# Patient Record
Sex: FEMALE | Race: WHITE | NOT HISPANIC OR LATINO | Employment: FULL TIME | URBAN - METROPOLITAN AREA
[De-identification: names, ages, dates, MRNs, and addresses within clinical notes are randomized per-mention and may not be internally consistent; named-entity substitution may affect disease eponyms.]

---

## 2020-03-12 ENCOUNTER — CONSULT (OUTPATIENT)
Dept: ONCOLOGY | Facility: CLINIC | Age: 55
End: 2020-03-12

## 2020-03-12 VITALS
HEIGHT: 62 IN | SYSTOLIC BLOOD PRESSURE: 158 MMHG | HEART RATE: 78 BPM | DIASTOLIC BLOOD PRESSURE: 89 MMHG | OXYGEN SATURATION: 97 % | WEIGHT: 186 LBS | TEMPERATURE: 97.2 F | BODY MASS INDEX: 34.23 KG/M2 | RESPIRATION RATE: 16 BRPM

## 2020-03-12 DIAGNOSIS — C50.411 MALIGNANT NEOPLASM OF UPPER-OUTER QUADRANT OF RIGHT BREAST IN FEMALE, ESTROGEN RECEPTOR NEGATIVE (HCC): Primary | ICD-10-CM

## 2020-03-12 DIAGNOSIS — Z51.11 ENCOUNTER FOR ANTINEOPLASTIC CHEMOTHERAPY: ICD-10-CM

## 2020-03-12 DIAGNOSIS — Z17.1 MALIGNANT NEOPLASM OF UPPER-OUTER QUADRANT OF RIGHT BREAST IN FEMALE, ESTROGEN RECEPTOR NEGATIVE (HCC): Primary | ICD-10-CM

## 2020-03-12 PROBLEM — C50.419 MALIGNANT NEOPLASM OF UPPER OUTER QUADRANT OF BREAST IN FEMALE, ESTROGEN RECEPTOR NEGATIVE (HCC): Status: ACTIVE | Noted: 2020-03-12

## 2020-03-12 PROCEDURE — 99245 OFF/OP CONSLTJ NEW/EST HI 55: CPT | Performed by: INTERNAL MEDICINE

## 2020-03-12 RX ORDER — DEXAMETHASONE 4 MG/1
TABLET ORAL
Qty: 6 TABLET | Refills: 3 | Status: SHIPPED | OUTPATIENT
Start: 2020-03-12 | End: 2020-08-07

## 2020-03-12 RX ORDER — DOXORUBICIN HYDROCHLORIDE 2 MG/ML
60 INJECTION, SOLUTION INTRAVENOUS ONCE
Status: CANCELLED | OUTPATIENT
Start: 2020-03-24

## 2020-03-12 RX ORDER — ATENOLOL 50 MG/1
75 TABLET ORAL 2 TIMES DAILY
COMMUNITY
Start: 2019-12-27

## 2020-03-12 RX ORDER — MODAFINIL 200 MG/1
200 TABLET ORAL DAILY
COMMUNITY
Start: 2020-03-11

## 2020-03-12 RX ORDER — ONDANSETRON HYDROCHLORIDE 8 MG/1
8 TABLET, FILM COATED ORAL 3 TIMES DAILY PRN
Qty: 30 TABLET | Refills: 5 | Status: SHIPPED | OUTPATIENT
Start: 2020-03-12 | End: 2020-10-02 | Stop reason: SDUPTHER

## 2020-03-12 RX ORDER — SODIUM CHLORIDE 9 MG/ML
250 INJECTION, SOLUTION INTRAVENOUS ONCE
Status: CANCELLED | OUTPATIENT
Start: 2020-03-24

## 2020-03-12 RX ORDER — HYDROCHLOROTHIAZIDE 25 MG/1
25 TABLET ORAL DAILY
COMMUNITY
Start: 2019-12-23 | End: 2021-06-26 | Stop reason: HOSPADM

## 2020-03-12 RX ORDER — VENLAFAXINE HYDROCHLORIDE 75 MG/1
75 CAPSULE, EXTENDED RELEASE ORAL DAILY
COMMUNITY
Start: 2019-12-23

## 2020-03-12 RX ORDER — LIDOCAINE AND PRILOCAINE 25; 25 MG/G; MG/G
CREAM TOPICAL AS NEEDED
Qty: 30 G | Refills: 3 | Status: SHIPPED | OUTPATIENT
Start: 2020-03-12 | End: 2021-06-08

## 2020-03-12 RX ORDER — PALONOSETRON 0.05 MG/ML
0.25 INJECTION, SOLUTION INTRAVENOUS ONCE
Status: CANCELLED | OUTPATIENT
Start: 2020-03-24

## 2020-03-12 NOTE — PROGRESS NOTES
CHIEF COMPLAINT: Neoadjuvant breast cancer discussion    REASON FOR REFERRAL: Same      RECORDS OBTAINED  Records of the patients history including those obtained from Dr. Perez were reviewed and summarized in detail.    Oncology/Hematology History    1.  Stage IIB T2 (2.5cm per Dr. Perez) N0 invasive ductal triple negative breast cancer.  2/16/2020 right upper outer quadrant needle core biopsy showed invasive ductal carcinoma 4.4 mm moderately differentiated negative for estrogen, progesterone receptor both 0% and HER-2/vashti 0+.  Dr. Santo recommended lumpectomy and sentinel node biopsy.  3/12/2020 saw Dr. Gary Perez.perimenopausal breast cancer she felt a 2.5 cm right upper outer quadrant mass almost at the edge of the axilla somewhat fixed.  No skin changes.  She recommended neoadjuvant chemotherapy followed by lumpectomy versus mastectomy then radiation if patient decides on breast conservation or if nodes are found on breast MRI.  She will get formal genetic counseling to help guide the issues of possible prophylactic mastectomy.  With triple negative disease the standard recommendation would be neoadjuvant Adriamycin Cytoxan dose dense x4 followed by Taxol weekly x12 followed by surgery.  If there is residual disease then would consider Xeloda per the create X trial.  We also have  looking at observation versus Keytruda for 1 year in patients with triple negative disease who do not have a complete pathological response to neoadjuvant chemotherapy.  In the absence of symptoms, NCCN guidelines does not recommend routine metastatic surveillance imaging.  Will need ejection fraction before Adriamycin.  2.  Hypertension  3.  Hyperlipidemia  4.  Diabetes  5.  Using IUD and taking estradiol for hormone replacement therapy preoperatively  6.  History of D and C as well as bilateral tubal ligation  7.  History of cholecystectomy  8.  Vasomotor symptoms  9.  Family history of paternal aunt with breast cancer  and perhaps a sister with ovarian cancer but she is not sure.  She has nulliparous.  Genetic spit test done results pending.    10.  Anxiety and depression        Malignant neoplasm of upper outer quadrant of breast in female, estrogen receptor negative (CMS/HCC)    3/12/2020 Initial Diagnosis     Malignant neoplasm of upper outer quadrant of breast in female, estrogen receptor negative (CMS/HCC)      3/12/2020 Cancer Staged     Staging form: Breast, AJCC 8th Edition  - Clinical: Stage IIB (cT2, cN0, cM0, G2, ER-, NV-, HER2-) - Signed by Johann Juarez MD on 3/12/2020         HISTORY OF PRESENT ILLNESS:  The patient is a 54 y.o.  female, referred for neoadjuvant breast cancer discussion.  This was found on screening mammography.  See above for oncology history.  No signs or symptoms to suggest metastasis.    REVIEW OF SYSTEMS:  A 14 point review of systems was performed and is negative except as noted above.    History reviewed. No pertinent past medical history.  Past Surgical History:   Procedure Laterality Date   • BREAST BIOPSY Right 02/2020   • CHOLECYSTECTOMY     • COLONOSCOPY         Current Outpatient Medications on File Prior to Visit   Medication Sig Dispense Refill   • atenolol (TENORMIN) 50 MG tablet      • hydroCHLOROthiazide (HYDRODIURIL) 25 MG tablet      • metFORMIN (GLUCOPHAGE) 1000 MG tablet Take 1,000 mg by mouth 2 (Two) Times a Day.     • modafinil (PROVIGIL) 200 MG tablet      • venlafaxine XR (EFFEXOR-XR) 75 MG 24 hr capsule        No current facility-administered medications on file prior to visit.        No Known Allergies    Social History     Socioeconomic History   • Marital status:      Spouse name: Not on file   • Number of children: Not on file   • Years of education: Not on file   • Highest education level: Not on file   Tobacco Use   • Smoking status: Never Smoker   • Smokeless tobacco: Never Used       Family History   Problem Relation Age of Onset   • Diabetes Mother    • Lung  "cancer Father    • Breast cancer Paternal Aunt        PHYSICAL EXAM:    /89   Pulse 78   Temp 97.2 °F (36.2 °C)   Resp 16   Ht 157.5 cm (62\")   Wt 84.4 kg (186 lb)   SpO2 97%   BMI 34.02 kg/m²     ECOG: (0) Fully Active - Able to Carry On All Pre-disease Performance Without Restriction  General: well appearing female in no acute distress  HEENT: sclera anicteric, oropharynx clear  Lymphatics: no cervical, supraclavicular, inguinal, or axillary adenopathy  Cardiovascular: regular rate and rhythm, no murmurs  Neck: Supple; No thyromegaly  Lungs: clear to auscultation bilaterally. No respiratory distress.   Abdomen: soft, nontender, nondistended.  No palpable organomegaly  Extremities: no cyanosis, clubbing, edema, or cords  Skin: no rashes, lesions, bruising, or petechiae  Neuro: Alert and oriented x 4; Moving all extremities.  Psych: No anxiety or depression  Breasts: Just examined by Dr. Perez today  No results found for: HGB, HCT, MCV, PLT, WBC, NEUTROABS, LYMPHSABS, MONOSABS, EOSABS, BASOSABS  No results found for: GLUCOSE, BUN, CREATININE, NA, K, CL, CO2, CALCIUM, PROTEINTOT, ALBUMIN, BILITOT, ALKPHOS, AST, ALT        Assessment/Plan     1.  Stage IIB T2 (2.5cm per Dr. Perez) N0 invasive ductal triple negative breast cancer.  2/16/2020 right upper outer quadrant needle core biopsy showed invasive ductal carcinoma 4.4 mm moderately differentiated negative for estrogen, progesterone receptor both 0% and HER-2/avshti 0+.  Dr. Santo recommended lumpectomy and sentinel node biopsy.  3/12/2020 saw Dr. Gary Perez.perimenopausal breast cancer she felt a 2.5 cm right upper outer quadrant mass almost at the edge of the axilla somewhat fixed.  No skin changes.  She recommended neoadjuvant chemotherapy followed by lumpectomy versus mastectomy then radiation if patient decides on breast conservation or if nodes are found on breast MRI.  She will get formal genetic counseling to help guide the issues of possible " prophylactic mastectomy.  With triple negative disease the standard recommendation would be neoadjuvant Adriamycin Cytoxan dose dense x4 followed by Taxol weekly x12 followed by surgery.  If there is residual disease then would consider Xeloda per the create X trial.  We also have  looking at observation versus Keytruda for 1 year in patients with triple negative disease who do not have a complete pathological response to neoadjuvant chemotherapy.  In the absence of symptoms, NCCN guidelines does not recommend routine metastatic surveillance imaging.  Will need ejection fraction before Adriamycin.  2.  Hypertension  3.  Hyperlipidemia  4.  Diabetes  5.  Using IUD and taking estradiol for hormone replacement therapy preoperatively  6.  History of D and C as well as bilateral tubal ligation  7.  History of cholecystectomy  8.  Vasomotor symptoms  9.  Family history of paternal aunt with breast cancer and perhaps a sister with ovarian cancer but she is not sure.  She has nulliparous.  Genetic spit test done results pending.    10.  Anxiety and depression    Discussed with patient face-to-face for 90 minutes greater than 50% spent counseling regarding this plan as outlined above  Johann Juarez MD    3/12/2020

## 2020-03-13 ENCOUNTER — LAB REQUISITION (OUTPATIENT)
Dept: LAB | Facility: HOSPITAL | Age: 55
End: 2020-03-13

## 2020-03-13 ENCOUNTER — NURSE NAVIGATOR (OUTPATIENT)
Dept: ONCOLOGY | Facility: CLINIC | Age: 55
End: 2020-03-13

## 2020-03-13 DIAGNOSIS — D05.91 UNSPECIFIED TYPE OF CARCINOMA IN SITU OF RIGHT BREAST: ICD-10-CM

## 2020-03-13 NOTE — PROGRESS NOTES
I saw patient with Dr Perez and patients daughters - Dr Perez reviewed the pathology report and surgical/treatment options -stage IB, right breast triple negative breast cancer - the patient had saliva genetic test (result pending) in [place]. She will undergo neoadjuvant chemotherapy - MRI has been ordered - she will see Dr Mcmahon today - genetic referral sent  And [facility] will fax to genetics the result from the saliva test - the genetic counselors will then decide if additional testing is needed -   Educational and supportive materials were given and reviewed

## 2020-03-16 ENCOUNTER — TELEPHONE (OUTPATIENT)
Dept: ONCOLOGY | Facility: CLINIC | Age: 55
End: 2020-03-16

## 2020-03-16 DIAGNOSIS — C50.411 MALIGNANT NEOPLASM OF UPPER-OUTER QUADRANT OF RIGHT BREAST IN FEMALE, ESTROGEN RECEPTOR NEGATIVE (HCC): Primary | ICD-10-CM

## 2020-03-16 DIAGNOSIS — Z51.11 ENCOUNTER FOR ANTINEOPLASTIC CHEMOTHERAPY: ICD-10-CM

## 2020-03-16 DIAGNOSIS — Z17.1 MALIGNANT NEOPLASM OF UPPER-OUTER QUADRANT OF RIGHT BREAST IN FEMALE, ESTROGEN RECEPTOR NEGATIVE (HCC): Primary | ICD-10-CM

## 2020-03-16 LAB
CYTO UR: NORMAL
DX PRELIMINARY: NORMAL
LAB AP CASE REPORT: NORMAL
LAB AP CLINICAL INFORMATION: NORMAL
PATH REPORT.GROSS SPEC: NORMAL

## 2020-03-16 NOTE — TELEPHONE ENCOUNTER
Spoke with patient. Advised that the port placements are on hold right now and a PICC line would be placed for treatment. Placed the referral order for the PICC line. Advised the patient that the scheduling center will call to get this scheduled. Patient verbalized understanding.

## 2020-03-17 ENCOUNTER — TELEPHONE (OUTPATIENT)
Dept: ONCOLOGY | Facility: CLINIC | Age: 55
End: 2020-03-17

## 2020-03-17 ENCOUNTER — TRANSCRIBE ORDERS (OUTPATIENT)
Dept: GENERAL RADIOLOGY | Facility: HOSPITAL | Age: 55
End: 2020-03-17

## 2020-03-17 DIAGNOSIS — C50.411 MALIGNANT NEOPLASM OF UPPER-OUTER QUADRANT OF RIGHT FEMALE BREAST, UNSPECIFIED ESTROGEN RECEPTOR STATUS (HCC): Primary | ICD-10-CM

## 2020-03-18 ENCOUNTER — HOSPITAL ENCOUNTER (OUTPATIENT)
Dept: MRI IMAGING | Facility: HOSPITAL | Age: 55
Discharge: HOME | End: 2020-03-18
Admitting: SURGERY

## 2020-03-18 ENCOUNTER — HOSPITAL ENCOUNTER (OUTPATIENT)
Dept: GENERAL RADIOLOGY | Facility: HOSPITAL | Age: 55
Discharge: HOME | End: 2020-03-18

## 2020-03-18 DIAGNOSIS — C50.411 MALIGNANT NEOPLASM OF UPPER-OUTER QUADRANT OF RIGHT FEMALE BREAST (HCC): ICD-10-CM

## 2020-03-18 DIAGNOSIS — Z95.828 PORT-A-CATH IN PLACE: ICD-10-CM

## 2020-03-18 DIAGNOSIS — C50.411 MALIGNANT NEOPLASM OF UPPER-OUTER QUADRANT OF RIGHT FEMALE BREAST, UNSPECIFIED ESTROGEN RECEPTOR STATUS (HCC): ICD-10-CM

## 2020-03-18 LAB — CREAT BLDA-MCNC: 0.9 MG/DL (ref 0.6–1.3)

## 2020-03-18 PROCEDURE — 82565 ASSAY OF CREATININE: CPT

## 2020-03-18 PROCEDURE — 77049 MRI BREAST C-+ W/CAD BI: CPT

## 2020-03-18 PROCEDURE — 77049 MRI BREAST C-+ W/CAD BI: CPT | Performed by: RADIOLOGY

## 2020-03-18 PROCEDURE — A9577 INJ MULTIHANCE: HCPCS | Performed by: SURGERY

## 2020-03-18 PROCEDURE — 71045 X-RAY EXAM CHEST 1 VIEW: CPT

## 2020-03-18 PROCEDURE — 0 GADOBENATE DIMEGLUMINE 529 MG/ML SOLUTION: Performed by: SURGERY

## 2020-03-18 PROCEDURE — 77001 FLUOROGUIDE FOR VEIN DEVICE: CPT

## 2020-03-18 RX ADMIN — GADOBENATE DIMEGLUMINE 16 ML: 529 INJECTION, SOLUTION INTRAVENOUS at 15:19

## 2020-03-19 ENCOUNTER — HOSPITAL ENCOUNTER (OUTPATIENT)
Dept: CARDIOLOGY | Facility: HOSPITAL | Age: 55
Discharge: HOME | End: 2020-03-19
Admitting: INTERNAL MEDICINE

## 2020-03-19 VITALS — HEIGHT: 62 IN | WEIGHT: 186 LBS | BODY MASS INDEX: 34.23 KG/M2

## 2020-03-19 DIAGNOSIS — C50.411 MALIGNANT NEOPLASM OF UPPER-OUTER QUADRANT OF RIGHT BREAST IN FEMALE, ESTROGEN RECEPTOR NEGATIVE (HCC): ICD-10-CM

## 2020-03-19 DIAGNOSIS — Z17.1 MALIGNANT NEOPLASM OF UPPER-OUTER QUADRANT OF RIGHT BREAST IN FEMALE, ESTROGEN RECEPTOR NEGATIVE (HCC): ICD-10-CM

## 2020-03-19 DIAGNOSIS — Z51.11 ENCOUNTER FOR ANTINEOPLASTIC CHEMOTHERAPY: ICD-10-CM

## 2020-03-19 LAB
BH CV ECHO MEAS - AO MAX PG (FULL): 2.3 MMHG
BH CV ECHO MEAS - AO MAX PG: 7 MMHG
BH CV ECHO MEAS - AO ROOT AREA (BSA CORRECTED): 1.6
BH CV ECHO MEAS - AO ROOT AREA: 6.6 CM^2
BH CV ECHO MEAS - AO ROOT DIAM: 2.9 CM
BH CV ECHO MEAS - AO V2 MAX: 136 CM/SEC
BH CV ECHO MEAS - AVA(V,A): 2.3 CM^2
BH CV ECHO MEAS - AVA(V,D): 2.3 CM^2
BH CV ECHO MEAS - BSA(HAYCOCK): 2 M^2
BH CV ECHO MEAS - BSA: 1.9 M^2
BH CV ECHO MEAS - BZI_BMI: 34 KILOGRAMS/M^2
BH CV ECHO MEAS - BZI_METRIC_HEIGHT: 157.5 CM
BH CV ECHO MEAS - BZI_METRIC_WEIGHT: 84.4 KG
BH CV ECHO MEAS - EDV(CUBED): 92.3 ML
BH CV ECHO MEAS - EDV(MOD-SP2): 53 ML
BH CV ECHO MEAS - EDV(MOD-SP4): 61 ML
BH CV ECHO MEAS - EDV(TEICH): 93.4 ML
BH CV ECHO MEAS - EF(CUBED): 62.5 %
BH CV ECHO MEAS - EF(MOD-BP): 61 %
BH CV ECHO MEAS - EF(MOD-SP2): 64.2 %
BH CV ECHO MEAS - EF(MOD-SP4): 57.4 %
BH CV ECHO MEAS - EF(TEICH): 54.1 %
BH CV ECHO MEAS - ESV(CUBED): 34.6 ML
BH CV ECHO MEAS - ESV(MOD-SP2): 19 ML
BH CV ECHO MEAS - ESV(MOD-SP4): 26 ML
BH CV ECHO MEAS - ESV(TEICH): 42.8 ML
BH CV ECHO MEAS - FS: 27.9 %
BH CV ECHO MEAS - IVS/LVPW: 0.94
BH CV ECHO MEAS - IVSD: 0.86 CM
BH CV ECHO MEAS - LA DIMENSION: 3.5 CM
BH CV ECHO MEAS - LA/AO: 1.2
BH CV ECHO MEAS - LAD MAJOR: 5.3 CM
BH CV ECHO MEAS - LAT PEAK E' VEL: 11.8 CM/SEC
BH CV ECHO MEAS - LATERAL E/E' RATIO: 5.4
BH CV ECHO MEAS - LV DIASTOLIC VOL/BSA (35-75): 32.9 ML/M^2
BH CV ECHO MEAS - LV MASS(C)D: 131.8 GRAMS
BH CV ECHO MEAS - LV MASS(C)DI: 71.1 GRAMS/M^2
BH CV ECHO MEAS - LV MAX PG: 4.7 MMHG
BH CV ECHO MEAS - LV MEAN PG: 3 MMHG
BH CV ECHO MEAS - LV SYSTOLIC VOL/BSA (12-30): 14 ML/M^2
BH CV ECHO MEAS - LV V1 MAX: 108 CM/SEC
BH CV ECHO MEAS - LV V1 MEAN: 74.3 CM/SEC
BH CV ECHO MEAS - LV V1 VTI: 22.7 CM
BH CV ECHO MEAS - LVIDD: 4.5 CM
BH CV ECHO MEAS - LVIDS: 3.3 CM
BH CV ECHO MEAS - LVLD AP2: 7.2 CM
BH CV ECHO MEAS - LVLD AP4: 7.2 CM
BH CV ECHO MEAS - LVLS AP2: 5.7 CM
BH CV ECHO MEAS - LVLS AP4: 6.1 CM
BH CV ECHO MEAS - LVOT AREA (M): 2.8 CM^2
BH CV ECHO MEAS - LVOT AREA: 2.8 CM^2
BH CV ECHO MEAS - LVOT DIAM: 1.9 CM
BH CV ECHO MEAS - LVPWD: 0.92 CM
BH CV ECHO MEAS - MED PEAK E' VEL: 5.7 CM/SEC
BH CV ECHO MEAS - MEDIAL E/E' RATIO: 11.1
BH CV ECHO MEAS - MV A MAX VEL: 61.4 CM/SEC
BH CV ECHO MEAS - MV DEC TIME: 0.22 SEC
BH CV ECHO MEAS - MV E MAX VEL: 63.4 CM/SEC
BH CV ECHO MEAS - MV E/A: 1
BH CV ECHO MEAS - PA ACC SLOPE: 436.5 CM/SEC^2
BH CV ECHO MEAS - PA ACC TIME: 0.11 SEC
BH CV ECHO MEAS - PA MAX PG: 2.7 MMHG
BH CV ECHO MEAS - PA PR(ACCEL): 29.1 MMHG
BH CV ECHO MEAS - PA V2 MAX: 82.5 CM/SEC
BH CV ECHO MEAS - SI(CUBED): 31.1 ML/M^2
BH CV ECHO MEAS - SI(LVOT): 34.7 ML/M^2
BH CV ECHO MEAS - SI(MOD-SP2): 18.3 ML/M^2
BH CV ECHO MEAS - SI(MOD-SP4): 18.9 ML/M^2
BH CV ECHO MEAS - SI(TEICH): 27.3 ML/M^2
BH CV ECHO MEAS - SV(CUBED): 57.7 ML
BH CV ECHO MEAS - SV(LVOT): 64.4 ML
BH CV ECHO MEAS - SV(MOD-SP2): 34 ML
BH CV ECHO MEAS - SV(MOD-SP4): 35 ML
BH CV ECHO MEAS - SV(TEICH): 50.6 ML
BH CV ECHO MEAS - TAPSE (>1.6): 1.6 CM2
BH CV ECHO MEASUREMENTS AVERAGE E/E' RATIO: 7.25
BH CV VAS BP LEFT ARM: NORMAL MMHG
BH CV XLRA - RV BASE: 3.1 CM
BH CV XLRA - RV LENGTH: 6.9 CM
BH CV XLRA - RV MID: 2.1 CM
BH CV XLRA - TDI S': 8.9 CM/SEC
LEFT ATRIUM VOLUME INDEX: 27.5 ML/M^2
LEFT ATRIUM VOLUME: 51 ML
LV EF 2D ECHO EST: 60 %
MAXIMAL PREDICTED HEART RATE: 166 BPM
STRESS TARGET HR: 141 BPM

## 2020-03-19 PROCEDURE — 93306 TTE W/DOPPLER COMPLETE: CPT

## 2020-03-19 PROCEDURE — 93306 TTE W/DOPPLER COMPLETE: CPT | Performed by: INTERNAL MEDICINE

## 2020-03-20 ENCOUNTER — OFFICE VISIT (OUTPATIENT)
Dept: ONCOLOGY | Facility: CLINIC | Age: 55
End: 2020-03-20

## 2020-03-20 VITALS
WEIGHT: 185 LBS | BODY MASS INDEX: 34.04 KG/M2 | OXYGEN SATURATION: 98 % | TEMPERATURE: 96.6 F | SYSTOLIC BLOOD PRESSURE: 169 MMHG | HEART RATE: 77 BPM | HEIGHT: 62 IN | DIASTOLIC BLOOD PRESSURE: 93 MMHG | RESPIRATION RATE: 16 BRPM

## 2020-03-20 DIAGNOSIS — Z17.1 MALIGNANT NEOPLASM OF UPPER-OUTER QUADRANT OF RIGHT BREAST IN FEMALE, ESTROGEN RECEPTOR NEGATIVE (HCC): Primary | ICD-10-CM

## 2020-03-20 DIAGNOSIS — C50.411 MALIGNANT NEOPLASM OF UPPER-OUTER QUADRANT OF RIGHT BREAST IN FEMALE, ESTROGEN RECEPTOR NEGATIVE (HCC): Primary | ICD-10-CM

## 2020-03-20 PROCEDURE — 99215 OFFICE O/P EST HI 40 MIN: CPT | Performed by: NURSE PRACTITIONER

## 2020-03-20 RX ORDER — HYDROCODONE BITARTRATE AND ACETAMINOPHEN 5; 325 MG/1; MG/1
TABLET ORAL
COMMUNITY
Start: 2020-03-18 | End: 2020-06-14

## 2020-03-20 NOTE — PROGRESS NOTES
CHEMOTHERAPY PREPARATION    Kenya Lemon  [id number]  1965    Chief Complaint: chemo education     History of present illness:  Kenya Lemon is a 54 y.o. year old female who is here today for chemotherapy preparation and needs assessment. The patient has been diagnosed with stage IIB triple negative breast cancer and is scheduled to begin treatment with dose dense adriamycin/cytoxan followed by weekly Taxol.     Oncology History:    Oncology/Hematology History    1.  Stage IIB T2 (2.5cm per Dr. Perez) N0 invasive ductal triple negative breast cancer.  2/16/2020 right upper outer quadrant needle core biopsy showed invasive ductal carcinoma 4.4 mm moderately differentiated negative for estrogen, progesterone receptor both 0% and HER-2/vashti 0+.  Dr. Santo recommended lumpectomy and sentinel node biopsy.  3/12/2020 saw Dr. Gary Perez.perimenopausal breast cancer she felt a 2.5 cm right upper outer quadrant mass almost at the edge of the axilla somewhat fixed.  No skin changes.  She recommended neoadjuvant chemotherapy followed by lumpectomy versus mastectomy then radiation if patient decides on breast conservation or if nodes are found on breast MRI.  She will get formal genetic counseling to help guide the issues of possible prophylactic mastectomy.  With triple negative disease the standard recommendation would be neoadjuvant Adriamycin Cytoxan dose dense x4 followed by Taxol weekly x12 followed by surgery.  If there is residual disease then would consider Xeloda per the create X trial.  We also have  looking at observation versus Keytruda for 1 year in patients with triple negative disease who do not have a complete pathological response to neoadjuvant chemotherapy.  In the absence of symptoms, NCCN guidelines does not recommend routine metastatic surveillance imaging.  Will need ejection fraction before Adriamycin.  2.  Hypertension  3.  Hyperlipidemia  4.  Diabetes  5.  Using IUD and  taking estradiol for hormone replacement therapy preoperatively  6.  History of D and C as well as bilateral tubal ligation  7.  History of cholecystectomy  8.  Vasomotor symptoms  9.  Family history of paternal aunt with breast cancer and perhaps a sister with ovarian cancer but she is not sure.  She has nulliparous.  Genetic spit test done results pending.    10.  Anxiety and depression        Malignant neoplasm of upper outer quadrant of breast in female, estrogen receptor negative (CMS/HCC)    3/12/2020 Initial Diagnosis     Malignant neoplasm of upper outer quadrant of breast in female, estrogen receptor negative (CMS/HCC)      3/12/2020 Cancer Staged     Staging form: Breast, AJCC 8th Edition  - Clinical: Stage IIB (cT2, cN0, cM0, G2, ER-, IA-, HER2-) - Signed by Johann Juarez MD on 3/12/2020        Malignant neoplasm of upper-outer quadrant of right breast in female, estrogen receptor negative (CMS/HCC)    3/12/2020 Initial Diagnosis     Malignant neoplasm of upper-outer quadrant of right breast in female, estrogen receptor negative (CMS/HCC)      3/24/2020 -  Chemotherapy     OP BREAST AC DD DOXOrubicin / Cyclophosphamide      5/19/2020 -  Chemotherapy     OP BREAST PACLitaxel (weekly X 12)         No past medical history on file.    Past Surgical History:   Procedure Laterality Date   • BREAST BIOPSY Right 02/2020   • CHOLECYSTECTOMY     • COLONOSCOPY         MEDICATIONS: The current medication list was reviewed and reconciled.     Allergies:  has No Known Allergies.    Family History   Problem Relation Age of Onset   • Diabetes Mother    • Lung cancer Father    • Breast cancer Paternal Aunt          Review of Systems   Constitutional: Negative for fatigue, fever and unexpected weight change.   HENT: Negative for congestion, hearing loss, sore throat and trouble swallowing.    Eyes: Negative for visual disturbance.   Respiratory: Negative for cough, shortness of breath and wheezing.    Cardiovascular:  "Negative for chest pain and leg swelling.   Gastrointestinal: Negative for abdominal distention, abdominal pain, constipation, diarrhea, nausea and vomiting.   Endocrine: Negative.    Genitourinary: Negative.    Musculoskeletal: Negative for arthralgias, back pain and gait problem.   Skin: Negative.         tenderness at port site   Allergic/Immunologic: Negative.    Neurological: Negative for dizziness, weakness, numbness and headaches.   Hematological: Negative for adenopathy. Does not bruise/bleed easily.   Psychiatric/Behavioral: The patient is nervous/anxious.    All other systems reviewed and are negative.      Physical Exam  Vital Signs: /93   Pulse 77   Temp 96.6 °F (35.9 °C) (Temporal)   Resp 16   Ht 157.5 cm (62.01\")   Wt 83.9 kg (185 lb)   SpO2 98%   BMI 33.83 kg/m²   Vitals:    03/20/20 1357   PainSc: 0-No pain           General Appearance:  alert, cooperative, no apparent distress and appears stated age   Neurologic/Psychiatric: A&O x 3, gait steady, appropriate affect   HEENT:  Normocephalic, without obvious abnormality, mucous membranes moist   Lungs:   Clear to auscultation bilaterally; respirations regular, even, and unlabored bilaterally   Heart:  Regular rate and rhythm, no murmurs appreciated   Extremities: Normal, atraumatic; no clubbing, cyanosis, or edema    Skin: No rashes, lesions, or abnormal coloration noted     ECOG Performance Status: (0) Fully Active - Able to Carry On All Pre-disease Performance Without Restriction          NEEDS ASSESSMENTS    Genetics  The patient's new diagnosis and family history have been reviewed for genetic counseling needs. A genetic referral is recommended.     Molecular Testing  Further molecular testing on tumor is not indicated.     Psychosocial  The patient has completed a PHQ-9 Depression Screening and the Distress Thermometer (DT) today.   PHQ-9 Total Score:  . PHQ-9 results show 1-4 (Minimal Depression). The patient scored their distress " "today as 0 on a scale of 0-10 with 0 being no distress and 10 being extreme distress.   Problems marked by the patient as being an issue for them within the last week include emotional problems.   Results were reviewed along with psychosocial resources offered by our cancer center. Our oncology social worker will be flagged for a DT score of 4 or above, and a same day call will be made for a score of 9 or 10. A mental health referral is not recommended at this time. The patient is not accepting of a referral to [facility].   Copies of patient's questionnaires will be scanned into EMR for details and further reference.    Barriers to care  A barriers form was also completed by the patient today. We discussed services offered by our facility to help her have adequate access to care. The patient was given the name and card for our Oncology Social Worker, Clair John. Based upon barriers assessment today, the patient will not require a follow-up call from the  to further discuss needs.   A copy of the barriers form will also be scanned into EMR for details and further reference.     VAD Assessment  The patient and I discussed planned intervenous chemotherapy as well as other IV treatments that are often needed throughout the course of treatment. These may include, but are not limited to blood transfusions, antibiotics, and IV hydration. The vasculature does not appear to be adequate for multiple peripheral IVs throughout their treatment course. Discussed risks and benefits of VADs. The patient would like to pursue Port-A-Cath insertion prior to initiation of treatment. Pt has had port placed and has RX for EMLA cream to be used prior to access.     Advance Care Planning   ACP discussion was held with the patient during this visit. Patient does not have an advance directive, information provided.  The patient and I discussed advanced care planning, \"Conversations that Matter\".   This service was " offered, free of charge, for development of advance directives with a certified ACP facilitator.  The patient does not have an up-to-date advanced directive. This document is not on file with our office. The patient is interested in an appointment with one of our facilitators to create or update their advanced directives.         Palliative Care  The patient and I discussed palliative care services. Palliative care is not the same as Hospice care. This is specialized medical care for people living with serious illness with the goal of improving quality of life for the patient and their family. [facility] has partnered with [facility] to offer our patients outpatient palliative care early along with their treatment to assist in coordination of care, symptom management, pain management, and medical decision making.  Oncology criteria for palliative care referral is not met at this time. The patient is not interested in a palliative care consultation.     Additional Referral needs  none      CHEMOTHERAPY EDUCATION    Booklets Given: Chemotherapy and You [x]  Eating Hints [x]    Sexuality/Fertility Books []      Chemotherapy/Biotherapy Education Sheets: (list all that apply)  nausea management, acid reflux management, diarrhea management, Cancer resourse contacts information, skin and mouth care and wig information                                                                                                                                                                 Chemotherapy Regimen:   Treatment Plans     Name Type Plan dates Plan Provider         Active    OP BREAST AC DD DOXOrubicin / Cyclophosphamide ONCOLOGY TREATMENT  3/23/2020 - Present Johann Juarez MD                    TOPICS EDUCATION PROVIDED COMMENTS   ANEMIA:  role of RBC, cause, s/s, ways to manage, role of transfusion [x]    THROMBOCYTOPENIA:  role of platelet, cause, s/s, ways to prevent bleeding, things to avoid, when to seek help  [x]    NEUTROPENIA:  role of WBC, cause, infection precautions, s/s of infection, when to call MD [x]    NUTRITION & APPETITE CHANGES:  importance of maintaining healthy diet & weight, ways to manage to improve intake, dietary consult, exercise regimen [x]    DIARRHEA:  causes, s/s of dehydration, ways to manage, dietary changes, when to call MD [x]    CONSTIPATION:  causes, ways to manage, dietary changes, when to call MD [x]    NAUSEA & VOMITING:  cause, use of antiemetics, dietary changes, when to call MD [x]    MOUTH SORES:  causes, oral care, ways to manage [x]    ALOPECIA:  cause, ways to manage, resources [x]    INFERTILITY & SEXUALITY:  causes, fertility preservation options, sexuality changes, ways to manage, importance of birth control [x]    NERVOUS SYSTEM CHANGES:  causes, s/s, neuropathies, cognitive changes, ways to manage [x]    PAIN:  causes, ways to manage [x]    SKIN & NAIL CHANGES:  cause, s/s, ways to manage [x]    ORGAN TOXICITIES:  cause, s/s, need for diagnostic tests, labs, when to notify MD [x]    SURVIVORSHIP:  distress, distress assessment, secondary malignancies, early/late effects, follow-up, social issues, social support [x]    HOME CARE:  use of spill kits, storing of PO chemo, how to manage bodily fluids [x]    MISCELLANEOUS:  drug interactions, administration, vesicant, et [x]        Assessment and Plan:    Diagnoses and all orders for this visit:    Malignant neoplasm of upper-outer quadrant of right breast in female, estrogen receptor negative (CMS/HCC)  -     Ambulatory Referral to Advance Care Planning        This was a 60 minute face-to-face visit with 60 minutes spent in  counseling and coordination of care as documented above.   The patient and I have reviewed their new cancer diagnosis and scheduled treatment plan. Needs assessment was completed including genetics, psychosocial needs, barriers to care, VAD evaluation, advanced care planning, and palliative care services.  Referrals have been ordered as appropriate based upon our evaluation and patient desires.     Chemotherapy teaching was also completed today as documented above. Adequate time was given to answer all questions to her satisfaction. Patient and family are aware of their care team members and contact information if they have questions or problems throughout the treatment course. Needs assessments and education has been completed. The patient is adequately prepared to begin treatment as scheduled.     Pain assessment was performed today as a part of patient’s care. For patients with pain related to surgery, gynecologic malignancy or cancer treatment, the plan is as noted in the assessment/plan.  For patients with pain not related to these issues, they are to seek any further needed care from a more appropriate provider, such as PCP.    Pt declined pre-treatment labs today. She would like to have them done when she comes for treatment next week with port access.     Reviewed RX for home use including Zofran 8 mg taken every 8 hours as needed for nausea and vomiting, Decadron 2 tabs in the AM on days 2, 3, and 4 of treatment, and EMLA cream prior to port access.     The patient is interested in advanced care planning and health care surrogate. Referral placed for ACP.     The patient will follow up with [facility] for cycle #2 of treatment.     I advised the patient monitor her blood pressure at home. If she continues to run high (>160/90) she will contact her PCP regarding recommendations for management.     I encouraged the patient to take Claritin 10 mg daily for bone pain related to Neulasta.     [facility] Crowe

## 2020-03-23 ENCOUNTER — TELEPHONE (OUTPATIENT)
Dept: NUTRITION | Facility: HOSPITAL | Age: 55
End: 2020-03-23

## 2020-03-23 ENCOUNTER — EDUCATION (OUTPATIENT)
Dept: ONCOLOGY | Facility: HOSPITAL | Age: 55
End: 2020-03-23

## 2020-03-23 NOTE — PROGRESS NOTES
Oncology Nutrition Screening    Patient Name:  Kenya Lemon  YOB: 1965  MRN: [id number]  Date:  03/23/20  Physician:  Dr. Juarez    Type of Cancer Treatment:   Chemotherapy: Dose Dense Adriamycin / Cytoxan - every 14 days x 4 followed by Taxol    Patient Active Problem List   Diagnosis   • Malignant neoplasm of upper outer quadrant of breast in female, estrogen receptor negative (CMS/HCC)   • Encounter for antineoplastic chemotherapy   • Malignant neoplasm of upper-outer quadrant of right breast in female, estrogen receptor negative (CMS/HCC)       Current Outpatient Medications   Medication Sig Dispense Refill   • atenolol (TENORMIN) 50 MG tablet      • dexamethasone (DECADRON) 4 MG tablet Take 2 tablets in the morning daily on Days 2, 3 & 4.  Take with food. 6 tablet 3   • hydroCHLOROthiazide (HYDRODIURIL) 25 MG tablet      • HYDROcodone-acetaminophen (NORCO) 5-325 MG per tablet      • lidocaine-prilocaine (EMLA) 2.5-2.5 % cream Apply  topically to the appropriate area as directed As Needed (45-60 minutes prior to port access.  Cover with saran/plastic wrap.). 30 g 3   • metFORMIN (GLUCOPHAGE) 1000 MG tablet Take 1,000 mg by mouth 2 (Two) Times a Day.     • modafinil (PROVIGIL) 200 MG tablet      • ondansetron (ZOFRAN) 8 MG tablet Take 1 tablet by mouth 3 (Three) Times a Day As Needed for Nausea or Vomiting. 30 tablet 5   • venlafaxine XR (EFFEXOR-XR) 75 MG 24 hr capsule        No current facility-administered medications for this visit.        Glycemic Risk:   NIDDM, Steriods    Weight:   Height: 62 inches  Weight: 185 lbs.   BMI: 33.8  Obese  Weight - no recent weight changes reported    Oral Food Intake:  Regular Diet - No Restrictions    Hydration Status:   How many 8 ounce glass of water of fluid do you drink per day?  Not assessed at this time.    Enteral Feeding:   n/a    Nutrition Symptoms:   No nutrition impact symptoms reported    Activity:   Normal with no limitations     reports  "that she has never smoked. She has never used smokeless tobacco.    Evaluation of Nutritional Risk:   Patient has been identified at mild nutrition risk due to diagnosis, treatment plan, glycemic risk, and patient education.  Attempted to contact patient via phone call x 2 for nutritional assessment / education, however no answer.  Unable to leave VM message as patient's voicemail box is full.    Will provide written diet materials \"Blood Glucose Management\" and \"Nutritional Considerations in Breast Cancer\" with RD's contact information at her infusion clinic appointment on 3/24/20.    RD available to assist as needed.  Will follow up as indicated.    Electronically signed by:  Keyla Espinosa RD  14:33  "

## 2020-03-23 NOTE — PLAN OF CARE
Outpatient Infusion • [address] [facility] • [address] • [place] [id number] • [phone]      CHEMOTHERAPY EDUCATION SHEET    NAME:  Kenya Lemon      : 1965           DATE: 20    Booklets Given: Chemotherapy and You []  Eating Hints []    Sexuality/Fertility Books []     Chemotherapy/Biotherapy Education Sheets: (list all that apply)  Will provide written material during infusion appt: doxorubicin, cyclophosphamide, Neulasta OnPro                                                                                                                                                                Chemotherapy Regimen:  Dose-dense AC (doxorubicin + cyclophosphamide) + Neulasta OnPro every 14 days x 4 cycles    TOPICS EDUCATION PROVIDED EDUCATION REINFORCED COMMENTS   ANEMIA:  role of RBC, cause, s/s, ways to manage, role of transfusion [] [x] Discussed risk of anemia and encouraged to stay active.   THROMBOCYTOPENIA:  role of platelet, cause, s/s, ways to prevent bleeding, things to avoid, when to seek help [] [x] Discussed risk of bruising and bleeding.   NEUTROPENIA:  role of WBC, cause, infection precautions, s/s of infection, when to call MD [] [x] Discussed risk of infection and when to notify MD office; disucssed importance of hand washing.   NUTRITION & APPETITE CHANGES:  importance of maintaining healthy diet & weight, ways to manage to improve intake, dietary consult, exercise regimen [] [x] Discussed risk of decreased appetite.     DIARRHEA:  causes, s/s of dehydration, ways to manage, dietary changes, when to call MD [] [x] Recommended OTC Immodium if needed.   CONSTIPATION:  causes, ways to manage, dietary changes, when to call MD [] [x] Recommended stool softner and Miralax if needed   NAUSEA & VOMITING:  cause, use of antiemetics, dietary changes, when to call MD [] [x] Discussed pre-meds and at home meds-when and how to take.   MOUTH SORES:  causes, oral care, ways to manage [] [x]  Discussed preventive mouthwash with salt/baking soda/water and to notify MD office if sores develop.  Discussed using a soft brissle toothbrush and flossing.   ALOPECIA:  cause, ways to manage, resources [] [x] Discussed hair loss.   INFERTILITY & SEXUALITY:  causes, fertility preservation options, sexuality changes, ways to manage, importance of birth control [] [x] Discussed safe sex practices.   NERVOUS SYSTEM CHANGES:  causes, s/s, neuropathies, cognitive changes, ways to manage [] []    PAIN:  causes, ways to manage [] [] ????   SKIN & NAIL CHANGES:  cause, s/s, ways to manage [] [x]    ORGAN TOXICITIES:  cause, s/s, need for diagnostic tests, labs, when to notify MD [] [x] Discussed need for Echo.   SURVIVORSHIP:  distress, distress assessment, secondary malignancies, early/late effects, follow-up, social issues, social support [] []    HOME CARE:  use of spill kits, storing of PO chemo, how to manage bodily fluids [] [x] Discussed how to manage ADRs at home and when to notify MD office.   MISCELLANEOUS:  drug interactions, administration, vesicant, et [] [x] Discussed length of infusion and frequency.  Discussed indication of Neulasta. Advised patient to take claritin daily to prevent bone pain.  Discussed red color of doxorubicin.      Referrals:        Notes:   Discussed aforementioned material with patient over the phone. All questions and concerns addressed. Provided patient with my contact information and instructions to call should additional questions arise. Patient will be provided with personalized treatment calendar during infusion appt.    Education provided via telephone in an effort to practice spatial distancing and reduce transmission risk of COVID-19 for both patients and employees during the COVID-19 pandemic.

## 2020-03-24 ENCOUNTER — HOSPITAL ENCOUNTER (OUTPATIENT)
Dept: ONCOLOGY | Facility: HOSPITAL | Age: 55
Setting detail: INFUSION SERIES
Discharge: HOME | End: 2020-03-24

## 2020-03-24 ENCOUNTER — TELEPHONE (OUTPATIENT)
Dept: MRI IMAGING | Facility: HOSPITAL | Age: 55
End: 2020-03-24

## 2020-03-24 ENCOUNTER — DOCUMENTATION (OUTPATIENT)
Dept: SOCIAL WORK | Facility: HOSPITAL | Age: 55
End: 2020-03-24

## 2020-03-24 VITALS
WEIGHT: 185.7 LBS | SYSTOLIC BLOOD PRESSURE: 155 MMHG | HEART RATE: 73 BPM | BODY MASS INDEX: 34.17 KG/M2 | TEMPERATURE: 97 F | HEIGHT: 62 IN | RESPIRATION RATE: 16 BRPM | DIASTOLIC BLOOD PRESSURE: 73 MMHG

## 2020-03-24 DIAGNOSIS — C50.411 MALIGNANT NEOPLASM OF UPPER-OUTER QUADRANT OF RIGHT BREAST IN FEMALE, ESTROGEN RECEPTOR NEGATIVE (HCC): Primary | ICD-10-CM

## 2020-03-24 DIAGNOSIS — Z17.1 MALIGNANT NEOPLASM OF UPPER-OUTER QUADRANT OF RIGHT BREAST IN FEMALE, ESTROGEN RECEPTOR NEGATIVE (HCC): Primary | ICD-10-CM

## 2020-03-24 DIAGNOSIS — Z45.2 ENCOUNTER FOR ADJUSTMENT OR MANAGEMENT OF VASCULAR ACCESS DEVICE: ICD-10-CM

## 2020-03-24 LAB
ALBUMIN SERPL-MCNC: 4.5 G/DL (ref 3.5–5.2)
ALBUMIN/GLOB SERPL: 1.5 G/DL
ALP SERPL-CCNC: 57 U/L (ref 39–117)
ALT SERPL W P-5'-P-CCNC: 22 U/L (ref 1–33)
ANION GAP SERPL CALCULATED.3IONS-SCNC: 14 MMOL/L (ref 5–15)
AST SERPL-CCNC: 17 U/L (ref 1–32)
BILIRUB SERPL-MCNC: 0.2 MG/DL (ref 0.2–1.2)
BUN BLD-MCNC: 14 MG/DL (ref 6–20)
BUN/CREAT SERPL: 18.7 (ref 7–25)
CALCIUM SPEC-SCNC: 8.9 MG/DL (ref 8.6–10.5)
CHLORIDE SERPL-SCNC: 97 MMOL/L (ref 98–107)
CO2 SERPL-SCNC: 29 MMOL/L (ref 22–29)
CREAT BLD-MCNC: 0.75 MG/DL (ref 0.57–1)
ERYTHROCYTE [DISTWIDTH] IN BLOOD BY AUTOMATED COUNT: 14 % (ref 12.3–15.4)
GFR SERPL CREATININE-BSD FRML MDRD: 81 ML/MIN/1.73
GLOBULIN UR ELPH-MCNC: 3 GM/DL
GLUCOSE BLD-MCNC: 169 MG/DL (ref 65–99)
HCT VFR BLD AUTO: 36.9 % (ref 34–46.6)
HGB BLD-MCNC: 12.3 G/DL (ref 12–15.9)
LYMPHOCYTES # BLD AUTO: 3.3 10*3/MM3 (ref 0.7–3.1)
LYMPHOCYTES NFR BLD AUTO: 42.1 % (ref 19.6–45.3)
MCH RBC QN AUTO: 29.7 PG (ref 26.6–33)
MCHC RBC AUTO-ENTMCNC: 33.4 G/DL (ref 31.5–35.7)
MCV RBC AUTO: 89 FL (ref 79–97)
MONOCYTES # BLD AUTO: 0.7 10*3/MM3 (ref 0.1–0.9)
MONOCYTES NFR BLD AUTO: 9.1 % (ref 5–12)
NEUTROPHILS # BLD AUTO: 3.9 10*3/MM3 (ref 1.7–7)
NEUTROPHILS NFR BLD AUTO: 48.8 % (ref 42.7–76)
PLATELET # BLD AUTO: 320 10*3/MM3 (ref 140–450)
PMV BLD AUTO: 7.4 FL (ref 6–12)
POTASSIUM BLD-SCNC: 3.7 MMOL/L (ref 3.5–5.2)
PROT SERPL-MCNC: 7.5 G/DL (ref 6–8.5)
RBC # BLD AUTO: 4.14 10*6/MM3 (ref 3.77–5.28)
SODIUM BLD-SCNC: 140 MMOL/L (ref 136–145)
WBC NRBC COR # BLD: 7.9 10*3/MM3 (ref 3.4–10.8)

## 2020-03-24 PROCEDURE — 25010000002 PEGFILGRASTIM 6 MG/0.6ML PREFILLED SYRINGE KIT: Performed by: INTERNAL MEDICINE

## 2020-03-24 PROCEDURE — 25010000002 DEXAMETHASONE PER 1 MG: Performed by: INTERNAL MEDICINE

## 2020-03-24 PROCEDURE — 25010000002 CYCLOPHOSPHAMIDE PER 100 MG: Performed by: INTERNAL MEDICINE

## 2020-03-24 PROCEDURE — 25010000002 PALONOSETRON 0.25 MG/5ML SOLUTION PREFILLED SYRINGE: Performed by: INTERNAL MEDICINE

## 2020-03-24 PROCEDURE — 25010000002 DOXORUBICIN PER 10 MG: Performed by: INTERNAL MEDICINE

## 2020-03-24 PROCEDURE — 85025 COMPLETE CBC W/AUTO DIFF WBC: CPT | Performed by: INTERNAL MEDICINE

## 2020-03-24 PROCEDURE — 25010000003 HEPARIN LOCK FLUCH PER 10 UNITS: Performed by: INTERNAL MEDICINE

## 2020-03-24 PROCEDURE — 25010000002 FOSAPREPITANT PER 1 MG: Performed by: INTERNAL MEDICINE

## 2020-03-24 PROCEDURE — 96413 CHEMO IV INFUSION 1 HR: CPT

## 2020-03-24 PROCEDURE — 96377 APPLICATON ON-BODY INJECTOR: CPT

## 2020-03-24 PROCEDURE — 80053 COMPREHEN METABOLIC PANEL: CPT | Performed by: INTERNAL MEDICINE

## 2020-03-24 PROCEDURE — 96411 CHEMO IV PUSH ADDL DRUG: CPT

## 2020-03-24 PROCEDURE — 96367 TX/PROPH/DG ADDL SEQ IV INF: CPT

## 2020-03-24 PROCEDURE — 96375 TX/PRO/DX INJ NEW DRUG ADDON: CPT

## 2020-03-24 RX ORDER — PALONOSETRON 0.05 MG/ML
0.25 INJECTION, SOLUTION INTRAVENOUS ONCE
Status: COMPLETED | OUTPATIENT
Start: 2020-03-24 | End: 2020-03-24

## 2020-03-24 RX ORDER — SODIUM CHLORIDE 0.9 % (FLUSH) 0.9 %
10 SYRINGE (ML) INJECTION AS NEEDED
Status: DISCONTINUED | OUTPATIENT
Start: 2020-03-24 | End: 2020-03-25 | Stop reason: HOSPADM

## 2020-03-24 RX ORDER — HEPARIN SODIUM (PORCINE) LOCK FLUSH IV SOLN 100 UNIT/ML 100 UNIT/ML
500 SOLUTION INTRAVENOUS AS NEEDED
Status: DISCONTINUED | OUTPATIENT
Start: 2020-03-24 | End: 2020-03-25 | Stop reason: HOSPADM

## 2020-03-24 RX ORDER — DOXORUBICIN HYDROCHLORIDE 2 MG/ML
110 INJECTION, SOLUTION INTRAVENOUS ONCE
Status: COMPLETED | OUTPATIENT
Start: 2020-03-24 | End: 2020-03-24

## 2020-03-24 RX ORDER — HEPARIN SODIUM (PORCINE) LOCK FLUSH IV SOLN 100 UNIT/ML 100 UNIT/ML
500 SOLUTION INTRAVENOUS AS NEEDED
Status: CANCELLED | OUTPATIENT
Start: 2020-03-24

## 2020-03-24 RX ORDER — SODIUM CHLORIDE 9 MG/ML
250 INJECTION, SOLUTION INTRAVENOUS ONCE
Status: COMPLETED | OUTPATIENT
Start: 2020-03-24 | End: 2020-03-24

## 2020-03-24 RX ORDER — SODIUM CHLORIDE 0.9 % (FLUSH) 0.9 %
10 SYRINGE (ML) INJECTION AS NEEDED
Status: CANCELLED | OUTPATIENT
Start: 2020-03-24

## 2020-03-24 RX ADMIN — DOXORUBICIN HYDROCHLORIDE 110 MG: 2 INJECTION, SOLUTION INTRAVENOUS at 14:17

## 2020-03-24 RX ADMIN — CYCLOPHOSPHAMIDE 1000 MG: 1 INJECTION, POWDER, FOR SOLUTION INTRAVENOUS; ORAL at 14:29

## 2020-03-24 RX ADMIN — HEPARIN 500 UNITS: 100 SYRINGE at 15:03

## 2020-03-24 RX ADMIN — PEGFILGRASTIM 6 MG: KIT SUBCUTANEOUS at 15:03

## 2020-03-24 RX ADMIN — SODIUM CHLORIDE, PRESERVATIVE FREE 10 ML: 5 INJECTION INTRAVENOUS at 15:03

## 2020-03-24 RX ADMIN — DEXAMETHASONE SODIUM PHOSPHATE 12 MG: 4 INJECTION, SOLUTION INTRAMUSCULAR; INTRAVENOUS at 13:41

## 2020-03-24 RX ADMIN — PALONOSETRON 0.25 MG: 0.25 INJECTION, SOLUTION INTRAVENOUS at 13:40

## 2020-03-24 RX ADMIN — SODIUM CHLORIDE 150 MG: 9 INJECTION, SOLUTION INTRAVENOUS at 13:40

## 2020-03-24 RX ADMIN — SODIUM CHLORIDE 250 ML: 9 INJECTION, SOLUTION INTRAVENOUS at 13:40

## 2020-03-24 NOTE — TELEPHONE ENCOUNTER
Pt called with MRI Breast results. Recommended surgical follow up. Pt has all of her appts set up and declined to be transferred to the help line.  Pt expressed understanding and was encouraged to call with any further questions or concerns.

## 2020-03-24 NOTE — PROGRESS NOTES
SW called pt to provide support and resources.  Pt is starting her chemotherapy treatments today and she is ready to get started.  SW informed pt of the role of oncology social work and asked if she has any needs at present time. Pt asked questions related to her FMLA forms from her work.  SW directed her to the med/onc clinic staff to help complete and process her paperwork.  SW encouraged pt to call with any future needs or concerns.  SW available for ongoing support and resource needs.

## 2020-03-30 ENCOUNTER — DOCUMENTATION (OUTPATIENT)
Dept: HOSPICE | Facility: HOSPITAL | Age: 55
End: 2020-03-30

## 2020-04-01 ENCOUNTER — TELEPHONE (OUTPATIENT)
Dept: ONCOLOGY | Facility: CLINIC | Age: 55
End: 2020-04-01

## 2020-04-01 ENCOUNTER — NURSE NAVIGATOR (OUTPATIENT)
Dept: ONCOLOGY | Facility: CLINIC | Age: 55
End: 2020-04-01

## 2020-04-01 NOTE — TELEPHONE ENCOUNTER
Spoke with Keturah, patients daughter, states patient is not nauseous as much as she is having frequent loose stools. Patient is drinking Gatorade and water and is still urinating fine. No dizziness or tachycardia noted. Patient has been taking one dose of the imodium and Zofran daily. Discussed with Valerie HUNTLEY. Advised to start with increasing the imodium to the daily max if needed, up to 16mg/day. Also, advised the patient can take Zofran up to 3 times a day as well if needed. Advised if patient becomes lethargic, dizzy, tachy, stops urinating or other signs/symptoms change or worsen to let us know. Keturah verbalized understanding.

## 2020-04-01 NOTE — TELEPHONE ENCOUNTER
----- Message from Camelia Hernandez RN sent at 4/1/2020 12:32 PM EDT -----  Regarding: Juarez: N/V/D  Received phone call from daughter, Karo, stating patient is having significant N/V/D without relief from zofran or OTC anti-diarrheal. They have been encouraging hydrating fluids and BRAT diet but patient is unable to keep anything down. Please call daughter back at [phone].  Thanks,  Camelia Cisneros

## 2020-04-01 NOTE — PROGRESS NOTES
"Received phone call from daughter, Karo, stating patient is having significant nausea, vomiting and diarrhea despite zofran and OTC anti-diarrheal. She is trying to follow BRAT diet and push hydrating fluids but is not able to \"keep anything down.\" Notified Dr. Greene's nursing staff. Provided daughter's number [phone] for return call.   "

## 2020-04-02 ENCOUNTER — TELEPHONE (OUTPATIENT)
Dept: NUTRITION | Facility: HOSPITAL | Age: 55
End: 2020-04-02

## 2020-04-02 NOTE — PROGRESS NOTES
"Onc Nutrition     Patient:  Kenya Lemon  YOB: 1965    Diagnosis: stage IIB triple negative breast cancer  Treatment: dose dense adriamycin / cytoxan - every 14 days x 4 followed by taxol     Weight: 185# Usual Body Weight: ~185#  Nutrition Symptoms: diarrhea, nausea, taste changes, smell aversions      Spoke with patient via telephone for nutrition assessment / education.  Patient reports her diarrhea has improved since taking imodium as instructed yesterday.  She also reports her nausea has been manageable with Zofran.  She complains of taste changes and smell aversions as well.    Briefly discussed her history of diabetes.  She states her blood sugars were elevated while taking steroids but they have now returned to normal.  She reports she received written diet material \"Blood Glucose Management\" at her first infusion appointment.    Discussed the importance of good nutrition during her treatment course focusing on adequate calorie, protein, nutrient and fluid intake.  Advised her to be consuming smaller more frequent meals/snacks throughout the day to aid with nausea and diarrhea management.  Also advised her to be avoid highly seasoned/spicy and fatty/greasy/fried foods to aid with GI tolerance.  Recommended she choose bland, cold/room temperature foods to aid with smell aversions.  Offered several snack ideas she may find more appealing at this time.  Emphasized the importance of protein and its role in the diet; reviewed high protein foods; and recommended she have a protein source at each meal/snack.  Also emphasized the importance of hydration; reviewed good hydrating fluid options; and recommended she drink at least 64 ounces daily.  Instructed her to use the baking soda / salt mouth rinse before eating to aid with taste changes.    Answered her questions and she voiced understanding of information discussed.  Will send written diet materials, \"Tips for Managing Diarrhea\", " "\"Managing Nausea and Vomiting\", and \"Taste and Smell Changes\", via e-mail with  RD's contact information.  Encouraged her to call RD with questions.  Will monitor as needed during her treatment course.    Keyla Espinosa RD  04/02/20    "

## 2020-04-07 ENCOUNTER — DOCUMENTATION (OUTPATIENT)
Dept: ONCOLOGY | Facility: CLINIC | Age: 55
End: 2020-04-07

## 2020-04-07 ENCOUNTER — HOSPITAL ENCOUNTER (OUTPATIENT)
Dept: ONCOLOGY | Facility: HOSPITAL | Age: 55
Setting detail: INFUSION SERIES
Discharge: HOME | End: 2020-04-07

## 2020-04-07 ENCOUNTER — OFFICE VISIT (OUTPATIENT)
Dept: ONCOLOGY | Facility: CLINIC | Age: 55
End: 2020-04-07

## 2020-04-07 ENCOUNTER — TELEPHONE (OUTPATIENT)
Dept: ONCOLOGY | Facility: CLINIC | Age: 55
End: 2020-04-07

## 2020-04-07 VITALS
HEIGHT: 62 IN | OXYGEN SATURATION: 97 % | SYSTOLIC BLOOD PRESSURE: 140 MMHG | WEIGHT: 182 LBS | TEMPERATURE: 97.6 F | HEART RATE: 83 BPM | BODY MASS INDEX: 33.49 KG/M2 | RESPIRATION RATE: 16 BRPM | DIASTOLIC BLOOD PRESSURE: 74 MMHG

## 2020-04-07 DIAGNOSIS — Z17.1 MALIGNANT NEOPLASM OF UPPER-OUTER QUADRANT OF RIGHT BREAST IN FEMALE, ESTROGEN RECEPTOR NEGATIVE (HCC): Primary | ICD-10-CM

## 2020-04-07 DIAGNOSIS — C50.411 MALIGNANT NEOPLASM OF UPPER-OUTER QUADRANT OF RIGHT BREAST IN FEMALE, ESTROGEN RECEPTOR NEGATIVE (HCC): Primary | ICD-10-CM

## 2020-04-07 DIAGNOSIS — Z45.2 ENCOUNTER FOR ADJUSTMENT OR MANAGEMENT OF VASCULAR ACCESS DEVICE: ICD-10-CM

## 2020-04-07 LAB
ALBUMIN SERPL-MCNC: 4.3 G/DL (ref 3.5–5.2)
ALBUMIN/GLOB SERPL: 1.5 G/DL
ALP SERPL-CCNC: 83 U/L (ref 39–117)
ALT SERPL W P-5'-P-CCNC: 23 U/L (ref 1–33)
ANION GAP SERPL CALCULATED.3IONS-SCNC: 17 MMOL/L (ref 5–15)
AST SERPL-CCNC: 19 U/L (ref 1–32)
BILIRUB SERPL-MCNC: <0.2 MG/DL (ref 0.2–1.2)
BUN BLD-MCNC: 14 MG/DL (ref 6–20)
BUN/CREAT SERPL: 15.9 (ref 7–25)
CALCIUM SPEC-SCNC: 9 MG/DL (ref 8.6–10.5)
CHLORIDE SERPL-SCNC: 91 MMOL/L (ref 98–107)
CO2 SERPL-SCNC: 31 MMOL/L (ref 22–29)
CREAT BLD-MCNC: 0.88 MG/DL (ref 0.57–1)
ERYTHROCYTE [DISTWIDTH] IN BLOOD BY AUTOMATED COUNT: 13.4 % (ref 12.3–15.4)
GFR SERPL CREATININE-BSD FRML MDRD: 67 ML/MIN/1.73
GLOBULIN UR ELPH-MCNC: 2.9 GM/DL
GLUCOSE BLD-MCNC: 214 MG/DL (ref 65–99)
HCT VFR BLD AUTO: 32.9 % (ref 34–46.6)
HGB BLD-MCNC: 11 G/DL (ref 12–15.9)
LYMPHOCYTES # BLD AUTO: 2.9 10*3/MM3 (ref 0.7–3.1)
LYMPHOCYTES NFR BLD AUTO: 18.3 % (ref 19.6–45.3)
MCH RBC QN AUTO: 29.5 PG (ref 26.6–33)
MCHC RBC AUTO-ENTMCNC: 33.3 G/DL (ref 31.5–35.7)
MCV RBC AUTO: 88.4 FL (ref 79–97)
MONOCYTES # BLD AUTO: 1 10*3/MM3 (ref 0.1–0.9)
MONOCYTES NFR BLD AUTO: 6.4 % (ref 5–12)
NEUTROPHILS # BLD AUTO: 12 10*3/MM3 (ref 1.7–7)
NEUTROPHILS NFR BLD AUTO: 75.3 % (ref 42.7–76)
PLATELET # BLD AUTO: 177 10*3/MM3 (ref 140–450)
PMV BLD AUTO: 7 FL (ref 6–12)
POTASSIUM BLD-SCNC: 3.3 MMOL/L (ref 3.5–5.2)
PROT SERPL-MCNC: 7.2 G/DL (ref 6–8.5)
RBC # BLD AUTO: 3.72 10*6/MM3 (ref 3.77–5.28)
SODIUM BLD-SCNC: 139 MMOL/L (ref 136–145)
WBC NRBC COR # BLD: 16 10*3/MM3 (ref 3.4–10.8)

## 2020-04-07 PROCEDURE — 80053 COMPREHEN METABOLIC PANEL: CPT | Performed by: INTERNAL MEDICINE

## 2020-04-07 PROCEDURE — 96413 CHEMO IV INFUSION 1 HR: CPT

## 2020-04-07 PROCEDURE — 25010000002 CYCLOPHOSPHAMIDE PER 100 MG: Performed by: NURSE PRACTITIONER

## 2020-04-07 PROCEDURE — 96368 THER/DIAG CONCURRENT INF: CPT

## 2020-04-07 PROCEDURE — 96411 CHEMO IV PUSH ADDL DRUG: CPT

## 2020-04-07 PROCEDURE — 96409 CHEMO IV PUSH SNGL DRUG: CPT

## 2020-04-07 PROCEDURE — 25010000002 PALONOSETRON 0.25 MG/5ML SOLUTION PREFILLED SYRINGE: Performed by: NURSE PRACTITIONER

## 2020-04-07 PROCEDURE — 85025 COMPLETE CBC W/AUTO DIFF WBC: CPT | Performed by: INTERNAL MEDICINE

## 2020-04-07 PROCEDURE — 96376 TX/PRO/DX INJ SAME DRUG ADON: CPT

## 2020-04-07 PROCEDURE — 96377 APPLICATON ON-BODY INJECTOR: CPT

## 2020-04-07 PROCEDURE — 96375 TX/PRO/DX INJ NEW DRUG ADDON: CPT

## 2020-04-07 PROCEDURE — 25010000002 PEGFILGRASTIM 6 MG/0.6ML PREFILLED SYRINGE KIT: Performed by: NURSE PRACTITIONER

## 2020-04-07 PROCEDURE — 25010000003 HEPARIN LOCK FLUCH PER 10 UNITS: Performed by: INTERNAL MEDICINE

## 2020-04-07 PROCEDURE — 96417 CHEMO IV INFUS EACH ADDL SEQ: CPT

## 2020-04-07 PROCEDURE — 25010000002 DOXORUBICIN PER 10 MG: Performed by: NURSE PRACTITIONER

## 2020-04-07 PROCEDURE — 25010000002 FOSAPREPITANT PER 1 MG: Performed by: NURSE PRACTITIONER

## 2020-04-07 PROCEDURE — 96367 TX/PROPH/DG ADDL SEQ IV INF: CPT

## 2020-04-07 PROCEDURE — 25010000002 DEXAMETHASONE PER 1 MG: Performed by: NURSE PRACTITIONER

## 2020-04-07 PROCEDURE — 99213 OFFICE O/P EST LOW 20 MIN: CPT | Performed by: NURSE PRACTITIONER

## 2020-04-07 RX ORDER — PALONOSETRON 0.05 MG/ML
0.25 INJECTION, SOLUTION INTRAVENOUS ONCE
Status: CANCELLED | OUTPATIENT
Start: 2020-04-07

## 2020-04-07 RX ORDER — SODIUM CHLORIDE 0.9 % (FLUSH) 0.9 %
10 SYRINGE (ML) INJECTION AS NEEDED
Status: CANCELLED | OUTPATIENT
Start: 2020-04-07

## 2020-04-07 RX ORDER — HEPARIN SODIUM (PORCINE) LOCK FLUSH IV SOLN 100 UNIT/ML 100 UNIT/ML
500 SOLUTION INTRAVENOUS AS NEEDED
Status: DISCONTINUED | OUTPATIENT
Start: 2020-04-07 | End: 2020-04-08 | Stop reason: HOSPADM

## 2020-04-07 RX ORDER — PALONOSETRON 0.05 MG/ML
0.25 INJECTION, SOLUTION INTRAVENOUS ONCE
Status: COMPLETED | OUTPATIENT
Start: 2020-04-07 | End: 2020-04-07

## 2020-04-07 RX ORDER — DIPHENOXYLATE HYDROCHLORIDE AND ATROPINE SULFATE 2.5; .025 MG/1; MG/1
1 TABLET ORAL EVERY 6 HOURS PRN
Qty: 30 TABLET | Refills: 2 | Status: SHIPPED | OUTPATIENT
Start: 2020-04-07 | End: 2021-06-08

## 2020-04-07 RX ORDER — SODIUM CHLORIDE 9 MG/ML
250 INJECTION, SOLUTION INTRAVENOUS ONCE
Status: COMPLETED | OUTPATIENT
Start: 2020-04-07 | End: 2020-04-07

## 2020-04-07 RX ORDER — SODIUM CHLORIDE 9 MG/ML
250 INJECTION, SOLUTION INTRAVENOUS ONCE
Status: CANCELLED | OUTPATIENT
Start: 2020-04-07

## 2020-04-07 RX ORDER — DOXORUBICIN HYDROCHLORIDE 2 MG/ML
110 INJECTION, SOLUTION INTRAVENOUS ONCE
Status: COMPLETED | OUTPATIENT
Start: 2020-04-07 | End: 2020-04-07

## 2020-04-07 RX ORDER — HEPARIN SODIUM (PORCINE) LOCK FLUSH IV SOLN 100 UNIT/ML 100 UNIT/ML
500 SOLUTION INTRAVENOUS AS NEEDED
Status: CANCELLED | OUTPATIENT
Start: 2020-04-07

## 2020-04-07 RX ORDER — DOXORUBICIN HYDROCHLORIDE 2 MG/ML
60 INJECTION, SOLUTION INTRAVENOUS ONCE
Status: CANCELLED | OUTPATIENT
Start: 2020-04-07

## 2020-04-07 RX ADMIN — CYCLOPHOSPHAMIDE 1000 MG: 1 INJECTION, POWDER, FOR SOLUTION INTRAVENOUS; ORAL at 11:07

## 2020-04-07 RX ADMIN — DEXAMETHASONE SODIUM PHOSPHATE 12 MG: 4 INJECTION, SOLUTION INTRAMUSCULAR; INTRAVENOUS at 10:15

## 2020-04-07 RX ADMIN — HEPARIN 500 UNITS: 100 SYRINGE at 11:47

## 2020-04-07 RX ADMIN — SODIUM CHLORIDE 150 MG: 9 INJECTION, SOLUTION INTRAVENOUS at 10:15

## 2020-04-07 RX ADMIN — DOXORUBICIN HYDROCHLORIDE 110 MG: 2 INJECTION, SOLUTION INTRAVENOUS at 10:51

## 2020-04-07 RX ADMIN — SODIUM CHLORIDE 250 ML: 9 INJECTION, SOLUTION INTRAVENOUS at 10:11

## 2020-04-07 RX ADMIN — PEGFILGRASTIM 6 MG: KIT SUBCUTANEOUS at 11:46

## 2020-04-07 RX ADMIN — PALONOSETRON 0.25 MG: 0.25 INJECTION, SOLUTION INTRAVENOUS at 10:11

## 2020-04-07 NOTE — PROGRESS NOTES
CHIEF COMPLAINT: breast cancer management     Problem List:  Oncology/Hematology History    1.  Stage IIB T2 (2.5cm per Dr. Perez) N0 invasive ductal triple negative breast cancer.  2/16/2020 right upper outer quadrant needle core biopsy showed invasive ductal carcinoma 4.4 mm moderately differentiated negative for estrogen, progesterone receptor both 0% and HER-2/vashti 0+.  Dr. Santo recommended lumpectomy and sentinel node biopsy.  3/12/2020 saw Dr. Gary Perez.perimenopausal breast cancer she felt a 2.5 cm right upper outer quadrant mass almost at the edge of the axilla somewhat fixed.  No skin changes.  She recommended neoadjuvant chemotherapy followed by lumpectomy versus mastectomy then radiation if patient decides on breast conservation or if nodes are found on breast MRI.  She will get formal genetic counseling to help guide the issues of possible prophylactic mastectomy.  With triple negative disease the standard recommendation would be neoadjuvant Adriamycin Cytoxan dose dense x4 followed by Taxol weekly x12 followed by surgery.  If there is residual disease then would consider Xeloda per the create X trial.  We also have  looking at observation versus Keytruda for 1 year in patients with triple negative disease who do not have a complete pathological response to neoadjuvant chemotherapy.  In the absence of symptoms, NCCN guidelines does not recommend routine metastatic surveillance imaging.  Will need ejection fraction before Adriamycin.  2.  Hypertension  3.  Hyperlipidemia  4.  Diabetes  5.  Using IUD and taking estradiol for hormone replacement therapy preoperatively  6.  History of D and C as well as bilateral tubal ligation  7.  History of cholecystectomy  8.  Vasomotor symptoms  9.  Family history of paternal aunt with breast cancer and perhaps a sister with ovarian cancer but she is not sure.  She has nulliparous.  Genetic spit test done results pending.    10.  Anxiety and depression         Malignant neoplasm of upper outer quadrant of breast in female, estrogen receptor negative (CMS/HCC)    3/12/2020 Initial Diagnosis     Malignant neoplasm of upper outer quadrant of breast in female, estrogen receptor negative (CMS/HCC)      3/12/2020 Cancer Staged     Staging form: Breast, AJCC 8th Edition  - Clinical: Stage IIB (cT2, cN0, cM0, G2, ER-, OR-, HER2-) - Signed by Johann Juarez MD on 3/12/2020        Malignant neoplasm of upper-outer quadrant of right breast in female, estrogen receptor negative (CMS/HCC)    3/12/2020 Initial Diagnosis     Malignant neoplasm of upper-outer quadrant of right breast in female, estrogen receptor negative (CMS/HCC)      3/24/2020 -  Chemotherapy     OP BREAST AC DD DOXOrubicin / Cyclophosphamide      5/19/2020 -  Chemotherapy     OP BREAST PACLitaxel (weekly X 12)         HISTORY OF PRESENT ILLNESS:  The patient is a 54 y.o. female, here for follow up on management of neoadjuvant breast cancer.  She had several days of diarrhea following cycle 1 of dd AC. The diarrhea improved with 4 imodium tablets daily. She was able to drink plenty of fluids. She had mild nausea that was controlled with Zofran. She denies any vomiting. She had a small sore spot in her mouth that improved with baking soda/salt warm water rinses. She denies any fevers, chills or cough. She does not think she has had any change in the right breast nodule following 1 cycle of treatment. She has noticed some mild hair loss.         History reviewed. No pertinent past medical history.  Past Surgical History:   Procedure Laterality Date   • BREAST BIOPSY Right 02/2020   • CHOLECYSTECTOMY     • COLONOSCOPY         No Known Allergies    Family History and Social History reviewed and changed as necessary      REVIEW OF SYSTEM:   Review of Systems   Constitutional: Negative for appetite change, chills, diaphoresis, fever and unexpected weight change. + fatigue  HENT:   Negative for sore throat and trouble  "swallowing.    Eyes: Negative for icterus.   Respiratory: Negative for cough, hemoptysis and shortness of breath.    Cardiovascular: Negative for chest pain, leg swelling and palpitations.   Gastrointestinal: Negative for abdominal distention, abdominal pain, blood in stool, constipation, and vomiting. + diarrhea ,+ nausea   Endocrine: Negative for hot flashes.   Genitourinary: Negative for bladder incontinence, difficulty urinating, dysuria, frequency and hematuria.    Musculoskeletal: Negative for gait problem, neck pain and neck stiffness.   Skin: Negative for rash.   Neurological: Negative for dizziness, gait problem, headaches, light-headedness and numbness.   Hematological: Negative for adenopathy. Does not bruise/bleed easily.   Psychiatric/Behavioral: Negative for depression. The patient is not nervous/anxious.    All other systems reviewed and are negative.       PHYSICAL EXAM    Vitals:    04/07/20 0905   BP: 140/74   Pulse: 83   Resp: 16   Temp: 97.6 °F (36.4 °C)   SpO2: 97%   Weight: 82.6 kg (182 lb)   Height: 157.5 cm (62\")     Vitals:    04/07/20 0905   PainSc: 0-No pain        Constitutional: Appears well-developed and well-nourished. No distress.   ECOG: (0) Fully Active - Able to Carry On All Pre-disease Performance Without Restriction  HENT:   Head: Normocephalic.   Mouth/Throat: Oropharynx is clear and moist.   Eyes: Conjunctivae are normal. Pupils are equal, round, and reactive to light. No scleral icterus.   Neck: Neck supple. No JVD present. No thyromegaly present.   Cardiovascular: Normal rate, regular rhythm and normal heart sounds.    Pulmonary/Chest: Breath sounds normal. No respiratory distress.   Breasts: right breast nodule approximately  3.0 x 3.0 cm at the lateral posterior 9:00 position of the right breast  Abdominal: Soft. Exhibits no distension and no mass. There is no hepatosplenomegaly. There is no tenderness. There is no rebound and no guarding.   Musculoskeletal:Exhibits no " edema, tenderness or deformity.   Neurological: Alert and oriented to person, place, and time. Exhibits normal muscle tone.   Skin: No ecchymosis, no petechiae and no rash noted. Not diaphoretic. No cyanosis. Nails show no clubbing.   Psychiatric: Normal mood and affect.   Vitals reviewed.      Lab Results   Component Value Date    HGB 12.3 03/24/2020    HCT 36.9 03/24/2020    MCV 89.0 03/24/2020     03/24/2020    WBC 7.90 03/24/2020    NEUTROABS 3.90 03/24/2020    LYMPHSABS 3.30 (H) 03/24/2020    MONOSABS 0.70 03/24/2020       Lab Results   Component Value Date    GLUCOSE 169 (H) 03/24/2020    BUN 14 03/24/2020    CREATININE 0.75 03/24/2020     03/24/2020    K 3.7 03/24/2020    CL 97 (L) 03/24/2020    CO2 29.0 03/24/2020    CALCIUM 8.9 03/24/2020    PROTEINTOT 7.5 03/24/2020    ALBUMIN 4.50 03/24/2020    BILITOT 0.2 03/24/2020    ALKPHOS 57 03/24/2020    AST 17 03/24/2020    ALT 22 03/24/2020                   ASSESSMENT & PLAN:  1.  Stage IIB T2 (2.5cm per Dr. Perez) N0 invasive ductal triple negative breast cancer.  2/16/2020 right upper outer quadrant needle core biopsy showed invasive ductal carcinoma 4.4 mm moderately differentiated negative for estrogen, progesterone receptor both 0% and HER-2/vashti 0+.  Dr. Santo recommended lumpectomy and sentinel node biopsy.  3/12/2020 saw Dr. Gary Perez.perimenopausal breast cancer she felt a 2.5 cm right upper outer quadrant mass almost at the edge of the axilla somewhat fixed.  No skin changes.  She recommended neoadjuvant chemotherapy followed by lumpectomy versus mastectomy then radiation if patient decides on breast conservation or if nodes are found on breast MRI.  She will get formal genetic counseling to help guide the issues of possible prophylactic mastectomy.  With triple negative disease the standard recommendation would be neoadjuvant Adriamycin Cytoxan dose dense x4 followed by Taxol weekly x12 followed by surgery.  If there is residual  disease then would consider Xeloda per the create X trial.  We also have  looking at observation versus Keytruda for 1 year in patients with triple negative disease who do not have a complete pathological response to neoadjuvant chemotherapy.  In the absence of symptoms, NCCN guidelines does not recommend routine metastatic surveillance imaging.  Will need ejection fraction before Adriamycin.  2.  Hypertension  3.  Hyperlipidemia  4.  Diabetes  5.  Using IUD and taking estradiol for hormone replacement therapy preoperatively  6.  History of D and C as well as bilateral tubal ligation  7.  History of cholecystectomy  8.  Vasomotor symptoms  9.  Family history of paternal aunt with breast cancer and perhaps a sister with ovarian cancer but she is not sure.  She has nulliparous.  Genetic spit test done results pending.    10.  Anxiety and depression     4/7/2020 oncology follow up visit: Patient tolerated cycle 1 of dd AC relatively well. Once she started the imodium the diarrhea was better controlled. She had mild nausea with no vomiting that was controlled with Zofran. I will send a prescription for lomotil in case diarrhea this cycle is not controlled with imodium alone. We will proceed with cycle 2 of dd AC unchanged today. Patient was asking about repeat scans to evaluate response to treatment. After consultation with Dr. Juarez, response will primarily be based on exam and not scanning.   We do not normally repeat the MRI of the breast but I will ultimately defer to Dr. Perez on that.  There are no other scans planned.  I will see her back in 2 weeks for follow up evaluation with next cycle. She has been instructed to contact us in the interm if any new symptoms arise.     I spent 21 minutes with patient face to face with 15 minutes spent counseling and discussing prognosis, diagnostic testing, evaluation, current status and symptom management.       Ludivina Felder, APRN    04/07/2020

## 2020-04-10 ENCOUNTER — TELEPHONE (OUTPATIENT)
Dept: ONCOLOGY | Facility: CLINIC | Age: 55
End: 2020-04-10

## 2020-04-10 ENCOUNTER — DOCUMENTATION (OUTPATIENT)
Dept: ONCOLOGY | Facility: CLINIC | Age: 55
End: 2020-04-10

## 2020-04-10 RX ORDER — DIPHENHYDRAMINE, LIDOCAINE, NYSTATIN
KIT ORAL
Qty: 240 ML | Refills: 3 | Status: CANCELLED | OUTPATIENT
Start: 2020-04-10

## 2020-04-16 ENCOUNTER — TELEPHONE (OUTPATIENT)
Dept: NUTRITION | Facility: HOSPITAL | Age: 55
End: 2020-04-16

## 2020-04-16 NOTE — PROGRESS NOTES
Onc Nutrition    Patient: Kenya Lemon  YOB: 1965    Attempted to contact patient via phone call for nutrition follow up, however no answer.  VM message provided stating the nature of the phone call, RD's contact information and instructions to call back at her convenience.  Will follow up as indicated.  RD available to assist prn.    Keyla Espinosa, ALEXANDRA  04/16/20

## 2020-04-17 ENCOUNTER — TELEPHONE (OUTPATIENT)
Dept: ONCOLOGY | Facility: CLINIC | Age: 55
End: 2020-04-17

## 2020-04-17 RX ORDER — DIPHENHYDRAMINE, LIDOCAINE, NYSTATIN
KIT ORAL
Qty: 240 ML | Refills: 3 | Status: SHIPPED | OUTPATIENT
Start: 2020-04-17 | End: 2020-06-14

## 2020-04-17 NOTE — TELEPHONE ENCOUNTER
Patient states she has sores on her palette and her gums are sore.  MMW sent to [facility] at [facility] per patient request.  Patient notified.

## 2020-04-17 NOTE — TELEPHONE ENCOUNTER
Patient called and wanted to see if she could request rx for mouthwash. Can give her a call if needed. Pharmacy is [facility] on chart.

## 2020-04-21 ENCOUNTER — HOSPITAL ENCOUNTER (OUTPATIENT)
Dept: ONCOLOGY | Facility: HOSPITAL | Age: 55
Setting detail: INFUSION SERIES
Discharge: HOME | End: 2020-04-21

## 2020-04-21 ENCOUNTER — OFFICE VISIT (OUTPATIENT)
Dept: ONCOLOGY | Facility: CLINIC | Age: 55
End: 2020-04-21

## 2020-04-21 VITALS
WEIGHT: 178.9 LBS | DIASTOLIC BLOOD PRESSURE: 82 MMHG | RESPIRATION RATE: 16 BRPM | HEART RATE: 84 BPM | TEMPERATURE: 96.8 F | HEIGHT: 62 IN | SYSTOLIC BLOOD PRESSURE: 142 MMHG | OXYGEN SATURATION: 96 % | BODY MASS INDEX: 32.92 KG/M2

## 2020-04-21 DIAGNOSIS — Z45.2 ENCOUNTER FOR ADJUSTMENT OR MANAGEMENT OF VASCULAR ACCESS DEVICE: Primary | ICD-10-CM

## 2020-04-21 DIAGNOSIS — Z17.1 MALIGNANT NEOPLASM OF UPPER-OUTER QUADRANT OF RIGHT BREAST IN FEMALE, ESTROGEN RECEPTOR NEGATIVE (HCC): Primary | ICD-10-CM

## 2020-04-21 DIAGNOSIS — C50.411 MALIGNANT NEOPLASM OF UPPER-OUTER QUADRANT OF RIGHT BREAST IN FEMALE, ESTROGEN RECEPTOR NEGATIVE (HCC): Primary | ICD-10-CM

## 2020-04-21 DIAGNOSIS — Z17.1 MALIGNANT NEOPLASM OF UPPER-OUTER QUADRANT OF RIGHT BREAST IN FEMALE, ESTROGEN RECEPTOR NEGATIVE (HCC): ICD-10-CM

## 2020-04-21 DIAGNOSIS — C50.411 MALIGNANT NEOPLASM OF UPPER-OUTER QUADRANT OF RIGHT BREAST IN FEMALE, ESTROGEN RECEPTOR NEGATIVE (HCC): ICD-10-CM

## 2020-04-21 LAB
ALBUMIN SERPL-MCNC: 4.4 G/DL (ref 3.5–5.2)
ALBUMIN/GLOB SERPL: 1.6 G/DL
ALP SERPL-CCNC: 91 U/L (ref 39–117)
ALT SERPL W P-5'-P-CCNC: 23 U/L (ref 1–33)
ANION GAP SERPL CALCULATED.3IONS-SCNC: 16 MMOL/L (ref 5–15)
AST SERPL-CCNC: 16 U/L (ref 1–32)
BILIRUB SERPL-MCNC: 0.2 MG/DL (ref 0.2–1.2)
BUN BLD-MCNC: 13 MG/DL (ref 6–20)
BUN/CREAT SERPL: 16 (ref 7–25)
CALCIUM SPEC-SCNC: 9.2 MG/DL (ref 8.6–10.5)
CHLORIDE SERPL-SCNC: 95 MMOL/L (ref 98–107)
CO2 SERPL-SCNC: 29 MMOL/L (ref 22–29)
CREAT BLD-MCNC: 0.81 MG/DL (ref 0.57–1)
ERYTHROCYTE [DISTWIDTH] IN BLOOD BY AUTOMATED COUNT: 13.6 % (ref 12.3–15.4)
GFR SERPL CREATININE-BSD FRML MDRD: 74 ML/MIN/1.73
GLOBULIN UR ELPH-MCNC: 2.7 GM/DL
GLUCOSE BLD-MCNC: 225 MG/DL (ref 65–99)
HCT VFR BLD AUTO: 31.8 % (ref 34–46.6)
HGB BLD-MCNC: 10.4 G/DL (ref 12–15.9)
LYMPHOCYTES # BLD AUTO: 2.9 10*3/MM3 (ref 0.7–3.1)
LYMPHOCYTES NFR BLD AUTO: 14.2 % (ref 19.6–45.3)
MCH RBC QN AUTO: 29.2 PG (ref 26.6–33)
MCHC RBC AUTO-ENTMCNC: 32.6 G/DL (ref 31.5–35.7)
MCV RBC AUTO: 89.4 FL (ref 79–97)
MONOCYTES # BLD AUTO: 1.3 10*3/MM3 (ref 0.1–0.9)
MONOCYTES NFR BLD AUTO: 6.2 % (ref 5–12)
NEUTROPHILS # BLD AUTO: 16.1 10*3/MM3 (ref 1.7–7)
NEUTROPHILS NFR BLD AUTO: 79.6 % (ref 42.7–76)
PLATELET # BLD AUTO: 226 10*3/MM3 (ref 140–450)
PMV BLD AUTO: 6.2 FL (ref 6–12)
POTASSIUM BLD-SCNC: 3.4 MMOL/L (ref 3.5–5.2)
PROT SERPL-MCNC: 7.1 G/DL (ref 6–8.5)
RBC # BLD AUTO: 3.56 10*6/MM3 (ref 3.77–5.28)
SODIUM BLD-SCNC: 140 MMOL/L (ref 136–145)
WBC NRBC COR # BLD: 20.2 10*3/MM3 (ref 3.4–10.8)

## 2020-04-21 PROCEDURE — 25010000002 DOXORUBICIN PER 10 MG: Performed by: NURSE PRACTITIONER

## 2020-04-21 PROCEDURE — 25010000002 CYCLOPHOSPHAMIDE PER 100 MG: Performed by: NURSE PRACTITIONER

## 2020-04-21 PROCEDURE — 96375 TX/PRO/DX INJ NEW DRUG ADDON: CPT

## 2020-04-21 PROCEDURE — 25010000002 PALONOSETRON PER 25 MCG: Performed by: NURSE PRACTITIONER

## 2020-04-21 PROCEDURE — 25010000002 PEGFILGRASTIM 6 MG/0.6ML PREFILLED SYRINGE KIT: Performed by: NURSE PRACTITIONER

## 2020-04-21 PROCEDURE — 80053 COMPREHEN METABOLIC PANEL: CPT | Performed by: NURSE PRACTITIONER

## 2020-04-21 PROCEDURE — 96366 THER/PROPH/DIAG IV INF ADDON: CPT

## 2020-04-21 PROCEDURE — 85025 COMPLETE CBC W/AUTO DIFF WBC: CPT | Performed by: NURSE PRACTITIONER

## 2020-04-21 PROCEDURE — 25010000002 FOSAPREPITANT PER 1 MG: Performed by: NURSE PRACTITIONER

## 2020-04-21 PROCEDURE — 25010000002 DEXAMETHASONE PER 1 MG: Performed by: NURSE PRACTITIONER

## 2020-04-21 PROCEDURE — 96411 CHEMO IV PUSH ADDL DRUG: CPT

## 2020-04-21 PROCEDURE — 25010000003 HEPARIN LOCK FLUCH PER 10 UNITS: Performed by: INTERNAL MEDICINE

## 2020-04-21 PROCEDURE — 96413 CHEMO IV INFUSION 1 HR: CPT

## 2020-04-21 PROCEDURE — 96377 APPLICATON ON-BODY INJECTOR: CPT

## 2020-04-21 PROCEDURE — 99214 OFFICE O/P EST MOD 30 MIN: CPT | Performed by: NURSE PRACTITIONER

## 2020-04-21 PROCEDURE — 96367 TX/PROPH/DG ADDL SEQ IV INF: CPT

## 2020-04-21 RX ORDER — SODIUM CHLORIDE 0.9 % (FLUSH) 0.9 %
10 SYRINGE (ML) INJECTION AS NEEDED
Status: CANCELLED | OUTPATIENT
Start: 2020-04-21

## 2020-04-21 RX ORDER — PALONOSETRON 0.05 MG/ML
0.25 INJECTION, SOLUTION INTRAVENOUS ONCE
Status: COMPLETED | OUTPATIENT
Start: 2020-04-21 | End: 2020-04-21

## 2020-04-21 RX ORDER — SODIUM CHLORIDE 9 MG/ML
250 INJECTION, SOLUTION INTRAVENOUS ONCE
Status: CANCELLED | OUTPATIENT
Start: 2020-04-21

## 2020-04-21 RX ORDER — SODIUM CHLORIDE 0.9 % (FLUSH) 0.9 %
10 SYRINGE (ML) INJECTION AS NEEDED
Status: DISCONTINUED | OUTPATIENT
Start: 2020-04-21 | End: 2020-04-22 | Stop reason: HOSPADM

## 2020-04-21 RX ORDER — HEPARIN SODIUM (PORCINE) LOCK FLUSH IV SOLN 100 UNIT/ML 100 UNIT/ML
500 SOLUTION INTRAVENOUS AS NEEDED
Status: CANCELLED | OUTPATIENT
Start: 2020-04-21

## 2020-04-21 RX ORDER — SODIUM CHLORIDE 9 MG/ML
250 INJECTION, SOLUTION INTRAVENOUS ONCE
Status: COMPLETED | OUTPATIENT
Start: 2020-04-21 | End: 2020-04-21

## 2020-04-21 RX ORDER — HEPARIN SODIUM (PORCINE) LOCK FLUSH IV SOLN 100 UNIT/ML 100 UNIT/ML
500 SOLUTION INTRAVENOUS AS NEEDED
Status: DISCONTINUED | OUTPATIENT
Start: 2020-04-21 | End: 2020-04-22 | Stop reason: HOSPADM

## 2020-04-21 RX ORDER — DOXORUBICIN HYDROCHLORIDE 2 MG/ML
60 INJECTION, SOLUTION INTRAVENOUS ONCE
Status: CANCELLED | OUTPATIENT
Start: 2020-04-21

## 2020-04-21 RX ORDER — PALONOSETRON 0.05 MG/ML
0.25 INJECTION, SOLUTION INTRAVENOUS ONCE
Status: CANCELLED | OUTPATIENT
Start: 2020-04-21

## 2020-04-21 RX ORDER — DOXORUBICIN HYDROCHLORIDE 2 MG/ML
110 INJECTION, SOLUTION INTRAVENOUS ONCE
Status: COMPLETED | OUTPATIENT
Start: 2020-04-21 | End: 2020-04-21

## 2020-04-21 RX ADMIN — PEGFILGRASTIM 6 MG: KIT SUBCUTANEOUS at 11:21

## 2020-04-21 RX ADMIN — SODIUM CHLORIDE, PRESERVATIVE FREE 10 ML: 5 INJECTION INTRAVENOUS at 11:33

## 2020-04-21 RX ADMIN — PALONOSETRON HYDROCHLORIDE 0.25 MG: 0.25 INJECTION INTRAVENOUS at 10:15

## 2020-04-21 RX ADMIN — HEPARIN 500 UNITS: 100 SYRINGE at 11:33

## 2020-04-21 RX ADMIN — SODIUM CHLORIDE 250 ML: 9 INJECTION, SOLUTION INTRAVENOUS at 10:15

## 2020-04-21 RX ADMIN — SODIUM CHLORIDE 150 MG: 9 INJECTION, SOLUTION INTRAVENOUS at 10:15

## 2020-04-21 RX ADMIN — DOXORUBICIN HYDROCHLORIDE 110 MG: 2 INJECTION, SOLUTION INTRAVENOUS at 10:52

## 2020-04-21 RX ADMIN — DEXAMETHASONE SODIUM PHOSPHATE 12 MG: 4 INJECTION, SOLUTION INTRAMUSCULAR; INTRAVENOUS at 10:15

## 2020-04-21 RX ADMIN — CYCLOPHOSPHAMIDE 1000 MG: 1 INJECTION, POWDER, FOR SOLUTION INTRAVENOUS; ORAL at 11:00

## 2020-04-21 NOTE — PROGRESS NOTES
CHIEF COMPLAINT: breast cancer management     Problem List:  Oncology/Hematology History    1.  Stage IIB T2 (2.5cm per Dr. Perez) N0 invasive ductal triple negative breast cancer.  2/16/2020 right upper outer quadrant needle core biopsy showed invasive ductal carcinoma 4.4 mm moderately differentiated negative for estrogen, progesterone receptor both 0% and HER-2/vashti 0+.  Dr. Santo recommended lumpectomy and sentinel node biopsy.  3/12/2020 saw Dr. Gary Perez.perimenopausal breast cancer she felt a 2.5 cm right upper outer quadrant mass almost at the edge of the axilla somewhat fixed.  No skin changes.  She recommended neoadjuvant chemotherapy followed by lumpectomy versus mastectomy then radiation if patient decides on breast conservation or if nodes are found on breast MRI.  She will get formal genetic counseling to help guide the issues of possible prophylactic mastectomy.  With triple negative disease the standard recommendation would be neoadjuvant Adriamycin Cytoxan dose dense x4 followed by Taxol weekly x12 followed by surgery.  If there is residual disease then would consider Xeloda per the create X trial.  We also have  looking at observation versus Keytruda for 1 year in patients with triple negative disease who do not have a complete pathological response to neoadjuvant chemotherapy.  In the absence of symptoms, NCCN guidelines does not recommend routine metastatic surveillance imaging.  Will need ejection fraction before Adriamycin.  2.  Hypertension  3.  Hyperlipidemia  4.  Diabetes  5.  Using IUD and taking estradiol for hormone replacement therapy preoperatively  6.  History of D and C as well as bilateral tubal ligation  7.  History of cholecystectomy  8.  Vasomotor symptoms  9.  Family history of paternal aunt with breast cancer and perhaps a sister with ovarian cancer but she is not sure.  She has nulliparous.  Genetic spit test done results pending.    10.  Anxiety and depression         Malignant neoplasm of upper outer quadrant of breast in female, estrogen receptor negative (CMS/HCC)    3/12/2020 Initial Diagnosis     Malignant neoplasm of upper outer quadrant of breast in female, estrogen receptor negative (CMS/HCC)      3/12/2020 Cancer Staged     Staging form: Breast, AJCC 8th Edition  - Clinical: Stage IIB (cT2, cN0, cM0, G2, ER-, NJ-, HER2-) - Signed by Johann Juarez MD on 3/12/2020        Malignant neoplasm of upper-outer quadrant of right breast in female, estrogen receptor negative (CMS/HCC)    3/12/2020 Initial Diagnosis     Malignant neoplasm of upper-outer quadrant of right breast in female, estrogen receptor negative (CMS/HCC)      3/24/2020 -  Chemotherapy     OP BREAST AC DD DOXOrubicin / Cyclophosphamide      5/19/2020 -  Chemotherapy     OP BREAST PACLitaxel (weekly X 12)         HISTORY OF PRESENT ILLNESS:  The patient is a 54 y.o. female, here for follow up on management of neoadjuvant breast cancer.    She tolerated cycle 2 of dose dense AC relatively well.  She did have ongoing diarrhea that improved with Imodium and Lomotil.  She is drinking plenty of fluids and did not feel dehydrated.  She had mild nausea that was controlled with Zofran.  She denies any vomiting.  She denies any mouth sores.  She denies any fevers, chills or cough.        History reviewed. No pertinent past medical history.  Past Surgical History:   Procedure Laterality Date   • BREAST BIOPSY Right 02/2020   • CHOLECYSTECTOMY     • COLONOSCOPY         No Known Allergies    Family History and Social History reviewed and changed as necessary      REVIEW OF SYSTEM:   Review of Systems   Constitutional: Negative for appetite change, chills, diaphoresis, fever and unexpected weight change. + fatigue  HENT:   Negative for sore throat and trouble swallowing.    Eyes: Negative for icterus.   Respiratory: Negative for cough, hemoptysis and shortness of breath.    Cardiovascular: Negative for chest pain, leg  "swelling and palpitations.   Gastrointestinal: Negative for abdominal distention, abdominal pain, blood in stool, constipation, and vomiting. + diarrhea ,+ nausea   Endocrine: Negative for hot flashes.   Genitourinary: Negative for bladder incontinence, difficulty urinating, dysuria, frequency and hematuria.    Musculoskeletal: Negative for gait problem, neck pain and neck stiffness.   Skin: Negative for rash.   Neurological: Negative for dizziness, gait problem, headaches, light-headedness and numbness.   Hematological: Negative for adenopathy. Does not bruise/bleed easily.   Psychiatric/Behavioral: Negative for depression. The patient is not nervous/anxious.    All other systems reviewed and are negative.       PHYSICAL EXAM    Vitals:    04/21/20 0900   BP: 142/82   Pulse: 84   Resp: 16   Temp: 96.8 °F (36 °C)   SpO2: 96%   Weight: 81.1 kg (178 lb 14.4 oz)   Height: 157.5 cm (62\")     Vitals:    04/21/20 0900   PainSc: 0-No pain        Constitutional: Appears well-developed and well-nourished. No distress.   ECOG: (0) Fully Active - Able to Carry On All Pre-disease Performance Without Restriction  HENT:   Head: Normocephalic.   Mouth/Throat: Oropharynx is clear and moist.   Eyes: Conjunctivae are normal. Pupils are equal, round, and reactive to light. No scleral icterus.   Neck: Neck supple. No JVD present. No thyromegaly present.   Cardiovascular: Normal rate, regular rhythm and normal heart sounds.    Pulmonary/Chest: Breath sounds normal. No respiratory distress.   Breasts: right breast nodule approximately  2 cm at the lateral posterior 9:00 position of the right breast, definite improvement noted on exam   Abdominal: Soft. Exhibits no distension and no mass. There is no hepatosplenomegaly. There is no tenderness. There is no rebound and no guarding.   Musculoskeletal:Exhibits no edema, tenderness or deformity.   Neurological: Alert and oriented to person, place, and time. Exhibits normal muscle tone.   Skin: " No ecchymosis, no petechiae and no rash noted. Not diaphoretic. No cyanosis. Nails show no clubbing.   Psychiatric: Normal mood and affect.   Vitals reviewed.      Lab Results   Component Value Date    HGB 10.4 (L) 04/21/2020    HCT 31.8 (L) 04/21/2020    MCV 89.4 04/21/2020     04/21/2020    WBC 20.20 (H) 04/21/2020    NEUTROABS 16.10 (H) 04/21/2020    LYMPHSABS 2.90 04/21/2020    MONOSABS 1.30 (H) 04/21/2020       Lab Results   Component Value Date    GLUCOSE 214 (H) 04/07/2020    BUN 14 04/07/2020    CREATININE 0.88 04/07/2020     04/07/2020    K 3.3 (L) 04/07/2020    CL 91 (L) 04/07/2020    CO2 31.0 (H) 04/07/2020    CALCIUM 9.0 04/07/2020    PROTEINTOT 7.2 04/07/2020    ALBUMIN 4.30 04/07/2020    BILITOT <0.2 (L) 04/07/2020    ALKPHOS 83 04/07/2020    AST 19 04/07/2020    ALT 23 04/07/2020                   ASSESSMENT & PLAN:  1.  Stage IIB T2 (2.5cm per Dr. Perez) N0 invasive ductal triple negative breast cancer.  2/16/2020 right upper outer quadrant needle core biopsy showed invasive ductal carcinoma 4.4 mm moderately differentiated negative for estrogen, progesterone receptor both 0% and HER-2/vashti 0+.  Dr. Santo recommended lumpectomy and sentinel node biopsy.  3/12/2020 saw Dr. Gary Perez.perimenopausal breast cancer she felt a 2.5 cm right upper outer quadrant mass almost at the edge of the axilla somewhat fixed.  No skin changes.  She recommended neoadjuvant chemotherapy followed by lumpectomy versus mastectomy then radiation if patient decides on breast conservation or if nodes are found on breast MRI.  She will get formal genetic counseling to help guide the issues of possible prophylactic mastectomy.  With triple negative disease the standard recommendation would be neoadjuvant Adriamycin Cytoxan dose dense x4 followed by Taxol weekly x12 followed by surgery.  If there is residual disease then would consider Xeloda per the create X trial.  We also have  looking at observation  versus Keytruda for 1 year in patients with triple negative disease who do not have a complete pathological response to neoadjuvant chemotherapy.  In the absence of symptoms, NCCN guidelines does not recommend routine metastatic surveillance imaging.  Will need ejection fraction before Adriamycin.  2.  Hypertension  3.  Hyperlipidemia  4.  Diabetes  5.  Using IUD and taking estradiol for hormone replacement therapy preoperatively  6.  History of D and C as well as bilateral tubal ligation  7.  History of cholecystectomy  8.  Vasomotor symptoms  9.  Family history of paternal aunt with breast cancer and perhaps a sister with ovarian cancer but she is not sure.  She has nulliparous.  Genetic spit test done results pending.    10.  Anxiety and depression     4/7/2020 oncology follow up visit: Patient tolerated cycle 2 of dose dense AC relatively well.  Diarrhea was controlled with Imodium and Lomotil.  We will proceed with cycle 3 of dose dense AC unchanged today.  .  Her physical exam shows a definite improvement in the size of the right breast mass.  The patient would like to talk with Dr. Juarez at her follow-up appointment about possibly not doing the Taxol and going straight to surgery following completion of AC.  We will see her back in 2 weeks prior to receiving her last dose of AC.  She has been instructed to contact us in the interim if any new symptoms arise.  The patient would like a referral to the genetics counselor order placed today.        I spent 26 minutes with patient face to face with 15 minutes spent counseling and discussing prognosis, diagnostic testing, evaluation, current status and symptom management.       Ludivina Felder, APRN    04/21/2020

## 2020-04-23 ENCOUNTER — CLINICAL SUPPORT (OUTPATIENT)
Dept: GENETICS | Facility: HOSPITAL | Age: 55
End: 2020-04-23

## 2020-04-23 DIAGNOSIS — Z13.79 GENETIC TESTING: Primary | ICD-10-CM

## 2020-04-23 DIAGNOSIS — Z17.1 MALIGNANT NEOPLASM OF RIGHT BREAST IN FEMALE, ESTROGEN RECEPTOR NEGATIVE, UNSPECIFIED SITE OF BREAST (HCC): ICD-10-CM

## 2020-04-23 DIAGNOSIS — Z80.3 FAMILY HISTORY OF BREAST CANCER: ICD-10-CM

## 2020-04-23 DIAGNOSIS — C50.911 MALIGNANT NEOPLASM OF RIGHT BREAST IN FEMALE, ESTROGEN RECEPTOR NEGATIVE, UNSPECIFIED SITE OF BREAST (HCC): ICD-10-CM

## 2020-04-23 DIAGNOSIS — Z80.0 FAMILY HISTORY OF MALIGNANT NEOPLASM OF DIGESTIVE ORGAN: ICD-10-CM

## 2020-04-23 NOTE — PROGRESS NOTES
Kenya Lemon, a 54-year old female, was provided genetic counseling via telephone consult due to a personal and family history of breast cancer.  Ms. Lemon was diagnosed with a triple negative breast cancer at age 54.  She is currently undergoing neoadjuvant chemotherapy and is making a surgical decision.  Ms. Lemon retains her uterus and ovaries.  Ms. Lemon was interested in discussing her risk for a hereditary cancer syndrome, and decided to pursue genetic testing.   Ms. Lemon opted to pursue comprehensive testing via the CancerNext panel ordered through [employer] which includes BRCA1/2 and 32 additional genes associated with an increased risk of breast cancer and other cancers (APC, MAYCOL, BARD1, BMPR1A, BRCA1, BRCA2, BRIP1, CDH1, CDK4, CDKN2A, CHEK2, EPCAM, GREM1, MLH1, MRE11A, MSH2, MSH6, MUTYH, NBN, NF1, PALB2, PMS2, POLD1, POLE, PTEN, RAD50, RAD51C, RAD51D, SMAD4, SMARCA4, STK11, and TP53). Results are expected in 2-3 weeks.    FAMILY HISTORY:  Pat. Aunt:  Breast cancer, 50s  Pat. Aunt:  Breast cancer; Lung cancer  Father:   Lung cancer  Pat. Uncle (x3): Lung cancer  Mat. Aunt:   Colon cancer, 65  Mat. Uncle:  Stomach cancer, 40s  Mat. 1st cousin: Stomach cancer, 45  Mat. Uncle:  Lung cancer  Mat. Grandmother:  Bladder cancer    Records regarding the family history were not available for review.     RISK ASSESSMENT:  Ms. Lemon’s personal history of triple negative breast cancer raises the question of a hereditary cancer syndrome.   NCCN guidelines recommend BRCA1/2 testing for any individual with triple negative breast cancer diagnosed under the age of 60, regardless of family history.  We discussed BRCA1/2 testing as well as the option of pursuing a panel that would test for other genes known to impact cancer risk. This risk assessment is based on the family history information provided at the time of the appointment and could change in the future should new information be obtained.    GENETIC COUNSELING  (30 minutes): We reviewed the family history information in detail.  Cases of breast cancer follow three general patterns: sporadic, familial, and hereditary.  While most cancer is sporadic, some cases appear to occur in family clusters.  These cases are said to be familial and account for 10-20% of breast cancer cases.  Familial cases may be due to a combination of shared genes and environmental factors among family members.  In even fewer families, the cancer is said to be inherited, and the genes responsible for the cancer are known.      Family histories typical of hereditary cancer syndromes usually include multiple first- and second-degree relatives diagnosed with cancer types that define a syndrome.  These cases tend to be diagnosed at younger-than-expected ages and can be bilateral or multifocal.  The cancer in these families follows an autosomal dominant inheritance pattern, which indicates the likely presence of a mutation in a cancer susceptibility gene.  Children and siblings of an individual believed to carry this mutation have a 50% chance of inheriting that mutation, thereby inheriting the increased risk to develop cancer.  These mutations can be passed down from the maternal or the paternal lineage.    Hereditary breast cancer accounts for 5-10% of all cases of breast cancer.  A significant proportion of hereditary breast cancer can be attributed to mutations in the BRCA1 and BRCA2 genes.  Mutations in these genes confer an increased risk for breast cancer, ovarian cancer, male breast cancer, prostate cancer and pancreatic cancer.  Women with a BRCA1 or BRCA2 mutation who have already been diagnosed with breast cancer have up to a 60% lifetime risk of a second breast cancer.   BRCA1 has been more significantly associated with triple negative breast cancers than other genes.  There are other genes in addition to BRCA1/2 that are known to be associated with an increased risk for breast cancer. We  discussed the PALB2 gene which has also been shown to be related to triple negative breast cancers. Based on Ms. Lemon’s desire to get as much information as possible regarding her personal risks and potential risks for her family, she opted to pursue testing through a multigene panel that would look at several other genes known to increase the risk for breast cancer.    GENETIC TESTING:  The risks, benefits and limitations of genetic testing and implications for clinical management following testing were reviewed.  DNA test results can influence decisions regarding screening, prevention and surgical management.  Genetic testing can have significant psychological implications for both individuals and families.  Also discussed was the possibility of insurance discrimination based on genetic test results and the laws ([place]) in place to prevent this.    We discussed panel testing, which would involve testing for BRCA1/2 as well as several other cancer susceptibility genes at the same time.  The benefits and limitations of genetic testing were discussed and Ms. Lemon decided to pursue testing via the panel. The implications of a positive or negative test result were discussed. We discussed the possibility that, in some cases, genetic test results may be uninformative due to the identification of a genetic variant. These variants may or may not be associated with an increased cancer risk.  VUSs are frequently reported through multigene panel testing.  Given her personal history, a negative test result would not eliminate all breast cancer risk to her relatives, although the risk would not be as high as it would with positive genetic testing.      PLAN: Genetic testing via the CancerNext panel through [employer] was ordered. A saliva kit will be mailed to Ms. Lemon’s home. Results are expected 2-3 weeks after the lab receives her saliva sample. We will contact Ms. Lemon with her results once they are  received.      Shea Hull MS, [facility], [facility]  Licensed Certified Genetic Counselor

## 2020-04-30 ENCOUNTER — TELEPHONE (OUTPATIENT)
Dept: NUTRITION | Facility: HOSPITAL | Age: 55
End: 2020-04-30

## 2020-04-30 NOTE — PROGRESS NOTES
"Onc Nutrition    Patient: Kenya Lemon  YOB: 1965    Weight: 178# (4/21) - ~7# (3.8%) weight loss x 4 weeks  Nutrition Symptoms: diarrhea, taste changes    Spoke with patient via phone call for nutrition follow up.  She continues to complain of diarrhea and taste changes.  She states she is managing diarrhea well with lomotil.  She reports her oral intake is decreased due to taste changes.  She states her blood sugars are \"ok\" although she has not been checking them regularly.  She reports receiving written diet materials and that they have been helpful.      Encouraged her to continue eating smaller more frequent snacks throughout the day to aid with diarrhea management.  Recommended she use the baking soda / salt mouth rinse before eating to aid with taste changes and throughout the day for overall oral care and mouth sore prevention.    She voiced understanding of information discussed.  Encouraged her to call RD with questions.  Will monitor as needed during her treatment course.    Keyla Espinosa, ALEXANDRA  04/30/20          "

## 2020-05-03 ENCOUNTER — HOSPITAL ENCOUNTER (EMERGENCY)
Facility: HOSPITAL | Age: 55
Discharge: HOME | End: 2020-05-03
Attending: EMERGENCY MEDICINE | Admitting: EMERGENCY MEDICINE

## 2020-05-03 VITALS
HEIGHT: 62 IN | SYSTOLIC BLOOD PRESSURE: 117 MMHG | OXYGEN SATURATION: 97 % | BODY MASS INDEX: 32.2 KG/M2 | TEMPERATURE: 98.5 F | HEART RATE: 85 BPM | DIASTOLIC BLOOD PRESSURE: 63 MMHG | RESPIRATION RATE: 16 BRPM | WEIGHT: 175 LBS

## 2020-05-03 DIAGNOSIS — L29.9 PRURITIC DERMATITIS: Primary | ICD-10-CM

## 2020-05-03 PROCEDURE — 63710000001 PREDNISONE PER 1 MG: Performed by: EMERGENCY MEDICINE

## 2020-05-03 PROCEDURE — 99284 EMERGENCY DEPT VISIT MOD MDM: CPT

## 2020-05-03 PROCEDURE — 63710000001 DIPHENHYDRAMINE PER 50 MG: Performed by: EMERGENCY MEDICINE

## 2020-05-03 RX ORDER — PREDNISONE 20 MG/1
60 TABLET ORAL ONCE
Status: COMPLETED | OUTPATIENT
Start: 2020-05-03 | End: 2020-05-03

## 2020-05-03 RX ORDER — DIPHENHYDRAMINE HCL 25 MG
25 CAPSULE ORAL EVERY 6 HOURS PRN
Qty: 20 CAPSULE | Refills: 0 | Status: SHIPPED | OUTPATIENT
Start: 2020-05-03 | End: 2020-06-14

## 2020-05-03 RX ORDER — DIPHENHYDRAMINE HCL 50 MG
50 CAPSULE ORAL ONCE
Status: COMPLETED | OUTPATIENT
Start: 2020-05-03 | End: 2020-05-03

## 2020-05-03 RX ORDER — PREDNISONE 20 MG/1
60 TABLET ORAL DAILY
Qty: 12 TABLET | Refills: 0 | Status: SHIPPED | OUTPATIENT
Start: 2020-05-03 | End: 2020-05-07

## 2020-05-03 RX ORDER — FAMOTIDINE 20 MG/1
20 TABLET, FILM COATED ORAL ONCE
Status: COMPLETED | OUTPATIENT
Start: 2020-05-03 | End: 2020-05-03

## 2020-05-03 RX ORDER — FAMOTIDINE 20 MG/1
20 TABLET, FILM COATED ORAL 2 TIMES DAILY
Qty: 14 TABLET | Refills: 0 | Status: SHIPPED | OUTPATIENT
Start: 2020-05-03 | End: 2020-05-10

## 2020-05-03 RX ADMIN — DIPHENHYDRAMINE HYDROCHLORIDE 50 MG: 50 CAPSULE ORAL at 02:07

## 2020-05-03 RX ADMIN — PREDNISONE 60 MG: 20 TABLET ORAL at 02:06

## 2020-05-03 RX ADMIN — FAMOTIDINE 20 MG: 20 TABLET ORAL at 02:07

## 2020-05-03 NOTE — ED PROVIDER NOTES
EMERGENCY DEPARTMENT ENCOUNTER      Pt Name: Kenya Lemon  MRN: [id number]  YOB: 1965  Date of evaluation: 5/3/2020  Provider: Matthew Wise DO    CHIEF COMPLAINT       Chief Complaint   Patient presents with   • Rash         HISTORY OF PRESENT ILLNESS  (Location/Symptom, Timing/Onset, Context/Setting, Quality, Duration, Modifying Factors, Severity.)   Kenya Lemon is a 54 y.o. female who presents to the emergency department for evaluation of a rash to the bilateral arms, lower legs pruritic in nature onset a couple hours prior to arrival.  The patient states she is currently on chemotherapy for right breast cancer and has undergone 3 treatments every 3 weeks, but no other changes to her medications.  The patient denies any recent travel, no known sick contacts, no new foods.  Denies any history of severely sensitive skin.  Denies any respiratory compromise.  Patient did not take any medications prior to arrival.  She denies any fevers or chills, no chest pain or shortness of breath, she has no other acute systemic complaints at this time.      Nursing notes were reviewed.    REVIEW OF SYSTEMS    (2-9 systems for level 4, 10 or more for level 5)   ROS:  General:  No fevers, no chills, no weakness  Cardiovascular:  No chest pain, no palpitations  Respiratory:  No shortness of breath, no cough, no wheezing  Gastrointestinal:  No pain, no nausea, no vomiting, no diarrhea  Musculoskeletal:  No muscle pain, no joint pain  Skin:  + rash, no easy bruising  Neurologic:  No speech problems, no headache, no extremity numbness, no extremity tingling, no extremity weakness  Psychiatric:  No anxiety  Genitourinary:  No dysuria, no hematuria    Except as noted above the remainder of the review of systems was reviewed and negative.       PAST MEDICAL HISTORY     Past Medical History:   Diagnosis Date   • Cancer (CMS/HCC)          SURGICAL HISTORY       Past Surgical History:   Procedure  Laterality Date   • BREAST BIOPSY Right 02/2020   • CHOLECYSTECTOMY     • COLONOSCOPY           CURRENT MEDICATIONS     No current facility-administered medications for this encounter.     Current Outpatient Medications:   •  atenolol (TENORMIN) 50 MG tablet, , Disp: , Rfl:   •  dexamethasone (DECADRON) 4 MG tablet, Take 2 tablets in the morning daily on Days 2, 3 & 4.  Take with food., Disp: 6 tablet, Rfl: 3  •  diphenhydrAMINE (BENADRYL) 25 mg capsule, Take 1 capsule by mouth Every 6 (Six) Hours As Needed for Itching or Allergies., Disp: 20 capsule, Rfl: 0  •  diphenoxylate-atropine (Lomotil) 2.5-0.025 MG per tablet, Take 1 tablet by mouth Every 6 (Six) Hours As Needed for Diarrhea. 1 tablet, Disp: 30 tablet, Rfl: 2  •  famotidine (PEPCID) 20 MG tablet, Take 1 tablet by mouth 2 (Two) Times a Day for 7 days., Disp: 14 tablet, Rfl: 0  •  hydroCHLOROthiazide (HYDRODIURIL) 25 MG tablet, , Disp: , Rfl:   •  HYDROcodone-acetaminophen (NORCO) 5-325 MG per tablet, , Disp: , Rfl:   •  lidocaine-prilocaine (EMLA) 2.5-2.5 % cream, Apply  topically to the appropriate area as directed As Needed (45-60 minutes prior to port access.  Cover with saran/plastic wrap.)., Disp: 30 g, Rfl: 3  •  metFORMIN (GLUCOPHAGE) 1000 MG tablet, Take 1,000 mg by mouth 2 (Two) Times a Day., Disp: , Rfl:   •  modafinil (PROVIGIL) 200 MG tablet, , Disp: , Rfl:   •  nystatin susp + lidocaine viscous (MAGIC MOUTHWASH) oral suspension, 5-10 ml swish and spit or swallow QID prn, Disp: 240 mL, Rfl: 3  •  ondansetron (ZOFRAN) 8 MG tablet, Take 1 tablet by mouth 3 (Three) Times a Day As Needed for Nausea or Vomiting., Disp: 30 tablet, Rfl: 5  •  predniSONE (DELTASONE) 20 MG tablet, Take 3 tablets by mouth Daily for 4 days., Disp: 12 tablet, Rfl: 0  •  venlafaxine XR (EFFEXOR-XR) 75 MG 24 hr capsule, , Disp: , Rfl:     ALLERGIES     Patient has no known allergies.    FAMILY HISTORY       Family History   Problem Relation Age of Onset   • Diabetes Mother   "  • Lung cancer Father    • Breast cancer Paternal Aunt           SOCIAL HISTORY       Social History     Socioeconomic History   • Marital status:      Spouse name: Not on file   • Number of children: Not on file   • Years of education: Not on file   • Highest education level: Not on file   Tobacco Use   • Smoking status: Never Smoker   • Smokeless tobacco: Never Used   Substance and Sexual Activity   • Alcohol use: Never     Frequency: Never   • Drug use: Never         PHYSICAL EXAM    (up to 7 for level 4, 8 or more for level 5)     Vitals:    05/03/20 0129 05/03/20 0145 05/03/20 0200   BP: 145/86 159/82 127/72   BP Location: Right arm     Patient Position: Lying     Pulse: 95 87 85   Resp: 16     Temp: 98.5 °F (36.9 °C)     TempSrc: Oral     SpO2: 97%     Weight: 79.4 kg (175 lb)     Height: 157.5 cm (62\")         Physical Exam  General :Patient is awake, alert, oriented, in no acute distress, nontoxic appearing  HEENT: Pupils are equally round and reactive to light, EOMI, conjunctivae clear, sclerae white, there is no injection no icterus.  Oral mucosa is moist, no exudate. Uvula is midline  Neck: Neck is supple, full range of motion, trachea midline  Cardiac: Heart regular rate, rhythm, no murmurs, rubs, or gallops  Lungs: Lungs are clear to auscultation, there is no wheezing, rhonchi, or rales. There is no use of accessory muscles.  Abdomen: Abdomen is soft, nontender, nondistended. There is no firm or pulsatile masses, no rebound rigidity or guarding.   Musculoskeletal: 5 out of 5 strength in all 4 extremities.  No focal muscle deficits are appreciated  Neuro: Motor intact, sensory intact, level of consciousness is normal, GCS 15  Dermatology: There is few areas of punctate raised pruritic papules on the bilateral forearms, bilateral lower half of the legs.  There are no petechiae, there is no open wounds or active drainage from the site.  Skin is warm and dry  Psych: Mentation is grossly normal, " cognition is grossly normal. Affect is appropriate.      DIAGNOSTIC RESULTS     EKG: All EKG's are interpreted by the Emergency Department Physician who either signs or Co-signs this chart in the absence of a cardiologist.    No orders to display       RADIOLOGY:   Non-plain film images such as CT, Ultrasound and MRI are read by the radiologist. Plain radiographic images are visualized and preliminarily interpreted by the emergency physician with the below findings:      [] Radiologist's Report Reviewed:  No orders to display         ED BEDSIDE ULTRASOUND:   Performed by ED Physician - none    LABS:    I have reviewed and interpreted all of the currently available lab results from this visit (if applicable):  Results for orders placed or performed during the hospital encounter of 04/21/20   Comprehensive metabolic panel   Result Value Ref Range    Glucose 225 (H) 65 - 99 mg/dL    BUN 13 6 - 20 mg/dL    Creatinine 0.81 0.57 - 1.00 mg/dL    Sodium 140 136 - 145 mmol/L    Potassium 3.4 (L) 3.5 - 5.2 mmol/L    Chloride 95 (L) 98 - 107 mmol/L    CO2 29.0 22.0 - 29.0 mmol/L    Calcium 9.2 8.6 - 10.5 mg/dL    Total Protein 7.1 6.0 - 8.5 g/dL    Albumin 4.40 3.50 - 5.20 g/dL    ALT (SGPT) 23 1 - 33 U/L    AST (SGOT) 16 1 - 32 U/L    Alkaline Phosphatase 91 39 - 117 U/L    Total Bilirubin 0.2 0.2 - 1.2 mg/dL    eGFR Non African Amer 74 >60 mL/min/1.73    Globulin 2.7 gm/dL    A/G Ratio 1.6 g/dL    BUN/Creatinine Ratio 16.0 7.0 - 25.0    Anion Gap 16.0 (H) 5.0 - 15.0 mmol/L   CBC Auto Differential   Result Value Ref Range    WBC 20.20 (H) 3.40 - 10.80 10*3/mm3    RBC 3.56 (L) 3.77 - 5.28 10*6/mm3    Hemoglobin 10.4 (L) 12.0 - 15.9 g/dL    Hematocrit 31.8 (L) 34.0 - 46.6 %    RDW 13.6 12.3 - 15.4 %    MCV 89.4 79.0 - 97.0 fL    MCH 29.2 26.6 - 33.0 pg    MCHC 32.6 31.5 - 35.7 g/dL    MPV 6.2 6.0 - 12.0 fL    Platelets 226 140 - 450 10*3/mm3    Neutrophil % 79.6 (H) 42.7 - 76.0 %    Lymphocyte % 14.2 (L) 19.6 - 45.3 %     "Monocyte % 6.2 5.0 - 12.0 %    Neutrophils, Absolute 16.10 (H) 1.70 - 7.00 10*3/mm3    Lymphocytes, Absolute 2.90 0.70 - 3.10 10*3/mm3    Monocytes, Absolute 1.30 (H) 0.10 - 0.90 10*3/mm3        All other labs were within normal range or not returned as of this dictation.      EMERGENCY DEPARTMENT COURSE and DIFFERENTIAL DIAGNOSIS/MDM:   Vitals:    Vitals:    05/03/20 0129 05/03/20 0145 05/03/20 0200   BP: 145/86 159/82 127/72   BP Location: Right arm     Patient Position: Lying     Pulse: 95 87 85   Resp: 16     Temp: 98.5 °F (36.9 °C)     TempSrc: Oral     SpO2: 97%     Weight: 79.4 kg (175 lb)     Height: 157.5 cm (62\")              Patient with a.  A rash to the bilateral arms and legs, unsure of the source of the reaction but appears to be just cutaneous in nature.  Is no respiratory compromise, no wheezing, her vital signs are stable.  We discussed treatment with H1, H2 blockers, anti-inflammatory with steroids for symptomatic relief.  Patient is agreeable to this, would prefer oral medications rather than intravenous and I feel this is appropriate.  No acute respiratory or cardiovascular compromise which would require any epinephrine at this time.  On reevaluation patient symptoms have improved greatly, we discussed treatment for the next few days, follow closely with her PCP for reevaluation.  Return precautions discussed. I had a discussion with the patient/family regarding diagnosis, diagnostic results, treatment plan, and medications.  The patient/family indicated understanding of these instructions.  I spent adequate time at the bedside proceeding discharge necessary to personally discuss the aftercare instructions, giving patient education, providing explanations of the results of our evaluations/findings, and my decision making to assure that the patient/family understand the plan of care.  Time was allotted to answer questions at that time and throughout the ED course.  Emphasis was placed on timely " follow-up after discharge.  I also discussed the potential for the development of an acute emergent condition requiring further evaluation, admission, or even surgical intervention. I discussed that we found nothing during the visit today indicating the need for further workup, admission, or the presence of an unstable medical condition.  I encouraged the patient to return to the emergency department immediately for ANY concerns, worsening, new complaints, or if symptoms persist and unable to seek follow-up in a timely fashion.  The patient/family expressed understanding and agreement with this plan.  The patient will follow-up with their PCP in 1-2 days for reevaluation.       MEDICATIONS ADMINISTERED IN ED:  Medications   predniSONE (DELTASONE) tablet 60 mg (60 mg Oral Given 5/3/20 0206)   diphenhydrAMINE (BENADRYL) capsule 50 mg (50 mg Oral Given 5/3/20 0207)   famotidine (PEPCID) tablet 20 mg (20 mg Oral Given 5/3/20 0207)       PROCEDURES:  Procedures    CRITICAL CARE TIME    Total Critical Care time was 0 minutes, excluding separately reportable procedures.   There was a high probability of clinically significant/life threatening deterioration in the patient's condition which required my urgent intervention.      FINAL IMPRESSION      1. Pruritic dermatitis          DISPOSITION/PLAN     ED Disposition     ED Disposition Condition Comment    Discharge Stable           PATIENT REFERRED TO:  Ina Kwok, DO  [address]  [facility] [address]  [phone]    In 2 days      [facility] Emergency Department  [address] [facility] [address] [phone]    If symptoms worsen      DISCHARGE MEDICATIONS:     Medication List      START taking these medications    diphenhydrAMINE 25 mg capsule  Commonly known as:  BENADRYL  Take 1 capsule by mouth Every 6 (Six) Hours As Needed for Itching or   Allergies.     famotidine 20 MG tablet  Commonly known as:  PEPCID  Take 1 tablet by  mouth 2 (Two) Times a Day for 7 days.     predniSONE 20 MG tablet  Commonly known as:  DELTASONE  Take 3 tablets by mouth Daily for 4 days.        CONTINUE taking these medications    atenolol 50 MG tablet  Commonly known as:  TENORMIN     dexamethasone 4 MG tablet  Commonly known as:  DECADRON  Take 2 tablets in the morning daily on Days 2, 3 & 4.  Take with food.     diphenoxylate-atropine 2.5-0.025 MG per tablet  Commonly known as:  Lomotil  Take 1 tablet by mouth Every 6 (Six) Hours As Needed for Diarrhea. 1   tablet     hydroCHLOROthiazide 25 MG tablet  Commonly known as:  HYDRODIURIL     HYDROcodone-acetaminophen 5-325 MG per tablet  Commonly known as:  NORCO     lidocaine-prilocaine 2.5-2.5 % cream  Commonly known as:  EMLA  Apply  topically to the appropriate area as directed As Needed (45-60   minutes prior to port access.  Cover with saran/plastic wrap.).     metFORMIN 1000 MG tablet  Commonly known as:  GLUCOPHAGE     modafinil 200 MG tablet  Commonly known as:  PROVIGIL     nystatin susp + lidocaine viscous  oral suspension  Commonly known as:  MAGIC MOUTHWASH  5-10 ml swish and spit or swallow QID prn     ondansetron 8 MG tablet  Commonly known as:  ZOFRAN  Take 1 tablet by mouth 3 (Three) Times a Day As Needed for Nausea or   Vomiting.     venlafaxine XR 75 MG 24 hr capsule  Commonly known as:  EFFEXOR-XR              Comment: Please note this report has been produced using speech recognition software.      Matthew Wise DO  Attending Emergency Physician               Matthew Wise DO  05/03/20 [id number]

## 2020-05-04 ENCOUNTER — OFFICE VISIT (OUTPATIENT)
Dept: ONCOLOGY | Facility: CLINIC | Age: 55
End: 2020-05-04

## 2020-05-04 ENCOUNTER — TELEPHONE (OUTPATIENT)
Dept: ONCOLOGY | Facility: CLINIC | Age: 55
End: 2020-05-04

## 2020-05-04 ENCOUNTER — HOSPITAL ENCOUNTER (OUTPATIENT)
Dept: ONCOLOGY | Facility: HOSPITAL | Age: 55
Setting detail: INFUSION SERIES
Discharge: HOME | End: 2020-05-04

## 2020-05-04 VITALS
SYSTOLIC BLOOD PRESSURE: 144 MMHG | RESPIRATION RATE: 18 BRPM | HEIGHT: 62 IN | BODY MASS INDEX: 32.57 KG/M2 | OXYGEN SATURATION: 96 % | DIASTOLIC BLOOD PRESSURE: 76 MMHG | TEMPERATURE: 97.5 F | WEIGHT: 177 LBS | HEART RATE: 86 BPM

## 2020-05-04 DIAGNOSIS — Z17.1 MALIGNANT NEOPLASM OF UPPER-OUTER QUADRANT OF RIGHT BREAST IN FEMALE, ESTROGEN RECEPTOR NEGATIVE (HCC): Primary | ICD-10-CM

## 2020-05-04 DIAGNOSIS — C50.411 MALIGNANT NEOPLASM OF UPPER-OUTER QUADRANT OF RIGHT BREAST IN FEMALE, ESTROGEN RECEPTOR NEGATIVE (HCC): Primary | ICD-10-CM

## 2020-05-04 DIAGNOSIS — Z45.2 ENCOUNTER FOR ADJUSTMENT OR MANAGEMENT OF VASCULAR ACCESS DEVICE: ICD-10-CM

## 2020-05-04 DIAGNOSIS — C50.411 MALIGNANT NEOPLASM OF UPPER-OUTER QUADRANT OF RIGHT BREAST IN FEMALE, ESTROGEN RECEPTOR NEGATIVE (HCC): ICD-10-CM

## 2020-05-04 DIAGNOSIS — Z17.1 MALIGNANT NEOPLASM OF UPPER-OUTER QUADRANT OF RIGHT BREAST IN FEMALE, ESTROGEN RECEPTOR NEGATIVE (HCC): ICD-10-CM

## 2020-05-04 LAB
ALBUMIN SERPL-MCNC: 4.4 G/DL (ref 3.5–5.2)
ALBUMIN/GLOB SERPL: 1.6 G/DL
ALP SERPL-CCNC: 74 U/L (ref 39–117)
ALT SERPL W P-5'-P-CCNC: 22 U/L (ref 1–33)
ANION GAP SERPL CALCULATED.3IONS-SCNC: 15 MMOL/L (ref 5–15)
AST SERPL-CCNC: 16 U/L (ref 1–32)
BILIRUB SERPL-MCNC: 0.2 MG/DL (ref 0.2–1.2)
BUN BLD-MCNC: 17 MG/DL (ref 6–20)
BUN/CREAT SERPL: 18.5 (ref 7–25)
CALCIUM SPEC-SCNC: 9.1 MG/DL (ref 8.6–10.5)
CHLORIDE SERPL-SCNC: 95 MMOL/L (ref 98–107)
CO2 SERPL-SCNC: 32 MMOL/L (ref 22–29)
CREAT BLD-MCNC: 0.92 MG/DL (ref 0.57–1)
ERYTHROCYTE [DISTWIDTH] IN BLOOD BY AUTOMATED COUNT: 15.3 % (ref 12.3–15.4)
GFR SERPL CREATININE-BSD FRML MDRD: 64 ML/MIN/1.73
GLOBULIN UR ELPH-MCNC: 2.8 GM/DL
GLUCOSE BLD-MCNC: 191 MG/DL (ref 65–99)
HCT VFR BLD AUTO: 26.9 % (ref 34–46.6)
HGB BLD-MCNC: 8.7 G/DL (ref 12–15.9)
LYMPHOCYTES # BLD AUTO: 2.5 10*3/MM3 (ref 0.7–3.1)
LYMPHOCYTES NFR BLD AUTO: 15.8 % (ref 19.6–45.3)
MCH RBC QN AUTO: 29.5 PG (ref 26.6–33)
MCHC RBC AUTO-ENTMCNC: 32.2 G/DL (ref 31.5–35.7)
MCV RBC AUTO: 91.6 FL (ref 79–97)
MONOCYTES # BLD AUTO: 1 10*3/MM3 (ref 0.1–0.9)
MONOCYTES NFR BLD AUTO: 6.5 % (ref 5–12)
NEUTROPHILS # BLD AUTO: 12.3 10*3/MM3 (ref 1.7–7)
NEUTROPHILS NFR BLD AUTO: 77.7 % (ref 42.7–76)
PLATELET # BLD AUTO: 217 10*3/MM3 (ref 140–450)
PMV BLD AUTO: 6.2 FL (ref 6–12)
POTASSIUM BLD-SCNC: 2.7 MMOL/L (ref 3.5–5.2)
PROT SERPL-MCNC: 7.2 G/DL (ref 6–8.5)
RBC # BLD AUTO: 2.94 10*6/MM3 (ref 3.77–5.28)
SODIUM BLD-SCNC: 142 MMOL/L (ref 136–145)
WBC NRBC COR # BLD: 15.8 10*3/MM3 (ref 3.4–10.8)

## 2020-05-04 PROCEDURE — 25010000002 DEXAMETHASONE PER 1 MG: Performed by: NURSE PRACTITIONER

## 2020-05-04 PROCEDURE — 25010000003 HEPARIN LOCK FLUSH PER 10 UNITS: Performed by: INTERNAL MEDICINE

## 2020-05-04 PROCEDURE — 25010000002 PEGFILGRASTIM 6 MG/0.6ML PREFILLED SYRINGE KIT: Performed by: NURSE PRACTITIONER

## 2020-05-04 PROCEDURE — 99214 OFFICE O/P EST MOD 30 MIN: CPT | Performed by: NURSE PRACTITIONER

## 2020-05-04 PROCEDURE — 25010000002 FOSAPREPITANT PER 1 MG: Performed by: NURSE PRACTITIONER

## 2020-05-04 PROCEDURE — 96375 TX/PRO/DX INJ NEW DRUG ADDON: CPT

## 2020-05-04 PROCEDURE — 96411 CHEMO IV PUSH ADDL DRUG: CPT

## 2020-05-04 PROCEDURE — 85025 COMPLETE CBC W/AUTO DIFF WBC: CPT | Performed by: NURSE PRACTITIONER

## 2020-05-04 PROCEDURE — 96413 CHEMO IV INFUSION 1 HR: CPT

## 2020-05-04 PROCEDURE — 25010000002 CYCLOPHOSPHAMIDE PER 100 MG: Performed by: NURSE PRACTITIONER

## 2020-05-04 PROCEDURE — 80053 COMPREHEN METABOLIC PANEL: CPT | Performed by: NURSE PRACTITIONER

## 2020-05-04 PROCEDURE — 25010000002 DOXORUBICIN PER 10 MG: Performed by: NURSE PRACTITIONER

## 2020-05-04 PROCEDURE — 96376 TX/PRO/DX INJ SAME DRUG ADON: CPT

## 2020-05-04 PROCEDURE — 96409 CHEMO IV PUSH SNGL DRUG: CPT

## 2020-05-04 PROCEDURE — 96377 APPLICATON ON-BODY INJECTOR: CPT

## 2020-05-04 PROCEDURE — 96367 TX/PROPH/DG ADDL SEQ IV INF: CPT

## 2020-05-04 PROCEDURE — 25010000002 PALONOSETRON 0.25 MG/5ML SOLUTION PREFILLED SYRINGE: Performed by: NURSE PRACTITIONER

## 2020-05-04 RX ORDER — SODIUM CHLORIDE 9 MG/ML
250 INJECTION, SOLUTION INTRAVENOUS ONCE
Status: COMPLETED | OUTPATIENT
Start: 2020-05-04 | End: 2020-05-04

## 2020-05-04 RX ORDER — HEPARIN SODIUM (PORCINE) LOCK FLUSH IV SOLN 100 UNIT/ML 100 UNIT/ML
500 SOLUTION INTRAVENOUS AS NEEDED
Status: DISCONTINUED | OUTPATIENT
Start: 2020-05-04 | End: 2020-05-05 | Stop reason: HOSPADM

## 2020-05-04 RX ORDER — DOXORUBICIN HYDROCHLORIDE 2 MG/ML
60 INJECTION, SOLUTION INTRAVENOUS ONCE
Status: CANCELLED | OUTPATIENT
Start: 2020-05-05

## 2020-05-04 RX ORDER — POTASSIUM CHLORIDE 1500 MG/1
20 TABLET, FILM COATED, EXTENDED RELEASE ORAL DAILY
Qty: 30 TABLET | Refills: 1 | Status: SHIPPED | OUTPATIENT
Start: 2020-05-04 | End: 2020-07-06

## 2020-05-04 RX ORDER — PALONOSETRON 0.05 MG/ML
0.25 INJECTION, SOLUTION INTRAVENOUS ONCE
Status: CANCELLED | OUTPATIENT
Start: 2020-05-05

## 2020-05-04 RX ORDER — DOXORUBICIN HYDROCHLORIDE 2 MG/ML
110 INJECTION, SOLUTION INTRAVENOUS ONCE
Status: COMPLETED | OUTPATIENT
Start: 2020-05-04 | End: 2020-05-04

## 2020-05-04 RX ORDER — SODIUM CHLORIDE 0.9 % (FLUSH) 0.9 %
10 SYRINGE (ML) INJECTION AS NEEDED
Status: CANCELLED | OUTPATIENT
Start: 2020-05-04

## 2020-05-04 RX ORDER — PALONOSETRON 0.05 MG/ML
0.25 INJECTION, SOLUTION INTRAVENOUS ONCE
Status: COMPLETED | OUTPATIENT
Start: 2020-05-04 | End: 2020-05-04

## 2020-05-04 RX ORDER — SODIUM CHLORIDE 9 MG/ML
250 INJECTION, SOLUTION INTRAVENOUS ONCE
Status: CANCELLED | OUTPATIENT
Start: 2020-05-05

## 2020-05-04 RX ORDER — HEPARIN SODIUM (PORCINE) LOCK FLUSH IV SOLN 100 UNIT/ML 100 UNIT/ML
500 SOLUTION INTRAVENOUS AS NEEDED
Status: CANCELLED | OUTPATIENT
Start: 2020-05-04

## 2020-05-04 RX ORDER — POTASSIUM CHLORIDE 1500 MG/1
20 TABLET, FILM COATED, EXTENDED RELEASE ORAL DAILY
Qty: 30 TABLET | Refills: 1 | Status: SHIPPED | OUTPATIENT
Start: 2020-05-04 | End: 2020-05-04 | Stop reason: SDUPTHER

## 2020-05-04 RX ADMIN — CYCLOPHOSPHAMIDE 1000 MG: 1 INJECTION, POWDER, FOR SOLUTION INTRAVENOUS; ORAL at 14:13

## 2020-05-04 RX ADMIN — HEPARIN 500 UNITS: 100 SYRINGE at 14:55

## 2020-05-04 RX ADMIN — SODIUM CHLORIDE 150 MG: 9 INJECTION, SOLUTION INTRAVENOUS at 13:34

## 2020-05-04 RX ADMIN — PEGFILGRASTIM 6 MG: KIT SUBCUTANEOUS at 14:53

## 2020-05-04 RX ADMIN — DOXORUBICIN HYDROCHLORIDE 110 MG: 2 INJECTION, SOLUTION INTRAVENOUS at 13:59

## 2020-05-04 RX ADMIN — DEXAMETHASONE SODIUM PHOSPHATE 12 MG: 4 INJECTION, SOLUTION INTRAMUSCULAR; INTRAVENOUS at 13:34

## 2020-05-04 RX ADMIN — PALONOSETRON 0.25 MG: 0.25 INJECTION, SOLUTION INTRAVENOUS at 13:32

## 2020-05-04 RX ADMIN — SODIUM CHLORIDE 250 ML: 9 INJECTION, SOLUTION INTRAVENOUS at 13:31

## 2020-05-04 NOTE — PROGRESS NOTES
CHIEF COMPLAINT: breast cancer management     Problem List:  Oncology/Hematology History    1.  Stage IIB T2 (2.5cm per Dr. Perez) N0 invasive ductal triple negative breast cancer.  2/16/2020 right upper outer quadrant needle core biopsy showed invasive ductal carcinoma 4.4 mm moderately differentiated negative for estrogen, progesterone receptor both 0% and HER-2/vashti 0+.  Dr. Santo recommended lumpectomy and sentinel node biopsy.  3/12/2020 saw Dr. Gary Perez.perimenopausal breast cancer she felt a 2.5 cm right upper outer quadrant mass almost at the edge of the axilla somewhat fixed.  No skin changes.  She recommended neoadjuvant chemotherapy followed by lumpectomy versus mastectomy then radiation if patient decides on breast conservation or if nodes are found on breast MRI.  She will get formal genetic counseling to help guide the issues of possible prophylactic mastectomy.  With triple negative disease the standard recommendation would be neoadjuvant Adriamycin Cytoxan dose dense x4 followed by Taxol weekly x12 followed by surgery.  If there is residual disease then would consider Xeloda per the create X trial.  We also have  looking at observation versus Keytruda for 1 year in patients with triple negative disease who do not have a complete pathological response to neoadjuvant chemotherapy.  In the absence of symptoms, NCCN guidelines does not recommend routine metastatic surveillance imaging.  Will need ejection fraction before Adriamycin.  2.  Hypertension  3.  Hyperlipidemia  4.  Diabetes  5.  Using IUD and taking estradiol for hormone replacement therapy preoperatively  6.  History of D and C as well as bilateral tubal ligation  7.  History of cholecystectomy  8.  Vasomotor symptoms  9.  Family history of paternal aunt with breast cancer and perhaps a sister with ovarian cancer but she is not sure.  She has nulliparous.  Genetic spit test done results pending.    10.  Anxiety and depression         Malignant neoplasm of upper outer quadrant of breast in female, estrogen receptor negative (CMS/HCC)    3/12/2020 Initial Diagnosis     Malignant neoplasm of upper outer quadrant of breast in female, estrogen receptor negative (CMS/HCC)      3/12/2020 Cancer Staged     Staging form: Breast, AJCC 8th Edition  - Clinical: Stage IIB (cT2, cN0, cM0, G2, ER-, CA-, HER2-) - Signed by Johann Juarez MD on 3/12/2020      5/6/2020 -  Chemotherapy     OP SUPPORTIVE HYDRATION + ANTIEMETICS        Malignant neoplasm of upper-outer quadrant of right breast in female, estrogen receptor negative (CMS/HCC)    3/12/2020 Initial Diagnosis     Malignant neoplasm of upper-outer quadrant of right breast in female, estrogen receptor negative (CMS/HCC)      3/24/2020 -  Chemotherapy     OP BREAST AC DD DOXOrubicin / Cyclophosphamide      5/19/2020 -  Chemotherapy     OP BREAST PACLitaxel (weekly X 12)         HISTORY OF PRESENT ILLNESS:  The patient is a 54 y.o. female, here for follow up on management of neoadjuvant breast cancer.    She tolerated cycle 3 of dose dense AC relatively well.  She had a rash develop over the weekend on her arms and legs that was pruritic.  She went to the emergency department for evaluation and was started on steroids, Benadryl and Pepcid.  The rash has almost resolved today but is still mildly pruritic.  She denies any shortness of air, trouble swallowing or feeling as though her throat was closing up.  She also reports on 4/25/2020 she was in the kitchen and passed out.  She woke up on her own.  She thinks this was related to dehydration.  She did not feel any dizziness at the time.  She has been drinking plenty of fluids since then has had no further episodes of syncope.  She did not go to the emergency department for evaluation of the syncope.  She denies any nausea or vomiting.  No fevers, chills or cough.        Past Medical History:   Diagnosis Date   • Cancer (CMS/HCC)      Past Surgical History:  "  Procedure Laterality Date   • BREAST BIOPSY Right 02/2020   • CHOLECYSTECTOMY     • COLONOSCOPY         No Known Allergies    Family History and Social History reviewed and changed as necessary      REVIEW OF SYSTEM:   Review of Systems   Constitutional: Negative for appetite change, chills, diaphoresis, fever and unexpected weight change. + fatigue  HENT:   Negative for sore throat and trouble swallowing.    Eyes: Negative for icterus.   Respiratory: Negative for cough, hemoptysis and shortness of breath.    Cardiovascular: Negative for chest pain, leg swelling and palpitations.   Gastrointestinal: Negative for abdominal distention, abdominal pain, blood in stool, constipation, and vomiting. no diarrhea or nausea   Endocrine: Negative for hot flashes.   Genitourinary: Negative for bladder incontinence, difficulty urinating, dysuria, frequency and hematuria.    Musculoskeletal: Negative for gait problem, neck pain and neck stiffness.   Skin: Negative for rash.   Neurological: Negative for dizziness, gait problem, headaches, light-headedness and numbness.   Hematological: Negative for adenopathy. Does not bruise/bleed easily.   Psychiatric/Behavioral: Negative for depression. The patient is not nervous/anxious.    All other systems reviewed and are negative.       PHYSICAL EXAM    Vitals:    05/04/20 1145   BP: 144/76   Pulse: 86   Resp: 18   Temp: 97.5 °F (36.4 °C)   SpO2: 96%   Weight: 80.3 kg (177 lb)   Height: 157.5 cm (62\")     Vitals:    05/04/20 1145   PainSc: 0-No pain        Constitutional: Appears well-developed and well-nourished. No distress.   ECOG: (0) Fully Active - Able to Carry On All Pre-disease Performance Without Restriction  HENT:   Head: Normocephalic.   Mouth/Throat: Oropharynx is clear and moist.   Eyes: Conjunctivae are normal. Pupils are equal, round, and reactive to light. No scleral icterus.   Neck: Neck supple. No JVD present. No thyromegaly present.   Cardiovascular: Normal rate, " regular rhythm and normal heart sounds.    Pulmonary/Chest: Breath sounds normal. No respiratory distress.   Breasts: right breast nodule approximately  2 cm at the lateral posterior 9:00 position of the right breast, definite improvement noted on exam   Abdominal: Soft. Exhibits no distension and no mass. There is no hepatosplenomegaly. There is no tenderness. There is no rebound and no guarding.   Musculoskeletal:Exhibits no edema, tenderness or deformity.   Neurological: Alert and oriented to person, place, and time. Exhibits normal muscle tone.   Skin: No ecchymosis, no petechiae and no rash noted. Not diaphoretic. No cyanosis. Nails show no clubbing.   Psychiatric: Normal mood and affect.   Vitals reviewed.      Lab Results   Component Value Date    HGB 8.7 (L) 05/04/2020    HCT 26.9 (L) 05/04/2020    MCV 91.6 05/04/2020     05/04/2020    WBC 15.80 (H) 05/04/2020    NEUTROABS 12.30 (H) 05/04/2020    LYMPHSABS 2.50 05/04/2020    MONOSABS 1.00 (H) 05/04/2020       Lab Results   Component Value Date    GLUCOSE 191 (H) 05/04/2020    BUN 17 05/04/2020    CREATININE 0.92 05/04/2020     05/04/2020    K 2.7 (L) 05/04/2020    CL 95 (L) 05/04/2020    CO2 32.0 (H) 05/04/2020    CALCIUM 9.1 05/04/2020    PROTEINTOT 7.2 05/04/2020    ALBUMIN 4.40 05/04/2020    BILITOT 0.2 05/04/2020    ALKPHOS 74 05/04/2020    AST 16 05/04/2020    ALT 22 05/04/2020                   ASSESSMENT & PLAN:  1.  Stage IIB T2 (2.5cm per Dr. Perez) N0 invasive ductal triple negative breast cancer.  2/16/2020 right upper outer quadrant needle core biopsy showed invasive ductal carcinoma 4.4 mm moderately differentiated negative for estrogen, progesterone receptor both 0% and HER-2/vashti 0+.  Dr. Santo recommended lumpectomy and sentinel node biopsy.  3/12/2020 saw Dr. Gary Perez.perimenopausal breast cancer she felt a 2.5 cm right upper outer quadrant mass almost at the edge of the axilla somewhat fixed.  No skin changes.  She  recommended neoadjuvant chemotherapy followed by lumpectomy versus mastectomy then radiation if patient decides on breast conservation or if nodes are found on breast MRI.  She will get formal genetic counseling to help guide the issues of possible prophylactic mastectomy.  With triple negative disease the standard recommendation would be neoadjuvant Adriamycin Cytoxan dose dense x4 followed by Taxol weekly x12 followed by surgery.  If there is residual disease then would consider Xeloda per the create X trial.  We also have  looking at observation versus Keytruda for 1 year in patients with triple negative disease who do not have a complete pathological response to neoadjuvant chemotherapy.  In the absence of symptoms, NCCN guidelines does not recommend routine metastatic surveillance imaging.  Will need ejection fraction before Adriamycin.  2.  Hypertension  3.  Hyperlipidemia  4.  Diabetes  5.  Using IUD and taking estradiol for hormone replacement therapy preoperatively  6.  History of D and C as well as bilateral tubal ligation  7.  History of cholecystectomy  8.  Vasomotor symptoms  9.  Family history of paternal aunt with breast cancer and perhaps a sister with ovarian cancer but she is not sure.  She has nulliparous.  Genetic spit test done results pending.    10.  Anxiety and depression     5/4/2020 oncology follow-up visit: The patient developed a rash over this past weekend and was treated in the emergency department with steroids, Benadryl, and Pepcid.  The rash is almost completely resolved.  She will receive dexamethasone today so she did not take the prednisone prescribed for her in the emergency department.  She will also take dexamethasone 8 mg for the next 3 days.  I have instructed her to take the last 2 days of the prednisone after she completes the 3 days of Decadron.  She did have one episode of syncope that she thinks could be related to dehydration.  I will set her up for IV fluids on  May 6 in May 8 to see if this helps with her hydration.  I did instruct her if she has another episode of syncope she must call 911 for further evaluation.  She will receive cycle 4 of dose dense AC today with the dosage unchanged.  After this we would like to give her 12 weekly doses of Taxol.  The patient reports because of her work schedule in the fall she cannot do the weekly Taxol at this time she wants to do her bilateral mastectomies and then come back for Taxol which she could do while she is working this summer/fall.  After consultation with Dr. Juarez I did explain to the patient in great detail that we recommend her receive the Taxol now as neoadjuvant that way following her surgery if she had residual disease we could consider Xeloda per the create X trial or also have  looking at observation versus Keytruda for 1 year in patients with triple negative disease who do not have a complete pathologic response to neoadjuvant chemotherapy.  The patient verbalizes understanding that these treatment options would not be available if we do the Taxol after her surgery.  Unfortunately, due to her work schedule and inability to take off sick days unpaid she is choosing to do her mastectomy after dose dense AC has completed and come back for Taxol after surgery.  She has an appointment to see Dr. Corrie Perez May 5, 2020 at 11:30 AM.  We will plan on seeing her back 1 month after surgery to discuss starting weekly Taxol.      I spent 30 minutes with patient face to face with 25 minutes spent counseling and discussing prognosis, diagnostic testing, evaluation, current status and symptom management.       Ludivina Felder, APRN    05/04/2020

## 2020-05-04 NOTE — TELEPHONE ENCOUNTER
----- Message from Ashley Grace RN sent at 5/4/2020  1:18 PM EDT -----  Regarding: Dr. Juarez- Abnormal labs  Patient seen today for cycle 4 day 1 AC. Hemoglobin 8.7. Plan states to notify for hgb. less than 9. FYI only. Thanks Ashley PARIS ext. [phone].

## 2020-05-04 NOTE — TELEPHONE ENCOUNTER
Patient notified her K+ was 2.7 today. [facility]Eq [facility] daily. Patient verbalizes understanding and will start prescription today.

## 2020-05-04 NOTE — TELEPHONE ENCOUNTER
KATELIN ARAGON CALLED TO VERIFY THE INSTRUCTIONS ON THE MEDICATION POTASSIUM CHLORIDE.    TAKE 1 TABLET BY MOUTH DAILY. TWO TABLETS.    PLEASE GIVE MAHESH A CALL [phone].  TAKE OPTION TO TALK TO THE PHARMACY MANAGER.

## 2020-05-06 ENCOUNTER — HOSPITAL ENCOUNTER (OUTPATIENT)
Dept: ONCOLOGY | Facility: HOSPITAL | Age: 55
Setting detail: INFUSION SERIES
Discharge: HOME | End: 2020-05-06

## 2020-05-06 VITALS
BODY MASS INDEX: 32.57 KG/M2 | RESPIRATION RATE: 18 BRPM | HEIGHT: 62 IN | SYSTOLIC BLOOD PRESSURE: 115 MMHG | DIASTOLIC BLOOD PRESSURE: 67 MMHG | HEART RATE: 77 BPM | TEMPERATURE: 97.3 F | WEIGHT: 177 LBS

## 2020-05-06 DIAGNOSIS — Z17.1 MALIGNANT NEOPLASM OF UPPER-OUTER QUADRANT OF RIGHT BREAST IN FEMALE, ESTROGEN RECEPTOR NEGATIVE (HCC): Primary | ICD-10-CM

## 2020-05-06 DIAGNOSIS — Z45.2 ENCOUNTER FOR ADJUSTMENT OR MANAGEMENT OF VASCULAR ACCESS DEVICE: ICD-10-CM

## 2020-05-06 DIAGNOSIS — C50.411 MALIGNANT NEOPLASM OF UPPER-OUTER QUADRANT OF RIGHT BREAST IN FEMALE, ESTROGEN RECEPTOR NEGATIVE (HCC): Primary | ICD-10-CM

## 2020-05-06 PROCEDURE — 25010000003 HEPARIN LOCK FLUSH PER 10 UNITS: Performed by: INTERNAL MEDICINE

## 2020-05-06 PROCEDURE — 96361 HYDRATE IV INFUSION ADD-ON: CPT

## 2020-05-06 PROCEDURE — 96360 HYDRATION IV INFUSION INIT: CPT

## 2020-05-06 RX ORDER — HEPARIN SODIUM (PORCINE) LOCK FLUSH IV SOLN 100 UNIT/ML 100 UNIT/ML
500 SOLUTION INTRAVENOUS AS NEEDED
Status: DISCONTINUED | OUTPATIENT
Start: 2020-05-06 | End: 2020-05-07 | Stop reason: HOSPADM

## 2020-05-06 RX ORDER — HEPARIN SODIUM (PORCINE) LOCK FLUSH IV SOLN 100 UNIT/ML 100 UNIT/ML
500 SOLUTION INTRAVENOUS AS NEEDED
Status: CANCELLED | OUTPATIENT
Start: 2020-05-06

## 2020-05-06 RX ORDER — SODIUM CHLORIDE 0.9 % (FLUSH) 0.9 %
10 SYRINGE (ML) INJECTION AS NEEDED
Status: CANCELLED | OUTPATIENT
Start: 2020-05-06

## 2020-05-06 RX ADMIN — HEPARIN 500 UNITS: 100 SYRINGE at 16:49

## 2020-05-06 RX ADMIN — SODIUM CHLORIDE 1000 ML: 9 INJECTION, SOLUTION INTRAVENOUS at 14:51

## 2020-05-08 ENCOUNTER — HOSPITAL ENCOUNTER (OUTPATIENT)
Dept: ONCOLOGY | Facility: HOSPITAL | Age: 55
Setting detail: INFUSION SERIES
Discharge: HOME | End: 2020-05-08

## 2020-05-08 VITALS
WEIGHT: 179 LBS | TEMPERATURE: 97.4 F | DIASTOLIC BLOOD PRESSURE: 67 MMHG | SYSTOLIC BLOOD PRESSURE: 136 MMHG | HEIGHT: 62 IN | RESPIRATION RATE: 18 BRPM | BODY MASS INDEX: 32.94 KG/M2 | HEART RATE: 83 BPM

## 2020-05-08 DIAGNOSIS — C50.411 MALIGNANT NEOPLASM OF UPPER-OUTER QUADRANT OF RIGHT BREAST IN FEMALE, ESTROGEN RECEPTOR NEGATIVE (HCC): Primary | ICD-10-CM

## 2020-05-08 DIAGNOSIS — Z17.1 MALIGNANT NEOPLASM OF UPPER-OUTER QUADRANT OF RIGHT BREAST IN FEMALE, ESTROGEN RECEPTOR NEGATIVE (HCC): Primary | ICD-10-CM

## 2020-05-08 DIAGNOSIS — Z45.2 ENCOUNTER FOR ADJUSTMENT OR MANAGEMENT OF VASCULAR ACCESS DEVICE: ICD-10-CM

## 2020-05-08 PROCEDURE — 96360 HYDRATION IV INFUSION INIT: CPT

## 2020-05-08 PROCEDURE — 96361 HYDRATE IV INFUSION ADD-ON: CPT

## 2020-05-08 PROCEDURE — 25010000003 HEPARIN LOCK FLUSH PER 10 UNITS: Performed by: INTERNAL MEDICINE

## 2020-05-08 RX ORDER — HEPARIN SODIUM (PORCINE) LOCK FLUSH IV SOLN 100 UNIT/ML 100 UNIT/ML
500 SOLUTION INTRAVENOUS AS NEEDED
Status: DISCONTINUED | OUTPATIENT
Start: 2020-05-08 | End: 2020-05-09 | Stop reason: HOSPADM

## 2020-05-08 RX ORDER — HEPARIN SODIUM (PORCINE) LOCK FLUSH IV SOLN 100 UNIT/ML 100 UNIT/ML
500 SOLUTION INTRAVENOUS AS NEEDED
Status: CANCELLED | OUTPATIENT
Start: 2020-05-08

## 2020-05-08 RX ORDER — SODIUM CHLORIDE 0.9 % (FLUSH) 0.9 %
10 SYRINGE (ML) INJECTION AS NEEDED
Status: CANCELLED | OUTPATIENT
Start: 2020-05-08

## 2020-05-08 RX ORDER — SODIUM CHLORIDE 0.9 % (FLUSH) 0.9 %
10 SYRINGE (ML) INJECTION AS NEEDED
Status: DISCONTINUED | OUTPATIENT
Start: 2020-05-08 | End: 2020-05-09 | Stop reason: HOSPADM

## 2020-05-08 RX ADMIN — HEPARIN 500 UNITS: 100 SYRINGE at 15:46

## 2020-05-08 RX ADMIN — SODIUM CHLORIDE 2000 ML: 9 INJECTION, SOLUTION INTRAVENOUS at 13:37

## 2020-05-12 ENCOUNTER — TELEPHONE (OUTPATIENT)
Dept: ONCOLOGY | Facility: CLINIC | Age: 55
End: 2020-05-12

## 2020-05-12 NOTE — TELEPHONE ENCOUNTER
Spoke with patient.  She is c/o neuropathy in both her feet that started over the weekend.  States he skins feels tight, but her feet are not swollen.  Denies numbness or tingling in her fingers.  States her skin is dry on her feet.  She has a small area on the pad of her left foot that has split.  It does not look inflamed and is not draining.      Discussed with AUDI Howard.  Patient advised to try menthol cream on her feet.  Patient was not interested in trying gabapentin at this time.  Will monitor neuropathy for now and notify us if it worsens or does not improved over time.  She just completed cycle #4 of DD AC.   Chemotherapy is on hold for surgery, but she will start taxol after she is healed from surgery.  Advised patient taxol can also cause neuropathy so it important for us to let us know if it gets worse.  Especially since she is also diabetic.  Verbalized understanding.  She will watch the split area on her foot and get in to see PCP if worsens of start to look infected.

## 2020-05-12 NOTE — TELEPHONE ENCOUNTER
Pt called states her feet started hurting this weekend then she got a open sore on her left foot  She wanted to speak to a nurse about the pain and the open sore     Please advise and give call [phone] (Y)

## 2020-06-03 ENCOUNTER — DOCUMENTATION (OUTPATIENT)
Dept: ONCOLOGY | Facility: CLINIC | Age: 55
End: 2020-06-03

## 2020-06-03 NOTE — PROGRESS NOTES
Eli at Dr. Perez's office notified that patient will not have reconstruction until after chemo. She would like to schedule for a double mastectomy.

## 2020-06-09 ENCOUNTER — TELEPHONE (OUTPATIENT)
Dept: ONCOLOGY | Facility: CLINIC | Age: 55
End: 2020-06-09

## 2020-06-09 NOTE — TELEPHONE ENCOUNTER
Patient called requested to have appointments on 6-15-20 with Dr. Juarez  Cancelled, having surgery Tuesday morning.

## 2020-06-11 ENCOUNTER — TELEPHONE (OUTPATIENT)
Dept: ONCOLOGY | Facility: CLINIC | Age: 55
End: 2020-06-11

## 2020-06-11 NOTE — TELEPHONE ENCOUNTER
I talked with AUDI Storey and patient should continue her potassium.  There is refill on script and patient has surgery on 6/16.  I let patient daughter know.

## 2020-06-11 NOTE — TELEPHONE ENCOUNTER
Please call patients daughter Karo [phone], she has questions regarding prescriptions for her mother.

## 2020-06-14 ENCOUNTER — APPOINTMENT (OUTPATIENT)
Dept: PREADMISSION TESTING | Facility: HOSPITAL | Age: 55
End: 2020-06-14

## 2020-06-14 VITALS — BODY MASS INDEX: 32.31 KG/M2 | HEIGHT: 62 IN | WEIGHT: 175.6 LBS

## 2020-06-14 LAB
ALBUMIN SERPL-MCNC: 4.4 G/DL (ref 3.5–5.2)
ALBUMIN/GLOB SERPL: 1.7 G/DL
ALP SERPL-CCNC: 56 U/L (ref 39–117)
ALT SERPL W P-5'-P-CCNC: 15 U/L (ref 1–33)
ANION GAP SERPL CALCULATED.3IONS-SCNC: 13 MMOL/L (ref 5–15)
AST SERPL-CCNC: 15 U/L (ref 1–32)
BILIRUB SERPL-MCNC: 0.2 MG/DL (ref 0.2–1.2)
BUN BLD-MCNC: 17 MG/DL (ref 6–20)
BUN/CREAT SERPL: 19.8 (ref 7–25)
CALCIUM SPEC-SCNC: 9.8 MG/DL (ref 8.6–10.5)
CHLORIDE SERPL-SCNC: 100 MMOL/L (ref 98–107)
CO2 SERPL-SCNC: 27 MMOL/L (ref 22–29)
CREAT BLD-MCNC: 0.86 MG/DL (ref 0.57–1)
DEPRECATED RDW RBC AUTO: 56.1 FL (ref 37–54)
ERYTHROCYTE [DISTWIDTH] IN BLOOD BY AUTOMATED COUNT: 15.7 % (ref 12.3–15.4)
GFR SERPL CREATININE-BSD FRML MDRD: 69 ML/MIN/1.73
GLOBULIN UR ELPH-MCNC: 2.6 GM/DL
GLUCOSE BLD-MCNC: 189 MG/DL (ref 65–99)
HCT VFR BLD AUTO: 34.3 % (ref 34–46.6)
HGB BLD-MCNC: 11.2 G/DL (ref 12–15.9)
MCH RBC QN AUTO: 32.1 PG (ref 26.6–33)
MCHC RBC AUTO-ENTMCNC: 32.7 G/DL (ref 31.5–35.7)
MCV RBC AUTO: 98.3 FL (ref 79–97)
PLATELET # BLD AUTO: 344 10*3/MM3 (ref 140–450)
PMV BLD AUTO: 9.8 FL (ref 6–12)
POTASSIUM BLD-SCNC: 4.4 MMOL/L (ref 3.5–5.2)
PROT SERPL-MCNC: 7 G/DL (ref 6–8.5)
RBC # BLD AUTO: 3.49 10*6/MM3 (ref 3.77–5.28)
SODIUM BLD-SCNC: 140 MMOL/L (ref 136–145)
WBC NRBC COR # BLD: 7.07 10*3/MM3 (ref 3.4–10.8)

## 2020-06-14 PROCEDURE — 93005 ELECTROCARDIOGRAM TRACING: CPT

## 2020-06-14 PROCEDURE — 93010 ELECTROCARDIOGRAM REPORT: CPT | Performed by: INTERNAL MEDICINE

## 2020-06-14 PROCEDURE — C9803 HOPD COVID-19 SPEC COLLECT: HCPCS

## 2020-06-14 PROCEDURE — 36415 COLL VENOUS BLD VENIPUNCTURE: CPT

## 2020-06-14 PROCEDURE — U0002 COVID-19 LAB TEST NON-CDC: HCPCS

## 2020-06-14 PROCEDURE — 85027 COMPLETE CBC AUTOMATED: CPT | Performed by: SURGERY

## 2020-06-14 PROCEDURE — 80053 COMPREHEN METABOLIC PANEL: CPT | Performed by: SURGERY

## 2020-06-14 PROCEDURE — U0004 COV-19 TEST NON-CDC HGH THRU: HCPCS

## 2020-06-14 RX ORDER — LORATADINE 10 MG/1
10 CAPSULE, LIQUID FILLED ORAL EVERY MORNING
COMMUNITY
End: 2022-06-07

## 2020-06-14 NOTE — PAT
Patient to apply Chlorhexadine wipes  to surgical area (as instructed) the night before procedure and the AM of procedure. Wipes provided.    Patient instructed to drink 20 ounces (or until full) of Gatorade and it needs to be completed 1 hour before given arrival time for procedure (NO RED Gatorade)    Patient verbalized understanding.    Instructed patient to take Atenolol on the AM of surgery even though the office told her not to.

## 2020-06-15 ENCOUNTER — TRANSCRIBE ORDERS (OUTPATIENT)
Dept: ADMINISTRATIVE | Facility: HOSPITAL | Age: 55
End: 2020-06-15

## 2020-06-15 DIAGNOSIS — Z40.01 PROPHYLACTIC BREAST REMOVAL: ICD-10-CM

## 2020-06-15 DIAGNOSIS — C50.411 MALIGNANT NEOPLASM OF UPPER-OUTER QUADRANT OF RIGHT FEMALE BREAST, UNSPECIFIED ESTROGEN RECEPTOR STATUS (HCC): Primary | ICD-10-CM

## 2020-06-15 LAB
REF LAB TEST METHOD: NORMAL
SARS-COV-2 RNA RESP QL NAA+PROBE: NOT DETECTED

## 2020-06-16 ENCOUNTER — ANESTHESIA (OUTPATIENT)
Dept: PERIOP | Facility: HOSPITAL | Age: 55
End: 2020-06-16

## 2020-06-16 ENCOUNTER — HOSPITAL ENCOUNTER (OUTPATIENT)
Dept: NUCLEAR MEDICINE | Facility: HOSPITAL | Age: 55
Discharge: HOME | End: 2020-06-16

## 2020-06-16 ENCOUNTER — ANESTHESIA EVENT (OUTPATIENT)
Dept: PERIOP | Facility: HOSPITAL | Age: 55
End: 2020-06-16

## 2020-06-16 ENCOUNTER — HOSPITAL ENCOUNTER (OUTPATIENT)
Facility: HOSPITAL | Age: 55
Discharge: HOME | End: 2020-06-17
Attending: SURGERY | Admitting: SURGERY

## 2020-06-16 DIAGNOSIS — Z40.01 PROPHYLACTIC BREAST REMOVAL: ICD-10-CM

## 2020-06-16 DIAGNOSIS — C50.411 MALIGNANT NEOPLASM OF UPPER-OUTER QUADRANT OF RIGHT FEMALE BREAST, UNSPECIFIED ESTROGEN RECEPTOR STATUS (HCC): ICD-10-CM

## 2020-06-16 DIAGNOSIS — C50.911 BREAST CANCER, RIGHT (HCC): ICD-10-CM

## 2020-06-16 LAB
B-HCG UR QL: NEGATIVE
GLUCOSE BLDC GLUCOMTR-MCNC: 157 MG/DL (ref 70–130)
GLUCOSE BLDC GLUCOMTR-MCNC: 173 MG/DL (ref 70–130)
GLUCOSE BLDC GLUCOMTR-MCNC: 246 MG/DL (ref 70–130)
INTERNAL NEGATIVE CONTROL: NORMAL
INTERNAL POSITIVE CONTROL: REACTIVE
Lab: NORMAL

## 2020-06-16 PROCEDURE — A9541 TC99M SULFUR COLLOID: HCPCS | Performed by: SURGERY

## 2020-06-16 PROCEDURE — 25810000003 SODIUM CHLORIDE 0.9 % WITH KCL 20 MEQ 20-0.9 MEQ/L-% SOLUTION: Performed by: SURGERY

## 2020-06-16 PROCEDURE — 82962 GLUCOSE BLOOD TEST: CPT

## 2020-06-16 PROCEDURE — 88307 TISSUE EXAM BY PATHOLOGIST: CPT | Performed by: SURGERY

## 2020-06-16 PROCEDURE — 38792 RA TRACER ID OF SENTINL NODE: CPT

## 2020-06-16 PROCEDURE — 88333 PATH CONSLTJ SURG CYTO XM 1: CPT | Performed by: PATHOLOGY

## 2020-06-16 PROCEDURE — 25010000002 DEXAMETHASONE PER 1 MG: Performed by: NURSE ANESTHETIST, CERTIFIED REGISTERED

## 2020-06-16 PROCEDURE — 88342 IMHCHEM/IMCYTCHM 1ST ANTB: CPT | Performed by: SURGERY

## 2020-06-16 PROCEDURE — 0 TECHNETIUM FILTERED SULFUR COLLOID: Performed by: SURGERY

## 2020-06-16 PROCEDURE — 81025 URINE PREGNANCY TEST: CPT | Performed by: ANESTHESIOLOGY

## 2020-06-16 PROCEDURE — 19303 MAST SIMPLE COMPLETE: CPT | Performed by: PHYSICIAN ASSISTANT

## 2020-06-16 PROCEDURE — 25010000002 PROPOFOL 10 MG/ML EMULSION: Performed by: NURSE ANESTHETIST, CERTIFIED REGISTERED

## 2020-06-16 PROCEDURE — 25010000002 NEOSTIGMINE 10 MG/10ML SOLUTION: Performed by: NURSE ANESTHETIST, CERTIFIED REGISTERED

## 2020-06-16 PROCEDURE — 25010000002 ONDANSETRON PER 1 MG: Performed by: NURSE ANESTHETIST, CERTIFIED REGISTERED

## 2020-06-16 PROCEDURE — 25010000003 CEFAZOLIN IN DEXTROSE 2-4 GM/100ML-% SOLUTION: Performed by: SURGERY

## 2020-06-16 PROCEDURE — 25010000002 HYDROMORPHONE PER 4 MG: Performed by: NURSE ANESTHETIST, CERTIFIED REGISTERED

## 2020-06-16 DEVICE — LIGACLIP MCA MULTIPLE CLIP APPLIERS, 20 MEDIUM CLIPS
Type: IMPLANTABLE DEVICE | Status: FUNCTIONAL
Brand: LIGACLIP

## 2020-06-16 RX ORDER — HYDROMORPHONE HYDROCHLORIDE 1 MG/ML
0.5 INJECTION, SOLUTION INTRAMUSCULAR; INTRAVENOUS; SUBCUTANEOUS
Status: DISCONTINUED | OUTPATIENT
Start: 2020-06-16 | End: 2020-06-16 | Stop reason: HOSPADM

## 2020-06-16 RX ORDER — ACETAMINOPHEN 650 MG/1
650 SUPPOSITORY RECTAL EVERY 8 HOURS
Status: DISCONTINUED | OUTPATIENT
Start: 2020-06-16 | End: 2020-06-17 | Stop reason: HOSPADM

## 2020-06-16 RX ORDER — ONDANSETRON 4 MG/1
4 TABLET, FILM COATED ORAL EVERY 6 HOURS PRN
Status: DISCONTINUED | OUTPATIENT
Start: 2020-06-16 | End: 2020-06-17 | Stop reason: HOSPADM

## 2020-06-16 RX ORDER — SODIUM CHLORIDE AND POTASSIUM CHLORIDE 150; 900 MG/100ML; MG/100ML
75 INJECTION, SOLUTION INTRAVENOUS CONTINUOUS
Status: DISCONTINUED | OUTPATIENT
Start: 2020-06-16 | End: 2020-06-17 | Stop reason: HOSPADM

## 2020-06-16 RX ORDER — PROMETHAZINE HYDROCHLORIDE 25 MG/1
25 SUPPOSITORY RECTAL ONCE AS NEEDED
Status: DISCONTINUED | OUTPATIENT
Start: 2020-06-16 | End: 2020-06-16 | Stop reason: HOSPADM

## 2020-06-16 RX ORDER — FAMOTIDINE 20 MG/1
20 TABLET, FILM COATED ORAL
Status: COMPLETED | OUTPATIENT
Start: 2020-06-16 | End: 2020-06-16

## 2020-06-16 RX ORDER — ATENOLOL 50 MG/1
50 TABLET ORAL 2 TIMES DAILY
Status: DISCONTINUED | OUTPATIENT
Start: 2020-06-16 | End: 2020-06-17 | Stop reason: HOSPADM

## 2020-06-16 RX ORDER — GLYCOPYRROLATE 0.2 MG/ML
INJECTION INTRAMUSCULAR; INTRAVENOUS AS NEEDED
Status: DISCONTINUED | OUTPATIENT
Start: 2020-06-16 | End: 2020-06-16 | Stop reason: SURG

## 2020-06-16 RX ORDER — ACETAMINOPHEN 500 MG
1000 TABLET ORAL EVERY 8 HOURS
Status: DISCONTINUED | OUTPATIENT
Start: 2020-06-16 | End: 2020-06-17 | Stop reason: HOSPADM

## 2020-06-16 RX ORDER — ONDANSETRON 2 MG/ML
4 INJECTION INTRAMUSCULAR; INTRAVENOUS EVERY 6 HOURS PRN
Status: DISCONTINUED | OUTPATIENT
Start: 2020-06-16 | End: 2020-06-17 | Stop reason: HOSPADM

## 2020-06-16 RX ORDER — CEFAZOLIN SODIUM 2 G/100ML
2 INJECTION, SOLUTION INTRAVENOUS ONCE
Status: COMPLETED | OUTPATIENT
Start: 2020-06-16 | End: 2020-06-16

## 2020-06-16 RX ORDER — SODIUM CHLORIDE 0.9 % (FLUSH) 0.9 %
10 SYRINGE (ML) INJECTION AS NEEDED
Status: DISCONTINUED | OUTPATIENT
Start: 2020-06-16 | End: 2020-06-16 | Stop reason: HOSPADM

## 2020-06-16 RX ORDER — LIDOCAINE HYDROCHLORIDE 10 MG/ML
0.5 INJECTION, SOLUTION EPIDURAL; INFILTRATION; INTRACAUDAL; PERINEURAL ONCE AS NEEDED
Status: COMPLETED | OUTPATIENT
Start: 2020-06-16 | End: 2020-06-16

## 2020-06-16 RX ORDER — SCOLOPAMINE TRANSDERMAL SYSTEM 1 MG/1
1 PATCH, EXTENDED RELEASE TRANSDERMAL ONCE
Status: DISCONTINUED | OUTPATIENT
Start: 2020-06-16 | End: 2020-06-16 | Stop reason: HOSPADM

## 2020-06-16 RX ORDER — PROMETHAZINE HYDROCHLORIDE 25 MG/ML
6.25 INJECTION, SOLUTION INTRAMUSCULAR; INTRAVENOUS ONCE AS NEEDED
Status: DISCONTINUED | OUTPATIENT
Start: 2020-06-16 | End: 2020-06-16 | Stop reason: HOSPADM

## 2020-06-16 RX ORDER — NEOSTIGMINE METHYLSULFATE 1 MG/ML
INJECTION, SOLUTION INTRAVENOUS AS NEEDED
Status: DISCONTINUED | OUTPATIENT
Start: 2020-06-16 | End: 2020-06-16 | Stop reason: SURG

## 2020-06-16 RX ORDER — SCOLOPAMINE TRANSDERMAL SYSTEM 1 MG/1
1 PATCH, EXTENDED RELEASE TRANSDERMAL CONTINUOUS
Status: DISCONTINUED | OUTPATIENT
Start: 2020-06-16 | End: 2020-06-16 | Stop reason: HOSPADM

## 2020-06-16 RX ORDER — ONDANSETRON 2 MG/ML
4 INJECTION INTRAMUSCULAR; INTRAVENOUS ONCE AS NEEDED
Status: DISCONTINUED | OUTPATIENT
Start: 2020-06-16 | End: 2020-06-16 | Stop reason: HOSPADM

## 2020-06-16 RX ORDER — ROCURONIUM BROMIDE 10 MG/ML
INJECTION, SOLUTION INTRAVENOUS AS NEEDED
Status: DISCONTINUED | OUTPATIENT
Start: 2020-06-16 | End: 2020-06-16 | Stop reason: SURG

## 2020-06-16 RX ORDER — PROMETHAZINE HYDROCHLORIDE 25 MG/1
25 TABLET ORAL ONCE AS NEEDED
Status: DISCONTINUED | OUTPATIENT
Start: 2020-06-16 | End: 2020-06-16 | Stop reason: HOSPADM

## 2020-06-16 RX ORDER — VENLAFAXINE HYDROCHLORIDE 75 MG/1
75 CAPSULE, EXTENDED RELEASE ORAL DAILY
Status: DISCONTINUED | OUTPATIENT
Start: 2020-06-17 | End: 2020-06-17 | Stop reason: HOSPADM

## 2020-06-16 RX ORDER — SODIUM CHLORIDE, SODIUM LACTATE, POTASSIUM CHLORIDE, CALCIUM CHLORIDE 600; 310; 30; 20 MG/100ML; MG/100ML; MG/100ML; MG/100ML
9 INJECTION, SOLUTION INTRAVENOUS CONTINUOUS PRN
Status: DISCONTINUED | OUTPATIENT
Start: 2020-06-16 | End: 2020-06-16 | Stop reason: HOSPADM

## 2020-06-16 RX ORDER — PROPOFOL 10 MG/ML
VIAL (ML) INTRAVENOUS AS NEEDED
Status: DISCONTINUED | OUTPATIENT
Start: 2020-06-16 | End: 2020-06-16 | Stop reason: SURG

## 2020-06-16 RX ORDER — LIDOCAINE HYDROCHLORIDE 10 MG/ML
INJECTION, SOLUTION EPIDURAL; INFILTRATION; INTRACAUDAL; PERINEURAL AS NEEDED
Status: DISCONTINUED | OUTPATIENT
Start: 2020-06-16 | End: 2020-06-16 | Stop reason: SURG

## 2020-06-16 RX ORDER — SODIUM CHLORIDE 0.9 % (FLUSH) 0.9 %
10 SYRINGE (ML) INJECTION EVERY 12 HOURS SCHEDULED
Status: DISCONTINUED | OUTPATIENT
Start: 2020-06-16 | End: 2020-06-16 | Stop reason: HOSPADM

## 2020-06-16 RX ORDER — OXYCODONE HYDROCHLORIDE 5 MG/1
5 TABLET ORAL EVERY 6 HOURS PRN
Status: DISCONTINUED | OUTPATIENT
Start: 2020-06-16 | End: 2020-06-17 | Stop reason: HOSPADM

## 2020-06-16 RX ORDER — MODAFINIL 100 MG/1
200 TABLET ORAL DAILY
Status: DISCONTINUED | OUTPATIENT
Start: 2020-06-17 | End: 2020-06-17 | Stop reason: HOSPADM

## 2020-06-16 RX ORDER — FENTANYL CITRATE 50 UG/ML
50 INJECTION, SOLUTION INTRAMUSCULAR; INTRAVENOUS
Status: DISCONTINUED | OUTPATIENT
Start: 2020-06-16 | End: 2020-06-16 | Stop reason: HOSPADM

## 2020-06-16 RX ORDER — DEXAMETHASONE SODIUM PHOSPHATE 4 MG/ML
INJECTION, SOLUTION INTRA-ARTICULAR; INTRALESIONAL; INTRAMUSCULAR; INTRAVENOUS; SOFT TISSUE AS NEEDED
Status: DISCONTINUED | OUTPATIENT
Start: 2020-06-16 | End: 2020-06-16 | Stop reason: SURG

## 2020-06-16 RX ORDER — ONDANSETRON 2 MG/ML
INJECTION INTRAMUSCULAR; INTRAVENOUS AS NEEDED
Status: DISCONTINUED | OUTPATIENT
Start: 2020-06-16 | End: 2020-06-16 | Stop reason: SURG

## 2020-06-16 RX ORDER — HYDROCHLOROTHIAZIDE 25 MG/1
25 TABLET ORAL DAILY
Status: DISCONTINUED | OUTPATIENT
Start: 2020-06-17 | End: 2020-06-17 | Stop reason: HOSPADM

## 2020-06-16 RX ORDER — MAGNESIUM HYDROXIDE 1200 MG/15ML
LIQUID ORAL AS NEEDED
Status: DISCONTINUED | OUTPATIENT
Start: 2020-06-16 | End: 2020-06-16 | Stop reason: HOSPADM

## 2020-06-16 RX ORDER — PREGABALIN 75 MG/1
75 CAPSULE ORAL ONCE
Status: COMPLETED | OUTPATIENT
Start: 2020-06-16 | End: 2020-06-16

## 2020-06-16 RX ADMIN — EPHEDRINE SULFATE 5 MG: 50 INJECTION INTRAMUSCULAR; INTRAVENOUS; SUBCUTANEOUS at 14:03

## 2020-06-16 RX ADMIN — PREGABALIN 75 MG: 75 CAPSULE ORAL at 11:40

## 2020-06-16 RX ADMIN — METFORMIN HYDROCHLORIDE 1000 MG: 1000 TABLET ORAL at 21:47

## 2020-06-16 RX ADMIN — LIDOCAINE HYDROCHLORIDE 0.5 ML: 10 INJECTION, SOLUTION EPIDURAL; INFILTRATION; INTRACAUDAL; PERINEURAL at 11:40

## 2020-06-16 RX ADMIN — EPHEDRINE SULFATE 5 MG: 50 INJECTION INTRAMUSCULAR; INTRAVENOUS; SUBCUTANEOUS at 14:53

## 2020-06-16 RX ADMIN — EPHEDRINE SULFATE 5 MG: 50 INJECTION INTRAMUSCULAR; INTRAVENOUS; SUBCUTANEOUS at 13:31

## 2020-06-16 RX ADMIN — SODIUM CHLORIDE, POTASSIUM CHLORIDE, SODIUM LACTATE AND CALCIUM CHLORIDE: 600; 310; 30; 20 INJECTION, SOLUTION INTRAVENOUS at 13:08

## 2020-06-16 RX ADMIN — FAMOTIDINE 20 MG: 20 TABLET, FILM COATED ORAL at 11:40

## 2020-06-16 RX ADMIN — SODIUM CHLORIDE, POTASSIUM CHLORIDE, SODIUM LACTATE AND CALCIUM CHLORIDE: 600; 310; 30; 20 INJECTION, SOLUTION INTRAVENOUS at 15:15

## 2020-06-16 RX ADMIN — ROCURONIUM BROMIDE 40 MG: 10 INJECTION INTRAVENOUS at 13:12

## 2020-06-16 RX ADMIN — HYDROMORPHONE HYDROCHLORIDE 0.5 MG: 1 INJECTION, SOLUTION INTRAMUSCULAR; INTRAVENOUS; SUBCUTANEOUS at 16:00

## 2020-06-16 RX ADMIN — SCOPALAMINE 1 PATCH: 1 PATCH, EXTENDED RELEASE TRANSDERMAL at 11:39

## 2020-06-16 RX ADMIN — SODIUM CHLORIDE, POTASSIUM CHLORIDE, SODIUM LACTATE AND CALCIUM CHLORIDE 9 ML/HR: 600; 310; 30; 20 INJECTION, SOLUTION INTRAVENOUS at 11:39

## 2020-06-16 RX ADMIN — ROCURONIUM BROMIDE 10 MG: 10 INJECTION INTRAVENOUS at 14:05

## 2020-06-16 RX ADMIN — PROPOFOL 200 MG: 10 INJECTION, EMULSION INTRAVENOUS at 13:13

## 2020-06-16 RX ADMIN — ONDANSETRON 4 MG: 2 INJECTION INTRAMUSCULAR; INTRAVENOUS at 14:54

## 2020-06-16 RX ADMIN — ACETAMINOPHEN 1000 MG: 500 TABLET, FILM COATED ORAL at 18:36

## 2020-06-16 RX ADMIN — HYDROMORPHONE HYDROCHLORIDE 0.5 MG: 1 INJECTION, SOLUTION INTRAMUSCULAR; INTRAVENOUS; SUBCUTANEOUS at 16:15

## 2020-06-16 RX ADMIN — EPHEDRINE SULFATE 5 MG: 50 INJECTION INTRAMUSCULAR; INTRAVENOUS; SUBCUTANEOUS at 13:58

## 2020-06-16 RX ADMIN — CEFAZOLIN SODIUM 2 G: 2 INJECTION, SOLUTION INTRAVENOUS at 13:08

## 2020-06-16 RX ADMIN — EPHEDRINE SULFATE 5 MG: 50 INJECTION INTRAMUSCULAR; INTRAVENOUS; SUBCUTANEOUS at 14:57

## 2020-06-16 RX ADMIN — TECHNETIUM TC 99M SULFUR COLLOID 1 DOSE: KIT at 13:13

## 2020-06-16 RX ADMIN — LIDOCAINE HYDROCHLORIDE 50 MG: 10 INJECTION, SOLUTION EPIDURAL; INFILTRATION; INTRACAUDAL; PERINEURAL at 13:12

## 2020-06-16 RX ADMIN — POTASSIUM CHLORIDE AND SODIUM CHLORIDE 75 ML/HR: 900; 150 INJECTION, SOLUTION INTRAVENOUS at 17:24

## 2020-06-16 RX ADMIN — GLYCOPYRROLATE 0.4 MG: 0.2 INJECTION INTRAMUSCULAR; INTRAVENOUS at 14:54

## 2020-06-16 RX ADMIN — ATENOLOL 50 MG: 50 TABLET ORAL at 21:47

## 2020-06-16 RX ADMIN — NEOSTIGMINE 2 MG: 1 INJECTION INTRAVENOUS at 14:54

## 2020-06-16 RX ADMIN — DEXAMETHASONE SODIUM PHOSPHATE 4 MG: 4 INJECTION, SOLUTION INTRAMUSCULAR; INTRAVENOUS at 13:18

## 2020-06-16 NOTE — ANESTHESIA PROCEDURE NOTES
Airway  Urgency: elective    Date/Time: 6/16/2020 1:15 PM  Airway not difficult    General Information and Staff    Patient location during procedure: OR  CRNA: Mariela Esparza CRNA    Indications and Patient Condition  Indications for airway management: airway protection    Preoxygenated: yes  MILS not maintained throughout  Mask difficulty assessment: 2 - vent by mask + OA or adjuvant +/- NMBA    Final Airway Details  Final airway type: endotracheal airway      Successful airway: ETT  Cuffed: yes   Successful intubation technique: direct laryngoscopy  Endotracheal tube insertion site: oral  Blade: Evangelist  Blade size: 3  ETT size (mm): 7.0  Cormack-Lehane Classification: grade I - full view of glottis  Placement verified by: chest auscultation and capnometry   Measured from: lips  ETT/EBT  to lips (cm): 20  Number of attempts at approach: 1    Additional Comments  Negative epigastric sounds, Breath sound equal bilaterally with symmetric chest rise and fall

## 2020-06-16 NOTE — H&P
Pre-Op H&P  Kenya Lemon  [id number]  1965    Chief complaint: Right breast cancer    HPI:    Patient is a 54 y.o.female who presents with Stage IIB T2 (2.5cm) N0 invasive ductal carcinoma, triple negative of the right breast.  S/P Adriamycin Cytoxan dose dense x4.   Follows with Dr. Juarez.  Here today to undergo bilateral skin sparing mastectomies and right sentinel node biopsy.  Following post op recovery plans for 12 weeks of Taxol.    Review of Systems:  General ROS: negative for chills, fever or skin lesions;  No changes since last office visit.  Neg for recent sick exposure  Cardiovascular ROS: no chest pain or dyspnea on exertion  Respiratory ROS: no cough, shortness of breath, or wheezing    Allergies: No Known Allergies    Home Meds:    No current facility-administered medications on file prior to encounter.      Current Outpatient Medications on File Prior to Encounter   Medication Sig Dispense Refill   • atenolol (TENORMIN) 50 MG tablet Take 50 mg by mouth 2 (two) times a day.     • dexamethasone (DECADRON) 4 MG tablet Take 2 tablets in the morning daily on Days 2, 3 & 4.  Take with food. 6 tablet 3   • diphenoxylate-atropine (Lomotil) 2.5-0.025 MG per tablet Take 1 tablet by mouth Every 6 (Six) Hours As Needed for Diarrhea. 1 tablet 30 tablet 2   • hydroCHLOROthiazide (HYDRODIURIL) 25 MG tablet Take 25 mg by mouth Daily.     • lidocaine-prilocaine (EMLA) 2.5-2.5 % cream Apply  topically to the appropriate area as directed As Needed (45-60 minutes prior to port access.  Cover with saran/plastic wrap.). (Patient taking differently: Apply 1 application topically to the appropriate area as directed As Needed (45-60 minutes prior to port access.  Cover with saran/plastic wrap.).) 30 g 3   • metFORMIN (GLUCOPHAGE) 1000 MG tablet Take 1,000 mg by mouth 2 (Two) Times a Day.     • modafinil (PROVIGIL) 200 MG tablet Take 200 mg by mouth Daily.     • ondansetron (ZOFRAN) 8 MG tablet Take 1 tablet by  "mouth 3 (Three) Times a Day As Needed for Nausea or Vomiting. 30 tablet 5   • potassium chloride ER (K-TAB) 20 MEQ tablet controlled-release ER tablet Take 1 tablet by mouth Daily. 30 tablet 1   • venlafaxine XR (EFFEXOR-XR) 75 MG 24 hr capsule Take 75 mg by mouth Daily.         PMH:   Past Medical History:   Diagnosis Date   • Cancer (CMS/HCC) 02/2020    breast cancer per mammogram; chemotherapy    • Diabetes (CMS/HCC)    • History of low potassium 03/2020    prescribed potassium supplements    • History of transfusion 1989    after delivery of son (1 unit only)    • HTN (hypertension)    • Low magnesium level 03/2020    requiring magnesium supplements    • Sleep apnea     no cpap machine currently     PSH:    Past Surgical History:   Procedure Laterality Date   • BREAST BIOPSY Right 02/2020   • CHOLECYSTECTOMY     • COLONOSCOPY     • LAPAROSCOPIC TUBAL LIGATION     • VENOUS ACCESS DEVICE (PORT) INSERTION  02/2020     Social History:   Tobacco:   Social History     Tobacco Use   Smoking Status Never Smoker   Smokeless Tobacco Never Used      Alcohol:     Social History     Substance and Sexual Activity   Alcohol Use Never   • Frequency: Never       Vitals:           /96 (BP Location: Right arm, Patient Position: Lying)   Pulse 66   Temp 97.2 °F (36.2 °C) (Temporal)   Resp 18   Ht 157.5 cm (62\")   Wt 79.7 kg (175 lb 9.6 oz)   SpO2 100%   BMI 32.12 kg/m²     Physical Exam:  General Appearance:    Alert, cooperative, no distress, appears stated age   Head:    Normocephalic, without obvious abnormality, atraumatic   Lungs:     Clear to auscultation bilaterally, respirations unlabored    Heart/chest:   Regular rate and rhythm, S1 and S2 normal, no murmur, rub    or gallop.  +port left chest    Abdomen:    Soft, nontender.  +bowel sounds   Breast Exam:    deferred   Genitalia:    deferred   Extremities:   Extremities normal, atraumatic, no cyanosis or edema   Skin:   Skin color, texture, turgor normal, no " rashes or lesions   Neurologic:   Grossly intact   Results Review  LABS:  Lab Results   Component Value Date    WBC 7.07 06/14/2020    HGB 11.2 (L) 06/14/2020    HCT 34.3 06/14/2020    MCV 98.3 (H) 06/14/2020     06/14/2020    NEUTROABS 12.30 (H) 05/04/2020    GLUCOSE 189 (H) 06/14/2020    BUN 17 06/14/2020    CREATININE 0.86 06/14/2020    EGFRIFNONA 69 06/14/2020     06/14/2020    K 4.4 06/14/2020     06/14/2020    CO2 27.0 06/14/2020    CALCIUM 9.8 06/14/2020    ALBUMIN 4.40 06/14/2020    AST 15 06/14/2020    ALT 15 06/14/2020    BILITOT 0.2 06/14/2020       RADIOLOGY:  Imaging Results (Last 72 Hours)     ** No results found for the last 72 hours. **          I reviewed the patient's new clinical results.    Cancer Staging (if applicable)  Cancer Patient: _x_ yes __no __unknown; If yes, clinical stage  IIB T:_2_ N:_0_M:_0_, stage group or __N/A    Impression: Stage IIB T2 (2.5cm) N0 invasive ductal carcinoma, triple negative, right breast    Plan: Bilateral skin sparing mastectomies with right sentinel node biopsy     Petrona Mahmood, APRN   6/16/2020   11:48

## 2020-06-16 NOTE — BRIEF OP NOTE
BREAST MASTECTOMY WITH SENTINEL NODE BIOPSY  Progress Note    Kenya Lemon  6/16/2020    Pre-op Diagnosis:   Right breast cancer    Post-Op Diagnosis Codes:  same    Procedure/CPT® Codes:    BILATERAL SKIN SPARING MASTECTOMIES AND RIGHT SENTINEL NODE INJECTION AND DEEP AXILLARY SENTINEL NODE  BIOPSY    Surgeon(s):  Gary Perez MD    Anesthesia: General    Staff:   Circulator: Emma Castelan, RN; Agata Sanchez, RN; Armida Farrell RN; Yajaira Jaffe, RAINA; Yaneli Pruitt RN  Scrub Person: Yajaira Lanza; Yashira Mcneil; Stephen Arora  Nursing Assistant: Jenn Pat  Assistant: Teo Dobbins PA-C    Estimated Blood Loss: 25ml    Specimens:                Specimens     ID Source Type Tests Collected By Collected At Frozen?      A Breast, Left Tissue · TISSUE PATHOLOGY EXAM   Gary Perez MD 6/16/20 1402 No     Description: LEFT BREAST TISSUE, STITCH PATTON THE AXILLARY TAIL (FRESH)    This specimen was not marked as sent.    B Breast, Right Tissue · TISSUE PATHOLOGY EXAM   Gary Perez MD 6/16/20 1427 No     Description: Stitch marks axillary tail    This specimen was not marked as sent.    C Lymph Node Tissue · TISSUE PATHOLOGY EXAM   Gary Perez MD 6/16/20 1438 Yes     Description: RIGHT DEEP AXILLARY NODE    This specimen was not marked as sent.                Drains:   Closed/Suction Drain 1 Left Breast Bulb (Active)   Site Description Unable to view 6/16/2020  4:15 PM   Dressing Status Clean;Dry;Intact 6/16/2020  6:00 PM   Drainage Appearance Serosanguineous 6/16/2020  6:00 PM   Status To bulb suction 6/16/2020  6:00 PM       Closed/Suction Drain Right Breast Bulb (Active)   Site Description Unable to view 6/16/2020  4:15 PM   Dressing Status Clean;Dry;Intact 6/16/2020  6:00 PM   Drainage Appearance Serosanguineous 6/16/2020  6:00 PM   Status To bulb suction 6/16/2020  6:00 PM       Findings: Right SN negative, no lad, no gross tumor seen    Complications:  none    Gary Perez MD     Date: 6/16/2020  Time: 19:36

## 2020-06-16 NOTE — OP NOTE
General Surgery Mastectomy Operative Note    Kenya Lemon  [id number]  1965    Date of Surgery:  6/16/2020 19:38  Pre-op Diagnosis:   Right breast cancer    Post-Op Diagnosis Codes:  same    Procedure/CPT® Codes:    BILATERAL SKIN SPARING MASTECTOMIES AND RIGHT SENTINEL NODE INJECTION AND DEEP AXILLARY SENTINEL NODE  BIOPSY    Surgeon(s):  Gary Perez MD    Anesthesia: General    Staff:   Circulator: Emma Castelan, RN; Agata Sanchez, RAINA; Armida Farrell RN; Yajaira Jaffe RN; Yaneli Pruitt RN  Scrub Person: Yajaira Lanza; Yashira Mcneil; Stephen Arora  Nursing Assistant: Jenn Pat  Assistant: Teo Dobbins PA-C    Estimated Blood Loss: 25ml    Specimens:                Specimens     ID Source Type Tests Collected By Collected At Frozen?      A Breast, Left Tissue · TISSUE PATHOLOGY EXAM   Gary Perez MD 6/16/20 1402 No     Description: LEFT BREAST TISSUE, STITCH PATTON THE AXILLARY TAIL (FRESH)    This specimen was not marked as sent.    B Breast, Right Tissue · TISSUE PATHOLOGY EXAM   Gary Perez MD 6/16/20 1427 No     Description: Stitch marks axillary tail    This specimen was not marked as sent.    C Lymph Node Tissue · TISSUE PATHOLOGY EXAM   Gary Perez MD 6/16/20 1438 Yes     Description: RIGHT DEEP AXILLARY NODE    This specimen was not marked as sent.                Drains:   Closed/Suction Drain 1 Left Breast Bulb (Active)   Site Description Unable to view 6/16/2020  4:15 PM   Dressing Status Clean;Dry;Intact 6/16/2020  6:00 PM   Drainage Appearance Serosanguineous 6/16/2020  6:00 PM   Status To bulb suction 6/16/2020  6:00 PM       Closed/Suction Drain Right Breast Bulb (Active)   Site Description Unable to view 6/16/2020  4:15 PM   Dressing Status Clean;Dry;Intact 6/16/2020  6:00 PM   Drainage Appearance Serosanguineous 6/16/2020  6:00 PM   Status To bulb suction 6/16/2020  6:00 PM       Findings: Right SN negative, no lad, no gross tumor  seen    Complications: none    History: Pt with dx of Right breast CA that has partially completed neoadjuvant chemo.  Pt desired bilateral mastecotmy and right sentinel node biopsy and delatyed recon was recommended secondary to diabetes. Pt understood and agreed and informed consent was obtained.    Description of Procedure: Pt brought to OR and placed in supine position.  GETA and bilateral pectoralis block performed.  Timeout performed.  I injected the right  breast with radiolabeled nucleotide in usual fashion.  Pt prepped and draped. I maarked my planned incision which was periareolar and elliptical. I began on left and incision was made in standard elliptical shape to remove NAC and excess skin.  Incision continued down using cautery.  Flaps created superiorly to just below the clavicle, medially to lateral sternum, inferior to below fold, and laterally to latissimus. Breast was taken off the pec and axillary tail was marked with a stitch.  Site was irrigated and hemostasis confirmed.  MANJEET was placed through opening below lower flap and secured in place with drain stitch. I closed with interrupted vicryl and running subQ monocryl.  I then moved to the right.   I made  incision in standard elliptical shape to remove NAC and minimal excess skin.  Incision continued down using cautery.  Flaps created superiorly to just below the clavicle, medially to lateral sternum, inferior to below fold, and laterally to latissimus. Once I was lateral the fascia was divided and Eduardo probe was used to identify a deep sentinel node.  This was removed using cautery and clips as needed. Frozen was negative.  Breast was taken off the pec and axillary tail was marked with a stitch.  Site was irrigated and hemostasis confirmed.  MANJEET was placed through opening below lower flap and secured in place with drain stitch.  I closed with interrupted vicryl and running subQ monocryl. Steristrips, Telfa, fluffs and OR bra placed. Sponge and  needle counts correct at end. Pt tolerated well and was transferred to PACU in stable condition.    Gary Perez MD     Date: 6/16/2020  Time: 19:38

## 2020-06-16 NOTE — ANESTHESIA POSTPROCEDURE EVALUATION
Patient: Kenya Lemon    Procedure Summary     Date:  06/16/20 Room / Location:   [facility] /  [facility]    Anesthesia Start:  1308 Anesthesia Stop:      Procedure:  BILATERAL SKIN SPARING MASTECTOMIES AND RIGHT SENTINEL NODE BIOPSY (Bilateral Breast) Diagnosis:      Surgeon:  Gary Perez MD Provider:  Sreekanth Girard MD    Anesthesia Type:  general with block ASA Status:  3          Anesthesia Type: general with block    Vitals  Vitals Value Taken Time   /89 6/16/2020  3:28 PM   Temp 97.2 °F (36.2 °C) 6/16/2020  3:28 PM   Pulse 70 6/16/2020  3:28 PM   Resp 16 6/16/2020  3:24 PM   SpO2 100 % 6/16/2020  3:28 PM           Post Anesthesia Care and Evaluation    Patient location during evaluation: PACU  Patient participation: waiting for patient participation  Level of consciousness: responsive to verbal stimuli  Pain score: 0  Pain management: adequate  Airway patency: patent  Anesthetic complications: No anesthetic complications  PONV Status: none  Cardiovascular status: hemodynamically stable and acceptable  Respiratory status: nonlabored ventilation, acceptable and nasal cannula  Hydration status: acceptable

## 2020-06-16 NOTE — ANESTHESIA PREPROCEDURE EVALUATION
Anesthesia Evaluation     Patient summary reviewed and Nursing notes reviewed   NPO Solid Status: > 8 hours  NPO Liquid Status: > 2 hours           Airway   Mallampati: II  TM distance: >3 FB  Neck ROM: full  No difficulty expected  Dental          Pulmonary    (+) sleep apnea (possible),   (-) COPD, shortness of breath, recent URI, not a smoker  Cardiovascular     ECG reviewed    (+) hypertension,   (-) past MI, dysrhythmias, angina    ROS comment: ECG  Normal sinus rhythm  Moderate voltage criteria for LVH, may be normal variant  Nonspecific T wave abnormality     ECHO EF 60% normal     Neuro/Psych  (-) seizures, CVA  GI/Hepatic/Renal/Endo    (+)   diabetes mellitus type 2 well controlled,   (-) liver disease, no renal disease, no thyroid disorder    Musculoskeletal     Abdominal    Substance History      OB/GYN          Other      history of cancer (breast ca)        Phys Exam Other: Partial lower denture              Anesthesia Plan    ASA 3     general with block   (PEC's  I nd II  Propofol Infusion as part of Anti PONV tech)  intravenous induction     Anesthetic plan, all risks, benefits, and alternatives have been provided, discussed and informed consent has been obtained with: patient.    Plan discussed with CRNA.

## 2020-06-16 NOTE — ANESTHESIA PROCEDURE NOTES
Peripheral Block      Patient reassessed immediately prior to procedure    Patient location during procedure: OR  Reason for block: at surgeon's request and post-op pain management  Performed by  Anesthesiologist: Sreekanth Girard MD  Preanesthetic Checklist  Completed: patient identified, site marked, surgical consent, pre-op evaluation, timeout performed, IV checked, risks and benefits discussed and monitors and equipment checked  Prep:  Pt Position: supine  Sterile barriers:cap, gloves, gown and mask  Prep: ChloraPrep  Patient monitoring: blood pressure monitoring, continuous pulse oximetry and EKG  Procedure  Performed under: general  Guidance:ultrasound guided and landmark technique  Images:still images obtained, printed/placed on chart    Laterality:Bilateral  Block Type:PECS I and PECS II  Injection Technique:single-shot  Needle Type:short-bevel  Needle Gauge:20 G  Resistance on Injection: none          Medications  Preservative Free Saline:10ml  Comment:Block Injection:  Total volume of LA divided between Right and Left sided blocks         Post Assessment  Injection Assessment: negative aspiration for heme and incremental injection  Patient Tolerance:comfortable throughout block  Complications:no  Additional Notes  The pt. Was placed in the Supine Position and GA was induced     The insertion site was prepped with CHG and Ultrasound guidance with In-Plane techniquewas  a 4inch BBraun 360 degree echogenic needle was visualized.  Normal Saline PSF was  utilized for hydrodissection of tissue. PECS 1 Block- Pectoralis Major and Minor where identified and LA was injected between PMM and PmM at the level of the 3rd Rib(10ml),  PECS 2-  Pectoralis Minor and Serratus muscle where identified and the needle was advanced laterally in-plane with the 4th rib as a backstop, pleura was monitored.  LA was injected between SA and PmM at the level of 4th rib( 20ml).  LA injection spread was visualized, injection was  incremental 1-5ml, normal or low injection pressure, no intravascular injection, no pneumothorax appreciated.  Thank You.

## 2020-06-17 VITALS
WEIGHT: 175.6 LBS | OXYGEN SATURATION: 99 % | TEMPERATURE: 98.6 F | HEART RATE: 61 BPM | RESPIRATION RATE: 12 BRPM | DIASTOLIC BLOOD PRESSURE: 61 MMHG | HEIGHT: 62 IN | BODY MASS INDEX: 32.31 KG/M2 | SYSTOLIC BLOOD PRESSURE: 110 MMHG

## 2020-06-17 LAB — GLUCOSE BLDC GLUCOMTR-MCNC: 148 MG/DL (ref 70–130)

## 2020-06-17 PROCEDURE — 82962 GLUCOSE BLOOD TEST: CPT

## 2020-06-17 PROCEDURE — 94799 UNLISTED PULMONARY SVC/PX: CPT

## 2020-06-17 RX ORDER — OXYCODONE HYDROCHLORIDE 5 MG/1
5 TABLET ORAL EVERY 6 HOURS PRN
Qty: 10 TABLET | Refills: 0 | Status: SHIPPED | OUTPATIENT
Start: 2020-06-17 | End: 2020-06-26

## 2020-06-17 RX ADMIN — ACETAMINOPHEN 1000 MG: 500 TABLET, FILM COATED ORAL at 09:34

## 2020-06-17 RX ADMIN — ATENOLOL 50 MG: 50 TABLET ORAL at 08:33

## 2020-06-17 RX ADMIN — ACETAMINOPHEN 1000 MG: 500 TABLET, FILM COATED ORAL at 02:53

## 2020-06-17 RX ADMIN — VENLAFAXINE HYDROCHLORIDE 75 MG: 75 CAPSULE, EXTENDED RELEASE ORAL at 08:33

## 2020-06-17 RX ADMIN — MODAFINIL 200 MG: 100 TABLET ORAL at 08:33

## 2020-06-17 RX ADMIN — HYDROCHLOROTHIAZIDE 25 MG: 25 TABLET ORAL at 08:33

## 2020-06-17 RX ADMIN — METFORMIN HYDROCHLORIDE 1000 MG: 1000 TABLET ORAL at 08:33

## 2020-06-17 RX ADMIN — OXYCODONE 5 MG: 5 TABLET ORAL at 06:40

## 2020-06-17 NOTE — PLAN OF CARE
Problem: Patient Care Overview  Goal: Plan of Care Review  Outcome: Ongoing (interventions implemented as appropriate)  Flowsheets  Taken 6/17/2020 0318 by Ludivina Bethea RN  Progress: improving  Outcome Summary: Pt has no c/o, rested well, ambulated, tolerated diet, no c/o pain  Taken 6/16/2020 1700 by Irish Sargent RN  Plan of Care Reviewed With: patient

## 2020-06-17 NOTE — DISCHARGE SUMMARY
"  Date of Discharge:  6/17/2020    Discharge Diagnosis: right breast cancer    Presenting Problem/History of Present Illness  Breast cancer, right (CMS/[facility]) [C50.911]       Hospital Course  Patient is a 54 y.o. female presented with right breast cancer.  She underwent [facility] skin sparing mastectomy and right sentinel node biopsy.  She has done well overnight and has had minimal pain.      Procedures Performed  Procedure(s):  BILATERAL SKIN SPARING MASTECTOMIES AND RIGHT SENTINEL NODE BIOPSY       Condition on Discharge: good    Vital Signs  Blood pressure 110/61, pulse 61, temperature 98.6 °F (37 °C), temperature source Oral, resp. rate 12, height 157.5 cm (62\"), weight 79.7 kg (175 lb 9.6 oz), SpO2 99 %.    Physical Exam:   Flaps flat and viable, MANJEET's serosang and appropriate    Discharge Disposition  Home or Self Care    Discharge Medications     Discharge Medications      New Medications      Instructions Start Date   oxyCODONE 5 MG immediate release tablet  Commonly known as:  ROXICODONE   5 mg, Oral, Every 6 Hours PRN         Changes to Medications      Instructions Start Date   lidocaine-prilocaine 2.5-2.5 % cream  Commonly known as:  EMLA  What changed:  how much to take   Topical, As Needed         Continue These Medications      Instructions Start Date   atenolol 50 MG tablet  Commonly known as:  TENORMIN   50 mg, Oral, 2 times daily      dexamethasone 4 MG tablet  Commonly known as:  DECADRON   Take 2 tablets in the morning daily on Days 2, 3 & 4.  Take with food.      diphenoxylate-atropine 2.5-0.025 MG per tablet  Commonly known as:  Lomotil   1 tablet, Oral, Every 6 Hours PRN, 1 tablet       hydroCHLOROthiazide 25 MG tablet  Commonly known as:  HYDRODIURIL   25 mg, Oral, Daily      Loratadine 10 MG capsule   10 mg, Oral, Every Morning      metFORMIN 1000 MG tablet  Commonly known as:  GLUCOPHAGE   1,000 mg, Oral, 2 Times Daily      modafinil 200 MG tablet  Commonly known as:  PROVIGIL   200 mg, Oral, " Daily      MULTIVITAMIN ADULT PO   1 dose, Oral, Every Morning      ondansetron 8 MG tablet  Commonly known as:  ZOFRAN   8 mg, Oral, 3 Times Daily PRN      potassium chloride ER 20 MEQ tablet controlled-release ER tablet  Commonly known as:  K-TAB   20 mEq, Oral, Daily      venlafaxine XR 75 MG 24 hr capsule  Commonly known as:  EFFEXOR-XR   75 mg, Oral, Daily             Discharge Diet: regular    Activity at Discharge: no lifting > 20 pounds    Follow-up Appointments  Fred Perez MD June 23         Fred Perez MD  06/17/20  08:36

## 2020-06-18 ENCOUNTER — TELEPHONE (OUTPATIENT)
Dept: GENETICS | Facility: HOSPITAL | Age: 55
End: 2020-06-18

## 2020-06-18 NOTE — TELEPHONE ENCOUNTER
Called patient to follow up from genetic counseling phone consult on 4/23/2020 because testing lab has not received patient's saliva sample for genetic testing. Left voicemail asking for patient to call me back at [phone].    Shea Hull MS, [facility] [place]  Licensed Certified Genetic Counselor

## 2020-06-19 ENCOUNTER — APPOINTMENT (OUTPATIENT)
Dept: CT IMAGING | Facility: HOSPITAL | Age: 55
End: 2020-06-19

## 2020-06-19 ENCOUNTER — HOSPITAL ENCOUNTER (INPATIENT)
Facility: HOSPITAL | Age: 55
LOS: 3 days | Discharge: HOME | End: 2020-06-22
Attending: EMERGENCY MEDICINE | Admitting: INTERNAL MEDICINE

## 2020-06-19 DIAGNOSIS — R50.9 ACUTE FEBRILE ILLNESS: Primary | ICD-10-CM

## 2020-06-19 DIAGNOSIS — N39.0 ACUTE UTI: ICD-10-CM

## 2020-06-19 DIAGNOSIS — Z98.890 RECENT MAJOR SURGERY: ICD-10-CM

## 2020-06-19 PROBLEM — I10 ESSENTIAL HYPERTENSION: Status: ACTIVE | Noted: 2020-06-19

## 2020-06-19 PROBLEM — E11.9 DM2 (DIABETES MELLITUS, TYPE 2): Status: ACTIVE | Noted: 2020-06-19

## 2020-06-19 PROBLEM — R65.10 SIRS (SYSTEMIC INFLAMMATORY RESPONSE SYNDROME): Status: ACTIVE | Noted: 2020-06-19

## 2020-06-19 PROBLEM — D64.9 ANEMIA: Status: ACTIVE | Noted: 2020-06-19

## 2020-06-19 PROBLEM — F32.A ANXIETY AND DEPRESSION: Status: ACTIVE | Noted: 2020-06-19

## 2020-06-19 PROBLEM — N17.9 AKI (ACUTE KIDNEY INJURY) (HCC): Status: ACTIVE | Noted: 2020-06-19

## 2020-06-19 PROBLEM — F41.9 ANXIETY AND DEPRESSION: Status: ACTIVE | Noted: 2020-06-19

## 2020-06-19 LAB
ALBUMIN SERPL-MCNC: 4.7 G/DL (ref 3.5–5.2)
ALBUMIN/GLOB SERPL: 1.6 G/DL
ALP SERPL-CCNC: 62 U/L (ref 39–117)
ALT SERPL W P-5'-P-CCNC: 26 U/L (ref 1–33)
ANION GAP SERPL CALCULATED.3IONS-SCNC: 15 MMOL/L (ref 5–15)
AST SERPL-CCNC: 24 U/L (ref 1–32)
BASOPHILS # BLD AUTO: 0.04 10*3/MM3 (ref 0–0.2)
BASOPHILS NFR BLD AUTO: 0.3 % (ref 0–1.5)
BILIRUB SERPL-MCNC: 0.4 MG/DL (ref 0.2–1.2)
BUN BLD-MCNC: 13 MG/DL (ref 6–20)
BUN/CREAT SERPL: 11.3 (ref 7–25)
CALCIUM SPEC-SCNC: 9.4 MG/DL (ref 8.6–10.5)
CHLORIDE SERPL-SCNC: 92 MMOL/L (ref 98–107)
CO2 SERPL-SCNC: 28 MMOL/L (ref 22–29)
CREAT BLD-MCNC: 1.15 MG/DL (ref 0.57–1)
CYTO UR: NORMAL
D-LACTATE SERPL-SCNC: 2.6 MMOL/L (ref 0.5–2)
DEPRECATED RDW RBC AUTO: 53.1 FL (ref 37–54)
EOSINOPHIL # BLD AUTO: 0.06 10*3/MM3 (ref 0–0.4)
EOSINOPHIL NFR BLD AUTO: 0.5 % (ref 0.3–6.2)
ERYTHROCYTE [DISTWIDTH] IN BLOOD BY AUTOMATED COUNT: 14.7 % (ref 12.3–15.4)
GFR SERPL CREATININE-BSD FRML MDRD: 49 ML/MIN/1.73
GLOBULIN UR ELPH-MCNC: 2.9 GM/DL
GLUCOSE BLD-MCNC: 199 MG/DL (ref 65–99)
HCT VFR BLD AUTO: 30.5 % (ref 34–46.6)
HGB BLD-MCNC: 9.7 G/DL (ref 12–15.9)
HOLD SPECIMEN: NORMAL
HOLD SPECIMEN: NORMAL
IMM GRANULOCYTES # BLD AUTO: 0.04 10*3/MM3 (ref 0–0.05)
IMM GRANULOCYTES NFR BLD AUTO: 0.3 % (ref 0–0.5)
LAB AP CASE REPORT: NORMAL
LAB AP CLINICAL INFORMATION: NORMAL
LAB AP DIAGNOSIS COMMENT: NORMAL
LYMPHOCYTES # BLD AUTO: 1.3 10*3/MM3 (ref 0.7–3.1)
LYMPHOCYTES NFR BLD AUTO: 10.4 % (ref 19.6–45.3)
Lab: NORMAL
MCH RBC QN AUTO: 31.3 PG (ref 26.6–33)
MCHC RBC AUTO-ENTMCNC: 31.8 G/DL (ref 31.5–35.7)
MCV RBC AUTO: 98.4 FL (ref 79–97)
MONOCYTES # BLD AUTO: 1.2 10*3/MM3 (ref 0.1–0.9)
MONOCYTES NFR BLD AUTO: 9.6 % (ref 5–12)
NEUTROPHILS # BLD AUTO: 9.84 10*3/MM3 (ref 1.7–7)
NEUTROPHILS NFR BLD AUTO: 78.9 % (ref 42.7–76)
NRBC BLD AUTO-RTO: 0 /100 WBC (ref 0–0.2)
PATH REPORT.FINAL DX SPEC: NORMAL
PATH REPORT.GROSS SPEC: NORMAL
PLATELET # BLD AUTO: 281 10*3/MM3 (ref 140–450)
PMV BLD AUTO: 10.9 FL (ref 6–12)
POTASSIUM BLD-SCNC: 3.7 MMOL/L (ref 3.5–5.2)
PROT SERPL-MCNC: 7.6 G/DL (ref 6–8.5)
RBC # BLD AUTO: 3.1 10*6/MM3 (ref 3.77–5.28)
SODIUM BLD-SCNC: 135 MMOL/L (ref 136–145)
WBC NRBC COR # BLD: 12.48 10*3/MM3 (ref 3.4–10.8)
WHOLE BLOOD HOLD SPECIMEN: NORMAL
WHOLE BLOOD HOLD SPECIMEN: NORMAL

## 2020-06-19 PROCEDURE — 71250 CT THORAX DX C-: CPT

## 2020-06-19 PROCEDURE — 85025 COMPLETE CBC W/AUTO DIFF WBC: CPT | Performed by: EMERGENCY MEDICINE

## 2020-06-19 PROCEDURE — 83605 ASSAY OF LACTIC ACID: CPT

## 2020-06-19 PROCEDURE — 81001 URINALYSIS AUTO W/SCOPE: CPT | Performed by: EMERGENCY MEDICINE

## 2020-06-19 PROCEDURE — 99284 EMERGENCY DEPT VISIT MOD MDM: CPT

## 2020-06-19 PROCEDURE — 87040 BLOOD CULTURE FOR BACTERIA: CPT | Performed by: EMERGENCY MEDICINE

## 2020-06-19 PROCEDURE — 87086 URINE CULTURE/COLONY COUNT: CPT | Performed by: INTERNAL MEDICINE

## 2020-06-19 PROCEDURE — 80053 COMPREHEN METABOLIC PANEL: CPT | Performed by: EMERGENCY MEDICINE

## 2020-06-19 RX ORDER — L.ACID,PARA/B.BIFIDUM/S.THERM 8B CELL
1 CAPSULE ORAL DAILY
Status: DISCONTINUED | OUTPATIENT
Start: 2020-06-20 | End: 2020-06-22 | Stop reason: HOSPADM

## 2020-06-19 RX ORDER — DEXTROSE MONOHYDRATE 25 G/50ML
25 INJECTION, SOLUTION INTRAVENOUS
Status: DISCONTINUED | OUTPATIENT
Start: 2020-06-19 | End: 2020-06-22 | Stop reason: HOSPADM

## 2020-06-19 RX ORDER — NICOTINE POLACRILEX 4 MG
15 LOZENGE BUCCAL
Status: DISCONTINUED | OUTPATIENT
Start: 2020-06-19 | End: 2020-06-22 | Stop reason: HOSPADM

## 2020-06-20 ENCOUNTER — APPOINTMENT (OUTPATIENT)
Dept: CT IMAGING | Facility: HOSPITAL | Age: 55
End: 2020-06-20

## 2020-06-20 PROBLEM — N39.0 ACUTE UTI (URINARY TRACT INFECTION): Status: ACTIVE | Noted: 2020-06-20

## 2020-06-20 PROBLEM — N10 ACUTE PYELONEPHRITIS: Status: ACTIVE | Noted: 2020-06-20

## 2020-06-20 PROBLEM — N39.0 SEPSIS SECONDARY TO UTI (HCC): Status: ACTIVE | Noted: 2020-06-19

## 2020-06-20 PROBLEM — A41.9 SEPSIS SECONDARY TO UTI (HCC): Status: ACTIVE | Noted: 2020-06-19

## 2020-06-20 LAB
ANION GAP SERPL CALCULATED.3IONS-SCNC: 10 MMOL/L (ref 5–15)
BACTERIA UR QL AUTO: ABNORMAL /HPF
BILIRUB UR QL STRIP: NEGATIVE
BUN BLD-MCNC: 16 MG/DL (ref 6–20)
BUN/CREAT SERPL: 12.4 (ref 7–25)
CALCIUM SPEC-SCNC: 8.6 MG/DL (ref 8.6–10.5)
CHLORIDE SERPL-SCNC: 97 MMOL/L (ref 98–107)
CLARITY UR: ABNORMAL
CO2 SERPL-SCNC: 29 MMOL/L (ref 22–29)
COLOR UR: ABNORMAL
CREAT BLD-MCNC: 1.29 MG/DL (ref 0.57–1)
CRP SERPL-MCNC: 26.96 MG/DL (ref 0–0.5)
D-LACTATE SERPL-SCNC: 2 MMOL/L (ref 0.5–2)
DEPRECATED RDW RBC AUTO: 52.3 FL (ref 37–54)
ERYTHROCYTE [DISTWIDTH] IN BLOOD BY AUTOMATED COUNT: 14.6 % (ref 12.3–15.4)
ERYTHROCYTE [SEDIMENTATION RATE] IN BLOOD: 54 MM/HR (ref 0–30)
FERRITIN SERPL-MCNC: 211.6 NG/ML (ref 13–150)
GFR SERPL CREATININE-BSD FRML MDRD: 43 ML/MIN/1.73
GLUCOSE BLD-MCNC: 160 MG/DL (ref 65–99)
GLUCOSE BLDC GLUCOMTR-MCNC: 154 MG/DL (ref 70–130)
GLUCOSE BLDC GLUCOMTR-MCNC: 175 MG/DL (ref 70–130)
GLUCOSE BLDC GLUCOMTR-MCNC: 199 MG/DL (ref 70–130)
GLUCOSE UR STRIP-MCNC: NEGATIVE MG/DL
HCT VFR BLD AUTO: 27 % (ref 34–46.6)
HGB BLD-MCNC: 8.5 G/DL (ref 12–15.9)
HGB UR QL STRIP.AUTO: NEGATIVE
IRON 24H UR-MRATE: 16 MCG/DL (ref 37–145)
IRON SATN MFR SERPL: 5 % (ref 20–50)
KETONES UR QL STRIP: ABNORMAL
LACTATE HOLD SPECIMEN: NORMAL
LDH SERPL-CCNC: 140 U/L (ref 135–214)
LEUKOCYTE ESTERASE UR QL STRIP.AUTO: ABNORMAL
MCH RBC QN AUTO: 30.9 PG (ref 26.6–33)
MCHC RBC AUTO-ENTMCNC: 31.5 G/DL (ref 31.5–35.7)
MCV RBC AUTO: 98.2 FL (ref 79–97)
NITRITE UR QL STRIP: NEGATIVE
PH UR STRIP.AUTO: <=5 [PH] (ref 5–8)
PLATELET # BLD AUTO: 217 10*3/MM3 (ref 140–450)
PMV BLD AUTO: 10.4 FL (ref 6–12)
POTASSIUM BLD-SCNC: 3 MMOL/L (ref 3.5–5.2)
PROCALCITONIN SERPL-MCNC: 1.2 NG/ML (ref 0.1–0.25)
PROT UR QL STRIP: ABNORMAL
RBC # BLD AUTO: 2.75 10*6/MM3 (ref 3.77–5.28)
RBC # UR: ABNORMAL /HPF
REF LAB TEST METHOD: ABNORMAL
SARS-COV-2 RNA RESP QL NAA+PROBE: NOT DETECTED
SODIUM BLD-SCNC: 136 MMOL/L (ref 136–145)
SP GR UR STRIP: 1.02 (ref 1–1.03)
SQUAMOUS #/AREA URNS HPF: ABNORMAL /HPF
TIBC SERPL-MCNC: 314 MCG/DL (ref 298–536)
TRANSFERRIN SERPL-MCNC: 211 MG/DL (ref 200–360)
UROBILINOGEN UR QL STRIP: ABNORMAL
WBC NRBC COR # BLD: 8.61 10*3/MM3 (ref 3.4–10.8)
WBC UR QL AUTO: ABNORMAL /HPF

## 2020-06-20 PROCEDURE — 82962 GLUCOSE BLOOD TEST: CPT

## 2020-06-20 PROCEDURE — 25010000002 VANCOMYCIN 10 G RECONSTITUTED SOLUTION

## 2020-06-20 PROCEDURE — 85027 COMPLETE CBC AUTOMATED: CPT | Performed by: PHYSICIAN ASSISTANT

## 2020-06-20 PROCEDURE — 83605 ASSAY OF LACTIC ACID: CPT | Performed by: EMERGENCY MEDICINE

## 2020-06-20 PROCEDURE — 86900 BLOOD TYPING SEROLOGIC ABO: CPT

## 2020-06-20 PROCEDURE — 84145 PROCALCITONIN (PCT): CPT | Performed by: PHYSICIAN ASSISTANT

## 2020-06-20 PROCEDURE — 82728 ASSAY OF FERRITIN: CPT | Performed by: PHYSICIAN ASSISTANT

## 2020-06-20 PROCEDURE — 80048 BASIC METABOLIC PNL TOTAL CA: CPT | Performed by: PHYSICIAN ASSISTANT

## 2020-06-20 PROCEDURE — 83615 LACTATE (LD) (LDH) ENZYME: CPT | Performed by: PHYSICIAN ASSISTANT

## 2020-06-20 PROCEDURE — 84466 ASSAY OF TRANSFERRIN: CPT | Performed by: PHYSICIAN ASSISTANT

## 2020-06-20 PROCEDURE — 25010000002 PIPERACILLIN SOD-TAZOBACTAM PER 1 G: Performed by: PHYSICIAN ASSISTANT

## 2020-06-20 PROCEDURE — 99223 1ST HOSP IP/OBS HIGH 75: CPT | Performed by: INTERNAL MEDICINE

## 2020-06-20 PROCEDURE — 85652 RBC SED RATE AUTOMATED: CPT | Performed by: PHYSICIAN ASSISTANT

## 2020-06-20 PROCEDURE — 74176 CT ABD & PELVIS W/O CONTRAST: CPT

## 2020-06-20 PROCEDURE — 83540 ASSAY OF IRON: CPT | Performed by: PHYSICIAN ASSISTANT

## 2020-06-20 PROCEDURE — 86901 BLOOD TYPING SEROLOGIC RH(D): CPT

## 2020-06-20 PROCEDURE — 86140 C-REACTIVE PROTEIN: CPT | Performed by: PHYSICIAN ASSISTANT

## 2020-06-20 PROCEDURE — 87635 SARS-COV-2 COVID-19 AMP PRB: CPT | Performed by: INTERNAL MEDICINE

## 2020-06-20 RX ORDER — POTASSIUM CHLORIDE 7.45 MG/ML
10 INJECTION INTRAVENOUS
Status: DISCONTINUED | OUTPATIENT
Start: 2020-06-20 | End: 2020-06-22 | Stop reason: HOSPADM

## 2020-06-20 RX ORDER — ACETAMINOPHEN 325 MG/1
650 TABLET ORAL EVERY 4 HOURS PRN
Status: DISCONTINUED | OUTPATIENT
Start: 2020-06-20 | End: 2020-06-22 | Stop reason: HOSPADM

## 2020-06-20 RX ORDER — OXYCODONE HYDROCHLORIDE 5 MG/1
5 TABLET ORAL ONCE
Status: COMPLETED | OUTPATIENT
Start: 2020-06-20 | End: 2020-06-20

## 2020-06-20 RX ORDER — POTASSIUM CHLORIDE 1.5 G/1.77G
40 POWDER, FOR SOLUTION ORAL AS NEEDED
Status: DISCONTINUED | OUTPATIENT
Start: 2020-06-20 | End: 2020-06-22 | Stop reason: HOSPADM

## 2020-06-20 RX ORDER — MODAFINIL 100 MG/1
200 TABLET ORAL DAILY
Status: DISCONTINUED | OUTPATIENT
Start: 2020-06-20 | End: 2020-06-22 | Stop reason: HOSPADM

## 2020-06-20 RX ORDER — SODIUM CHLORIDE 0.9 % (FLUSH) 0.9 %
10 SYRINGE (ML) INJECTION EVERY 12 HOURS SCHEDULED
Status: DISCONTINUED | OUTPATIENT
Start: 2020-06-20 | End: 2020-06-22 | Stop reason: HOSPADM

## 2020-06-20 RX ORDER — OXYCODONE HYDROCHLORIDE 5 MG/1
5 TABLET ORAL EVERY 6 HOURS PRN
Status: DISCONTINUED | OUTPATIENT
Start: 2020-06-20 | End: 2020-06-22 | Stop reason: HOSPADM

## 2020-06-20 RX ORDER — FERROUS SULFATE 325(65) MG
325 TABLET ORAL
Status: DISCONTINUED | OUTPATIENT
Start: 2020-06-20 | End: 2020-06-22 | Stop reason: HOSPADM

## 2020-06-20 RX ORDER — VENLAFAXINE HYDROCHLORIDE 75 MG/1
75 CAPSULE, EXTENDED RELEASE ORAL DAILY
Status: DISCONTINUED | OUTPATIENT
Start: 2020-06-20 | End: 2020-06-22 | Stop reason: HOSPADM

## 2020-06-20 RX ORDER — SODIUM CHLORIDE 9 MG/ML
100 INJECTION, SOLUTION INTRAVENOUS CONTINUOUS
Status: DISCONTINUED | OUTPATIENT
Start: 2020-06-20 | End: 2020-06-22 | Stop reason: HOSPADM

## 2020-06-20 RX ORDER — ONDANSETRON 2 MG/ML
4 INJECTION INTRAMUSCULAR; INTRAVENOUS EVERY 6 HOURS PRN
Status: DISCONTINUED | OUTPATIENT
Start: 2020-06-20 | End: 2020-06-22 | Stop reason: HOSPADM

## 2020-06-20 RX ORDER — CETIRIZINE HYDROCHLORIDE 10 MG/1
10 TABLET ORAL DAILY
Status: DISCONTINUED | OUTPATIENT
Start: 2020-06-20 | End: 2020-06-22 | Stop reason: HOSPADM

## 2020-06-20 RX ORDER — CHOLECALCIFEROL (VITAMIN D3) 125 MCG
5 CAPSULE ORAL NIGHTLY PRN
Status: DISCONTINUED | OUTPATIENT
Start: 2020-06-20 | End: 2020-06-22 | Stop reason: HOSPADM

## 2020-06-20 RX ORDER — SODIUM CHLORIDE 0.9 % (FLUSH) 0.9 %
10 SYRINGE (ML) INJECTION AS NEEDED
Status: DISCONTINUED | OUTPATIENT
Start: 2020-06-20 | End: 2020-06-22 | Stop reason: HOSPADM

## 2020-06-20 RX ORDER — POTASSIUM CHLORIDE 750 MG/1
40 CAPSULE, EXTENDED RELEASE ORAL AS NEEDED
Status: DISCONTINUED | OUTPATIENT
Start: 2020-06-20 | End: 2020-06-22 | Stop reason: HOSPADM

## 2020-06-20 RX ADMIN — OXYCODONE 5 MG: 5 TABLET ORAL at 20:27

## 2020-06-20 RX ADMIN — MELATONIN TAB 5 MG 5 MG: 5 TAB at 20:28

## 2020-06-20 RX ADMIN — MODAFINIL 200 MG: 100 TABLET ORAL at 08:21

## 2020-06-20 RX ADMIN — SODIUM CHLORIDE 100 ML/HR: 9 INJECTION, SOLUTION INTRAVENOUS at 05:18

## 2020-06-20 RX ADMIN — OXYCODONE 5 MG: 5 TABLET ORAL at 14:28

## 2020-06-20 RX ADMIN — CETIRIZINE HYDROCHLORIDE 10 MG: 10 TABLET, FILM COATED ORAL at 08:21

## 2020-06-20 RX ADMIN — VENLAFAXINE HYDROCHLORIDE 75 MG: 75 CAPSULE, EXTENDED RELEASE ORAL at 08:20

## 2020-06-20 RX ADMIN — POTASSIUM CHLORIDE 40 MEQ: 10 CAPSULE, COATED, EXTENDED RELEASE ORAL at 10:43

## 2020-06-20 RX ADMIN — POTASSIUM CHLORIDE 40 MEQ: 10 CAPSULE, COATED, EXTENDED RELEASE ORAL at 19:09

## 2020-06-20 RX ADMIN — Medication 1 CAPSULE: at 08:20

## 2020-06-20 RX ADMIN — VANCOMYCIN HYDROCHLORIDE 1250 MG: 10 INJECTION, POWDER, LYOPHILIZED, FOR SOLUTION INTRAVENOUS at 19:09

## 2020-06-20 RX ADMIN — POTASSIUM CHLORIDE 40 MEQ: 10 CAPSULE, COATED, EXTENDED RELEASE ORAL at 14:28

## 2020-06-20 RX ADMIN — OXYCODONE 5 MG: 5 TABLET ORAL at 08:20

## 2020-06-20 RX ADMIN — FERROUS SULFATE TAB 325 MG (65 MG ELEMENTAL FE) 325 MG: 325 (65 FE) TAB at 08:20

## 2020-06-20 RX ADMIN — OXYCODONE HYDROCHLORIDE 5 MG: 5 TABLET ORAL at 00:13

## 2020-06-20 RX ADMIN — TAZOBACTAM SODIUM AND PIPERACILLIN SODIUM 3.38 G: 375; 3 INJECTION, SOLUTION INTRAVENOUS at 06:57

## 2020-06-20 RX ADMIN — TAZOBACTAM SODIUM AND PIPERACILLIN SODIUM 3.38 G: 375; 3 INJECTION, SOLUTION INTRAVENOUS at 14:28

## 2020-06-20 RX ADMIN — VANCOMYCIN HYDROCHLORIDE 2000 MG: 10 INJECTION, POWDER, LYOPHILIZED, FOR SOLUTION INTRAVENOUS at 00:56

## 2020-06-20 RX ADMIN — TAZOBACTAM SODIUM AND PIPERACILLIN SODIUM 3.38 G: 375; 3 INJECTION, SOLUTION INTRAVENOUS at 00:56

## 2020-06-20 RX ADMIN — TAZOBACTAM SODIUM AND PIPERACILLIN SODIUM 3.38 G: 375; 3 INJECTION, SOLUTION INTRAVENOUS at 20:29

## 2020-06-20 NOTE — PROGRESS NOTES
"Pharmacokinetic Consult - Vancomycin Dosing  Kenya Lemon is a 54 y.o. female who is receiving vancomycin for sepsis      Ordering service: pulmonology  Infectious Disease Consult: No  Goal trough: 15-20 mcg/mL    Ht - 157.5 cm (62\")  Wt - 79.4 kg (175 lb)    Current Antimicrobial Therapy  Zosyn 3.375 gm IV q8h (extended infusion)   Vancomycin 1250 mg IV q12h    Allergies  Patient has no known allergies.    Microbiology and Radiology  Microbiology Results (last 10 days)       Procedure Component Value - Date/Time    COVID PRE-OP / PRE-PROCEDURE SCREENING ORDER (NO ISOLATION) - Swab, Nasopharynx [[id number]] Collected:  06/14/20 0741    Lab Status:  Final result Specimen:  Swab from Nasopharynx Updated:  06/15/20 1822    Narrative:       The following orders were created for panel order COVID PRE-OP / PRE-PROCEDURE SCREENING ORDER (NO ISOLATION) - Swab, Nasopharynx.  Procedure                               Abnormality         Status                     ---------                               -----------         ------                     COVID-19,[facility], NP ...[[id number]]                      Final result                 Please view results for these tests on the individual orders.    COVID-19,[facility], NP SWAB IN LEXAR SALINE MEDIA 24-30 HR TAT - Swab, Nasopharynx [[id number]] Collected:  06/14/20 0741    Lab Status:  Final result Specimen:  Swab from Nasopharynx Updated:  06/15/20 1822     Reference Lab Report --     Comment: See scanned report          COVID19 Not Detected          Relevant clinical data and objective history reviewed:  Results from last 7 days   Lab Units 06/19/20 2007 06/14/20  0941   BUN mg/dL 13 17   CREATININE mg/dL 1.15* 0.86    Estimated Creatinine Clearance: 54.6 mL/min (A) (by C-G formula based on SCr of 1.15 mg/dL (H)).   Intake & Output (last 3 days)       None          Asessment/Plan  1. Will give vancomycin 2000 mg IV once                                                " followed by      Vancomycin 1250 mg IV q 12 hours    2. Vancomycin trough is scheduled 6/22 at 0530 prior to the 5th dose    Matthew Hahn PharmD, [facility]  6/20/2020  00:08

## 2020-06-20 NOTE — PROGRESS NOTES
[facility] Medicine Services  ADMISSION FOLLOW-UP NOTE          Patient admitted after midnight, H&P by my partner performed earlier on today's date reviewed.  Interim findings, labs, and charting also reviewed.        The [facility] Hospital Problem List has been managed and updated to include any new diagnoses:  Active Hospital Problems    Diagnosis  POA   • **Sepsis secondary to UTI (CMS/HCC) [A41.9, N39.0]  Yes   • Acute UTI (urinary tract infection) [N39.0]  Yes   • Acute pyelonephritis [N10]  Unknown   • DM2 (diabetes mellitus, type 2) (CMS/HCC) [E11.9]  Yes   • MARY (acute kidney injury) (CMS/[facility]) [N17.9]  Yes   • Anemia [D64.9]  Yes   • Anxiety and depression [F41.9, F32.9]  Yes   • Essential hypertension [I10]  Yes   • Malignant neoplasm of upper outer quadrant of breast in female, estrogen receptor negative (CMS/[facility]) [C50.419, Z17.1]  Not Applicable      Resolved Hospital Problems   No resolved problems to display.         ADDITIONAL PLAN:  - detailed assessment and plan from admission reviewed    Kenya Lemon is a 54 year old female with breast cancer undergoing neoadjuvant therapy with Dr. Juarez 4 days s/p bilateral mastectomies with huan biopsy. Presents with 2 days of intermittent fever. Found to be septic likely secondary to UTI and pyelonephritis. Administered vancomycin and and zosyn.        Sepsis, source UTI/pyelonpephritis  ---continue broad spectrum abx- vanc/zosyn at this time  ---continue IVF, supportive care  ---follow all cultures, covid noted negative     MARY  ---continue IVF, [facility]    Breast cancer currently undergoing chemotherapy (Dr Juarez) and recent b/l mastectomies and right senitnel node biopsy (Dr Perez)  ---MANJEET drains remain in place  ---general surgery consulted    T2DM  ---normally on metformin at home-- d/w patient and discussed rationale for continuing insulin in hospital and holding home orals-- will consider adding back metformin if kidney  function improves, for now, continue SSI    Anemia    Anxiety       Electronically signed by Shea Le MD, 06/20/20, 11:24 AM.

## 2020-06-20 NOTE — ED PROVIDER NOTES
EMERGENCY DEPARTMENT ENCOUNTER    Room Number:  14/14  Date of encounter:  6/20/2020  PCP: Ina Kwok DO  Historian: Patient      HPI:  Chief Complaint: Postoperative fever    A complete HPI/ROS/PMH/PSH/SH/FH are unobtainable due to: N/A    Context: Kenya Lemon is a 54 y.o. female who presents to the ED c/o intermittent fevers over the last 2 days.  The patient underwent bilateral mastectomies with right-sided lymph node resection on 6/16/2020.  Surgery seemed to go well.  Drains were placed and have been draining a thin bloody fluid.  No bad odors have been noted.  No rash or skin changes have been noted since the surgery.  The patient denies pain.  She has had no chest pain, abdominal pain, dysuria, hematuria, exposure to COVID patient's.  She began feeling somewhat chilled 2 days ago and then ran a temperature of 100 degrees.  This afternoon her temperature reached 102.3.  She was instructed to take ibuprofen and come to the emergency department.  She states she is feeling somewhat improved after defervesced since.      PAST MEDICAL HISTORY  Active Ambulatory Problems     Diagnosis Date Noted   • Malignant neoplasm of upper outer quadrant of breast in female, estrogen receptor negative (CMS/HCC) 03/12/2020   • Encounter for antineoplastic chemotherapy 03/12/2020   • Malignant neoplasm of upper-outer quadrant of right breast in female, estrogen receptor negative (CMS/HCC) 03/12/2020   • Encounter for adjustment or management of vascular access device 03/24/2020   • Breast cancer, right (CMS/HCC) 06/16/2020     Resolved Ambulatory Problems     Diagnosis Date Noted   • No Resolved Ambulatory Problems     Past Medical History:   Diagnosis Date   • Cancer (CMS/HCC) 02/2020   • Diabetes (CMS/HCC)    • History of low potassium 03/2020   • History of transfusion 1989   • HTN (hypertension)    • Low magnesium level 03/2020   • Sleep apnea          PAST SURGICAL HISTORY  Past Surgical History:   Procedure  Laterality Date   • BREAST BIOPSY Right 02/2020   • CHOLECYSTECTOMY     • COLONOSCOPY     • LAPAROSCOPIC TUBAL LIGATION     • MASTECTOMY W/ SENTINEL NODE BIOPSY Bilateral 6/16/2020    Procedure: SKIN SPARING MASTECTOMIES BILATERAL AND RIGHT SENTINEL NODE BIOPSY;  Surgeon: Gary Perez MD;  Location: [facility];  Service: General;  Laterality: Bilateral;   • VENOUS ACCESS DEVICE (PORT) INSERTION  02/2020         FAMILY HISTORY  Family History   Problem Relation Age of Onset   • Diabetes Mother    • Lung cancer Father    • Breast cancer Paternal Aunt          SOCIAL HISTORY  Social History     Socioeconomic History   • Marital status:      Spouse name: Not on file   • Number of children: Not on file   • Years of education: Not on file   • Highest education level: Not on file   Tobacco Use   • Smoking status: Never Smoker   • Smokeless tobacco: Never Used   Substance and Sexual Activity   • Alcohol use: Never     Frequency: Never   • Drug use: Never   • Sexual activity: Defer         ALLERGIES  Patient has no known allergies.        REVIEW OF SYSTEMS  Review of Systems     All systems reviewed and negative except for those discussed in HPI.       PHYSICAL EXAM    I have reviewed the triage vital signs and nursing notes.    ED Triage Vitals [06/19/20 1946]   Temp Heart Rate Resp BP SpO2   99.7 °F (37.6 °C) 84 16 125/72 98 %      Temp src Heart Rate Source Patient Position BP Location FiO2 (%)   Oral Monitor Sitting Left arm --       Physical Exam  GENERAL:  Well developed.  Appears nontoxic.  HENT: Nares patent.  Clear posterior oropharynx.  EYES: No scleral icterus  CV: Regular rhythm, regular rate.  No murmurs gallops rubs.  RESPIRATORY: Normal effort.  No audible wheezes, rales or rhonchi  ABDOMEN: Soft, nontender  MUSCULOSKELETAL: No deformities.   NEURO: Alert, moves all extremities, follows commands  SKIN: Warm, dry.  Drainage tubes below mastectomy sites show serosanguineous fluid drainage.  No  surrounding erythema at the drain insertion sites nor at the breast surgical sites.        LAB RESULTS  Recent Results (from the past 24 hour(s))   Lactic Acid, Plasma    Collection Time: 06/19/20  8:07 PM   Result Value Ref Range    Lactate 2.6 (C) 0.5 - 2.0 mmol/L   Light Blue Top    Collection Time: 06/19/20  8:07 PM   Result Value Ref Range    Extra Tube hold for add-on    Green Top (Gel)    Collection Time: 06/19/20  8:07 PM   Result Value Ref Range    Extra Tube Hold for add-ons.    Lavender Top    Collection Time: 06/19/20  8:07 PM   Result Value Ref Range    Extra Tube hold for add-on    Gold Top - SST    Collection Time: 06/19/20  8:07 PM   Result Value Ref Range    Extra Tube Hold for add-ons.    Lactic Acid, Reflex Timer (This will reflex a repeat order 3-3:15 hours after ordered.)    Collection Time: 06/19/20  8:07 PM   Result Value Ref Range    Hold Tube Hold for add-ons.    Comprehensive Metabolic Panel    Collection Time: 06/19/20  8:07 PM   Result Value Ref Range    Glucose 199 (H) 65 - 99 mg/dL    BUN 13 6 - 20 mg/dL    Creatinine 1.15 (H) 0.57 - 1.00 mg/dL    Sodium 135 (L) 136 - 145 mmol/L    Potassium 3.7 3.5 - 5.2 mmol/L    Chloride 92 (L) 98 - 107 mmol/L    CO2 28.0 22.0 - 29.0 mmol/L    Calcium 9.4 8.6 - 10.5 mg/dL    Total Protein 7.6 6.0 - 8.5 g/dL    Albumin 4.70 3.50 - 5.20 g/dL    ALT (SGPT) 26 1 - 33 U/L    AST (SGOT) 24 1 - 32 U/L    Alkaline Phosphatase 62 39 - 117 U/L    Total Bilirubin 0.4 0.2 - 1.2 mg/dL    eGFR Non African Amer 49 (L) >60 mL/min/1.73    Globulin 2.9 gm/dL    A/G Ratio 1.6 g/dL    BUN/Creatinine Ratio 11.3 7.0 - 25.0    Anion Gap 15.0 5.0 - 15.0 mmol/L   CBC Auto Differential    Collection Time: 06/19/20  8:07 PM   Result Value Ref Range    WBC 12.48 (H) 3.40 - 10.80 10*3/mm3    RBC 3.10 (L) 3.77 - 5.28 10*6/mm3    Hemoglobin 9.7 (L) 12.0 - 15.9 g/dL    Hematocrit 30.5 (L) 34.0 - 46.6 %    MCV 98.4 (H) 79.0 - 97.0 fL    MCH 31.3 26.6 - 33.0 pg    MCHC 31.8 31.5 -  35.7 g/dL    RDW 14.7 12.3 - 15.4 %    RDW-SD 53.1 37.0 - 54.0 fl    MPV 10.9 6.0 - 12.0 fL    Platelets 281 140 - 450 10*3/mm3    Neutrophil % 78.9 (H) 42.7 - 76.0 %    Lymphocyte % 10.4 (L) 19.6 - 45.3 %    Monocyte % 9.6 5.0 - 12.0 %    Eosinophil % 0.5 0.3 - 6.2 %    Basophil % 0.3 0.0 - 1.5 %    Immature Grans % 0.3 0.0 - 0.5 %    Neutrophils, Absolute 9.84 (H) 1.70 - 7.00 10*3/mm3    Lymphocytes, Absolute 1.30 0.70 - 3.10 10*3/mm3    Monocytes, Absolute 1.20 (H) 0.10 - 0.90 10*3/mm3    Eosinophils, Absolute 0.06 0.00 - 0.40 10*3/mm3    Basophils, Absolute 0.04 0.00 - 0.20 10*3/mm3    Immature Grans, Absolute 0.04 0.00 - 0.05 10*3/mm3    nRBC 0.0 0.0 - 0.2 /100 WBC   Urinalysis With Microscopic If Indicated (No Culture) - Urine, Clean Catch    Collection Time: 06/19/20 11:21 PM   Result Value Ref Range    Color, UA Dark Yellow (A) Yellow, Straw    Appearance, UA Cloudy (A) Clear    pH, UA <=5.0 5.0 - 8.0    Specific Gravity, UA 1.020 1.001 - 1.030    Glucose, UA Negative Negative    Ketones, UA Trace (A) Negative    Bilirubin, UA Negative Negative    Blood, UA Negative Negative    Protein, UA 30 mg/dL (1+) (A) Negative    Leuk Esterase, UA Large (3+) (A) Negative    Nitrite, UA Negative Negative    Urobilinogen, UA 1.0 E.U./dL 0.2 - 1.0 E.U./dL   Urinalysis, Microscopic Only - Urine, Clean Catch    Collection Time: 06/19/20 11:21 PM   Result Value Ref Range    RBC, UA 0-2 None Seen, 0-2 /HPF    WBC, UA Too Numerous to Count (A) None Seen, 0-2 /HPF    Bacteria, UA 4+ (A) None Seen, Trace /HPF    Squamous Epithelial Cells, UA 31-50 (A) None Seen, 0-2 /HPF    Methodology Manual Light Microscopy        Ordered the above labs and independently reviewed the results.        RADIOLOGY  Ct Chest Without Contrast    Result Date: 6/19/2020  CT Chest WO INDICATION: 54-year-old female with fever status post bilateral mastectomies performed 6/16/2020. TECHNIQUE: CT of the chest without IV contrast. Coronal and sagittal  reformatted images. Radiation dose reduction techniques included automated exposure control or exposure modulation based on body size. Count of known CT and cardiac nuc med studies performed in previous 12 months: 0. COMPARISON: None available. FINDINGS: Thoracic aorta normal in course and caliber. Heart size is normal. No pericardial effusion. No pleural effusion. No pneumothorax. No focal pulmonary infiltrates. Visualized upper abdomen is unremarkable. No acute osseous abnormality. Postsurgical changes from recent bilateral mastectomies. Surgical drains in place. No abnormal soft tissue fluid collections. Surgical clips in the right axillary region.     1. Stable postsurgical changes from recent bilateral mastectomies. No abnormal soft tissue fluid collections. 2. No other acute chest findings. Signer Name: Jassi Diaz MD  Signed: 6/19/2020 11:28 PM  Workstation Name: [email or web address]  [facility]      I ordered and reviewed the above noted radiographic studies.    I viewed images of chest CT which showed no pneumonia per my independent interpretation.  See radiologist's dictation for official interpretation.        PROCEDURES    Procedures      MEDICATIONS GIVEN IN ER    Medications   piperacillin-tazobactam (ZOSYN) 3.375 g in iso-osmotic dextrose 50 ml (premix) (has no administration in time range)   piperacillin-tazobactam (ZOSYN) 3.375 g in iso-osmotic dextrose 50 ml (premix) (has no administration in time range)   Pharmacy to dose vancomycin (has no administration in time range)   lactobacillus acidophilus (RISAQUAD) capsule 1 capsule (has no administration in time range)   dextrose (GLUTOSE) oral gel 15 g (has no administration in time range)   dextrose (D50W) 25 g/ 50mL Intravenous Solution 25 g (has no administration in time range)   glucagon (human recombinant) (GLUCAGEN DIAGNOSTIC) injection 1 mg (has no administration in time range)   insulin lispro (humaLOG) injection 0-7 Units  (has no administration in time range)   vancomycin 2000 mg/500 mL 0.9% NS IVPB ([facility]) (has no administration in time range)   vancomycin 1250 mg/250 mL 0.9% NS IVPB ([facility]) (has no administration in time range)   ! Vancomycin trough 6/22 at 0530.  Please hold vancomycin dose 6/22 at 0600 until pharmacy can evaluate level (has no administration in time range)   oxyCODONE (ROXICODONE) immediate release tablet 5 mg (5 mg Oral Given 6/20/20 0013)         PROGRESS, DATA ANALYSIS, CONSULTS, AND MEDICAL DECISION MAKING    All labs have been independently reviewed by me.  All radiology studies have been reviewed by me and the radiologist dictating the report.   EKG's have been independently viewed and interpreted by me.      Differential diagnoses: Acute febrile illness in a recently postop patient.  Concern for wound infection, UTI, PE, etc.      ED Course as of Jun 20 0032 Fri Jun 19, 2020 2329 I have ordered broad-spectrum antibiotics as well as IV fluids.    I have paged the hospitalist for admission.    [MS]   2330 I spoke with Dr. Nair, on-call surgery for Dr. Ning Perez.  Case discussed in detail.  He agrees with my plan to start the patient on broad-spectrum antibiotics, obtain CT scan of the chest, admit to hospitalist service.  Patient is updated on current findings and plan.  She and her daughter are quite comfortable with this.    [MS]      ED Course User Index  [MS] Andres Spear MD             AS OF 00:32 VITALS:    BP - 110/66  HR - 84  TEMP - 99.7 °F (37.6 °C) (Oral)  O2 SATS - 99%        DIAGNOSIS  Final diagnoses:   Acute febrile illness   Recent major surgery   Acute UTI         DISPOSITION  Admission           Andres Spear MD  06/20/20 0033

## 2020-06-20 NOTE — PLAN OF CARE
Problem: Patient Care Overview  Goal: Plan of Care Review  Flowsheets (Taken 6/20/2020 0739)  Progress: no change  Plan of Care Reviewed With: patient  Outcome Summary: pt recieved from ER at 0530, VSS, RA, A&O x 4, tele - NSR, 2 bilateral MANJEET drains due to mastectomy, no complaint of pain, will continue to monitor.

## 2020-06-20 NOTE — CONSULTS
General Surgery Consultation Note    Date of Service: 6/20/2020  Kenya Lemon  [id number]  1965      Referring Provider: Shea Le MD    Location of Consult: Hospital floor     Reason for Consultation: Postoperative care, fever       History of Present Illness:  I am seeing, Kenya Lemon, in consultation for Shea Le MD regarding persistent spiking fever at home up to 102.3.  Recent bilateral mastectomy for breast cancer done by Dr. Perez on 6/16/2020.    Problem List Items Addressed This Visit        Immune and Lymphatic    * (Principal) Sepsis secondary to UTI (CMS/HCC) - Primary      Other Visit Diagnoses     Recent major surgery        Acute UTI              Past Medical History:   Diagnosis Date   • Cancer (CMS/HCC) 02/2020    breast cancer per mammogram; chemotherapy    • Diabetes (CMS/HCC)    • History of low potassium 03/2020    prescribed potassium supplements    • History of transfusion 1989    after delivery of son (1 unit only)    • HTN (hypertension)    • Low magnesium level 03/2020    requiring magnesium supplements    • Sleep apnea     no cpap machine currently       Past Surgical History:   • BREAST BIOPSY   • CHOLECYSTECTOMY   • COLONOSCOPY   • LAPAROSCOPIC TUBAL LIGATION   • MASTECTOMY W/ SENTINEL NODE BIOPSY    Procedure: SKIN SPARING MASTECTOMIES BILATERAL AND RIGHT SENTINEL NODE BIOPSY;  Surgeon: Gary Perez MD;  Location: [facility];  Service: General;  Laterality: Bilateral;   • VENOUS ACCESS DEVICE (PORT) INSERTION       No Known Allergies    No current facility-administered medications on file prior to encounter.      Current Outpatient Medications on File Prior to Encounter   Medication Sig Dispense Refill   • atenolol (TENORMIN) 50 MG tablet Take 50 mg by mouth 2 (two) times a day.     • dexamethasone (DECADRON) 4 MG tablet Take 2 tablets in the morning daily on Days 2, 3 & 4.  Take with food. 6 tablet 3   • diphenoxylate-atropine (Lomotil)  2.5-0.025 MG per tablet Take 1 tablet by mouth Every 6 (Six) Hours As Needed for Diarrhea. 1 tablet 30 tablet 2   • hydroCHLOROthiazide (HYDRODIURIL) 25 MG tablet Take 25 mg by mouth Daily.     • lidocaine-prilocaine (EMLA) 2.5-2.5 % cream Apply  topically to the appropriate area as directed As Needed (45-60 minutes prior to port access.  Cover with saran/plastic wrap.). (Patient taking differently: Apply 1 application topically to the appropriate area as directed As Needed (45-60 minutes prior to port access.  Cover with saran/plastic wrap.).) 30 g 3   • Loratadine 10 MG capsule Take 10 mg by mouth Every Morning.     • metFORMIN (GLUCOPHAGE) 1000 MG tablet Take 1,000 mg by mouth 2 (Two) Times a Day.     • modafinil (PROVIGIL) 200 MG tablet Take 200 mg by mouth Daily.     • Multiple Vitamins-Minerals (MULTIVITAMIN ADULT PO) Take 1 dose by mouth Every Morning.     • ondansetron (ZOFRAN) 8 MG tablet Take 1 tablet by mouth 3 (Three) Times a Day As Needed for Nausea or Vomiting. 30 tablet 5   • oxyCODONE (ROXICODONE) 5 MG immediate release tablet Take 1 tablet by mouth Every 6 (Six) Hours As Needed for Moderate Pain  for up to 9 days. 10 tablet 0   • potassium chloride ER (K-TAB) 20 MEQ tablet controlled-release ER tablet Take 1 tablet by mouth Daily. 30 tablet 1   • venlafaxine XR (EFFEXOR-XR) 75 MG 24 hr capsule Take 75 mg by mouth Daily.           Current Facility-Administered Medications:   •  [START ON 6/22/2020] ! Vancomycin trough 6/22 at 0530.  Please hold vancomycin dose 6/22 at 0600 until pharmacy can evaluate level, , Does not apply, Once, Matthew Hahn, [facility]  •  acetaminophen (TYLENOL) tablet 650 mg, 650 mg, Oral, Q4H PRN, Akira Fernandez PA-C  •  cetirizine (zyrTEC) tablet 10 mg, 10 mg, Oral, Daily, Akira Fernandez PA-C, 10 mg at 06/20/20 0821  •  dextrose (D50W) 25 g/ 50mL Intravenous Solution 25 g, 25 g, Intravenous, Q15 Min PRN, Akira Fernandez PA-C  •  dextrose (GLUTOSE) oral gel 15 g, 15  g, Oral, Q15 Min PRN, Akira Fernandez PA-C  •  ferrous sulfate tablet 325 mg, 325 mg, Oral, Daily With Breakfast, Flora Pretty G, DO, 325 mg at 06/20/20 0820  •  glucagon (human recombinant) (GLUCAGEN DIAGNOSTIC) injection 1 mg, 1 mg, Subcutaneous, Q15 Min PRN, Akira Fernandez PA-C  •  insulin lispro (humaLOG) injection 0-7 Units, 0-7 Units, Subcutaneous, TID AC, Akira Fernandez PA-C  •  lactobacillus acidophilus (RISAQUAD) capsule 1 capsule, 1 capsule, Oral, Daily, Akira Fernandez PA-C, 1 capsule at 06/20/20 0820  •  melatonin tablet 5 mg, 5 mg, Oral, Nightly PRN, Akira Fernandez PA-C  •  modafinil (PROVIGIL) tablet 200 mg, 200 mg, Oral, Daily, Flora Pretty, DO, 200 mg at 06/20/20 0821  •  ondansetron (ZOFRAN) injection 4 mg, 4 mg, Intravenous, Q6H PRN, Akira Fernandez PA-C  •  oxyCODONE (ROXICODONE) immediate release tablet 5 mg, 5 mg, Oral, Q6H PRN, Flora Pretty, DO, 5 mg at 06/20/20 0820  •  Pharmacy to dose vancomycin, , Does not apply, Continuous PRN, Akira Fernandez PA-C  •  piperacillin-tazobactam (ZOSYN) 3.375 g in iso-osmotic dextrose 50 ml (premix), 3.375 g, Intravenous, Q8H, Akira Fernandez PA-C, 3.375 g at 06/20/20 0657  •  potassium chloride (MICRO-K) CR capsule 40 mEq, 40 mEq, Oral, PRN, 40 mEq at 06/20/20 1043 **OR** potassium chloride (KLOR-CON) packet 40 mEq, 40 mEq, Oral, PRN **OR** potassium chloride 10 mEq in 100 mL IVPB, 10 mEq, Intravenous, Q1H PRN, Shea Le MD  •  sodium chloride 0.9 % flush 10 mL, 10 mL, Intravenous, Q12H, Akira Fernandez PA-C  •  sodium chloride 0.9 % flush 10 mL, 10 mL, Intravenous, PRN, Akira Fernandez PA-C  •  sodium chloride 0.9 % infusion, 100 mL/hr, Intravenous, Continuous, Akira Fernandez PA-C, Last Rate: 100 mL/hr at 06/20/20 0518, 100 mL/hr at 06/20/20 0518  •  vancomycin 1250 mg/250 mL 0.9% NS IVPB ([facility]), 15 mg/kg, Intravenous, Q12H, Matthew Hahn, [facility]  •  venlafaxine XR (EFFEXOR-XR) 24 hr capsule 75 mg, 75 mg,  "Oral, Daily, Akira Fernandez PA-C, 75 mg at 06/20/20 0820    Family History   Problem Relation Age of Onset   • Diabetes Mother    • Lung cancer Father    • Breast cancer Paternal Aunt      Social History     Socioeconomic History   • Marital status:      Spouse name: Not on file   • Number of children: Not on file   • Years of education: Not on file   • Highest education level: Not on file   Tobacco Use   • Smoking status: Never Smoker   • Smokeless tobacco: Never Used   Substance and Sexual Activity   • Alcohol use: Never     Frequency: Never   • Drug use: Never   • Sexual activity: Defer     /70 (BP Location: Left arm, Patient Position: Lying)   Pulse 78   Temp 98.3 °F (36.8 °C) (Oral)   Resp 16   Ht 157.5 cm (62\")   Wt 79.4 kg (175 lb)   SpO2 94%   BMI 32.01 kg/m²   Body mass index is 32.01 kg/m².    General: No apparent distress  HEENT: PER, no icterus, normal sclerae  Cardiac: regular rhythm, no tachycardia, no audible rubs  Surgical sites: Bilateral mastectomy incisions appear to be healing well.  There is no obvious evidence of infection.  MANJEET drains are noted to be serosanguineous and not purulent.  The axillary incision also appears without gross infection.  The left chest port site also appears okay.  Pulmonary: bilateral breath sounds, non labored  Abdominal: Soft, nontender, nondistended, no peritonitis  Neurologic: awake, alert, no obvious focal deficits  Extremities: warm, no edema  Skin: no obvious rashes nor worrisome lesions seen     CBC  Results from last 7 days   Lab Units 06/20/20  0618   WBC 10*3/mm3 8.61   HEMOGLOBIN g/dL 8.5*   HEMATOCRIT % 27.0*   PLATELETS 10*3/mm3 217       CMP  Results from last 7 days   Lab Units 06/20/20  0618 06/19/20 2007   SODIUM mmol/L 136 135*   POTASSIUM mmol/L 3.0* 3.7   CHLORIDE mmol/L 97* 92*   CO2 mmol/L 29.0 28.0   BUN mg/dL 16 13   CREATININE mg/dL 1.29* 1.15*   CALCIUM mg/dL 8.6 9.4   BILIRUBIN mg/dL  --  0.4   ALK PHOS U/L  --  62 "   ALT (SGPT) U/L  --  26   AST (SGOT) U/L  --  24   GLUCOSE mg/dL 160* 199*       Radiology  Imaging Results (Last 72 Hours)     Procedure Component Value Units Date/Time    CT Abdomen Pelvis Without Contrast [[id number]] Collected:  06/20/20 0126     Updated:  06/20/20 0128    Narrative:       CT Abdomen Pelvis WO    INDICATION:   54-year-old female with generalized abdominal pain and right flank pain today.    TECHNIQUE:   CT of the abdomen and pelvis without IV contrast. Coronal and sagittal reconstructions were obtained.  Radiation dose reduction techniques included automated exposure control or exposure modulation based on body size. Count of known CT and cardiac nuc  med studies performed in previous 12 months: 0.     COMPARISON:   None available.    FINDINGS:  Visualized lung bases are unremarkable.    Abdomen:   The liver is within normal limits. The spleen and pancreas are normal. Cholecystectomy. Both adrenal glands are normal. Nonobstructing right intrarenal calcification. Minimal stranding in the bilateral perinephric fat, nonspecific and possibly chronic.  No hydronephrosis. Abdominal aorta normal in course and caliber. Small bowel is unremarkable without obstruction. Normal appendix. The colon is unremarkable. No free fluid or free air.    Pelvis:   The urinary bladder is unremarkable.  The uterus and adnexa are within normal limits. IUD. No free pelvic fluid.    No acute osseous abnormality.        Impression:         1. No acute abdominal or pelvic findings.  2. Nonobstructing right intrarenal calcification. Minimal stranding in the bilateral perinephric fat. This is nonspecific and possibly chronic. Correlation with urinalysis to exclude urinary tract infection.  3. Cholecystectomy.  4. Normal appendix.  5. IUD.          Signer Name: Jassi Diaz MD   Signed: 6/20/2020 1:26 AM   Workstation Name: [email or web address]    [facility]    CT Chest Without Contrast [[id number]] Collected:   06/19/20 2328     Updated:  06/19/20 2331    Narrative:       CT Chest WO    INDICATION:   54-year-old female with fever status post bilateral mastectomies performed 6/16/2020.    TECHNIQUE:   CT of the chest without IV contrast. Coronal and sagittal reformatted images. Radiation dose reduction techniques included automated exposure control or exposure modulation based on body size. Count of known CT and cardiac nuc med studies performed in  previous 12 months: 0.     COMPARISON:   None available.    FINDINGS:   Thoracic aorta normal in course and caliber. Heart size is normal. No pericardial effusion.    No pleural effusion. No pneumothorax. No focal pulmonary infiltrates.    Visualized upper abdomen is unremarkable.    No acute osseous abnormality. Postsurgical changes from recent bilateral mastectomies. Surgical drains in place. No abnormal soft tissue fluid collections. Surgical clips in the right axillary region.      Impression:         1. Stable postsurgical changes from recent bilateral mastectomies. No abnormal soft tissue fluid collections.  2. No other acute chest findings.    Signer Name: Jassi Diaz MD   Signed: 6/19/2020 11:28 PM   Workstation Name: [email or web address]    [facility] [place]            Assessment:  Fever  Recent bilateral mastectomy with sentinel lymph node biopsy (right)    Plan:  No further fevers noted in house.  No leukocytosis.  Cultures pending.  Empiric antibiotics per medicine.    Capo Mcghee MD  06/20/20  11:03

## 2020-06-20 NOTE — H&P
[facility] Medicine Services  HISTORY AND PHYSICAL    Patient Name: Kenya Lemon  : 1965  MRN: [id number]  Primary Care Physician: Ina Kwok DO  Date of admission: 2020      Subjective   Subjective     Chief Complaint:  fever    HPI:  Kenya Lemon is a 54 y.o. female with a past medical history significant for hypertension, DM2, HLD, SALOME, and breast cancer status post bilateral mastectomy on 2020 who presents to the ED due to fever.  Patient states that yesterday she began to have a low-grade fever and malaise.  She spoke with Dr. Juarez who informed her that she needed to come to the ED for a temperature above 100.5.  Today patient had fever at home of 102.3 prompting her to come to the ED for further evaluation.  She reports dark urine, denies frequency or dysuria.  Patient denies cough, wheeze, shortness of breath, vomiting, diarrhea, change in taste or smell, known exposure to COVID-19, painful or swollen joints, body rash, redness or swelling to incision sites or port.  Patient is followed by Dr. Juarez per oncology with whom she is undergoing neoadjuvant therapy. On 20, she underwent bilateral mastectomies and right sentinel node biopsy per Dr. Perez. Subsequent drains placed. Patient now presents with 2 days of intermittent fevers.     Emergency Department Evaluation; febrile to 99.7. Hypotensive to 108/63. Vitals otherwise stable. Glucose 199. Creatinine 1.15. Lactate 2.6. WBC 12.4. H/H 9.7 and 30.5 respectively. UA positive for acute infection with 4+ bacteria and WBC too numerous to count. CT chest shows stable postsurgical changes.    Review of Systems     All other systems reviewed and are negative.     Personal History     Past Medical History:   Diagnosis Date   • Cancer (CMS/HCC) 2020    breast cancer per mammogram; chemotherapy    • Diabetes (CMS/[facility])    • History of low potassium 2020    prescribed potassium supplements     • History of transfusion 1989    after delivery of son (1 unit only)    • HTN (hypertension)    • Low magnesium level 03/2020    requiring magnesium supplements    • Sleep apnea     no cpap machine currently       Past Surgical History:   Procedure Laterality Date   • BREAST BIOPSY Right 02/2020   • CHOLECYSTECTOMY     • COLONOSCOPY     • LAPAROSCOPIC TUBAL LIGATION     • MASTECTOMY W/ SENTINEL NODE BIOPSY Bilateral 6/16/2020    Procedure: SKIN SPARING MASTECTOMIES BILATERAL AND RIGHT SENTINEL NODE BIOPSY;  Surgeon: Gary Perez MD;  Location: [facility];  Service: General;  Laterality: Bilateral;   • VENOUS ACCESS DEVICE (PORT) INSERTION  02/2020       Family History: family history includes Breast cancer in her paternal aunt; Diabetes in her mother; Lung cancer in her father. Otherwise pertinent FHx was reviewed and unremarkable.     Social History:  reports that she has never smoked. She has never used smokeless tobacco. She reports that she does not drink alcohol or use drugs.  Social History     Social History Narrative   • Not on file       Medications:  Available home medication information reviewed.    (Not in a hospital admission)    No Known Allergies    Objective   Objective     Vital Signs:   Temp:  [97.8 °F (36.6 °C)-99.7 °F (37.6 °C)] 97.8 °F (36.6 °C)  Heart Rate:  [84] 84  Resp:  [16] 16  BP: (108-125)/(63-77) 111/73        Physical Exam   Constitutional: Awake, alert  Eyes: PERRLA, sclerae anicteric, no conjunctival injection  HENT: NCAT, mucous membranes moist  Neck: Supple, no thyromegaly, no lymphadenopathy, trachea midline  Respiratory: Clear to auscultation bilaterally, nonlabored respirations   Cardiovascular: RRR, no murmurs, rubs, or gallops, palpable pedal pulses bilaterally  Gastrointestinal: Positive bowel sounds, soft, nontender, nondistended  Musculoskeletal: No bilateral ankle edema, no clubbing or cyanosis to extremities.  Mild right flank tenderness  Psychiatric: Appropriate  affect, cooperative  Neurologic: Oriented x 3, strength symmetric in all extremities, Cranial Nerves grossly intact to confrontation, speech clear  Skin: Bilateral mastectomy incision sites healing well without erythema, drainage.  Port site without induration or erythema or drainage.  Mastectomy drainage serosanguineous      Results Reviewed:  I have personally reviewed current lab and radiology data.    Results from last 7 days   Lab Units 06/19/20 2007   WBC 10*3/mm3 12.48*   HEMOGLOBIN g/dL 9.7*   HEMATOCRIT % 30.5*   PLATELETS 10*3/mm3 281     Results from last 7 days   Lab Units 06/20/20  0051 06/20/20  0033 06/19/20 2007   SODIUM mmol/L  --   --  135*   POTASSIUM mmol/L  --   --  3.7   CHLORIDE mmol/L  --   --  92*   CO2 mmol/L  --   --  28.0   BUN mg/dL  --   --  13   CREATININE mg/dL  --   --  1.15*   GLUCOSE mg/dL  --   --  199*   CALCIUM mg/dL  --   --  9.4   ALT (SGPT) U/L  --   --  26   AST (SGOT) U/L  --   --  24   LACTATE mmol/L 2.0  --  2.6*   PROCALCITONIN ng/mL  --  1.20*  --      Estimated Creatinine Clearance: 54.6 mL/min (A) (by C-G formula based on SCr of 1.15 mg/dL (H)).  Brief Urine Lab Results  (Last result in the past 365 days)      Color   Clarity   Blood   Leuk Est   Nitrite   Protein   CREAT   Urine HCG        06/19/20 2321 Dark Yellow Cloudy Negative Large (3+) Negative 30 mg/dL (1+)             Imaging Results (Last 24 Hours)     Procedure Component Value Units Date/Time    CT Abdomen Pelvis Without Contrast [[id number]] Collected:  06/20/20 0126     Updated:  06/20/20 0128    Narrative:       CT Abdomen Pelvis WO    INDICATION:   54-year-old female with generalized abdominal pain and right flank pain today.    TECHNIQUE:   CT of the abdomen and pelvis without IV contrast. Coronal and sagittal reconstructions were obtained.  Radiation dose reduction techniques included automated exposure control or exposure modulation based on body size. Count of known CT and cardiac nuc  med studies  performed in previous 12 months: 0.     COMPARISON:   None available.    FINDINGS:  Visualized lung bases are unremarkable.    Abdomen:   The liver is within normal limits. The spleen and pancreas are normal. Cholecystectomy. Both adrenal glands are normal. Nonobstructing right intrarenal calcification. Minimal stranding in the bilateral perinephric fat, nonspecific and possibly chronic.  No hydronephrosis. Abdominal aorta normal in course and caliber. Small bowel is unremarkable without obstruction. Normal appendix. The colon is unremarkable. No free fluid or free air.    Pelvis:   The urinary bladder is unremarkable.  The uterus and adnexa are within normal limits. IUD. No free pelvic fluid.    No acute osseous abnormality.        Impression:         1. No acute abdominal or pelvic findings.  2. Nonobstructing right intrarenal calcification. Minimal stranding in the bilateral perinephric fat. This is nonspecific and possibly chronic. Correlation with urinalysis to exclude urinary tract infection.  3. Cholecystectomy.  4. Normal appendix.  5. IUD.          Signer Name: Jassi Diaz MD   Signed: 6/20/2020 1:26 AM   Workstation Name: [email or web address]    [facility]    CT Chest Without Contrast [[id number]] Collected:  06/19/20 2328     Updated:  06/19/20 2331    Narrative:       CT Chest WO    INDICATION:   54-year-old female with fever status post bilateral mastectomies performed 6/16/2020.    TECHNIQUE:   CT of the chest without IV contrast. Coronal and sagittal reformatted images. Radiation dose reduction techniques included automated exposure control or exposure modulation based on body size. Count of known CT and cardiac nuc med studies performed in  previous 12 months: 0.     COMPARISON:   None available.    FINDINGS:   Thoracic aorta normal in course and caliber. Heart size is normal. No pericardial effusion.    No pleural effusion. No pneumothorax. No focal pulmonary  infiltrates.    Visualized upper abdomen is unremarkable.    No acute osseous abnormality. Postsurgical changes from recent bilateral mastectomies. Surgical drains in place. No abnormal soft tissue fluid collections. Surgical clips in the right axillary region.      Impression:         1. Stable postsurgical changes from recent bilateral mastectomies. No abnormal soft tissue fluid collections.  2. No other acute chest findings.    Signer Name: Jassi Diaz MD   Signed: 6/19/2020 11:28 PM   Workstation Name: [email or web address]    [facility]        Results for orders placed during the hospital encounter of 03/19/20   Adult Transthoracic Echo Complete W/ Cont if Necessary Per Protocol    Addendum · Left ventricular systolic function is normal. Estimated EF = 60%. · The global Longitudinal LV strain = -20.7%. · Estimated EF = 60%.        Sander Jeffers MD 3/19/2020 12:01 PM          Narrative · Left ventricular systolic function is normal. Estimated EF = 60%.  · The global Longitudinal LV strain = -20.7%.          Assessment/Plan   Assessment & Plan     Active Hospital Problems    Diagnosis POA   • **Sepsis secondary to UTI (CMS/HCC) [A41.9, N39.0] Yes   • Acute UTI (urinary tract infection) [N39.0] Yes   • Acute pyelonephritis [N10] Unknown   • DM2 (diabetes mellitus, type 2) (CMS/HCC) [E11.9] Yes   • MARY (acute kidney injury) (CMS/HCC) [N17.9] Yes   • Anemia [D64.9] Yes   • Anxiety and depression [F41.9, F32.9] Yes   • Essential hypertension [I10] Yes   • Malignant neoplasm of upper outer quadrant of breast in female, estrogen receptor negative (CMS/HCC) [C50.419, Z17.1] Not Applicable       54 year old female with breast cancer undergoing neoadjuvant therapy with Dr. Juarez 4 days s/p bilateral mastectomies with huan biopsy. Presents with 2 days of intermittent fever. Found to be septic likely secondary to UTI and pyelonephritis. Administered vancomycin and and zosyn. Consulting general surgery  and considering heme/onc consult.    1. Sepsis secondary to UTI and pyelonephritis:  - Fever, leukocytosis, elevated lactic acid, MARY  - administered vancomycin and zosyn. Immunocompromised. De-escalate as tolerated.  - add probiotics  - obtain urine culture  - obtain blood cultures  - Low risk COVID-19 rule out, will remove isolation with 1 negative test  - check inflammatory markers  - hold antihypertensives  - saline 100 cc/hr  - am labs  - Consider ID consult    2. MARY:  - creatinine at discharge 6/14/2020 0.86  - hold HCTZ. Avoid additional nephrotoxins  - continue with IVF    3. Breast Cancer:  - courtesy consult to general surgery  - consider heme/onc consult    4. DM2:  - holding metformin for now  - start SSI with scheduled accu checks    5. Hypertension:  - BP labile. Holding atenolol and HCTZ as above. Resume as tolerated    6. Iron deficiency Anemia:  - hemoglobin dropped from 11.2 to 9.7 from 6/14  - likely procedure related  - start ferrous sulfate  - FOB pending    7. Anxiety/depression:  - continue venlafaxine    DVT prophylaxis:  mechanical      Admission Communication  Due to current limited visitation policies, an attempt will be made to update patient's identified best point-of-contact(s) at admission to discuss plan of care.   Contact:    Relation:    Time of communication:    Notes (if applicable): Daughter at bedside         CODE STATUS:  Full code  Code Status and Medical Interventions:   Ordered at: 06/19/20 6743     Level Of Support Discussed With:    Patient     Code Status:    CPR     Medical Interventions (Level of Support Prior to Arrest):    Full       Admission Status:  I believe this patient meets INPATIENT status due to sepsis.  I feel patient’s risk for adverse outcomes and need for care warrant INPATIENT evaluation and I predict the patient’s care encounter to likely last beyond 2 midnights.      Electronically signed by Flora Pretty DO, 06/20/20, 12:01 AM.

## 2020-06-21 LAB
ABO GROUP BLD: NORMAL
ABO GROUP BLD: NORMAL
ANION GAP SERPL CALCULATED.3IONS-SCNC: 10 MMOL/L (ref 5–15)
ANION GAP SERPL CALCULATED.3IONS-SCNC: 13 MMOL/L (ref 5–15)
BACTERIA SPEC AEROBE CULT: NORMAL
BASOPHILS # BLD AUTO: 0.05 10*3/MM3 (ref 0–0.2)
BASOPHILS NFR BLD AUTO: 0.9 % (ref 0–1.5)
BLD GP AB SCN SERPL QL: NEGATIVE
BUN BLD-MCNC: 10 MG/DL (ref 6–20)
BUN BLD-MCNC: 8 MG/DL (ref 6–20)
BUN/CREAT SERPL: 13.2 (ref 7–25)
BUN/CREAT SERPL: 15.1 (ref 7–25)
CALCIUM SPEC-SCNC: 6.9 MG/DL (ref 8.6–10.5)
CALCIUM SPEC-SCNC: 9.2 MG/DL (ref 8.6–10.5)
CHLORIDE SERPL-SCNC: 106 MMOL/L (ref 98–107)
CHLORIDE SERPL-SCNC: 95 MMOL/L (ref 98–107)
CO2 SERPL-SCNC: 24 MMOL/L (ref 22–29)
CO2 SERPL-SCNC: 30 MMOL/L (ref 22–29)
CREAT BLD-MCNC: 0.53 MG/DL (ref 0.57–1)
CREAT BLD-MCNC: 0.76 MG/DL (ref 0.57–1)
DEPRECATED RDW RBC AUTO: 50.2 FL (ref 37–54)
EOSINOPHIL # BLD AUTO: 0.25 10*3/MM3 (ref 0–0.4)
EOSINOPHIL NFR BLD AUTO: 4.3 % (ref 0.3–6.2)
ERYTHROCYTE [DISTWIDTH] IN BLOOD BY AUTOMATED COUNT: 14.2 % (ref 12.3–15.4)
GFR SERPL CREATININE-BSD FRML MDRD: 120 ML/MIN/1.73
GFR SERPL CREATININE-BSD FRML MDRD: 79 ML/MIN/1.73
GLUCOSE BLD-MCNC: 113 MG/DL (ref 65–99)
GLUCOSE BLD-MCNC: 168 MG/DL (ref 65–99)
GLUCOSE BLDC GLUCOMTR-MCNC: 135 MG/DL (ref 70–130)
GLUCOSE BLDC GLUCOMTR-MCNC: 141 MG/DL (ref 70–130)
GLUCOSE BLDC GLUCOMTR-MCNC: 152 MG/DL (ref 70–130)
GLUCOSE BLDC GLUCOMTR-MCNC: 212 MG/DL (ref 70–130)
HCT VFR BLD AUTO: 26.8 % (ref 34–46.6)
HGB BLD-MCNC: 8.5 G/DL (ref 12–15.9)
IMM GRANULOCYTES # BLD AUTO: 0.02 10*3/MM3 (ref 0–0.05)
IMM GRANULOCYTES NFR BLD AUTO: 0.3 % (ref 0–0.5)
LYMPHOCYTES # BLD AUTO: 0.99 10*3/MM3 (ref 0.7–3.1)
LYMPHOCYTES NFR BLD AUTO: 17 % (ref 19.6–45.3)
MCH RBC QN AUTO: 30.8 PG (ref 26.6–33)
MCHC RBC AUTO-ENTMCNC: 31.7 G/DL (ref 31.5–35.7)
MCV RBC AUTO: 97.1 FL (ref 79–97)
MONOCYTES # BLD AUTO: 0.85 10*3/MM3 (ref 0.1–0.9)
MONOCYTES NFR BLD AUTO: 14.6 % (ref 5–12)
NEUTROPHILS # BLD AUTO: 3.65 10*3/MM3 (ref 1.7–7)
NEUTROPHILS NFR BLD AUTO: 62.9 % (ref 42.7–76)
NRBC BLD AUTO-RTO: 0 /100 WBC (ref 0–0.2)
PLATELET # BLD AUTO: 252 10*3/MM3 (ref 140–450)
PMV BLD AUTO: 10.1 FL (ref 6–12)
POTASSIUM BLD-SCNC: 3 MMOL/L (ref 3.5–5.2)
POTASSIUM BLD-SCNC: 3.9 MMOL/L (ref 3.5–5.2)
POTASSIUM BLD-SCNC: 4.1 MMOL/L (ref 3.5–5.2)
RBC # BLD AUTO: 2.76 10*6/MM3 (ref 3.77–5.28)
RH BLD: POSITIVE
RH BLD: POSITIVE
SODIUM BLD-SCNC: 138 MMOL/L (ref 136–145)
SODIUM BLD-SCNC: 140 MMOL/L (ref 136–145)
T&S EXPIRATION DATE: NORMAL
WBC NRBC COR # BLD: 5.81 10*3/MM3 (ref 3.4–10.8)

## 2020-06-21 PROCEDURE — 84132 ASSAY OF SERUM POTASSIUM: CPT | Performed by: INTERNAL MEDICINE

## 2020-06-21 PROCEDURE — 99232 SBSQ HOSP IP/OBS MODERATE 35: CPT | Performed by: INTERNAL MEDICINE

## 2020-06-21 PROCEDURE — 63710000001 INSULIN LISPRO (HUMAN) PER 5 UNITS: Performed by: PHYSICIAN ASSISTANT

## 2020-06-21 PROCEDURE — 86850 RBC ANTIBODY SCREEN: CPT | Performed by: INTERNAL MEDICINE

## 2020-06-21 PROCEDURE — 25010000002 VANCOMYCIN 10 G RECONSTITUTED SOLUTION

## 2020-06-21 PROCEDURE — 82962 GLUCOSE BLOOD TEST: CPT

## 2020-06-21 PROCEDURE — 25010000002 PIPERACILLIN SOD-TAZOBACTAM PER 1 G: Performed by: PHYSICIAN ASSISTANT

## 2020-06-21 PROCEDURE — 86900 BLOOD TYPING SEROLOGIC ABO: CPT | Performed by: INTERNAL MEDICINE

## 2020-06-21 PROCEDURE — 86901 BLOOD TYPING SEROLOGIC RH(D): CPT | Performed by: INTERNAL MEDICINE

## 2020-06-21 PROCEDURE — 80048 BASIC METABOLIC PNL TOTAL CA: CPT | Performed by: INTERNAL MEDICINE

## 2020-06-21 PROCEDURE — 85025 COMPLETE CBC W/AUTO DIFF WBC: CPT | Performed by: INTERNAL MEDICINE

## 2020-06-21 RX ADMIN — TAZOBACTAM SODIUM AND PIPERACILLIN SODIUM 3.38 G: 375; 3 INJECTION, SOLUTION INTRAVENOUS at 13:23

## 2020-06-21 RX ADMIN — SODIUM CHLORIDE 100 ML/HR: 9 INJECTION, SOLUTION INTRAVENOUS at 04:19

## 2020-06-21 RX ADMIN — OXYCODONE 5 MG: 5 TABLET ORAL at 15:48

## 2020-06-21 RX ADMIN — VANCOMYCIN HYDROCHLORIDE 1250 MG: 10 INJECTION, POWDER, LYOPHILIZED, FOR SOLUTION INTRAVENOUS at 05:11

## 2020-06-21 RX ADMIN — INSULIN LISPRO 2 UNITS: 100 INJECTION, SOLUTION INTRAVENOUS; SUBCUTANEOUS at 17:23

## 2020-06-21 RX ADMIN — MODAFINIL 200 MG: 100 TABLET ORAL at 08:36

## 2020-06-21 RX ADMIN — FERROUS SULFATE TAB 325 MG (65 MG ELEMENTAL FE) 325 MG: 325 (65 FE) TAB at 08:36

## 2020-06-21 RX ADMIN — CETIRIZINE HYDROCHLORIDE 10 MG: 10 TABLET, FILM COATED ORAL at 08:36

## 2020-06-21 RX ADMIN — TAZOBACTAM SODIUM AND PIPERACILLIN SODIUM 3.38 G: 375; 3 INJECTION, SOLUTION INTRAVENOUS at 20:45

## 2020-06-21 RX ADMIN — TAZOBACTAM SODIUM AND PIPERACILLIN SODIUM 3.38 G: 375; 3 INJECTION, SOLUTION INTRAVENOUS at 06:35

## 2020-06-21 RX ADMIN — MELATONIN TAB 5 MG 5 MG: 5 TAB at 20:45

## 2020-06-21 RX ADMIN — Medication 1 CAPSULE: at 08:36

## 2020-06-21 RX ADMIN — INSULIN LISPRO 3 UNITS: 100 INJECTION, SOLUTION INTRAVENOUS; SUBCUTANEOUS at 12:05

## 2020-06-21 RX ADMIN — VANCOMYCIN HYDROCHLORIDE 1250 MG: 10 INJECTION, POWDER, LYOPHILIZED, FOR SOLUTION INTRAVENOUS at 17:47

## 2020-06-21 RX ADMIN — VENLAFAXINE HYDROCHLORIDE 75 MG: 75 CAPSULE, EXTENDED RELEASE ORAL at 08:36

## 2020-06-21 RX ADMIN — SODIUM CHLORIDE, PRESERVATIVE FREE 10 ML: 5 INJECTION INTRAVENOUS at 20:46

## 2020-06-21 NOTE — PROGRESS NOTES
"General Surgery Post op Follow Up Note    Subjective:   Feeling better overall    /74 (BP Location: Left arm, Patient Position: Lying)   Pulse 97   Temp 98.4 °F (36.9 °C) (Oral)   Resp 16   Ht 157.5 cm (62\")   Wt 79.4 kg (175 lb)   SpO2 94%   BMI 32.01 kg/m²     General Appearance:  in no acute distress  Chest: incision is clean and dry, x2.  Axillary incision clean.  JPs serosanguineous.    CBC  Results from last 7 days   Lab Units 06/20/20 0618   WBC 10*3/mm3 8.61   HEMOGLOBIN g/dL 8.5*   HEMATOCRIT % 27.0*   PLATELETS 10*3/mm3 217       CMP/BMP  Results from last 7 days   Lab Units 06/21/20 0606 06/19/20 2007   SODIUM mmol/L 140   < > 135*   POTASSIUM mmol/L 3.0*   < > 3.7   CHLORIDE mmol/L 106   < > 92*   CO2 mmol/L 24.0   < > 28.0   BUN mg/dL 8   < > 13   CREATININE mg/dL 0.53*   < > 1.15*   CALCIUM mg/dL 6.9*   < > 9.4   BILIRUBIN mg/dL  --   --  0.4   ALK PHOS U/L  --   --  62   ALT (SGPT) U/L  --   --  26   AST (SGOT) U/L  --   --  24   GLUCOSE mg/dL 113*   < > 199*    < > = values in this interval not displayed.         ASSESSMENT/PLAN:    UTI.  On antibiotics.  Urine culture greater than 100,000 mixed erika.  The surgical sites appear well.    May discharge from surgical perspective.  She has a follow-up on Tuesday with Dr. Perez.    Capo Mcghee MD  6/21/2020  11:28  "

## 2020-06-21 NOTE — PLAN OF CARE
Patient alert and oriented x 4. Ambulating in room, VSS afebrile. Incisions from bilateral mastectomy c/d/I, bettina drains to bulb suction. Voiding wnl. Received one dose of pain medication this afternoon.

## 2020-06-21 NOTE — PROGRESS NOTES
[facility] Medicine Services  PROGRESS NOTE    Patient Name: Kenya Lemon  : 1965  MRN: [id number]    Date of Admission: 2020  Primary Care Physician: Ina Kwok DO    Subjective   Subjective     CC:  Sepsis/UTI    HPI:  Feeling improved.  Denies bleeding. Has not had a BM since admission  No other complaints, is feeling better    Review of Systems  Gen- No fevers, chills  CV- No chest pain, palpitations  Resp- No cough, dyspnea  GI- No N/V/D, abd pain        Objective   Objective     Vital Signs:   Temp:  [98 °F (36.7 °C)-98.5 °F (36.9 °C)] 98.4 °F (36.9 °C)  Heart Rate:  [86-97] 97  Resp:  [16-18] 16  BP: (120-139)/(71-82) 139/74        Physical Exam:  GEN- no acute distress noted, resting in chair, sitting up, bald  HEENT- atraumatic, normocephlic, eomi  Chest: MANJEET drains in place, surgical sites well healing from mastectomies  NECK- supple, trachea midline, no masses  RESP: ctab, normal effort  CV: no murmurs, s1/s2, rrr  MSK: no edema noted, spontaneous movement of all extremities  NEURO: alert, oriented, no focal deficits  SKIN: no rashes  PSYCH: appropriate mood and affect       Results Reviewed:  Results from last 7 days   Lab Units 20  0618 20  0033 20   WBC 10*3/mm3 8.61  --  12.48*   HEMOGLOBIN g/dL 8.5*  --  9.7*   HEMATOCRIT % 27.0*  --  30.5*   PLATELETS 10*3/mm3 217  --  281   PROCALCITONIN ng/mL  --  1.20*  --      Results from last 7 days   Lab Units 20  0606 20  0106 20  0618 20   SODIUM mmol/L 140  --  136 135*   POTASSIUM mmol/L 3.0* 3.9 3.0* 3.7   CHLORIDE mmol/L 106  --  97* 92*   CO2 mmol/L 24.0  --  29.0 28.0   BUN mg/dL 8  --  16 13   CREATININE mg/dL 0.53*  --  1.29* 1.15*   GLUCOSE mg/dL 113*  --  160* 199*   CALCIUM mg/dL 6.9*  --  8.6 9.4   ALT (SGPT) U/L  --   --   --  26   AST (SGOT) U/L  --   --   --  24     Estimated Creatinine Clearance: 118.4 mL/min (A) (by C-G formula  based on SCr of 0.53 mg/dL (L)).    Microbiology Results Abnormal     Procedure Component Value - Date/Time    Urine Culture - Urine, Urine, Clean Catch [[id number]] Collected:  06/19/20 2321    Lab Status:  Final result Specimen:  Urine, Clean Catch Updated:  06/21/20 0954     Urine Culture >100,000 CFU/mL Mixed Erika Isolated    Narrative:       Specimen contains mixed organisms of questionable pathogenicity which indicates contamination with commensal erika.  Further identification is unlikely to provide clinically useful information.  Suggest recollection.    Blood Culture - Blood, Arm, Left [[id number]] Collected:  06/19/20 2300    Lab Status:  Preliminary result Specimen:  Blood from Arm, Left Updated:  06/21/20 0316     Blood Culture No growth at 24 hours    Blood Culture - Blood, Arm, Right [[id number]] Collected:  06/19/20 2304    Lab Status:  Preliminary result Specimen:  Blood from Arm, Right Updated:  06/21/20 0316     Blood Culture No growth at 24 hours    COVID-19,CEPHEID,MANUEL IN-HOUSE(OR EMERGENT/ADD-ON),NP SWAB IN TRANSPORT MEDIA 3-4 HR TAT - Swab, Nasopharynx [[id number]]  (Normal) Collected:  06/20/20 0146    Lab Status:  Final result Specimen:  Swab from Nasopharynx Updated:  06/20/20 0312     COVID19 Not Detected          Imaging Results (Last 24 Hours)     ** No results found for the last 24 hours. **          Results for orders placed during the hospital encounter of 03/19/20   Adult Transthoracic Echo Complete W/ Cont if Necessary Per Protocol    Addendum · Left ventricular systolic function is normal. Estimated EF = 60%. · The global Longitudinal LV strain = -20.7%. · Estimated EF = 60%.        Sander Jeffers MD 3/19/2020 12:01 PM          Narrative · Left ventricular systolic function is normal. Estimated EF = 60%.  · The global Longitudinal LV strain = -20.7%.          I have reviewed the medications:  Scheduled Meds:  [START ON 6/22/2020] Pharmacy Consult  Does not apply Once   cetirizine 10 mg  Oral Daily   ferrous sulfate 325 mg Oral Daily With Breakfast   insulin lispro 0-7 Units Subcutaneous TID AC   lactobacillus acidophilus 1 capsule Oral Daily   modafinil 200 mg Oral Daily   piperacillin-tazobactam 3.375 g Intravenous Q8H   sodium chloride 10 mL Intravenous Q12H   vancomycin 15 mg/kg Intravenous Q12H   venlafaxine XR 75 mg Oral Daily     Continuous Infusions:  Pharmacy to dose vancomycin     sodium chloride 100 mL/hr Last Rate: 100 mL/hr (06/21/20 7085)     PRN Meds:.acetaminophen  •  dextrose  •  dextrose  •  glucagon (human recombinant)  •  melatonin  •  ondansetron  •  oxyCODONE  •  Pharmacy to dose vancomycin  •  potassium chloride **OR** potassium chloride **OR** potassium chloride  •  sodium chloride    Assessment/Plan   Assessment & Plan     Active Hospital Problems    Diagnosis  POA   • **Sepsis secondary to UTI (CMS/[facility]) [A41.9, N39.0]  Yes   • Acute UTI (urinary tract infection) [N39.0]  Yes   • Acute pyelonephritis [N10]  Unknown   • DM2 (diabetes mellitus, type 2) (CMS/[facility]) [E11.9]  Yes   • MARY (acute kidney injury) (CMS/[facility]) [N17.9]  Yes   • Anemia [D64.9]  Yes   • Anxiety and depression [F41.9, F32.9]  Yes   • Essential hypertension [I10]  Yes   • Malignant neoplasm of upper outer quadrant of breast in female, estrogen receptor negative (CMS/[facility]) [C50.419, Z17.1]  Not Applicable      Resolved Hospital Problems   No resolved problems to display.        Brief Hospital Course to date:  Kenya Lemon is a 54 year old female with breast cancer undergoing neoadjuvant therapy with Dr. Juarez 4 days s/p bilateral mastectomies with huan biopsy. Presents with 2 days of intermittent fever. Found to be septic likely secondary to UTI and pyelonephritis. Administered vancomycin and and zosyn.         Sepsis, source UTI/pyelonpephritis  ---continue broad spectrum abx- vanc/zosyn at this time  ---continue IVF, supportive care  ---follow all cultures, covid noted negative   ---urine culture with  mixed erika- continue broad spectrum for now but will consider de-escalation as soon as tomorrow if continues to improve     MARY  ---continue IVF, monitor    Anemia  ---noted drop in H/H since admission-- labs this morning pending  ---no change in drainage from MANJEET site and no evidence of bleeding per patient  ---monitor closely, transfuse <7    Hypokalemia  ---replace per protocol     Breast cancer currently undergoing chemotherapy (Dr Juarez) and recent b/l mastectomies and right senitnel node biopsy (Dr Perez)  ---MANJEET drains remain in place  ---general surgery consulted, appreciate recs     T2DM  ---normally on metformin at home-- d/w patient and discussed rationale for continuing insulin in hospital and holding home orals-- will consider adding back metformin if kidney function improves, for now, continue SSI     HTN  ---holding home medications at this time     DVT Prophylaxis:  On hold given anemia and possible bleedin      Disposition: I expect the patient to be discharged TBD.    CODE STATUS:   Code Status and Medical Interventions:   Ordered at: 06/19/20 9646     Level Of Support Discussed With:    Patient     Code Status:    CPR     Medical Interventions (Level of Support Prior to Arrest):    Full         Electronically signed by Shea Le MD, 06/21/20, 11:27.

## 2020-06-21 NOTE — PLAN OF CARE
Problem: Patient Care Overview  Goal: Plan of Care Review  Flowsheets (Taken 6/21/2020 0441)  Consent Given to Review Plan with: pt VSS, A&O x 4, RA, no complaint of pain, melatonin PRN for sleep - rested well, Bilateral mastectomy & MANJEET drains - minimal drainage, voiding adequtely, chris continue to monitor.  Plan of Care Reviewed With: patient  Patient Agreement with Plan of Care: agrees     Problem: Fall Risk (Adult)  Goal: Identify Related Risk Factors and Signs and Symptoms  Flowsheets (Taken 6/21/2020 0441)  Related Risk Factors (Fall Risk): fatigue/slow reaction; gait/mobility problems  Signs and Symptoms (Fall Risk): presence of risk factors

## 2020-06-22 VITALS
WEIGHT: 175 LBS | RESPIRATION RATE: 14 BRPM | SYSTOLIC BLOOD PRESSURE: 140 MMHG | HEART RATE: 83 BPM | TEMPERATURE: 98.4 F | OXYGEN SATURATION: 97 % | BODY MASS INDEX: 32.2 KG/M2 | HEIGHT: 62 IN | DIASTOLIC BLOOD PRESSURE: 80 MMHG

## 2020-06-22 PROBLEM — N17.9 AKI (ACUTE KIDNEY INJURY) (HCC): Status: RESOLVED | Noted: 2020-06-19 | Resolved: 2020-06-22

## 2020-06-22 PROBLEM — N10 ACUTE PYELONEPHRITIS: Status: RESOLVED | Noted: 2020-06-20 | Resolved: 2020-06-22

## 2020-06-22 PROBLEM — N39.0 SEPSIS SECONDARY TO UTI: Status: RESOLVED | Noted: 2020-06-19 | Resolved: 2020-06-22

## 2020-06-22 PROBLEM — A41.9 SEPSIS SECONDARY TO UTI (HCC): Status: RESOLVED | Noted: 2020-06-19 | Resolved: 2020-06-22

## 2020-06-22 LAB
ANION GAP SERPL CALCULATED.3IONS-SCNC: 15 MMOL/L (ref 5–15)
BASOPHILS # BLD AUTO: 0.06 10*3/MM3 (ref 0–0.2)
BASOPHILS NFR BLD AUTO: 0.9 % (ref 0–1.5)
BUN BLD-MCNC: 10 MG/DL (ref 6–20)
BUN/CREAT SERPL: 13.2 (ref 7–25)
CALCIUM SPEC-SCNC: 9.1 MG/DL (ref 8.6–10.5)
CHLORIDE SERPL-SCNC: 97 MMOL/L (ref 98–107)
CO2 SERPL-SCNC: 25 MMOL/L (ref 22–29)
CREAT BLD-MCNC: 0.76 MG/DL (ref 0.57–1)
DEPRECATED RDW RBC AUTO: 52 FL (ref 37–54)
EOSINOPHIL # BLD AUTO: 0.28 10*3/MM3 (ref 0–0.4)
EOSINOPHIL NFR BLD AUTO: 4 % (ref 0.3–6.2)
ERYTHROCYTE [DISTWIDTH] IN BLOOD BY AUTOMATED COUNT: 14.4 % (ref 12.3–15.4)
GFR SERPL CREATININE-BSD FRML MDRD: 79 ML/MIN/1.73
GLUCOSE BLD-MCNC: 151 MG/DL (ref 65–99)
GLUCOSE BLDC GLUCOMTR-MCNC: 149 MG/DL (ref 70–130)
GLUCOSE BLDC GLUCOMTR-MCNC: 225 MG/DL (ref 70–130)
HCT VFR BLD AUTO: 28.1 % (ref 34–46.6)
HGB BLD-MCNC: 8.8 G/DL (ref 12–15.9)
IMM GRANULOCYTES # BLD AUTO: 0.03 10*3/MM3 (ref 0–0.05)
IMM GRANULOCYTES NFR BLD AUTO: 0.4 % (ref 0–0.5)
LYMPHOCYTES # BLD AUTO: 1.35 10*3/MM3 (ref 0.7–3.1)
LYMPHOCYTES NFR BLD AUTO: 19.3 % (ref 19.6–45.3)
MCH RBC QN AUTO: 30.9 PG (ref 26.6–33)
MCHC RBC AUTO-ENTMCNC: 31.3 G/DL (ref 31.5–35.7)
MCV RBC AUTO: 98.6 FL (ref 79–97)
MONOCYTES # BLD AUTO: 0.91 10*3/MM3 (ref 0.1–0.9)
MONOCYTES NFR BLD AUTO: 13 % (ref 5–12)
NEUTROPHILS # BLD AUTO: 4.35 10*3/MM3 (ref 1.7–7)
NEUTROPHILS NFR BLD AUTO: 62.4 % (ref 42.7–76)
NRBC BLD AUTO-RTO: 0 /100 WBC (ref 0–0.2)
PLATELET # BLD AUTO: 280 10*3/MM3 (ref 140–450)
PMV BLD AUTO: 10.1 FL (ref 6–12)
POTASSIUM BLD-SCNC: 4.4 MMOL/L (ref 3.5–5.2)
RBC # BLD AUTO: 2.85 10*6/MM3 (ref 3.77–5.28)
SODIUM BLD-SCNC: 137 MMOL/L (ref 136–145)
VANCOMYCIN TROUGH SERPL-MCNC: 15.4 MCG/ML (ref 5–20)
WBC NRBC COR # BLD: 6.98 10*3/MM3 (ref 3.4–10.8)

## 2020-06-22 PROCEDURE — 80048 BASIC METABOLIC PNL TOTAL CA: CPT | Performed by: INTERNAL MEDICINE

## 2020-06-22 PROCEDURE — 85025 COMPLETE CBC W/AUTO DIFF WBC: CPT | Performed by: INTERNAL MEDICINE

## 2020-06-22 PROCEDURE — 82962 GLUCOSE BLOOD TEST: CPT

## 2020-06-22 PROCEDURE — 80202 ASSAY OF VANCOMYCIN: CPT

## 2020-06-22 PROCEDURE — 99239 HOSP IP/OBS DSCHRG MGMT >30: CPT | Performed by: PHYSICIAN ASSISTANT

## 2020-06-22 PROCEDURE — 63710000001 INSULIN LISPRO (HUMAN) PER 5 UNITS: Performed by: PHYSICIAN ASSISTANT

## 2020-06-22 PROCEDURE — 25010000002 PIPERACILLIN SOD-TAZOBACTAM PER 1 G: Performed by: PHYSICIAN ASSISTANT

## 2020-06-22 RX ORDER — ACETAMINOPHEN 325 MG/1
650 TABLET ORAL EVERY 4 HOURS PRN
Start: 2020-06-22 | End: 2020-08-07

## 2020-06-22 RX ORDER — CHOLECALCIFEROL (VITAMIN D3) 125 MCG
5 CAPSULE ORAL NIGHTLY PRN
Start: 2020-06-22

## 2020-06-22 RX ORDER — AMOXICILLIN AND CLAVULANATE POTASSIUM 875; 125 MG/1; MG/1
1 TABLET, FILM COATED ORAL EVERY 12 HOURS SCHEDULED
Qty: 8 TABLET | Refills: 0 | Status: SHIPPED | OUTPATIENT
Start: 2020-06-22 | End: 2020-06-22 | Stop reason: HOSPADM

## 2020-06-22 RX ORDER — AMOXICILLIN AND CLAVULANATE POTASSIUM 875; 125 MG/1; MG/1
1 TABLET, FILM COATED ORAL EVERY 12 HOURS SCHEDULED
Status: DISCONTINUED | OUTPATIENT
Start: 2020-06-22 | End: 2020-06-22 | Stop reason: HOSPADM

## 2020-06-22 RX ORDER — FERROUS SULFATE 325(65) MG
325 TABLET ORAL
Qty: 30 TABLET | Refills: 0 | Status: SHIPPED | OUTPATIENT
Start: 2020-06-23 | End: 2020-07-23

## 2020-06-22 RX ORDER — AMOXICILLIN AND CLAVULANATE POTASSIUM 875; 125 MG/1; MG/1
1 TABLET, FILM COATED ORAL 2 TIMES DAILY
Qty: 14 TABLET | Refills: 0 | Status: SHIPPED | OUTPATIENT
Start: 2020-06-22 | End: 2020-06-29

## 2020-06-22 RX ORDER — L.ACID,PARA/B.BIFIDUM/S.THERM 8B CELL
1 CAPSULE ORAL DAILY
Qty: 21 CAPSULE | Refills: 0 | Status: SHIPPED | OUTPATIENT
Start: 2020-06-23 | End: 2020-07-14

## 2020-06-22 RX ORDER — ACYCLOVIR 50 MG/G
OINTMENT TOPICAL
Qty: 15 G | Refills: 2 | Status: SHIPPED | OUTPATIENT
Start: 2020-06-22 | End: 2021-06-08

## 2020-06-22 RX ADMIN — TAZOBACTAM SODIUM AND PIPERACILLIN SODIUM 3.38 G: 375; 3 INJECTION, SOLUTION INTRAVENOUS at 06:09

## 2020-06-22 RX ADMIN — SODIUM CHLORIDE 100 ML/HR: 9 INJECTION, SOLUTION INTRAVENOUS at 06:09

## 2020-06-22 RX ADMIN — INSULIN LISPRO 3 UNITS: 100 INJECTION, SOLUTION INTRAVENOUS; SUBCUTANEOUS at 12:11

## 2020-06-22 RX ADMIN — Medication 1 CAPSULE: at 09:06

## 2020-06-22 RX ADMIN — FERROUS SULFATE TAB 325 MG (65 MG ELEMENTAL FE) 325 MG: 325 (65 FE) TAB at 09:06

## 2020-06-22 RX ADMIN — Medication: at 06:09

## 2020-06-22 RX ADMIN — VENLAFAXINE HYDROCHLORIDE 75 MG: 75 CAPSULE, EXTENDED RELEASE ORAL at 09:06

## 2020-06-22 RX ADMIN — MODAFINIL 200 MG: 100 TABLET ORAL at 09:06

## 2020-06-22 RX ADMIN — CETIRIZINE HYDROCHLORIDE 10 MG: 10 TABLET, FILM COATED ORAL at 09:06

## 2020-06-22 NOTE — DISCHARGE SUMMARY
[facility] Medicine Services  DISCHARGE SUMMARY    Patient Name: Kenya Lemon  : 1965  MRN: [id number]    Date of Admission: 2020 10:05 PM  Date of Discharge:  2020  Primary Care Physician: Ina Kwok DO    Consults     Date and Time Order Name Status Description    2020 0510 Inpatient General Surgery Consult Completed           Hospital Course     Presenting Problem:   Acute febrile illness [R50.9]    Active Hospital Problems    Diagnosis  POA   • Acute UTI (urinary tract infection) [N39.0]  Yes   • DM2 (diabetes mellitus, type 2) (CMS/HCC) [E11.9]  Yes   • Anemia [D64.9]  Yes   • Anxiety and depression [F41.9, F32.9]  Yes   • Essential hypertension [I10]  Yes   • Malignant neoplasm of upper outer quadrant of breast in female, estrogen receptor negative (CMS/HCC) [C50.419, Z17.1]  Not Applicable      Resolved Hospital Problems    Diagnosis Date Resolved POA   • **Sepsis secondary to UTI (CMS/HCC) [A41.9, N39.0] 2020 Yes   • Acute pyelonephritis [N10] 2020 Yes   • MARY (acute kidney injury) (CMS/HCC) [N17.9] 2020 Yes          Hospital Course:  Kenya Lemon is a 54 y.o. female with a past medical history significant for hypertension, DM2, HLD, SALOME, and breast cancer status post bilateral mastectomy on 2020 who was admitted on 2020 due to fever.  The patient reported having a fever at home above 100.5.  She also endorsed dark urine, urinary frequency and dysuria.  On arrival to the ED she was febrile to 99.7, hypotensive 108/63.  Her initial lactic acid was elevated at 2.6 with a white blood cell count of 12.4.  Her UA was positive with 4+ bacteria and too numerous to count WBC.  Her initial procalcitonin was 1.2, CRP 27 and sed rate 54.  Her surgical site and CT chest showed stable postsurgical changes.  She was started on empiric antibiotics, Zosyn and vancomycin.  Her MRSA screen was negative and vancomycin  discontinued.  She was tested for COVID-19 which was negative.  She was found to have iron deficiency anemia and started on ferrous sulfate.  On admission her hemoglobin was 9.7 from 11.2 (6/14).  On admission she also had a mild MARY with initial creatinine of 1.15.  This resolved after hydration and holding some of her home medications.  Her urine culture resulted with mixed erika, however due to her initial presentation and concerns for infection, the decision was made to continue the patient on Augmentin twice daily for 7 day at discharge.  Her blood cultures showed no growth to date.  Her lactic acid eventually normalized.  Surgery was consulted as she is recently postoperative from a bilateral mastectomy, they did not recommend any further intervention and from their standpoint could be discharged.  On June 21 the patient developed a herpes labialis/cold sore on her lip.  She states this is happened in the past and this is not the initial episode.  She states the pain has subsided and her lip.  She was given acyclovir ointment to use and instructed how to use it.    Discharge Follow Up Recommendations for outpatient labs/diagnostics:  Recommend follow-up with Dr. Perez tomorrow.  I anticipate her MANJEET drain will be removed at this appointment.    Follow-up with oncology.  I recommend basic lab work in 2 weeks to check renal function and electrolytes.  Day of Discharge     HPI:   Ms. Dobbins reports feeling well today.  She denies any issues overnight.  She further denies any fever, chills, shortness of breath, chest pain, abdominal pain, nausea, vomiting.  She reports her drain output has been minimal.  She is eager for discharge.    Review of Systems  Gen- No fevers, chills  CV- No chest pain, palpitations  Resp- No cough, dyspnea  GI- No N/V/D, abd pain      Vital Signs:   Temp:  [98.4 °F (36.9 °C)-99.5 °F (37.5 °C)] 98.4 °F (36.9 °C)  Heart Rate:  [83-97] 83  Resp:  [14-18] 14  BP: (129-145)/(69-80) 140/80      Physical Exam:  Constitutional: No acute distress, awake, alert  HENT: NCAT, mucous membranes moist  Respiratory: Clear to auscultation bilaterally, respiratory effort normal   Cardiovascular: RRR, no murmurs, rubs, or gallops  Gastrointestinal: Positive bowel sounds, soft, nontender, nondistended  Psychiatric: Appropriate affect, cooperative  Neurologic: Oriented x 3, Cranial Nerves grossly intact to confrontation, speech clear  Serosanguineous output from MANJEET drain.      Pertinent  and/or Most Recent Results     Results from last 7 days   Lab Units 06/22/20  0533 06/21/20  1224 06/21/20  0606 06/21/20  0106 06/20/20  0618 06/19/20 2007   WBC 10*3/mm3 6.98 5.81  --   --  8.61 12.48*   HEMOGLOBIN g/dL 8.8* 8.5*  --   --  8.5* 9.7*   HEMATOCRIT % 28.1* 26.8*  --   --  27.0* 30.5*   PLATELETS 10*3/mm3 280 252  --   --  217 281   SODIUM mmol/L 137 138 140  --  136 135*   POTASSIUM mmol/L 4.4 4.1 3.0* 3.9 3.0* 3.7   CHLORIDE mmol/L 97* 95* 106  --  97* 92*   CO2 mmol/L 25.0 30.0* 24.0  --  29.0 28.0   BUN mg/dL 10 10 8  --  16 13   CREATININE mg/dL 0.76 0.76 0.53*  --  1.29* 1.15*   GLUCOSE mg/dL 151* 168* 113*  --  160* 199*   CALCIUM mg/dL 9.1 9.2 6.9*  --  8.6 9.4     Results from last 7 days   Lab Units 06/19/20 2007   BILIRUBIN mg/dL 0.4   ALK PHOS U/L 62   ALT (SGPT) U/L 26   AST (SGOT) U/L 24           Invalid input(s): TG, LDLCALC, LDLREALC  Results from last 7 days   Lab Units 06/20/20  0051 06/20/20  0033 06/19/20 2007   PROCALCITONIN ng/mL  --  1.20*  --    LACTATE mmol/L 2.0  --  2.6*       Brief Urine Lab Results  (Last result in the past 365 days)      Color   Clarity   Blood   Leuk Est   Nitrite   Protein   CREAT   Urine HCG        06/19/20 2321 Dark Yellow Cloudy Negative Large (3+) Negative 30 mg/dL (1+)               Microbiology Results Abnormal     Procedure Component Value - Date/Time    Blood Culture - Blood, Arm, Left [[id number]] Collected:  06/19/20 2300    Lab Status:  Preliminary result  Specimen:  Blood from Arm, Left Updated:  06/22/20 0316     Blood Culture No growth at 2 days    Blood Culture - Blood, Arm, Right [[id number]] Collected:  06/19/20 2304    Lab Status:  Preliminary result Specimen:  Blood from Arm, Right Updated:  06/22/20 0315     Blood Culture No growth at 2 days    Urine Culture - Urine, Urine, Clean Catch [[id number]] Collected:  06/19/20 2321    Lab Status:  Final result Specimen:  Urine, Clean Catch Updated:  06/21/20 0954     Urine Culture >100,000 CFU/mL Mixed Erika Isolated    Narrative:       Specimen contains mixed organisms of questionable pathogenicity which indicates contamination with commensal erika.  Further identification is unlikely to provide clinically useful information.  Suggest recollection.    COVID-19,CEPHEID,MANUEL IN-HOUSE(OR EMERGENT/ADD-ON),NP SWAB IN TRANSPORT MEDIA 3-4 HR TAT - Swab, Nasopharynx [[id number]]  (Normal) Collected:  06/20/20 0146    Lab Status:  Final result Specimen:  Swab from Nasopharynx Updated:  06/20/20 0312     COVID19 Not Detected          Imaging Results (All)     Procedure Component Value Units Date/Time    CT Abdomen Pelvis Without Contrast [[id number]] Collected:  06/20/20 0126     Updated:  06/20/20 0128    Narrative:       CT Abdomen Pelvis WO    INDICATION:   54-year-old female with generalized abdominal pain and right flank pain today.    TECHNIQUE:   CT of the abdomen and pelvis without IV contrast. Coronal and sagittal reconstructions were obtained.  Radiation dose reduction techniques included automated exposure control or exposure modulation based on body size. Count of known CT and cardiac nuc  med studies performed in previous 12 months: 0.     COMPARISON:   None available.    FINDINGS:  Visualized lung bases are unremarkable.    Abdomen:   The liver is within normal limits. The spleen and pancreas are normal. Cholecystectomy. Both adrenal glands are normal. Nonobstructing right intrarenal calcification. Minimal stranding  in the bilateral perinephric fat, nonspecific and possibly chronic.  No hydronephrosis. Abdominal aorta normal in course and caliber. Small bowel is unremarkable without obstruction. Normal appendix. The colon is unremarkable. No free fluid or free air.    Pelvis:   The urinary bladder is unremarkable.  The uterus and adnexa are within normal limits. IUD. No free pelvic fluid.    No acute osseous abnormality.        Impression:         1. No acute abdominal or pelvic findings.  2. Nonobstructing right intrarenal calcification. Minimal stranding in the bilateral perinephric fat. This is nonspecific and possibly chronic. Correlation with urinalysis to exclude urinary tract infection.  3. Cholecystectomy.  4. Normal appendix.  5. IUD.          Signer Name: Jassi Diaz MD   Signed: 6/20/2020 1:26 AM   Workstation Name: [id number]    [facility]    CT Chest Without Contrast [[id number]] Collected:  06/19/20 2328     Updated:  06/19/20 2331    Narrative:       CT Chest WO    INDICATION:   54-year-old female with fever status post bilateral mastectomies performed 6/16/2020.    TECHNIQUE:   CT of the chest without IV contrast. Coronal and sagittal reformatted images. Radiation dose reduction techniques included automated exposure control or exposure modulation based on body size. Count of known CT and cardiac nuc med studies performed in  previous 12 months: 0.     COMPARISON:   None available.    FINDINGS:   Thoracic aorta normal in course and caliber. Heart size is normal. No pericardial effusion.    No pleural effusion. No pneumothorax. No focal pulmonary infiltrates.    Visualized upper abdomen is unremarkable.    No acute osseous abnormality. Postsurgical changes from recent bilateral mastectomies. Surgical drains in place. No abnormal soft tissue fluid collections. Surgical clips in the right axillary region.      Impression:         1. Stable postsurgical changes from recent bilateral  mastectomies. No abnormal soft tissue fluid collections.  2. No other acute chest findings.    Signer Name: Jassi Diaz MD   Signed: 6/19/2020 11:28 PM   Workstation Name: [id number]    [facility]                    Results for orders placed during the hospital encounter of 03/19/20   Adult Transthoracic Echo Complete W/ Cont if Necessary Per Protocol    Addendum · Left ventricular systolic function is normal. Estimated EF = 60%. · The global Longitudinal LV strain = -20.7%. · Estimated EF = 60%.        Sander Jeffers MD 3/19/2020 12:01 PM          Narrative · Left ventricular systolic function is normal. Estimated EF = 60%.  · The global Longitudinal LV strain = -20.7%.          Plan for Follow-up of Pending Labs/Results:    Order Current Status    Blood Culture - Blood, Arm, Left Preliminary result    Blood Culture - Blood, Arm, Right Preliminary result        Discharge Details        Discharge Medications      New Medications      Instructions Start Date   acetaminophen 325 MG tablet  Commonly known as:  TYLENOL   650 mg, Oral, Every 4 Hours PRN      acyclovir 5 % ointment  Commonly known as:  Zovirax   Apply ointment topically (using a finger cot or glove) every 3 hours (6 times per day) for cold sore outbreak.      amoxicillin-clavulanate 875-125 MG per tablet  Commonly known as:  Augmentin   1 tablet, Oral, 2 Times Daily      ferrous sulfate 325 (65 FE) MG tablet   325 mg, Oral, Daily With Breakfast   Start Date:  June 23, 2020     lactobacillus acidophilus capsule capsule   1 capsule, Oral, Daily   Start Date:  June 23, 2020     melatonin 5 MG tablet tablet   5 mg, Oral, Nightly PRN         Changes to Medications      Instructions Start Date   lidocaine-prilocaine 2.5-2.5 % cream  Commonly known as:  EMLA  What changed:  how much to take   Topical, As Needed         Continue These Medications      Instructions Start Date   atenolol 50 MG tablet  Commonly known as:  TENORMIN   50  mg, Oral, 2 times daily      dexamethasone 4 MG tablet  Commonly known as:  DECADRON   Take 2 tablets in the morning daily on Days 2, 3 & 4.  Take with food.      diphenoxylate-atropine 2.5-0.025 MG per tablet  Commonly known as:  Lomotil   1 tablet, Oral, Every 6 Hours PRN, 1 tablet       hydroCHLOROthiazide 25 MG tablet  Commonly known as:  HYDRODIURIL   25 mg, Oral, Daily      Loratadine 10 MG capsule   10 mg, Oral, Every Morning      metFORMIN 1000 MG tablet  Commonly known as:  GLUCOPHAGE   1,000 mg, Oral, 2 Times Daily      modafinil 200 MG tablet  Commonly known as:  PROVIGIL   200 mg, Oral, Daily      MULTIVITAMIN ADULT PO   1 dose, Oral, Every Morning      ondansetron 8 MG tablet  Commonly known as:  ZOFRAN   8 mg, Oral, 3 Times Daily PRN      oxyCODONE 5 MG immediate release tablet  Commonly known as:  ROXICODONE   5 mg, Oral, Every 6 Hours PRN      potassium chloride ER 20 MEQ tablet controlled-release ER tablet  Commonly known as:  K-TAB   20 mEq, Oral, Daily      venlafaxine XR 75 MG 24 hr capsule  Commonly known as:  EFFEXOR-XR   75 mg, Oral, Daily             No Known Allergies      Discharge Disposition:  Home or Self Care    Diet:  Hospital:  Diet Order   Procedures   • Diet Regular; Consistent Carbohydrate       Activity:  as tolerated     Restrictions or Other Recommendations:   Follow up as scheduled       CODE STATUS:    Code Status and Medical Interventions:   Ordered at: 06/19/20 9020     Level Of Support Discussed With:    Patient     Code Status:    CPR     Medical Interventions (Level of Support Prior to Arrest):    Full       No future appointments.    Additional Instructions for the Follow-ups that You Need to Schedule     Discharge Follow-up with Specified Provider: Follow up with Dr. Juarez.   As directed      To:  Follow up with Dr. Juarez.         Discharge Follow-up with Specified Provider: Follow up with surgery, Dr. Perez tomorrow.   As directed      To:  Follow up with surgery,   Chris tomorrow.                     Electronically signed by Anamaria Arora PA-C, 06/22/20, 11:25 AM.      Time Spent on Discharge:  I spent  35 minutes on this discharge activity which included: face-to-face encounter with the patient, reviewing the data in the system, coordination of the care with the nursing staff as well as consultants, documentation, and entering orders.

## 2020-06-22 NOTE — PROGRESS NOTES
"Pharmacy Consult-Vancomycin Dosing  Kenya Lemon is a  54 y.o. female receiving vancomycin therapy.     Indication: UTI  Consulting Provider: Hospitalist  ID Consult: No    Goal Trough: 15-20mcg/mL    Current Antimicrobial Therapy  Anti-Infectives (From admission, onward)      Ordered     Dose/Rate Route Frequency Start Stop    06/22/20 1130  amoxicillin-clavulanate (AUGMENTIN) 875-125 MG per tablet 1 tablet     Ordering Provider:  Shea Le MD    1 tablet Oral Every 12 Hours Scheduled 06/22/20 2100 06/26/20 2059 06/22/20 1125  amoxicillin-clavulanate (Augmentin) 875-125 MG per tablet     Ordering Provider:  Anamaria Arora PA-C    1 tablet Oral 2 Times Daily 06/22/20 0000 06/29/20 2359    06/20/20 0014  vancomycin 2000 mg/500 mL 0.9% NS IVPB ([facility])     Ordering Provider:  Matthew Hahn RPH    25 mg/kg × 79.4 kg  over 120 Minutes Intravenous Once 06/20/20 0016 06/20/20 0315    06/19/20 2353  piperacillin-tazobactam (ZOSYN) 3.375 g in iso-osmotic dextrose 50 ml (premix)     Ordering Provider:  Akira Fernandez PA-C    3.375 g  over 30 Minutes Intravenous Once 06/19/20 2355 06/20/20 0213            Allergies  Allergies as of 06/19/2020    (No Known Allergies)       Labs    Results from last 7 days   Lab Units 06/22/20  0533 06/21/20  1224 06/21/20  0606   BUN mg/dL 10 10 8   CREATININE mg/dL 0.76 0.76 0.53*       Results from last 7 days   Lab Units 06/22/20  0533 06/21/20  1224 06/20/20  0618   WBC 10*3/mm3 6.98 5.81 8.61       Evaluation of Dosing      Ht - 157.5 cm (62\")  Wt - 79.4 kg (175 lb)    Estimated Creatinine Clearance: 82.6 mL/min (by C-G formula based on SCr of 0.76 mg/dL).    Intake & Output (last 3 days)         06/19 0701 - 06/20 0700 06/20 0701 - 06/21 0700 06/21 0701 - 06/22 0700 06/22 0701 - 06/23 0700    P.O.   720     Total Intake(mL/kg)   720 (9.1)     Urine (mL/kg/hr) 0 0 (0)      Drains 50 45 5     Total Output 50 45 5     Net -50 -45 +715             Urine Unmeasured " Occurrence 1 x 5 x 1 x             Microbiology and Radiology  Microbiology Results (last 10 days)       Procedure Component Value - Date/Time    COVID-19,CEPHEID,MANUEL IN-HOUSE(OR EMERGENT/ADD-ON),NP SWAB IN TRANSPORT MEDIA 3-4 HR TAT - Swab, Nasopharynx [[id number]]  (Normal) Collected:  06/20/20 0146    Lab Status:  Final result Specimen:  Swab from Nasopharynx Updated:  06/20/20 0312     COVID19 Not Detected    Urine Culture - Urine, Urine, Clean Catch [[id number]] Collected:  06/19/20 2321    Lab Status:  Final result Specimen:  Urine, Clean Catch Updated:  06/21/20 0954     Urine Culture >100,000 CFU/mL Mixed Erika Isolated    Narrative:       Specimen contains mixed organisms of questionable pathogenicity which indicates contamination with commensal erika.  Further identification is unlikely to provide clinically useful information.  Suggest recollection.    Blood Culture - Blood, Arm, Right [[id number]] Collected:  06/19/20 2304    Lab Status:  Preliminary result Specimen:  Blood from Arm, Right Updated:  06/22/20 0315     Blood Culture No growth at 2 days    Blood Culture - Blood, Arm, Left [[id number]] Collected:  06/19/20 2300    Lab Status:  Preliminary result Specimen:  Blood from Arm, Left Updated:  06/22/20 0316     Blood Culture No growth at 2 days    COVID PRE-OP / PRE-PROCEDURE SCREENING ORDER (NO ISOLATION) - Swab, Nasopharynx [[id number]] Collected:  06/14/20 0741    Lab Status:  Final result Specimen:  Swab from Nasopharynx Updated:  06/15/20 1822    Narrative:       The following orders were created for panel order COVID PRE-OP / PRE-PROCEDURE SCREENING ORDER (NO ISOLATION) - Swab, Nasopharynx.  Procedure                               Abnormality         Status                     ---------                               -----------         ------                     COVID-19,[facility], NP ...[[id number]]                      Final result                 Please view results for these tests on the  individual orders.    COVID-19,[facility], NP SWAB IN LEXAR SALINE MEDIA 24-30 HR TAT - Swab, Nasopharynx [[id number]] Collected:  06/14/20 0741    Lab Status:  Final result Specimen:  Swab from Nasopharynx Updated:  06/15/20 1822     Reference Lab Report --     Comment: See scanned report          COVID19 Not Detected            Evaluation of Level                  Results from last 7 days   Lab Units 06/22/20  0533   VANCOMYCIN TR mcg/mL 15.40          Assessment/Plan:    Trough this morning at upper end of therapeutic range at 15.4mcg/mL. Discussed continuation with Dr. Le versus de-escalation to oral option for UTI coverage. Decision was made to change to Augmentin.    Thank you,    Ildefonso Weston PharmD, JEAN-PAUL  Pharmacy Resident  6/22/2020  11:54

## 2020-06-22 NOTE — PROGRESS NOTES
Discharge Planning Assessment  [facility]     Patient Name: Kenya Lemon  MRN: [id number]  Today's Date: 6/22/2020    Admit Date: 6/19/2020    Discharge Needs Assessment     Row Name 06/22/20 1031       Living Environment    Lives With  spouse    Current Living Arrangements  home/apartment/condo    Primary Care Provided by  self    Provides Primary Care For  no one    Family Caregiver if Needed  spouse    Quality of Family Relationships  involved;helpful    Able to Return to Prior Arrangements  yes       Transition Planning    Patient/Family Anticipates Transition to  home with family    Patient/Family Anticipated Services at Transition  none    Transportation Anticipated  family or friend will provide       Discharge Needs Assessment    Readmission Within the Last 30 Days  no previous admission in last 30 days    Concerns to be Addressed  discharge planning    Equipment Currently Used at Home  none    Anticipated Changes Related to Illness  none    Equipment Needed After Discharge  none        Discharge Plan     Row Name 06/22/20 1031       Plan    Plan  home with family    Patient/Family in Agreement with Plan  yes    Plan Comments  Pt lives in [place] with . Per pt she is independent with all ADLs and does not use any DME/HH. She does her chemo at [facility] and can stay with her adult daughter and son-in-law who live in [facility] for this. She is followed by her PCP and her medications are covered by insurance. At this time her plan for discharge is to return home. She denies any discharge needs at this time.    Final Discharge Disposition Code  01 - home or self-care        Destination      Coordination has not been started for this encounter.      Durable Medical Equipment      Coordination has not been started for this encounter.      Dialysis/Infusion      Coordination has not been started for this encounter.      Home Medical Care      Coordination has not been  started for this encounter.      Therapy      Coordination has not been started for this encounter.      Community Resources      Coordination has not been started for this encounter.          Demographic Summary     Row Name 06/22/20 1030       General Information    Admission Type  inpatient    Referral Source  physician    Reason for Consult  discharge planning    General Information Comments  PCP Ina Kwok       Contact Information    Permission Granted to Share Info With  family/designee    Contact Information Comments  Arelis Hilton  [phone]        Functional Status     Row Name 06/22/20 1030       Functional Status, IADL    Medications  independent    Meal Preparation  independent    Housekeeping  independent    Laundry  independent    Shopping  independent       Mental Status    General Appearance WDL  WDL       Mental Status Summary    Recent Changes in Mental Status/Cognitive Functioning  no changes        Psychosocial    No documentation.       Abuse/Neglect    No documentation.       Legal    No documentation.       Substance Abuse    No documentation.       Patient Forms    No documentation.           Lali Stack RN

## 2020-06-22 NOTE — PLAN OF CARE
Problem: Patient Care Overview  Goal: Plan of Care Review  Flowsheets (Taken 6/22/2020 0444)  Progress: improving  Plan of Care Reviewed With: patient  Outcome Summary: pt VSS, RA, A&O x 4, tele - NSR, 2 bilateral MANJEET drains to bulb suction, ambulating ad kasandra, voiding adequately, no complaint of pain, melatonin PRN for sleep, will continue to monitor.     Problem: Patient Care Overview  Goal: Plan of Care Review  Flowsheets (Taken 6/22/2020 0444)  Progress: --  Plan of Care Reviewed With: patient  Outcome Summary: --     Problem: Fall Risk (Adult)  Goal: Identify Related Risk Factors and Signs and Symptoms  Flowsheets (Taken 6/22/2020 0444)  Related Risk Factors (Fall Risk): environment unfamiliar  Signs and Symptoms (Fall Risk): presence of risk factors

## 2020-06-23 ENCOUNTER — READMISSION MANAGEMENT (OUTPATIENT)
Dept: CALL CENTER | Facility: HOSPITAL | Age: 55
End: 2020-06-23

## 2020-06-23 NOTE — PAYOR COMM NOTE
"Lidia Fuchs RN Utilization Review [phone]  Fax # [phone]  Ref # [id number]    Clinicals for reconsideration      Kenya Lemon (54 y.o. Female)     Date of Birth Social Security Number Address Home Phone MRN    1965  [address] [id number] [phone] [id number]    Church Marital Status          Scientologist        Admission Date Admission Type Admitting Provider Attending Provider Department, Room/Bed    20 Emergency Shea Le MD  [facility] 3H, S377/1    Discharge Date Discharge Disposition Discharge Destination        2020 Home or Self Care              Attending Provider:  (none)   Allergies:  No Known Allergies    Isolation:  None   Infection:  None   Code Status:  Prior    Ht:  157.5 cm (62\")   Wt:  79.4 kg (175 lb)    Admission Cmt:  None   Principal Problem:  Sepsis secondary to UTI (CMS/[facility]) [A41.9,N39.0]                 Active Insurance as of 2020     Primary Coverage     Payor Plan Insurance Group Employer/Plan Group    [employer] EM[employer] [id number]     Payor Plan Address Payor Plan Phone Number Payor Plan Fax Number Effective Dates    [address] [phone]  2018 - None Entered    [place] [address]       Subscriber Name Subscriber Birth Date Member ID       KENYA LEMON 1965 [id number]                 Emergency Contacts      (Rel.) Home Phone Work Phone Mobile Phone    DORA WALKER (Daughter) -- -- [phone]    PAIGETRINITYBETTY (Daughter) -- -- [phone]               History & Physical      Flora Pretty DO at 20 [address] [place] [address] Medicine Services  HISTORY AND PHYSICAL    Patient Name: Kenya Lemon  : 1965  MRN: [id number]  Primary Care Physician: Ina Kwok DO  Date of admission: 2020      Subjective   Subjective     Chief " Complaint:  fever    HPI:  Kenya Lemon is a 54 y.o. female with a past medical history significant for hypertension, DM2, HLD, SALOME, and breast cancer status post bilateral mastectomy on 6/16/2020 who presents to the ED due to fever.  Patient states that yesterday she began to have a low-grade fever and malaise.  She spoke with Dr. Juarez who informed her that she needed to come to the ED for a temperature above 100.5.  Today patient had fever at home of 102.3 prompting her to come to the ED for further evaluation.  She reports dark urine, denies frequency or dysuria.  Patient denies cough, wheeze, shortness of breath, vomiting, diarrhea, change in taste or smell, known exposure to COVID-19, painful or swollen joints, body rash, redness or swelling to incision sites or port.  Patient is followed by Dr. Juarez per oncology with whom she is undergoing neoadjuvant therapy. On 6/16/20, she underwent bilateral mastectomies and right sentinel node biopsy per Dr. Perez. Subsequent drains placed. Patient now presents with 2 days of intermittent fevers.     Emergency Department Evaluation; febrile to 99.7. Hypotensive to 108/63. Vitals otherwise stable. Glucose 199. Creatinine 1.15. Lactate 2.6. WBC 12.4. H/H 9.7 and 30.5 respectively. UA positive for acute infection with 4+ bacteria and WBC too numerous to count. CT chest shows stable postsurgical changes.    Review of Systems     All other systems reviewed and are negative.     Personal History     Past Medical History:   Diagnosis Date   • Cancer (CMS/HCC) 02/2020    breast cancer per mammogram; chemotherapy    • Diabetes (CMS/HCC)    • History of low potassium 03/2020    prescribed potassium supplements    • History of transfusion 1989    after delivery of son (1 unit only)    • HTN (hypertension)    • Low magnesium level 03/2020    requiring magnesium supplements    • Sleep apnea     no cpap machine currently       Past Surgical History:   Procedure Laterality  Date   • BREAST BIOPSY Right 02/2020   • CHOLECYSTECTOMY     • COLONOSCOPY     • LAPAROSCOPIC TUBAL LIGATION     • MASTECTOMY W/ SENTINEL NODE BIOPSY Bilateral 6/16/2020    Procedure: SKIN SPARING MASTECTOMIES BILATERAL AND RIGHT SENTINEL NODE BIOPSY;  Surgeon: Gary Perez MD;  Location: [facility];  Service: General;  Laterality: Bilateral;   • VENOUS ACCESS DEVICE (PORT) INSERTION  02/2020       Family History: family history includes Breast cancer in her paternal aunt; Diabetes in her mother; Lung cancer in her father. Otherwise pertinent FHx was reviewed and unremarkable.     Social History:  reports that she has never smoked. She has never used smokeless tobacco. She reports that she does not drink alcohol or use drugs.  Social History     Social History Narrative   • Not on file       Medications:  Available home medication information reviewed.    (Not in a hospital admission)    No Known Allergies    Objective   Objective     Vital Signs:   Temp:  [97.8 °F (36.6 °C)-99.7 °F (37.6 °C)] 97.8 °F (36.6 °C)  Heart Rate:  [84] 84  Resp:  [16] 16  BP: (108-125)/(63-77) 111/73        Physical Exam   Constitutional: Awake, alert  Eyes: PERRLA, sclerae anicteric, no conjunctival injection  HENT: NCAT, mucous membranes moist  Neck: Supple, no thyromegaly, no lymphadenopathy, trachea midline  Respiratory: Clear to auscultation bilaterally, nonlabored respirations   Cardiovascular: RRR, no murmurs, rubs, or gallops, palpable pedal pulses bilaterally  Gastrointestinal: Positive bowel sounds, soft, nontender, nondistended  Musculoskeletal: No bilateral ankle edema, no clubbing or cyanosis to extremities.  Mild right flank tenderness  Psychiatric: Appropriate affect, cooperative  Neurologic: Oriented x 3, strength symmetric in all extremities, Cranial Nerves grossly intact to confrontation, speech clear  Skin: Bilateral mastectomy incision sites healing well without erythema, drainage.  Port site without induration or  erythema or drainage.  Mastectomy drainage serosanguineous      Results Reviewed:  I have personally reviewed current lab and radiology data.    Results from last 7 days   Lab Units 06/19/20 2007   WBC 10*3/mm3 12.48*   HEMOGLOBIN g/dL 9.7*   HEMATOCRIT % 30.5*   PLATELETS 10*3/mm3 281     Results from last 7 days   Lab Units 06/20/20  0051 06/20/20  0033 06/19/20 2007   SODIUM mmol/L  --   --  135*   POTASSIUM mmol/L  --   --  3.7   CHLORIDE mmol/L  --   --  92*   CO2 mmol/L  --   --  28.0   BUN mg/dL  --   --  13   CREATININE mg/dL  --   --  1.15*   GLUCOSE mg/dL  --   --  199*   CALCIUM mg/dL  --   --  9.4   ALT (SGPT) U/L  --   --  26   AST (SGOT) U/L  --   --  24   LACTATE mmol/L 2.0  --  2.6*   PROCALCITONIN ng/mL  --  1.20*  --      Estimated Creatinine Clearance: 54.6 mL/min (A) (by C-G formula based on SCr of 1.15 mg/dL (H)).  Brief Urine Lab Results  (Last result in the past 365 days)      Color   Clarity   Blood   Leuk Est   Nitrite   Protein   CREAT   Urine HCG        06/19/20 2321 Dark Yellow Cloudy Negative Large (3+) Negative 30 mg/dL (1+)             Imaging Results (Last 24 Hours)     Procedure Component Value Units Date/Time    CT Abdomen Pelvis Without Contrast [[id number]] Collected:  06/20/20 0126     Updated:  06/20/20 0128    Narrative:       CT Abdomen Pelvis WO    INDICATION:   54-year-old female with generalized abdominal pain and right flank pain today.    TECHNIQUE:   CT of the abdomen and pelvis without IV contrast. Coronal and sagittal reconstructions were obtained.  Radiation dose reduction techniques included automated exposure control or exposure modulation based on body size. Count of known CT and cardiac nuc  med studies performed in previous 12 months: 0.     COMPARISON:   None available.    FINDINGS:  Visualized lung bases are unremarkable.    Abdomen:   The liver is within normal limits. The spleen and pancreas are normal. Cholecystectomy. Both adrenal glands are normal.  Nonobstructing right intrarenal calcification. Minimal stranding in the bilateral perinephric fat, nonspecific and possibly chronic.  No hydronephrosis. Abdominal aorta normal in course and caliber. Small bowel is unremarkable without obstruction. Normal appendix. The colon is unremarkable. No free fluid or free air.    Pelvis:   The urinary bladder is unremarkable.  The uterus and adnexa are within normal limits. IUD. No free pelvic fluid.    No acute osseous abnormality.        Impression:         1. No acute abdominal or pelvic findings.  2. Nonobstructing right intrarenal calcification. Minimal stranding in the bilateral perinephric fat. This is nonspecific and possibly chronic. Correlation with urinalysis to exclude urinary tract infection.  3. Cholecystectomy.  4. Normal appendix.  5. IUD.          Signer Name: Jassi Diaz MD   Signed: 6/20/2020 1:26 AM   Workstation Name: [id number]    [facility]    CT Chest Without Contrast [[id number]] Collected:  06/19/20 2328     Updated:  06/19/20 2331    Narrative:       CT Chest WO    INDICATION:   54-year-old female with fever status post bilateral mastectomies performed 6/16/2020.    TECHNIQUE:   CT of the chest without IV contrast. Coronal and sagittal reformatted images. Radiation dose reduction techniques included automated exposure control or exposure modulation based on body size. Count of known CT and cardiac nuc med studies performed in  previous 12 months: 0.     COMPARISON:   None available.    FINDINGS:   Thoracic aorta normal in course and caliber. Heart size is normal. No pericardial effusion.    No pleural effusion. No pneumothorax. No focal pulmonary infiltrates.    Visualized upper abdomen is unremarkable.    No acute osseous abnormality. Postsurgical changes from recent bilateral mastectomies. Surgical drains in place. No abnormal soft tissue fluid collections. Surgical clips in the right axillary region.      Impression:          1. Stable postsurgical changes from recent bilateral mastectomies. No abnormal soft tissue fluid collections.  2. No other acute chest findings.    Signer Name: Jassi Diaz MD   Signed: 6/19/2020 11:28 PM   Workstation Name: [email or web address]    [facility]        Results for orders placed during the hospital encounter of 03/19/20   Adult Transthoracic Echo Complete W/ Cont if Necessary Per Protocol    Addendum · Left ventricular systolic function is normal. Estimated EF = 60%. · The global Longitudinal LV strain = -20.7%. · Estimated EF = 60%.        Sander Jeffers MD 3/19/2020 12:01 PM          Narrative · Left ventricular systolic function is normal. Estimated EF = 60%.  · The global Longitudinal LV strain = -20.7%.          Assessment/Plan   Assessment & Plan     Active Hospital Problems    Diagnosis POA   • **Sepsis secondary to UTI (CMS/HCC) [A41.9, N39.0] Yes   • Acute UTI (urinary tract infection) [N39.0] Yes   • Acute pyelonephritis [N10] Unknown   • DM2 (diabetes mellitus, type 2) (CMS/HCC) [E11.9] Yes   • MARY (acute kidney injury) (CMS/HCC) [N17.9] Yes   • Anemia [D64.9] Yes   • Anxiety and depression [F41.9, F32.9] Yes   • Essential hypertension [I10] Yes   • Malignant neoplasm of upper outer quadrant of breast in female, estrogen receptor negative (CMS/HCC) [C50.419, Z17.1] Not Applicable       54 year old female with breast cancer undergoing neoadjuvant therapy with Dr. Juarez 4 days s/p bilateral mastectomies with huan biopsy. Presents with 2 days of intermittent fever. Found to be septic likely secondary to UTI and pyelonephritis. Administered vancomycin and and zosyn. Consulting general surgery and considering heme/onc consult.    1. Sepsis secondary to UTI and pyelonephritis:  - Fever, leukocytosis, elevated lactic acid, MARY  - administered vancomycin and zosyn. Immunocompromised. De-escalate as tolerated.  - add probiotics  - obtain urine culture  - obtain blood  cultures  - Low risk COVID-19 rule out, will remove isolation with 1 negative test  - check inflammatory markers  - hold antihypertensives  - saline 100 cc/hr  - am labs  - Consider ID consult    2. MARY:  - creatinine at discharge 6/14/2020 0.86  - hold HCTZ. Avoid additional nephrotoxins  - continue with IVF    3. Breast Cancer:  - courtesy consult to general surgery  - consider heme/onc consult    4. DM2:  - holding metformin for now  - start SSI with scheduled accu checks    5. Hypertension:  - BP labile. Holding atenolol and HCTZ as above. Resume as tolerated    6. Iron deficiency Anemia:  - hemoglobin dropped from 11.2 to 9.7 from 6/14  - likely procedure related  - start ferrous sulfate  - FOB pending    7. Anxiety/depression:  - continue venlafaxine    DVT prophylaxis:  mechanical      Admission Communication  Due to current limited visitation policies, an attempt will be made to update patient's identified best point-of-contact(s) at admission to discuss plan of care.   Contact:    Relation:    Time of communication:    Notes (if applicable): Daughter at bedside         CODE STATUS:  Full code  Code Status and Medical Interventions:   Ordered at: 06/19/20 2478     Level Of Support Discussed With:    Patient     Code Status:    CPR     Medical Interventions (Level of Support Prior to Arrest):    Full       Admission Status:  I believe this patient meets INPATIENT status due to sepsis.  I feel patient’s risk for adverse outcomes and need for care warrant INPATIENT evaluation and I predict the patient’s care encounter to likely last beyond 2 midnights.      Electronically signed by Flora Pretty DO, 06/20/20, 12:01 AM.      Electronically signed by Flora Pretty DO at 06/20/20 0330          Emergency Department Notes      Andres Spear MD at 06/19/20 2028           EMERGENCY DEPARTMENT ENCOUNTER    Room Number:  14/14  Date of encounter:  6/20/2020  PCP: Ina Kwok DO  Historian:  Patient      HPI:  Chief Complaint: Postoperative fever    A complete HPI/ROS/PMH/PSH/SH/FH are unobtainable due to: N/A    Context: Kenya Lemon is a 54 y.o. female who presents to the ED c/o intermittent fevers over the last 2 days.  The patient underwent bilateral mastectomies with right-sided lymph node resection on 6/16/2020.  Surgery seemed to go well.  Drains were placed and have been draining a thin bloody fluid.  No bad odors have been noted.  No rash or skin changes have been noted since the surgery.  The patient denies pain.  She has had no chest pain, abdominal pain, dysuria, hematuria, exposure to COVID patient's.  She began feeling somewhat chilled 2 days ago and then ran a temperature of 100 degrees.  This afternoon her temperature reached 102.3.  She was instructed to take ibuprofen and come to the emergency department.  She states she is feeling somewhat improved after defervesced since.      PAST MEDICAL HISTORY  Active Ambulatory Problems     Diagnosis Date Noted   • Malignant neoplasm of upper outer quadrant of breast in female, estrogen receptor negative (CMS/HCC) 03/12/2020   • Encounter for antineoplastic chemotherapy 03/12/2020   • Malignant neoplasm of upper-outer quadrant of right breast in female, estrogen receptor negative (CMS/HCC) 03/12/2020   • Encounter for adjustment or management of vascular access device 03/24/2020   • Breast cancer, right (CMS/HCC) 06/16/2020     Resolved Ambulatory Problems     Diagnosis Date Noted   • No Resolved Ambulatory Problems     Past Medical History:   Diagnosis Date   • Cancer (CMS/HCC) 02/2020   • Diabetes (CMS/HCC)    • History of low potassium 03/2020   • History of transfusion 1989   • HTN (hypertension)    • Low magnesium level 03/2020   • Sleep apnea          PAST SURGICAL HISTORY  Past Surgical History:   Procedure Laterality Date   • BREAST BIOPSY Right 02/2020   • CHOLECYSTECTOMY     • COLONOSCOPY     • LAPAROSCOPIC TUBAL LIGATION      • MASTECTOMY W/ SENTINEL NODE BIOPSY Bilateral 6/16/2020    Procedure: SKIN SPARING MASTECTOMIES BILATERAL AND RIGHT SENTINEL NODE BIOPSY;  Surgeon: Gary Perez MD;  Location: [facility];  Service: General;  Laterality: Bilateral;   • VENOUS ACCESS DEVICE (PORT) INSERTION  02/2020         FAMILY HISTORY  Family History   Problem Relation Age of Onset   • Diabetes Mother    • Lung cancer Father    • Breast cancer Paternal Aunt          SOCIAL HISTORY  Social History     Socioeconomic History   • Marital status:      Spouse name: Not on file   • Number of children: Not on file   • Years of education: Not on file   • Highest education level: Not on file   Tobacco Use   • Smoking status: Never Smoker   • Smokeless tobacco: Never Used   Substance and Sexual Activity   • Alcohol use: Never     Frequency: Never   • Drug use: Never   • Sexual activity: Defer         ALLERGIES  Patient has no known allergies.        REVIEW OF SYSTEMS  Review of Systems     All systems reviewed and negative except for those discussed in HPI.       PHYSICAL EXAM    I have reviewed the triage vital signs and nursing notes.    ED Triage Vitals [06/19/20 1946]   Temp Heart Rate Resp BP SpO2   99.7 °F (37.6 °C) 84 16 125/72 98 %      Temp src Heart Rate Source Patient Position BP Location FiO2 (%)   Oral Monitor Sitting Left arm --       Physical Exam  GENERAL:  Well developed.  Appears nontoxic.  HENT: Nares patent.  Clear posterior oropharynx.  EYES: No scleral icterus  CV: Regular rhythm, regular rate.  No murmurs gallops rubs.  RESPIRATORY: Normal effort.  No audible wheezes, rales or rhonchi  ABDOMEN: Soft, nontender  MUSCULOSKELETAL: No deformities.   NEURO: Alert, moves all extremities, follows commands  SKIN: Warm, dry.  Drainage tubes below mastectomy sites show serosanguineous fluid drainage.  No surrounding erythema at the drain insertion sites nor at the breast surgical sites.        LAB RESULTS  Recent Results (from the  past 24 hour(s))   Lactic Acid, Plasma    Collection Time: 06/19/20  8:07 PM   Result Value Ref Range    Lactate 2.6 (C) 0.5 - 2.0 mmol/L   Light Blue Top    Collection Time: 06/19/20  8:07 PM   Result Value Ref Range    Extra Tube hold for add-on    Green Top (Gel)    Collection Time: 06/19/20  8:07 PM   Result Value Ref Range    Extra Tube Hold for add-ons.    Lavender Top    Collection Time: 06/19/20  8:07 PM   Result Value Ref Range    Extra Tube hold for add-on    Gold Top - SST    Collection Time: 06/19/20  8:07 PM   Result Value Ref Range    Extra Tube Hold for add-ons.    Lactic Acid, Reflex Timer (This will reflex a repeat order 3-3:15 hours after ordered.)    Collection Time: 06/19/20  8:07 PM   Result Value Ref Range    Hold Tube Hold for add-ons.    Comprehensive Metabolic Panel    Collection Time: 06/19/20  8:07 PM   Result Value Ref Range    Glucose 199 (H) 65 - 99 mg/dL    BUN 13 6 - 20 mg/dL    Creatinine 1.15 (H) 0.57 - 1.00 mg/dL    Sodium 135 (L) 136 - 145 mmol/L    Potassium 3.7 3.5 - 5.2 mmol/L    Chloride 92 (L) 98 - 107 mmol/L    CO2 28.0 22.0 - 29.0 mmol/L    Calcium 9.4 8.6 - 10.5 mg/dL    Total Protein 7.6 6.0 - 8.5 g/dL    Albumin 4.70 3.50 - 5.20 g/dL    ALT (SGPT) 26 1 - 33 U/L    AST (SGOT) 24 1 - 32 U/L    Alkaline Phosphatase 62 39 - 117 U/L    Total Bilirubin 0.4 0.2 - 1.2 mg/dL    eGFR Non African Amer 49 (L) >60 mL/min/1.73    Globulin 2.9 gm/dL    A/G Ratio 1.6 g/dL    BUN/Creatinine Ratio 11.3 7.0 - 25.0    Anion Gap 15.0 5.0 - 15.0 mmol/L   CBC Auto Differential    Collection Time: 06/19/20  8:07 PM   Result Value Ref Range    WBC 12.48 (H) 3.40 - 10.80 10*3/mm3    RBC 3.10 (L) 3.77 - 5.28 10*6/mm3    Hemoglobin 9.7 (L) 12.0 - 15.9 g/dL    Hematocrit 30.5 (L) 34.0 - 46.6 %    MCV 98.4 (H) 79.0 - 97.0 fL    MCH 31.3 26.6 - 33.0 pg    MCHC 31.8 31.5 - 35.7 g/dL    RDW 14.7 12.3 - 15.4 %    RDW-SD 53.1 37.0 - 54.0 fl    MPV 10.9 6.0 - 12.0 fL    Platelets 281 140 - 450 10*3/mm3     Neutrophil % 78.9 (H) 42.7 - 76.0 %    Lymphocyte % 10.4 (L) 19.6 - 45.3 %    Monocyte % 9.6 5.0 - 12.0 %    Eosinophil % 0.5 0.3 - 6.2 %    Basophil % 0.3 0.0 - 1.5 %    Immature Grans % 0.3 0.0 - 0.5 %    Neutrophils, Absolute 9.84 (H) 1.70 - 7.00 10*3/mm3    Lymphocytes, Absolute 1.30 0.70 - 3.10 10*3/mm3    Monocytes, Absolute 1.20 (H) 0.10 - 0.90 10*3/mm3    Eosinophils, Absolute 0.06 0.00 - 0.40 10*3/mm3    Basophils, Absolute 0.04 0.00 - 0.20 10*3/mm3    Immature Grans, Absolute 0.04 0.00 - 0.05 10*3/mm3    nRBC 0.0 0.0 - 0.2 /100 WBC   Urinalysis With Microscopic If Indicated (No Culture) - Urine, Clean Catch    Collection Time: 06/19/20 11:21 PM   Result Value Ref Range    Color, UA Dark Yellow (A) Yellow, Straw    Appearance, UA Cloudy (A) Clear    pH, UA <=5.0 5.0 - 8.0    Specific Gravity, UA 1.020 1.001 - 1.030    Glucose, UA Negative Negative    Ketones, UA Trace (A) Negative    Bilirubin, UA Negative Negative    Blood, UA Negative Negative    Protein, UA 30 mg/dL (1+) (A) Negative    Leuk Esterase, UA Large (3+) (A) Negative    Nitrite, UA Negative Negative    Urobilinogen, UA 1.0 E.U./dL 0.2 - 1.0 E.U./dL   Urinalysis, Microscopic Only - Urine, Clean Catch    Collection Time: 06/19/20 11:21 PM   Result Value Ref Range    RBC, UA 0-2 None Seen, 0-2 /HPF    WBC, UA Too Numerous to Count (A) None Seen, 0-2 /HPF    Bacteria, UA 4+ (A) None Seen, Trace /HPF    Squamous Epithelial Cells, UA 31-50 (A) None Seen, 0-2 /HPF    Methodology Manual Light Microscopy        Ordered the above labs and independently reviewed the results.        RADIOLOGY  Ct Chest Without Contrast    Result Date: 6/19/2020  CT Chest WO INDICATION: 54-year-old female with fever status post bilateral mastectomies performed 6/16/2020. TECHNIQUE: CT of the chest without IV contrast. Coronal and sagittal reformatted images. Radiation dose reduction techniques included automated exposure control or exposure modulation based on body size.  Count of known CT and cardiac nuc med studies performed in previous 12 months: 0. COMPARISON: None available. FINDINGS: Thoracic aorta normal in course and caliber. Heart size is normal. No pericardial effusion. No pleural effusion. No pneumothorax. No focal pulmonary infiltrates. Visualized upper abdomen is unremarkable. No acute osseous abnormality. Postsurgical changes from recent bilateral mastectomies. Surgical drains in place. No abnormal soft tissue fluid collections. Surgical clips in the right axillary region.     1. Stable postsurgical changes from recent bilateral mastectomies. No abnormal soft tissue fluid collections. 2. No other acute chest findings. Signer Name: Jassi Diaz MD  Signed: 6/19/2020 11:28 PM  Workstation Name: [email or web address]  [facility]      I ordered and reviewed the above noted radiographic studies.    I viewed images of chest CT which showed no pneumonia per my independent interpretation.  See radiologist's dictation for official interpretation.        PROCEDURES    Procedures      MEDICATIONS GIVEN IN ER    Medications   piperacillin-tazobactam (ZOSYN) 3.375 g in iso-osmotic dextrose 50 ml (premix) (has no administration in time range)   piperacillin-tazobactam (ZOSYN) 3.375 g in iso-osmotic dextrose 50 ml (premix) (has no administration in time range)   Pharmacy to dose vancomycin (has no administration in time range)   lactobacillus acidophilus (RISAQUAD) capsule 1 capsule (has no administration in time range)   dextrose (GLUTOSE) oral gel 15 g (has no administration in time range)   dextrose (D50W) 25 g/ 50mL Intravenous Solution 25 g (has no administration in time range)   glucagon (human recombinant) (GLUCAGEN DIAGNOSTIC) injection 1 mg (has no administration in time range)   insulin lispro (humaLOG) injection 0-7 Units (has no administration in time range)   vancomycin 2000 mg/500 mL 0.9% NS IVPB ([facility]) (has no administration in time range)   vancomycin  1250 mg/250 mL 0.9% NS IVPB ([facility]) (has no administration in time range)   ! Vancomycin trough 6/22 at 0530.  Please hold vancomycin dose 6/22 at 0600 until pharmacy can evaluate level (has no administration in time range)   oxyCODONE (ROXICODONE) immediate release tablet 5 mg (5 mg Oral Given 6/20/20 0013)         PROGRESS, DATA ANALYSIS, CONSULTS, AND MEDICAL DECISION MAKING    All labs have been independently reviewed by me.  All radiology studies have been reviewed by me and the radiologist dictating the report.   EKG's have been independently viewed and interpreted by me.      Differential diagnoses: Acute febrile illness in a recently postop patient.  Concern for wound infection, UTI, PE, etc.      ED Course as of Jun 20 0032 Fri Jun 19, 2020 2329 I have ordered broad-spectrum antibiotics as well as IV fluids.    I have paged the hospitalist for admission.    [MS]   2330 I spoke with Dr. Nair, on-call surgery for Dr. Ning Perez.  Case discussed in detail.  He agrees with my plan to start the patient on broad-spectrum antibiotics, obtain CT scan of the chest, admit to hospitalist service.  Patient is updated on current findings and plan.  She and her daughter are quite comfortable with this.    [MS]      ED Course User Index  [MS] Andres Spear MD             AS OF 00:32 VITALS:    BP - 110/66  HR - 84  TEMP - 99.7 °F (37.6 °C) (Oral)  O2 SATS - 99%        DIAGNOSIS  Final diagnoses:   Acute febrile illness   Recent major surgery   Acute UTI         DISPOSITION  Admission           Andres Spear MD  06/20/20 0033      Electronically signed by Andres Spear MD at 06/20/20 0033       Vital Signs (last 7 days) before discharge     Date/Time   Temp   Temp src   Pulse   Resp   BP   Patient Position   SpO2    06/22/20 0755   98.4 (36.9)   Oral   83   14   140/80   Lying   --    06/21/20 2320   98.9 (37.2)   Oral   88   18   129/69   Lying   97    06/21/20 1933   99.5 (37.5)   Oral   94   18    145/79   Lying   --    06/21/20 1544   98.8 (37.1)   Oral   97   16   137/80   Sitting   --    06/21/20 1132   98.1 (36.7)   Oral   89   18   137/85   Sitting   --    06/21/20 0709   98.4 (36.9)   Oral   97   16   139/74   Lying   --    06/21/20 0417   98.4 (36.9)   Oral   95   18   139/82   Sitting   --    06/20/20 1914   98.5 (36.9)   Oral   --   16   120/75   Lying   --    06/20/20 1635   98 (36.7)   Oral   86   18   120/71   Lying   --    06/20/20 1115   --   --   82   --   129/75   --   --    06/20/20 0719   98.3 (36.8)   Oral   78   16   124/70   Lying   --    06/20/20 0513   97.8 (36.6)   Oral   67   16   118/74   Lying   --    06/20/20 0430   --   --   --   --   114/73   --   94    06/20/20 0400   --   --   --   --   111/73   --   96    06/20/20 0330   --   --   --   --   111/70   --   96    06/20/20 0300   --   --   --   --   115/68   --   93    06/20/20 0230   --   --   --   --   117/68   --   93    06/20/20 0222   97.8 (36.6)   Infrared   --   --   --   --   95    06/20/20 0200   --   --   --   --   111/73   --   94    06/20/20 0130   --   --   --   --   119/70   --   97    06/20/20 0100   --   --   --   --   118/74   --   99    06/20/20 0030   --   --   --   --   124/69   --   96    06/20/20 0000   --   --   --   --   110/66   --   99    06/19/20 2230   --   --   --   --   108/63   --   98    06/19/20 2216   --   --   --   --   110/77   --   99    06/19/20 1946   99.7 (37.6)   Oral   84   16   125/72   Sitting   98                Facility-Administered Medications as of 6/22/2020   Medication Dose Route Frequency Provider Last Rate Last Dose   • [COMPLETED] ! Vancomycin trough 6/22 at 0530.  Please hold vancomycin dose 6/22 at 0600 until pharmacy can evaluate level   Does not apply Once Matthew Hahn, [facility]       • [COMPLETED] oxyCODONE (ROXICODONE) immediate release tablet 5 mg  5 mg Oral Once Andres Spear MD   5 mg at 06/20/20 0013   • [COMPLETED] piperacillin-tazobactam (ZOSYN) 3.375 g in  "iso-osmotic dextrose 50 ml (premix)  3.375 g Intravenous Once Akira Fernandez PA-C   Stopped at 06/20/20 0213   • [COMPLETED] vancomycin 2000 mg/500 mL 0.9% NS IVPB ([facility])  25 mg/kg Intravenous Once Matthew Hahn EMILY [facility]   Stopped at 06/20/20 0315       Operative/Procedure Notes (all)    No notes of this type exist for this encounter.            Physician Progress Notes (all)      Capo Mcghee MD at 06/21/20 1128          General Surgery Post op Follow Up Note    Subjective:   Feeling better overall    /74 (BP Location: Left arm, Patient Position: Lying)   Pulse 97   Temp 98.4 °F (36.9 °C) (Oral)   Resp 16   Ht 157.5 cm (62\")   Wt 79.4 kg (175 lb)   SpO2 94%   BMI 32.01 kg/m²      General Appearance:  in no acute distress  Chest: incision is clean and dry, x2.  Axillary incision clean.  JPs serosanguineous.    CBC  Results from last 7 days   Lab Units 06/20/20  0618   WBC 10*3/mm3 8.61   HEMOGLOBIN g/dL 8.5*   HEMATOCRIT % 27.0*   PLATELETS 10*3/mm3 217       CMP/BMP  Results from last 7 days   Lab Units 06/21/20  0606  06/19/20 2007   SODIUM mmol/L 140   < > 135*   POTASSIUM mmol/L 3.0*   < > 3.7   CHLORIDE mmol/L 106   < > 92*   CO2 mmol/L 24.0   < > 28.0   BUN mg/dL 8   < > 13   CREATININE mg/dL 0.53*   < > 1.15*   CALCIUM mg/dL 6.9*   < > 9.4   BILIRUBIN mg/dL  --   --  0.4   ALK PHOS U/L  --   --  62   ALT (SGPT) U/L  --   --  26   AST (SGOT) U/L  --   --  24   GLUCOSE mg/dL 113*   < > 199*    < > = values in this interval not displayed.         ASSESSMENT/PLAN:    UTI.  On antibiotics.  Urine culture greater than 100,000 mixed erika.  The surgical sites appear well.    May discharge from surgical perspective.  She has a follow-up on Tuesday with Dr. Perez.    Capo Mcghee MD  6/21/2020  11:28    Electronically signed by Capo Mcghee MD at 06/21/20 1129     Shea Le MD at 06/21/20 1127              [facility] Services  PROGRESS NOTE    Patient Name: Kenya Lemon  : 1965  MRN: [id number]    Date of Admission: 2020  Primary Care Physician: Ina Kwok DO    Subjective   Subjective     CC:  Sepsis/UTI    HPI:  Feeling improved.  Denies bleeding. Has not had a BM since admission  No other complaints, is feeling better    Review of Systems  Gen- No fevers, chills  CV- No chest pain, palpitations  Resp- No cough, dyspnea  GI- No N/V/D, abd pain        Objective   Objective     Vital Signs:   Temp:  [98 °F (36.7 °C)-98.5 °F (36.9 °C)] 98.4 °F (36.9 °C)  Heart Rate:  [86-97] 97  Resp:  [16-18] 16  BP: (120-139)/(71-82) 139/74        Physical Exam:  GEN- no acute distress noted, resting in chair, sitting up, bald  HEENT- atraumatic, normocephlic, eomi  Chest: MANJEET drains in place, surgical sites well healing from mastectomies  NECK- supple, trachea midline, no masses  RESP: ctab, normal effort  CV: no murmurs, s1/s2, rrr  MSK: no edema noted, spontaneous movement of all extremities  NEURO: alert, oriented, no focal deficits  SKIN: no rashes  PSYCH: appropriate mood and affect       Results Reviewed:  Results from last 7 days   Lab Units 20  0618 20  0033 20   WBC 10*3/mm3 8.61  --  12.48*   HEMOGLOBIN g/dL 8.5*  --  9.7*   HEMATOCRIT % 27.0*  --  30.5*   PLATELETS 10*3/mm3 217  --  281   PROCALCITONIN ng/mL  --  1.20*  --      Results from last 7 days   Lab Units 20  0606 20  0106 20  0618 20   SODIUM mmol/L 140  --  136 135*   POTASSIUM mmol/L 3.0* 3.9 3.0* 3.7   CHLORIDE mmol/L 106  --  97* 92*   CO2 mmol/L 24.0  --  29.0 28.0   BUN mg/dL 8  --  16 13   CREATININE mg/dL 0.53*  --  1.29* 1.15*   GLUCOSE mg/dL 113*  --  160* 199*   CALCIUM mg/dL 6.9*  --  8.6 9.4   ALT (SGPT) U/L  --   --   --  26   AST (SGOT) U/L  --   --   --  24     Estimated Creatinine Clearance: 118.4 mL/min (A) (by C-G formula based on SCr of 0.53 mg/dL (L)).    Microbiology  Results Abnormal     Procedure Component Value - Date/Time    Urine Culture - Urine, Urine, Clean Catch [[id number]] Collected:  06/19/20 2321    Lab Status:  Final result Specimen:  Urine, Clean Catch Updated:  06/21/20 0954     Urine Culture >100,000 CFU/mL Mixed Erika Isolated    Narrative:       Specimen contains mixed organisms of questionable pathogenicity which indicates contamination with commensal erika.  Further identification is unlikely to provide clinically useful information.  Suggest recollection.    Blood Culture - Blood, Arm, Left [[id number]] Collected:  06/19/20 2300    Lab Status:  Preliminary result Specimen:  Blood from Arm, Left Updated:  06/21/20 0316     Blood Culture No growth at 24 hours    Blood Culture - Blood, Arm, Right [[id number]] Collected:  06/19/20 2304    Lab Status:  Preliminary result Specimen:  Blood from Arm, Right Updated:  06/21/20 0316     Blood Culture No growth at 24 hours    COVID-19,CEPHEID,MANUEL IN-HOUSE(OR EMERGENT/ADD-ON),NP SWAB IN TRANSPORT MEDIA 3-4 HR TAT - Swab, Nasopharynx [[id number]]  (Normal) Collected:  06/20/20 0146    Lab Status:  Final result Specimen:  Swab from Nasopharynx Updated:  06/20/20 0312     COVID19 Not Detected          Imaging Results (Last 24 Hours)     ** No results found for the last 24 hours. **          Results for orders placed during the hospital encounter of 03/19/20   Adult Transthoracic Echo Complete W/ Cont if Necessary Per Protocol    Addendum · Left ventricular systolic function is normal. Estimated EF = 60%. · The global Longitudinal LV strain = -20.7%. · Estimated EF = 60%.        Sander Jeffers MD 3/19/2020 12:01 PM          Narrative · Left ventricular systolic function is normal. Estimated EF = 60%.  · The global Longitudinal LV strain = -20.7%.          I have reviewed the medications:  Scheduled Meds:  [START ON 6/22/2020] Pharmacy Consult  Does not apply Once   cetirizine 10 mg Oral Daily   ferrous sulfate 325 mg Oral Daily  With Breakfast   insulin lispro 0-7 Units Subcutaneous TID AC   lactobacillus acidophilus 1 capsule Oral Daily   modafinil 200 mg Oral Daily   piperacillin-tazobactam 3.375 g Intravenous Q8H   sodium chloride 10 mL Intravenous Q12H   vancomycin 15 mg/kg Intravenous Q12H   venlafaxine XR 75 mg Oral Daily     Continuous Infusions:  Pharmacy to dose vancomycin     sodium chloride 100 mL/hr Last Rate: 100 mL/hr (06/21/20 2823)     PRN Meds:.acetaminophen  •  dextrose  •  dextrose  •  glucagon (human recombinant)  •  melatonin  •  ondansetron  •  oxyCODONE  •  Pharmacy to dose vancomycin  •  potassium chloride **OR** potassium chloride **OR** potassium chloride  •  sodium chloride    Assessment/Plan   Assessment & Plan     Active Hospital Problems    Diagnosis  POA   • **Sepsis secondary to UTI (CMS/HCC) [A41.9, N39.0]  Yes   • Acute UTI (urinary tract infection) [N39.0]  Yes   • Acute pyelonephritis [N10]  Unknown   • DM2 (diabetes mellitus, type 2) (CMS/[facility]) [E11.9]  Yes   • MARY (acute kidney injury) (CMS/[facility]) [N17.9]  Yes   • Anemia [D64.9]  Yes   • Anxiety and depression [F41.9, F32.9]  Yes   • Essential hypertension [I10]  Yes   • Malignant neoplasm of upper outer quadrant of breast in female, estrogen receptor negative (CMS/[facility]) [C50.419, Z17.1]  Not Applicable      Resolved Hospital Problems   No resolved problems to display.        Brief Hospital Course to date:  Kenya Lemon is a 54 year old female with breast cancer undergoing neoadjuvant therapy with Dr. Juarez 4 days s/p bilateral mastectomies with huan biopsy. Presents with 2 days of intermittent fever. Found to be septic likely secondary to UTI and pyelonephritis. Administered vancomycin and and zosyn.         Sepsis, source UTI/pyelonpephritis  ---continue broad spectrum abx- vanc/zosyn at this time  ---continue IVF, supportive care  ---follow all cultures, covid noted negative   ---urine culture with mixed erika- continue broad spectrum for now  but will consider de-escalation as soon as tomorrow if continues to improve     MARY  ---continue IVF, monitor    Anemia  ---noted drop in H/H since admission-- labs this morning pending  ---no change in drainage from MANJEET site and no evidence of bleeding per patient  ---monitor closely, transfuse <7    Hypokalemia  ---replace per protocol     Breast cancer currently undergoing chemotherapy (Dr Juarez) and recent b/l mastectomies and right senitnel node biopsy (Dr Perez)  ---MANJEET drains remain in place  ---general surgery consulted, appreciate recs     T2DM  ---normally on metformin at home-- d/w patient and discussed rationale for continuing insulin in hospital and holding home orals-- will consider adding back metformin if kidney function improves, for now, continue SSI     HTN  ---holding home medications at this time     DVT Prophylaxis:  On hold given anemia and possible bleedin      Disposition: I expect the patient to be discharged TBD.    CODE STATUS:   Code Status and Medical Interventions:   Ordered at: 06/19/20 2352     Level Of Support Discussed With:    Patient     Code Status:    CPR     Medical Interventions (Level of Support Prior to Arrest):    Full         Electronically signed by Shea Le MD, 06/21/20, 11:27.        Electronically signed by Shea Le MD at 06/21/20 1131     Shea Le MD at 06/20/20 1124                [facility] Medicine Services  ADMISSION FOLLOW-UP NOTE          Patient admitted after midnight, H&P by my partner performed earlier on today's date reviewed.  Interim findings, labs, and charting also reviewed.        [facility] Hospital Problem List has been managed and updated to include any new diagnoses:  Active Hospital Problems    Diagnosis  POA   • **Sepsis secondary to UTI (CMS/[facility]) [A41.9, N39.0]  Yes   • Acute UTI (urinary tract infection) [N39.0]  Yes   • Acute pyelonephritis [N10]  Unknown   • DM2 (diabetes mellitus, type 2) (CMS/[facility])  [E11.9]  Yes   • MARY (acute kidney injury) (CMS/HCC) [N17.9]  Yes   • Anemia [D64.9]  Yes   • Anxiety and depression [F41.9, F32.9]  Yes   • Essential hypertension [I10]  Yes   • Malignant neoplasm of upper outer quadrant of breast in female, estrogen receptor negative (CMS/HCC) [C50.419, Z17.1]  Not Applicable      Resolved Hospital Problems   No resolved problems to display.         ADDITIONAL PLAN:  - detailed assessment and plan from admission reviewed    Kenya Lemon is a 54 year old female with breast cancer undergoing neoadjuvant therapy with Dr. Juarez 4 days s/p bilateral mastectomies with huan biopsy. Presents with 2 days of intermittent fever. Found to be septic likely secondary to UTI and pyelonephritis. Administered vancomycin and and zosyn.        Sepsis, source UTI/pyelonpephritis  ---continue broad spectrum abx- vanc/zosyn at this time  ---continue IVF, supportive care  ---follow all cultures, covid noted negative     MARY  ---continue IVF, montor    Breast cancer currently undergoing chemotherapy (Dr Juarez) and recent b/l mastectomies and right senitnel node biopsy (Dr Perez)  ---MANJEET drains remain in place  ---general surgery consulted    T2DM  ---normally on metformin at home-- d/w patient and discussed rationale for continuing insulin in hospital and holding home orals-- will consider adding back metformin if kidney function improves, for now, continue SSI    Anemia    Anxiety       Electronically signed by Shea Le MD, 06/20/20, 11:24 AM.      Electronically signed by Shea Le MD at 06/20/20 1126          Consult Notes (all)      Capo Mcghee MD at 06/20/20 1103      Consult Orders    1. Inpatient General Surgery Consult [[id number]] ordered by Akira Fernandez PA-C at 06/19/20 235                General Surgery Consultation Note    Date of Service: 6/20/2020  Kenya Lemon  [id number]  1965      Referring Provider: Shea Le MD    Location  of Consult: Hospital floor     Reason for Consultation: Postoperative care, fever       History of Present Illness:  I am seeing, Kenyarahat Lemon, in consultation for Shea Le MD regarding persistent spiking fever at home up to 102.3.  Recent bilateral mastectomy for breast cancer done by Dr. Perez on 6/16/2020.    Problem List Items Addressed This Visit        Immune and Lymphatic    * (Principal) Sepsis secondary to UTI (CMS/HCC) - Primary      Other Visit Diagnoses     Recent major surgery        Acute UTI              Past Medical History:   Diagnosis Date   • Cancer (CMS/HCC) 02/2020    breast cancer per mammogram; chemotherapy    • Diabetes (CMS/HCC)    • History of low potassium 03/2020    prescribed potassium supplements    • History of transfusion 1989    after delivery of son (1 unit only)    • HTN (hypertension)    • Low magnesium level 03/2020    requiring magnesium supplements    • Sleep apnea     no cpap machine currently       Past Surgical History:   • BREAST BIOPSY   • CHOLECYSTECTOMY   • COLONOSCOPY   • LAPAROSCOPIC TUBAL LIGATION   • MASTECTOMY W/ SENTINEL NODE BIOPSY    Procedure: SKIN SPARING MASTECTOMIES BILATERAL AND RIGHT SENTINEL NODE BIOPSY;  Surgeon: Gary Perez MD;  Location: [facility];  Service: General;  Laterality: Bilateral;   • VENOUS ACCESS DEVICE (PORT) INSERTION       No Known Allergies    No current facility-administered medications on file prior to encounter.      Current Outpatient Medications on File Prior to Encounter   Medication Sig Dispense Refill   • atenolol (TENORMIN) 50 MG tablet Take 50 mg by mouth 2 (two) times a day.     • dexamethasone (DECADRON) 4 MG tablet Take 2 tablets in the morning daily on Days 2, 3 & 4.  Take with food. 6 tablet 3   • diphenoxylate-atropine (Lomotil) 2.5-0.025 MG per tablet Take 1 tablet by mouth Every 6 (Six) Hours As Needed for Diarrhea. 1 tablet 30 tablet 2   • hydroCHLOROthiazide (HYDRODIURIL) 25 MG tablet Take 25 mg  by mouth Daily.     • lidocaine-prilocaine (EMLA) 2.5-2.5 % cream Apply  topically to the appropriate area as directed As Needed (45-60 minutes prior to port access.  Cover with saran/plastic wrap.). (Patient taking differently: Apply 1 application topically to the appropriate area as directed As Needed (45-60 minutes prior to port access.  Cover with saran/plastic wrap.).) 30 g 3   • Loratadine 10 MG capsule Take 10 mg by mouth Every Morning.     • metFORMIN (GLUCOPHAGE) 1000 MG tablet Take 1,000 mg by mouth 2 (Two) Times a Day.     • modafinil (PROVIGIL) 200 MG tablet Take 200 mg by mouth Daily.     • Multiple Vitamins-Minerals (MULTIVITAMIN ADULT PO) Take 1 dose by mouth Every Morning.     • ondansetron (ZOFRAN) 8 MG tablet Take 1 tablet by mouth 3 (Three) Times a Day As Needed for Nausea or Vomiting. 30 tablet 5   • oxyCODONE (ROXICODONE) 5 MG immediate release tablet Take 1 tablet by mouth Every 6 (Six) Hours As Needed for Moderate Pain  for up to 9 days. 10 tablet 0   • potassium chloride ER (K-TAB) 20 MEQ tablet controlled-release ER tablet Take 1 tablet by mouth Daily. 30 tablet 1   • venlafaxine XR (EFFEXOR-XR) 75 MG 24 hr capsule Take 75 mg by mouth Daily.           Current Facility-Administered Medications:   •  [START ON 6/22/2020] ! Vancomycin trough 6/22 at 0530.  Please hold vancomycin dose 6/22 at 0600 until pharmacy can evaluate level, , Does not apply, Once, Matthew Hahn, [facility]  •  acetaminophen (TYLENOL) tablet 650 mg, 650 mg, Oral, Q4H PRN, Akira Fernandez PA-C  •  cetirizine (zyrTEC) tablet 10 mg, 10 mg, Oral, Daily, Akira Fernandez PA-C, 10 mg at 06/20/20 0821  •  dextrose (D50W) 25 g/ 50mL Intravenous Solution 25 g, 25 g, Intravenous, Q15 Min PRN, Akira Fernandez PA-C  •  dextrose (GLUTOSE) oral gel 15 g, 15 g, Oral, Q15 Min PRN, Akira Fernandez PA-C  •  ferrous sulfate tablet 325 mg, 325 mg, Oral, Daily With Breakfast, Flora Pretty DO, 325 mg at 06/20/20 0820  •  glucagon  (human recombinant) (GLUCAGEN DIAGNOSTIC) injection 1 mg, 1 mg, Subcutaneous, Q15 Min PRN, Akira Fernandez PA-C  •  insulin lispro (humaLOG) injection 0-7 Units, 0-7 Units, Subcutaneous, TID AC, Akira Fernandez PA-C  •  lactobacillus acidophilus (RISAQUAD) capsule 1 capsule, 1 capsule, Oral, Daily, Akira Fernandez PA-C, 1 capsule at 06/20/20 0820  •  melatonin tablet 5 mg, 5 mg, Oral, Nightly PRN, Akira Fernandez PA-C  •  modafinil (PROVIGIL) tablet 200 mg, 200 mg, Oral, Daily, Maria De Jesus, Flora G, DO, 200 mg at 06/20/20 0821  •  ondansetron (ZOFRAN) injection 4 mg, 4 mg, Intravenous, Q6H PRN, Akira Fernandez PA-C  •  oxyCODONE (ROXICODONE) immediate release tablet 5 mg, 5 mg, Oral, Q6H PRN, Maria De Jesus, Lfora G, DO, 5 mg at 06/20/20 0820  •  Pharmacy to dose vancomycin, , Does not apply, Continuous PRN, Akira Fernandez PA-C  •  piperacillin-tazobactam (ZOSYN) 3.375 g in iso-osmotic dextrose 50 ml (premix), 3.375 g, Intravenous, Q8H, Akira Fernandez PA-C, 3.375 g at 06/20/20 0657  •  potassium chloride (MICRO-K) CR capsule 40 mEq, 40 mEq, Oral, PRN, 40 mEq at 06/20/20 1043 **OR** potassium chloride (KLOR-CON) packet 40 mEq, 40 mEq, Oral, PRN **OR** potassium chloride 10 mEq in 100 mL IVPB, 10 mEq, Intravenous, Q1H PRN, Shea Le MD  •  sodium chloride 0.9 % flush 10 mL, 10 mL, Intravenous, Q12H, Akira Fernandez PA-C  •  sodium chloride 0.9 % flush 10 mL, 10 mL, Intravenous, PRN, Akira Fernandez PA-C  •  sodium chloride 0.9 % infusion, 100 mL/hr, Intravenous, Continuous, Akira Fernandez PA-C, Last Rate: 100 mL/hr at 06/20/20 0518, 100 mL/hr at 06/20/20 0518  •  vancomycin 1250 mg/250 mL 0.9% NS IVPB ([facility]), 15 mg/kg, Intravenous, Q12H, Matthew Hahn, [facility]  •  venlafaxine XR (EFFEXOR-XR) 24 hr capsule 75 mg, 75 mg, Oral, Daily, Akira Fernandez PA-C, 75 mg at 06/20/20 0820    Family History   Problem Relation Age of Onset   • Diabetes Mother    • Lung cancer Father    • Breast cancer  "Paternal Aunt      Social History     Socioeconomic History   • Marital status:      Spouse name: Not on file   • Number of children: Not on file   • Years of education: Not on file   • Highest education level: Not on file   Tobacco Use   • Smoking status: Never Smoker   • Smokeless tobacco: Never Used   Substance and Sexual Activity   • Alcohol use: Never     Frequency: Never   • Drug use: Never   • Sexual activity: Defer     /70 (BP Location: Left arm, Patient Position: Lying)   Pulse 78   Temp 98.3 °F (36.8 °C) (Oral)   Resp 16   Ht 157.5 cm (62\")   Wt 79.4 kg (175 lb)   SpO2 94%   BMI 32.01 kg/m²    Body mass index is 32.01 kg/m².    General: No apparent distress  HEENT: PER, no icterus, normal sclerae  Cardiac: regular rhythm, no tachycardia, no audible rubs  Surgical sites: Bilateral mastectomy incisions appear to be healing well.  There is no obvious evidence of infection.  MANJEET drains are noted to be serosanguineous and not purulent.  The axillary incision also appears without gross infection.  The left chest port site also appears okay.  Pulmonary: bilateral breath sounds, non labored  Abdominal: Soft, nontender, nondistended, no peritonitis  Neurologic: awake, alert, no obvious focal deficits  Extremities: warm, no edema  Skin: no obvious rashes nor worrisome lesions seen     CBC  Results from last 7 days   Lab Units 06/20/20  0618   WBC 10*3/mm3 8.61   HEMOGLOBIN g/dL 8.5*   HEMATOCRIT % 27.0*   PLATELETS 10*3/mm3 217       CMP  Results from last 7 days   Lab Units 06/20/20  0618 06/19/20 2007   SODIUM mmol/L 136 135*   POTASSIUM mmol/L 3.0* 3.7   CHLORIDE mmol/L 97* 92*   CO2 mmol/L 29.0 28.0   BUN mg/dL 16 13   CREATININE mg/dL 1.29* 1.15*   CALCIUM mg/dL 8.6 9.4   BILIRUBIN mg/dL  --  0.4   ALK PHOS U/L  --  62   ALT (SGPT) U/L  --  26   AST (SGOT) U/L  --  24   GLUCOSE mg/dL 160* 199*       Radiology  Imaging Results (Last 72 Hours)     Procedure Component Value Units Date/Time    " CT Abdomen Pelvis Without Contrast [[id number]] Collected:  06/20/20 0126     Updated:  06/20/20 0128    Narrative:       CT Abdomen Pelvis WO    INDICATION:   54-year-old female with generalized abdominal pain and right flank pain today.    TECHNIQUE:   CT of the abdomen and pelvis without IV contrast. Coronal and sagittal reconstructions were obtained.  Radiation dose reduction techniques included automated exposure control or exposure modulation based on body size. Count of known CT and cardiac nuc  med studies performed in previous 12 months: 0.     COMPARISON:   None available.    FINDINGS:  Visualized lung bases are unremarkable.    Abdomen:   The liver is within normal limits. The spleen and pancreas are normal. Cholecystectomy. Both adrenal glands are normal. Nonobstructing right intrarenal calcification. Minimal stranding in the bilateral perinephric fat, nonspecific and possibly chronic.  No hydronephrosis. Abdominal aorta normal in course and caliber. Small bowel is unremarkable without obstruction. Normal appendix. The colon is unremarkable. No free fluid or free air.    Pelvis:   The urinary bladder is unremarkable.  The uterus and adnexa are within normal limits. IUD. No free pelvic fluid.    No acute osseous abnormality.        Impression:         1. No acute abdominal or pelvic findings.  2. Nonobstructing right intrarenal calcification. Minimal stranding in the bilateral perinephric fat. This is nonspecific and possibly chronic. Correlation with urinalysis to exclude urinary tract infection.  3. Cholecystectomy.  4. Normal appendix.  5. IUD.          Signer Name: Jassi Diaz MD   Signed: 6/20/2020 1:26 AM   Workstation [facility]: [facility]    [facility]    CT Chest Without Contrast [[id number]] Collected:  06/19/20 2328     Updated:  06/19/20 2331    Narrative:       CT Chest WO    INDICATION:   54-year-old female with fever status post bilateral mastectomies performed  2020.    TECHNIQUE:   CT of the chest without IV contrast. Coronal and sagittal reformatted images. Radiation dose reduction techniques included automated exposure control or exposure modulation based on body size. Count of known CT and cardiac nuc med studies performed in  previous 12 months: 0.     COMPARISON:   None available.    FINDINGS:   Thoracic aorta normal in course and caliber. Heart size is normal. No pericardial effusion.    No pleural effusion. No pneumothorax. No focal pulmonary infiltrates.    Visualized upper abdomen is unremarkable.    No acute osseous abnormality. Postsurgical changes from recent bilateral mastectomies. Surgical drains in place. No abnormal soft tissue fluid collections. Surgical clips in the right axillary region.      Impression:         1. Stable postsurgical changes from recent bilateral mastectomies. No abnormal soft tissue fluid collections.  2. No other acute chest findings.    Signer Name: Jassi Diaz MD   Signed: 2020 11:28 PM   Workstation [facility]: [facility]    [facility]            Assessment:  Fever  Recent bilateral mastectomy with sentinel lymph node biopsy (right)    Plan:  No further fevers noted in house.  No leukocytosis.  Cultures pending.  Empiric antibiotics per medicine.    Capo Mcghee MD  20  11:03      Electronically signed by Capo Mcghee MD at 20 1109            Discharge Summary      Anamaria Arora PA-C at 20 1125     Attestation signed by Shea Le MD at 20 1416    I have reviewed documentation and agree, I was available for questions on the day of discharge for this patient.                        [facility] Medicine Services  DISCHARGE SUMMARY    Patient Name: Kenya Lemon  : 1965  MRN: [id number]    Date of Admission: 2020 10:05 PM  Date of Discharge:  2020  Primary Care Physician: Ina Kwok,  DO    Consults     Date and Time Order Name Status Description    6/20/2020 0510 Inpatient General Surgery Consult Completed           Hospital Course     Presenting Problem:   Acute febrile illness [R50.9]    Active Hospital Problems    Diagnosis  POA   • Acute UTI (urinary tract infection) [N39.0]  Yes   • DM2 (diabetes mellitus, type 2) (CMS/[facility]) [E11.9]  Yes   • Anemia [D64.9]  Yes   • Anxiety and depression [F41.9, F32.9]  Yes   • Essential hypertension [I10]  Yes   • Malignant neoplasm of upper outer quadrant of breast in female, estrogen receptor negative (CMS/[facility]) [C50.419, Z17.1]  Not Applicable      Resolved Hospital Problems    Diagnosis Date Resolved POA   • **Sepsis secondary to UTI (CMS/[facility]) [A41.9, N39.0] 06/22/2020 Yes   • Acute pyelonephritis [N10] 06/22/2020 Yes   • MARY (acute kidney injury) (CMS/[facility]) [N17.9] 06/22/2020 Yes          Hospital Course:  Kenya Lemon is a 54 y.o. female with a past medical history significant for hypertension, DM2, HLD, SALOME, and breast cancer status post bilateral mastectomy on 6/16/2020 who was admitted on 6/19/2020 due to fever.  The patient reported having a fever at home above 100.5.  She also endorsed dark urine, urinary frequency and dysuria.  On arrival to the ED she was febrile to 99.7, hypotensive 108/63.  Her initial lactic acid was elevated at 2.6 with a white blood cell count of 12.4.  Her UA was positive with 4+ bacteria and too numerous to count WBC.  Her initial procalcitonin was 1.2, CRP 27 and sed rate 54.  Her surgical site and CT chest showed stable postsurgical changes.  She was started on empiric antibiotics, Zosyn and vancomycin.  Her MRSA screen was negative and vancomycin discontinued.  She was tested for COVID-19 which was negative.  She was found to have iron deficiency anemia and started on ferrous sulfate.  On admission her hemoglobin was 9.7 from 11.2 (6/14).  On admission she also had a mild MARY with initial creatinine of 1.15.   This resolved after hydration and holding some of her home medications.  Her urine culture resulted with mixed erika, however due to her initial presentation and concerns for infection, the decision was made to continue the patient on Augmentin twice daily for 7 day at discharge.  Her blood cultures showed no growth to date.  Her lactic acid eventually normalized.  Surgery was consulted as she is recently postoperative from a bilateral mastectomy, they did not recommend any further intervention and from their standpoint could be discharged.  On June 21 the patient developed a herpes labialis/cold sore on her lip.  She states this is happened in the past and this is not the initial episode.  She states the pain has subsided and her lip.  She was given acyclovir ointment to use and instructed how to use it.    Discharge Follow Up Recommendations for outpatient labs/diagnostics:  Recommend follow-up with Dr. Perez tomorrow.  I anticipate her MANJEET drain will be removed at this appointment.    Follow-up with oncology.  I recommend basic lab work in 2 weeks to check renal function and electrolytes.  Day of Discharge     HPI:   Ms. Dobbins reports feeling well today.  She denies any issues overnight.  She further denies any fever, chills, shortness of breath, chest pain, abdominal pain, nausea, vomiting.  She reports her drain output has been minimal.  She is eager for discharge.    Review of Systems  Gen- No fevers, chills  CV- No chest pain, palpitations  Resp- No cough, dyspnea  GI- No N/V/D, abd pain      Vital Signs:   Temp:  [98.4 °F (36.9 °C)-99.5 °F (37.5 °C)] 98.4 °F (36.9 °C)  Heart Rate:  [83-97] 83  Resp:  [14-18] 14  BP: (129-145)/(69-80) 140/80     Physical Exam:  Constitutional: No acute distress, awake, alert  HENT: NCAT, mucous membranes moist  Respiratory: Clear to auscultation bilaterally, respiratory effort normal   Cardiovascular: RRR, no murmurs, rubs, or gallops  Gastrointestinal: Positive bowel sounds,  soft, nontender, nondistended  Psychiatric: Appropriate affect, cooperative  Neurologic: Oriented x 3, Cranial Nerves grossly intact to confrontation, speech clear  Serosanguineous output from MANJEET drain.      Pertinent  and/or Most Recent Results     Results from last 7 days   Lab Units 06/22/20  0533 06/21/20  1224 06/21/20  0606 06/21/20  0106 06/20/20  0618 06/19/20 2007   WBC 10*3/mm3 6.98 5.81  --   --  8.61 12.48*   HEMOGLOBIN g/dL 8.8* 8.5*  --   --  8.5* 9.7*   HEMATOCRIT % 28.1* 26.8*  --   --  27.0* 30.5*   PLATELETS 10*3/mm3 280 252  --   --  217 281   SODIUM mmol/L 137 138 140  --  136 135*   POTASSIUM mmol/L 4.4 4.1 3.0* 3.9 3.0* 3.7   CHLORIDE mmol/L 97* 95* 106  --  97* 92*   CO2 mmol/L 25.0 30.0* 24.0  --  29.0 28.0   BUN mg/dL 10 10 8  --  16 13   CREATININE mg/dL 0.76 0.76 0.53*  --  1.29* 1.15*   GLUCOSE mg/dL 151* 168* 113*  --  160* 199*   CALCIUM mg/dL 9.1 9.2 6.9*  --  8.6 9.4     Results from last 7 days   Lab Units 06/19/20 2007   BILIRUBIN mg/dL 0.4   ALK PHOS U/L 62   ALT (SGPT) U/L 26   AST (SGOT) U/L 24           Invalid input(s): TG, LDLCALC, LDLREALC  Results from last 7 days   Lab Units 06/20/20  0051 06/20/20  0033 06/19/20 2007   PROCALCITONIN ng/mL  --  1.20*  --    LACTATE mmol/L 2.0  --  2.6*       Brief Urine Lab Results  (Last result in the past 365 days)      Color   Clarity   Blood   Leuk Est   Nitrite   Protein   CREAT   Urine HCG        06/19/20 2321 Dark Yellow Cloudy Negative Large (3+) Negative 30 mg/dL (1+)               Microbiology Results Abnormal     Procedure Component Value - Date/Time    Blood Culture - Blood, Arm, Left [[id number]] Collected:  06/19/20 2300    Lab Status:  Preliminary result Specimen:  Blood from Arm, Left Updated:  06/22/20 0316     Blood Culture No growth at 2 days    Blood Culture - Blood, Arm, Right [[id number]] Collected:  06/19/20 2304    Lab Status:  Preliminary result Specimen:  Blood from Arm, Right Updated:  06/22/20 0315     Blood  Culture No growth at 2 days    Urine Culture - Urine, Urine, Clean Catch [[id number]] Collected:  06/19/20 2321    Lab Status:  Final result Specimen:  Urine, Clean Catch Updated:  06/21/20 0954     Urine Culture >100,000 CFU/mL Mixed Erika Isolated    Narrative:       Specimen contains mixed organisms of questionable pathogenicity which indicates contamination with commensal erika.  Further identification is unlikely to provide clinically useful information.  Suggest recollection.    COVID-19,CEPHEID,MANUEL IN-HOUSE(OR EMERGENT/ADD-ON),NP SWAB IN TRANSPORT MEDIA 3-4 HR TAT - Swab, Nasopharynx [[id number]]  (Normal) Collected:  06/20/20 0146    Lab Status:  Final result Specimen:  Swab from Nasopharynx Updated:  06/20/20 0312     COVID19 Not Detected          Imaging Results (All)     Procedure Component Value Units Date/Time    CT Abdomen Pelvis Without Contrast [[id number]] Collected:  06/20/20 0126     Updated:  06/20/20 0128    Narrative:       CT Abdomen Pelvis WO    INDICATION:   54-year-old female with generalized abdominal pain and right flank pain today.    TECHNIQUE:   CT of the abdomen and pelvis without IV contrast. Coronal and sagittal reconstructions were obtained.  Radiation dose reduction techniques included automated exposure control or exposure modulation based on body size. Count of known CT and cardiac nuc  med studies performed in previous 12 months: 0.     COMPARISON:   None available.    FINDINGS:  Visualized lung bases are unremarkable.    Abdomen:   The liver is within normal limits. The spleen and pancreas are normal. Cholecystectomy. Both adrenal glands are normal. Nonobstructing right intrarenal calcification. Minimal stranding in the bilateral perinephric fat, nonspecific and possibly chronic.  No hydronephrosis. Abdominal aorta normal in course and caliber. Small bowel is unremarkable without obstruction. Normal appendix. The colon is unremarkable. No free fluid or free air.    Pelvis:   The  urinary bladder is unremarkable.  The uterus and adnexa are within normal limits. IUD. No free pelvic fluid.    No acute osseous abnormality.        Impression:         1. No acute abdominal or pelvic findings.  2. Nonobstructing right intrarenal calcification. Minimal stranding in the bilateral perinephric fat. This is nonspecific and possibly chronic. Correlation with urinalysis to exclude urinary tract infection.  3. Cholecystectomy.  4. Normal appendix.  5. IUD.          Signer Name: Jassi Diaz MD   Signed: 6/20/2020 1:26 AM   Workstation Name: [facility]    [facility]iology [facility]    CT Chest Without Contrast [[id number]] Collected:  06/19/20 2328     Updated:  06/19/20 2331    Narrative:       CT Chest WO    INDICATION:   54-year-old female with fever status post bilateral mastectomies performed 6/16/2020.    TECHNIQUE:   CT of the chest without IV contrast. Coronal and sagittal reformatted images. Radiation dose reduction techniques included automated exposure control or exposure modulation based on body size. Count of known CT and cardiac nuc med studies performed in  previous 12 months: 0.     COMPARISON:   None available.    FINDINGS:   Thoracic aorta normal in course and caliber. Heart size is normal. No pericardial effusion.    No pleural effusion. No pneumothorax. No focal pulmonary infiltrates.    Visualized upper abdomen is unremarkable.    No acute osseous abnormality. Postsurgical changes from recent bilateral mastectomies. Surgical drains in place. No abnormal soft tissue fluid collections. Surgical clips in the right axillary region.      Impression:         1. Stable postsurgical changes from recent bilateral mastectomies. No abnormal soft tissue fluid collections.  2. No other acute chest findings.    Signer Name: Jassi Diaz MD   Signed: 6/19/2020 11:28 PM   Workstation Name: [facility]    Radiology [facility]                    Results for orders placed  during the hospital encounter of 03/19/20   Adult Transthoracic Echo Complete W/ Cont if Necessary Per Protocol    Addendum · Left ventricular systolic function is normal. Estimated EF = 60%. · The global Longitudinal LV strain = -20.7%. · Estimated EF = 60%.        Sander Jeffers MD 3/19/2020 12:01 PM          Narrative · Left ventricular systolic function is normal. Estimated EF = 60%.  · The global Longitudinal LV strain = -20.7%.          Plan for Follow-up of Pending Labs/Results:    Order Current Status    Blood Culture - Blood, Arm, Left Preliminary result    Blood Culture - Blood, Arm, Right Preliminary result        Discharge Details        Discharge Medications      New Medications      Instructions Start Date   acetaminophen 325 MG tablet  Commonly known as:  TYLENOL   650 mg, Oral, Every 4 Hours PRN      acyclovir 5 % ointment  Commonly known as:  Zovirax   Apply ointment topically (using a finger cot or glove) every 3 hours (6 times per day) for cold sore outbreak.      amoxicillin-clavulanate 875-125 MG per tablet  Commonly known as:  Augmentin   1 tablet, Oral, 2 Times Daily      ferrous sulfate 325 (65 FE) MG tablet   325 mg, Oral, Daily With Breakfast   Start Date:  June 23, 2020     lactobacillus acidophilus capsule capsule   1 capsule, Oral, Daily   Start Date:  June 23, 2020     melatonin 5 MG tablet tablet   5 mg, Oral, Nightly PRN         Changes to Medications      Instructions Start Date   lidocaine-prilocaine 2.5-2.5 % cream  Commonly known as:  EMLA  What changed:  how much to take   Topical, As Needed         Continue These Medications      Instructions Start Date   atenolol 50 MG tablet  Commonly known as:  TENORMIN   50 mg, Oral, 2 times daily      dexamethasone 4 MG tablet  Commonly known as:  DECADRON   Take 2 tablets in the morning daily on Days 2, 3 & 4.  Take with food.      diphenoxylate-atropine 2.5-0.025 MG per tablet  Commonly known as:  Lomotil   1 tablet, Oral, Every 6 Hours  PRN, 1 tablet       hydroCHLOROthiazide 25 MG tablet  Commonly known as:  HYDRODIURIL   25 mg, Oral, Daily      Loratadine 10 MG capsule   10 mg, Oral, Every Morning      metFORMIN 1000 MG tablet  Commonly known as:  GLUCOPHAGE   1,000 mg, Oral, 2 Times Daily      modafinil 200 MG tablet  Commonly known as:  PROVIGIL   200 mg, Oral, Daily      MULTIVITAMIN ADULT PO   1 dose, Oral, Every Morning      ondansetron 8 MG tablet  Commonly known as:  ZOFRAN   8 mg, Oral, 3 Times Daily PRN      oxyCODONE 5 MG immediate release tablet  Commonly known as:  ROXICODONE   5 mg, Oral, Every 6 Hours PRN      potassium chloride ER 20 MEQ tablet controlled-release ER tablet  Commonly known as:  K-TAB   20 mEq, Oral, Daily      venlafaxine XR 75 MG 24 hr capsule  Commonly known as:  EFFEXOR-XR   75 mg, Oral, Daily             No Known Allergies      Discharge Disposition:  Home or Self Care    Diet:  Hospital:  Diet Order   Procedures   • Diet Regular; Consistent Carbohydrate       Activity:  as tolerated     Restrictions or Other Recommendations:   Follow up as scheduled       CODE STATUS:    Code Status and Medical Interventions:   Ordered at: 06/19/20 0792     Level Of Support Discussed With:    Patient     Code Status:    CPR     Medical Interventions (Level of Support Prior to Arrest):    Full       No future appointments.    Additional Instructions for the Follow-ups that You Need to Schedule     Discharge Follow-up with Specified Provider: Follow up with Dr. Juarez.   As directed      To:  Follow up with Dr. Juarez.         Discharge Follow-up with Specified Provider: Follow up with surgery, Dr. Perez tomorrow.   As directed      To:  Follow up with Dr. Chris caba tomorrow.                     Electronically signed by Anamaria Arora PA-C, 06/22/20, 11:25 AM.      Time Spent on Discharge:  I spent  35 minutes on this discharge activity which included: face-to-face encounter with the patient, reviewing the data in the system,  coordination of the care with the nursing staff as well as consultants, documentation, and entering orders.            Electronically signed by Shea Le MD at 06/22/20 9337

## 2020-06-23 NOTE — OUTREACH NOTE
Prep Survey      Responses   [facility] patient discharged from?  [place]   Is LACE score < 7 ?  No   Eligibility  Readm Mgmt   Discharge diagnosis  Acute UTI, DM II, anemia, anxiety and depression, essential HTN, Malignant neoplasm of breast   COVID-19 Test Status  Negative   Does the patient have one of the following disease processes/diagnoses(primary or secondary)?  Other   Does the patient have Home health ordered?  No   Is there a DME ordered?  No   Comments regarding appointments  Pt to schedule F/U with PCP   Medication alerts for this patient  see AVS   General alerts for this patient  Pt recently had bilateral mastectomy was discharged day prior to this admission   Prep survey completed?  Yes          Ana Navarrete RN

## 2020-06-24 ENCOUNTER — TELEPHONE (OUTPATIENT)
Dept: ONCOLOGY | Facility: CLINIC | Age: 55
End: 2020-06-24

## 2020-06-24 NOTE — TELEPHONE ENCOUNTER
DORA DENISE PT'S DAUGHTER CALLED TO SCHEDULE FOLLOW UP APPT WITH DR IQBAL. HE SAID ONCE SHE HAD HER DOUBLE MASTECTOMY ON 6/16/20 TO GIVE HIM A CALL TO START HER NEXT ROUNDS OF CHEMO.    PLEASE GIVE DORA A CALL [phone].

## 2020-06-25 LAB
BACTERIA SPEC AEROBE CULT: NORMAL
BACTERIA SPEC AEROBE CULT: NORMAL

## 2020-06-26 ENCOUNTER — READMISSION MANAGEMENT (OUTPATIENT)
Dept: CALL CENTER | Facility: HOSPITAL | Age: 55
End: 2020-06-26

## 2020-06-26 NOTE — OUTREACH NOTE
Medical Week 1 Survey      Responses   [facility] patient discharged from?  [place]   COVID-19 Test Status  Negative   Does the patient have one of the following disease processes/diagnoses(primary or secondary)?  Other   Is there a successful TCM telephone encounter documented?  No   Week 1 attempt successful?  Yes   Call start time  [id number]   Rescheduled  Rescheduled-pt requested [Has appt]   General alerts for this patient  Pt recently had bilateral mastectomy was discharged day prior to this admission   Discharge diagnosis  Acute UTI, DM II, anemia, anxiety and depression, essential HTN, Malignant neoplasm of breast          Hoda Shahid RN

## 2020-06-29 ENCOUNTER — READMISSION MANAGEMENT (OUTPATIENT)
Dept: CALL CENTER | Facility: HOSPITAL | Age: 55
End: 2020-06-29

## 2020-06-29 NOTE — OUTREACH NOTE
Medical Week 1 Survey      Responses   [facility] patient discharged from?  [place]   COVID-19 Test Status  Negative   Does the patient have one of the following disease processes/diagnoses(primary or secondary)?  Other   Is there a successful TCM telephone encounter documented?  No   Week 1 attempt successful?  No   Rescheduled  Revoked [No answer to rescheduled call.]          Hoda Shahid RN

## 2020-07-06 ENCOUNTER — OFFICE VISIT (OUTPATIENT)
Dept: ONCOLOGY | Facility: CLINIC | Age: 55
End: 2020-07-06

## 2020-07-06 VITALS
WEIGHT: 169 LBS | RESPIRATION RATE: 18 BRPM | HEART RATE: 82 BPM | OXYGEN SATURATION: 98 % | SYSTOLIC BLOOD PRESSURE: 160 MMHG | BODY MASS INDEX: 31.1 KG/M2 | TEMPERATURE: 97 F | DIASTOLIC BLOOD PRESSURE: 91 MMHG | HEIGHT: 62 IN

## 2020-07-06 DIAGNOSIS — C50.411 MALIGNANT NEOPLASM OF UPPER-OUTER QUADRANT OF RIGHT BREAST IN FEMALE, ESTROGEN RECEPTOR NEGATIVE (HCC): ICD-10-CM

## 2020-07-06 DIAGNOSIS — Z17.1 MALIGNANT NEOPLASM OF UPPER-OUTER QUADRANT OF RIGHT BREAST IN FEMALE, ESTROGEN RECEPTOR NEGATIVE (HCC): ICD-10-CM

## 2020-07-06 PROCEDURE — 99214 OFFICE O/P EST MOD 30 MIN: CPT | Performed by: INTERNAL MEDICINE

## 2020-07-06 RX ORDER — POTASSIUM CHLORIDE 1500 MG/1
TABLET, FILM COATED, EXTENDED RELEASE ORAL
Qty: 30 TABLET | Refills: 1 | Status: SHIPPED | OUTPATIENT
Start: 2020-07-06 | End: 2020-09-03

## 2020-07-06 RX ORDER — DIPHENHYDRAMINE HYDROCHLORIDE 50 MG/ML
50 INJECTION INTRAMUSCULAR; INTRAVENOUS AS NEEDED
Status: CANCELLED | OUTPATIENT
Start: 2020-07-31

## 2020-07-06 RX ORDER — SODIUM CHLORIDE 9 MG/ML
250 INJECTION, SOLUTION INTRAVENOUS ONCE
Status: CANCELLED | OUTPATIENT
Start: 2020-07-31

## 2020-07-06 RX ORDER — SODIUM CHLORIDE 9 MG/ML
250 INJECTION, SOLUTION INTRAVENOUS ONCE
Status: CANCELLED | OUTPATIENT
Start: 2020-08-07

## 2020-07-06 RX ORDER — DIPHENHYDRAMINE HYDROCHLORIDE 50 MG/ML
50 INJECTION INTRAMUSCULAR; INTRAVENOUS AS NEEDED
Status: CANCELLED | OUTPATIENT
Start: 2020-08-07

## 2020-07-06 RX ORDER — FAMOTIDINE 10 MG/ML
20 INJECTION, SOLUTION INTRAVENOUS AS NEEDED
Status: CANCELLED | OUTPATIENT
Start: 2020-08-07

## 2020-07-06 RX ORDER — FAMOTIDINE 10 MG/ML
20 INJECTION, SOLUTION INTRAVENOUS AS NEEDED
Status: CANCELLED | OUTPATIENT
Start: 2020-07-31

## 2020-07-06 RX ORDER — POTASSIUM CHLORIDE 1500 MG/1
TABLET, FILM COATED, EXTENDED RELEASE ORAL
Qty: 90 TABLET | OUTPATIENT
Start: 2020-07-06

## 2020-07-06 RX ORDER — FAMOTIDINE 10 MG/ML
20 INJECTION, SOLUTION INTRAVENOUS ONCE
Status: CANCELLED | OUTPATIENT
Start: 2020-07-31

## 2020-07-06 RX ORDER — FAMOTIDINE 10 MG/ML
20 INJECTION, SOLUTION INTRAVENOUS ONCE
Status: CANCELLED | OUTPATIENT
Start: 2020-08-07

## 2020-07-06 NOTE — PROGRESS NOTES
CHIEF COMPLAINT: Adjuvant therapy breast cancer    Problem List:  Oncology/Hematology History    1.  Stage IIB T2 (2.5cm per Dr. Perez) N0 invasive ductal triple negative breast cancer.  2/16/2020 right upper outer quadrant needle core biopsy showed invasive ductal carcinoma 4.4 mm moderately differentiated negative for estrogen, progesterone receptor both 0% and HER-2/vashti 0+.  Dr. Santo recommended lumpectomy and sentinel node biopsy.  3/12/2020 saw Dr. Gary Perez.perimenopausal breast cancer she felt a 2.5 cm right upper outer quadrant mass almost at the edge of the axilla somewhat fixed.  No skin changes.  She recommended neoadjuvant chemotherapy followed by lumpectomy versus mastectomy then radiation if patient decides on breast conservation or if nodes are found on breast MRI.  She will get formal genetic counseling to help guide the issues of possible prophylactic mastectomy.  With triple negative disease the standard recommendation would be neoadjuvant Adriamycin Cytoxan dose dense x4 followed by Taxol weekly x12 followed by surgery.  If there is residual disease then would consider Xeloda per the create X trial.  We also have  looking at observation versus Keytruda for 1 year in patients with triple negative disease who do not have a complete pathological response to neoadjuvant chemotherapy.  In the absence of symptoms, NCCN guidelines does not recommend routine metastatic surveillance imaging.  Will need ejection fraction before Adriamycin.  -3/24/2020 through 5/4/2020 Adriamycin Cytoxan x4.  Opted out of neoadjuvant Taxol  -6/16/2020 left breast simple mastectomy benign  Right breast simple mastectomy showed residual invasive ductal carcinoma 9 mm with limited DCIS high-grade and negative margins.  0 out of 1 sentinel nodes positive.  Grade 7 out of 9.  Pathologic T1 BN 0.  Postoperative course complicated by localized infections.   -7/6/2020 [facility] medical oncology follow-up visit: Given  residual disease after 4 courses of AC I would want her to complete the Taxol as planned.  There is also new data presented at [facility] that suggests adjuvant Xeloda following adjuvant chemotherapy is reasonable and triple negative disease.  This is not precisely what she is doing as she has now done part of this neoadjuvant point but I think it makes sense to apply in this setting given residual disease after AC x4 but I would not forego the Taxol but will plan on adding Xeloda.  Reinforced the need for genetic testing salivary kit that has been sent to her but she has not completed  2.  Hypertension  3.  Hyperlipidemia  4.  Diabetes  5.  Using IUD and taking estradiol for hormone replacement therapy preoperatively  6.  History of D and C as well as bilateral tubal ligation  7.  History of cholecystectomy  8.  Vasomotor symptoms  9.  Family history of paternal aunt with breast cancer and perhaps a sister with ovarian cancer but she is not sure.  She has nulliparous.  Genetic spit test done results pending.    10.  Anxiety and depression        Malignant neoplasm of upper outer quadrant of breast in female, estrogen receptor negative (CMS/HCC)    3/12/2020 Initial Diagnosis     Malignant neoplasm of upper outer quadrant of breast in female, estrogen receptor negative (CMS/HCC)      3/12/2020 Cancer Staged     Staging form: Breast, AJCC 8th Edition  - Clinical: Stage IIB (cT2, cN0, cM0, G2, ER-, CO-, HER2-) - Signed by Johann Juarez MD on 3/12/2020      3/24/2020 - 5/4/2020 Chemotherapy     OP BREAST AC DD DOXOrubicin / Cyclophosphamide        5/6/2020 -  Chemotherapy     OP SUPPORTIVE HYDRATION + ANTIEMETICS      6/16/2020 Surgery     Surgery       -6/16/2020 left breast simple mastectomy benign  Right breast simple mastectomy showed residual invasive ductal carcinoma 9 mm with limited DCIS high-grade and negative margins.  0 out of 1 sentinel nodes positive.  Grade 7 out of 9.  Pathologic T1 BN 0      7/6/2020 Cancer  Staged     Staging form: Breast, AJCC 8th Edition  - Pathologic: Stage IB (pT1b, pN0, cM0, G2, ER-, OK-, HER2-) - Signed by Johann Juarez MD on 7/6/2020        Malignant neoplasm of upper-outer quadrant of right breast in female, estrogen receptor negative (CMS/HCC)    3/12/2020 Initial Diagnosis     Malignant neoplasm of upper-outer quadrant of right breast in female, estrogen receptor negative (CMS/HCC)      3/24/2020 - 5/18/2020 Chemotherapy     OP BREAST AC DD DOXOrubicin / Cyclophosphamide      7/23/2020 -  Chemotherapy     OP BREAST PACLitaxel (weekly X 12)         HISTORY OF PRESENT ILLNESS:  The patient is a 54 y.o. female, here for follow up on management of adjuvant therapy breast cancer.  Now status post surgery bilateral mastectomy.  See above for pathology review results.  There was 9 mm residual with sentinel node negative right breast.      Past Medical History:   Diagnosis Date   • Cancer (CMS/HCC) 02/2020    breast cancer per mammogram; chemotherapy    • Diabetes (CMS/HCC)    • History of low potassium 03/2020    prescribed potassium supplements    • History of transfusion 1989    after delivery of son (1 unit only)    • HTN (hypertension)    • Low magnesium level 03/2020    requiring magnesium supplements    • Sleep apnea     no cpap machine currently     Past Surgical History:   Procedure Laterality Date   • BREAST BIOPSY Right 02/2020   • CHOLECYSTECTOMY     • COLONOSCOPY     • LAPAROSCOPIC TUBAL LIGATION     • MASTECTOMY W/ SENTINEL NODE BIOPSY Bilateral 6/16/2020    Procedure: SKIN SPARING MASTECTOMIES BILATERAL AND RIGHT SENTINEL NODE BIOPSY;  Surgeon: Gary Perez MD;  Location: [facility];  Service: General;  Laterality: Bilateral;   • VENOUS ACCESS DEVICE (PORT) INSERTION  02/2020       No Known Allergies    Family History and Social History reviewed and changed as necessary      REVIEW OF SYSTEM:   Review of Systems   Constitutional: Negative for appetite change, chills, diaphoresis,  "fatigue, fever and unexpected weight change.   HENT:   Negative for mouth sores, sore throat and trouble swallowing.    Eyes: Negative for icterus.   Respiratory: Negative for cough, hemoptysis and shortness of breath.    Cardiovascular: Negative for chest pain, leg swelling and palpitations.   Gastrointestinal: Negative for abdominal distention, abdominal pain, blood in stool, constipation, diarrhea, nausea and vomiting.   Endocrine: Negative for hot flashes.   Genitourinary: Negative for bladder incontinence, difficulty urinating, dysuria, frequency and hematuria.    Musculoskeletal: Negative for gait problem, neck pain and neck stiffness.   Skin: Negative for rash.   Neurological: Negative for dizziness, gait problem, headaches, light-headedness and numbness.   Hematological: Negative for adenopathy. Does not bruise/bleed easily.   Psychiatric/Behavioral: Negative for depression. The patient is not nervous/anxious.    All other systems reviewed and are negative.       PHYSICAL EXAM    Vitals:    07/06/20 1120   BP: 160/91   Pulse: 82   Resp: 18   Temp: 97 °F (36.1 °C)   SpO2: 98%   Weight: 76.7 kg (169 lb)   Height: 157.5 cm (62\")     Vitals:    07/06/20 1120   PainSc: 0-No pain        Constitutional: Appears well-developed and well-nourished. No distress.   ECOG: (1) Restricted in Physically Strenuous Activity, Ambulatory & Able to Do Work of Light Nature  HENT:   Head: Normocephalic.   Mouth/Throat: Oropharynx is clear and moist.   Eyes: Conjunctivae are normal. Pupils are equal, round, and reactive to light. No scleral icterus.   Neck: Neck supple. No JVD present. No thyromegaly present.   Cardiovascular: Normal rate, regular rhythm and normal heart sounds.    Pulmonary/Chest: Breath sounds normal. No respiratory distress.   Abdominal: Soft. Exhibits no distension and no mass. There is no hepatosplenomegaly. There is no tenderness. There is no rebound and no guarding.   Musculoskeletal:Exhibits no edema, " tenderness or deformity.   Neurological: Alert and oriented to person, place, and time. Exhibits normal muscle tone.   Skin: No ecchymosis, no petechiae and no rash noted. Not diaphoretic. No cyanosis. Nails show no clubbing.   Psychiatric: Normal mood and affect.   Vitals reviewed.      Lab Results   Component Value Date    HGB 8.8 (L) 06/22/2020    HCT 28.1 (L) 06/22/2020    MCV 98.6 (H) 06/22/2020     06/22/2020    WBC 6.98 06/22/2020    NEUTROABS 4.35 06/22/2020    LYMPHSABS 1.35 06/22/2020    MONOSABS 0.91 (H) 06/22/2020    EOSABS 0.28 06/22/2020    BASOSABS 0.06 06/22/2020       Lab Results   Component Value Date    GLUCOSE 151 (H) 06/22/2020    BUN 10 06/22/2020    CREATININE 0.76 06/22/2020     06/22/2020    K 4.4 06/22/2020    CL 97 (L) 06/22/2020    CO2 25.0 06/22/2020    CALCIUM 9.1 06/22/2020    PROTEINTOT 7.6 06/19/2020    ALBUMIN 4.70 06/19/2020    BILITOT 0.4 06/19/2020    ALKPHOS 62 06/19/2020    AST 24 06/19/2020    ALT 26 06/19/2020                   ASSESSMENT & PLAN:    1.  Stage IIB T2 (2.5cm per Dr. Perez) N0 invasive ductal triple negative breast cancer.  2/16/2020 right upper outer quadrant needle core biopsy showed invasive ductal carcinoma 4.4 mm moderately differentiated negative for estrogen, progesterone receptor both 0% and HER-2/vashti 0+.  Dr. Santo recommended lumpectomy and sentinel node biopsy.  3/12/2020 saw Dr. Gary Perez.perimenopausal breast cancer she felt a 2.5 cm right upper outer quadrant mass almost at the edge of the axilla somewhat fixed.  No skin changes.  She recommended neoadjuvant chemotherapy followed by lumpectomy versus mastectomy then radiation if patient decides on breast conservation or if nodes are found on breast MRI.  She will get formal genetic counseling to help guide the issues of possible prophylactic mastectomy.  With triple negative disease the standard recommendation would be neoadjuvant Adriamycin Cytoxan dose dense x4 followed by  Taxol weekly x12 followed by surgery.  If there is residual disease then would consider Xeloda per the create X trial.  We also have  looking at observation versus Keytruda for 1 year in patients with triple negative disease who do not have a complete pathological response to neoadjuvant chemotherapy.  In the absence of symptoms, NCCN guidelines does not recommend routine metastatic surveillance imaging.  Will need ejection fraction before Adriamycin.  -3/24/2020 through 5/4/2020 Adriamycin Cytoxan x4.  Opted out of neoadjuvant Taxol  -6/16/2020 left breast simple mastectomy benign  Right breast simple mastectomy showed residual invasive ductal carcinoma 9 mm with limited DCIS high-grade and negative margins.  0 out of 1 sentinel nodes positive.  Grade 7 out of 9.  Pathologic T1 BN 0.  Postoperative course complicated by localized infections.   -7/6/2020 [facility] medical oncology follow-up visit: Given residual disease after 4 courses of AC I would want her to complete the Taxol as planned.  There is also new data presented at [facility] that suggests adjuvant Xeloda following adjuvant chemotherapy is reasonable and triple negative disease.  This is not precisely what she is doing as she has now done part of this neoadjuvant point but I think it makes sense to apply in this setting given residual disease after AC x4 but I would not forego the Taxol but will plan on adding Xeloda.  Reinforced the need for genetic testing salivary kit that has been sent to her but she has not completed  2.  Post chemotherapeutic and postoperative anemia   3.  Family history of paternal aunt with breast cancer and perhaps a sister with ovarian cancer but she is not sure.  She is nulliparous.  Genetic spit test done results pending.      [place] with patient face-to-face 30 minutes greater than 50% spent counseling regarding this plan as outlined above.    Johann Juarez MD    07/06/2020

## 2020-07-07 ENCOUNTER — CLINICAL SUPPORT (OUTPATIENT)
Dept: GENETICS | Facility: HOSPITAL | Age: 55
End: 2020-07-07

## 2020-07-07 ENCOUNTER — LAB (OUTPATIENT)
Dept: LAB | Facility: HOSPITAL | Age: 55
End: 2020-07-07

## 2020-07-07 ENCOUNTER — TRANSCRIBE ORDERS (OUTPATIENT)
Dept: LAB | Facility: HOSPITAL | Age: 55
End: 2020-07-07

## 2020-07-07 DIAGNOSIS — Z17.1 MALIGNANT NEOPLASM OF RIGHT BREAST IN FEMALE, ESTROGEN RECEPTOR NEGATIVE, UNSPECIFIED SITE OF BREAST (HCC): ICD-10-CM

## 2020-07-07 DIAGNOSIS — C50.411 MALIGNANT NEOPLASM OF UPPER-OUTER QUADRANT OF RIGHT FEMALE BREAST, UNSPECIFIED ESTROGEN RECEPTOR STATUS (HCC): ICD-10-CM

## 2020-07-07 DIAGNOSIS — C50.911 MALIGNANT NEOPLASM OF RIGHT BREAST IN FEMALE, ESTROGEN RECEPTOR NEGATIVE, UNSPECIFIED SITE OF BREAST (HCC): ICD-10-CM

## 2020-07-07 DIAGNOSIS — Z13.79 GENETIC TESTING: Primary | ICD-10-CM

## 2020-07-07 DIAGNOSIS — C50.411 MALIGNANT NEOPLASM OF UPPER-OUTER QUADRANT OF RIGHT FEMALE BREAST, UNSPECIFIED ESTROGEN RECEPTOR STATUS (HCC): Primary | ICD-10-CM

## 2020-07-07 DIAGNOSIS — Z80.3 FAMILY HISTORY OF BREAST CANCER: ICD-10-CM

## 2020-07-07 LAB — C DIFF TOX GENS STL QL NAA+PROBE: NOT DETECTED

## 2020-07-07 PROCEDURE — 87493 C DIFF AMPLIFIED PROBE: CPT

## 2020-07-07 NOTE — PROGRESS NOTES
Patient was initially seen for genetic counseling via telephone consult on 4/23/2020. A saliva kit was mailed to her home from [facility], however the testing lab never received her sample. She was seen today for blood draw for genetic testing. Results are expected in 2-3 weeks and she will be contacted by telephone to discuss at that time.     Shea Hull MS, [facility] [place], [facility]  Licensed Certified Genetic Counselor

## 2020-07-22 ENCOUNTER — TELEPHONE (OUTPATIENT)
Dept: ONCOLOGY | Facility: CLINIC | Age: 55
End: 2020-07-22

## 2020-07-22 NOTE — TELEPHONE ENCOUNTER
HUB UNABLE TO REACH THE OFFICE.    PT WANTS TO CANCEL HER 7/23 INFUSION APPT.    SHE WILL NOT RESCHEDULE. SHE WILL START HER FIRST INFUSION AT HER NEXT APPT ON 7/31.    BEST CALL BACK # [phone]

## 2020-07-23 ENCOUNTER — HOSPITAL ENCOUNTER (OUTPATIENT)
Dept: ONCOLOGY | Facility: HOSPITAL | Age: 55
Setting detail: INFUSION SERIES
End: 2020-07-23

## 2020-07-31 ENCOUNTER — EDUCATION (OUTPATIENT)
Dept: ONCOLOGY | Facility: HOSPITAL | Age: 55
End: 2020-07-31

## 2020-07-31 ENCOUNTER — HOSPITAL ENCOUNTER (OUTPATIENT)
Dept: ONCOLOGY | Facility: HOSPITAL | Age: 55
Setting detail: INFUSION SERIES
Discharge: HOME | End: 2020-07-31

## 2020-07-31 ENCOUNTER — APPOINTMENT (OUTPATIENT)
Dept: ONCOLOGY | Facility: HOSPITAL | Age: 55
End: 2020-07-31

## 2020-07-31 ENCOUNTER — DOCUMENTATION (OUTPATIENT)
Dept: NUTRITION | Facility: HOSPITAL | Age: 55
End: 2020-07-31

## 2020-07-31 VITALS
WEIGHT: 169 LBS | HEART RATE: 70 BPM | RESPIRATION RATE: 16 BRPM | HEIGHT: 62 IN | SYSTOLIC BLOOD PRESSURE: 149 MMHG | BODY MASS INDEX: 31.1 KG/M2 | TEMPERATURE: 97.2 F | DIASTOLIC BLOOD PRESSURE: 80 MMHG

## 2020-07-31 DIAGNOSIS — C50.411 MALIGNANT NEOPLASM OF UPPER-OUTER QUADRANT OF RIGHT BREAST IN FEMALE, ESTROGEN RECEPTOR NEGATIVE (HCC): Primary | ICD-10-CM

## 2020-07-31 DIAGNOSIS — Z17.1 MALIGNANT NEOPLASM OF UPPER-OUTER QUADRANT OF RIGHT BREAST IN FEMALE, ESTROGEN RECEPTOR NEGATIVE (HCC): Primary | ICD-10-CM

## 2020-07-31 DIAGNOSIS — C50.419 MALIGNANT NEOPLASM OF UPPER OUTER QUADRANT OF BREAST IN FEMALE, ESTROGEN RECEPTOR NEGATIVE, UNSPECIFIED LATERALITY (HCC): ICD-10-CM

## 2020-07-31 DIAGNOSIS — Z17.1 MALIGNANT NEOPLASM OF UPPER OUTER QUADRANT OF BREAST IN FEMALE, ESTROGEN RECEPTOR NEGATIVE, UNSPECIFIED LATERALITY (HCC): ICD-10-CM

## 2020-07-31 LAB
ALBUMIN SERPL-MCNC: 4.2 G/DL (ref 3.5–5.2)
ALBUMIN/GLOB SERPL: 1.4 G/DL
ALP SERPL-CCNC: 64 U/L (ref 39–117)
ALT SERPL W P-5'-P-CCNC: 19 U/L (ref 1–33)
ANION GAP SERPL CALCULATED.3IONS-SCNC: 13 MMOL/L (ref 5–15)
AST SERPL-CCNC: 18 U/L (ref 1–32)
BILIRUB SERPL-MCNC: <0.2 MG/DL (ref 0–1.2)
BUN SERPL-MCNC: 14 MG/DL (ref 6–20)
BUN/CREAT SERPL: 17.9 (ref 7–25)
CALCIUM SPEC-SCNC: 9.1 MG/DL (ref 8.6–10.5)
CHLORIDE SERPL-SCNC: 98 MMOL/L (ref 98–107)
CO2 SERPL-SCNC: 28 MMOL/L (ref 22–29)
CREAT SERPL-MCNC: 0.78 MG/DL (ref 0.57–1)
ERYTHROCYTE [DISTWIDTH] IN BLOOD BY AUTOMATED COUNT: 14.5 % (ref 12.3–15.4)
GFR SERPL CREATININE-BSD FRML MDRD: 77 ML/MIN/1.73
GLOBULIN UR ELPH-MCNC: 3 GM/DL
GLUCOSE SERPL-MCNC: 209 MG/DL (ref 65–99)
HCT VFR BLD AUTO: 35.3 % (ref 34–46.6)
HGB BLD-MCNC: 11.1 G/DL (ref 12–15.9)
LYMPHOCYTES # BLD AUTO: 1.7 10*3/MM3 (ref 0.7–3.1)
LYMPHOCYTES NFR BLD AUTO: 33.2 % (ref 19.6–45.3)
MCH RBC QN AUTO: 29.1 PG (ref 26.6–33)
MCHC RBC AUTO-ENTMCNC: 31.5 G/DL (ref 31.5–35.7)
MCV RBC AUTO: 92.4 FL (ref 79–97)
MONOCYTES # BLD AUTO: 0.3 10*3/MM3 (ref 0.1–0.9)
MONOCYTES NFR BLD AUTO: 6 % (ref 5–12)
NEUTROPHILS NFR BLD AUTO: 3 10*3/MM3 (ref 1.7–7)
NEUTROPHILS NFR BLD AUTO: 60.8 % (ref 42.7–76)
PLATELET # BLD AUTO: 299 10*3/MM3 (ref 140–450)
PMV BLD AUTO: 7.1 FL (ref 6–12)
POTASSIUM SERPL-SCNC: 3.9 MMOL/L (ref 3.5–5.2)
PROT SERPL-MCNC: 7.2 G/DL (ref 6–8.5)
RBC # BLD AUTO: 3.82 10*6/MM3 (ref 3.77–5.28)
SODIUM SERPL-SCNC: 139 MMOL/L (ref 136–145)
WBC # BLD AUTO: 5 10*3/MM3 (ref 3.4–10.8)

## 2020-07-31 PROCEDURE — 80053 COMPREHEN METABOLIC PANEL: CPT | Performed by: INTERNAL MEDICINE

## 2020-07-31 PROCEDURE — 85025 COMPLETE CBC W/AUTO DIFF WBC: CPT | Performed by: INTERNAL MEDICINE

## 2020-07-31 PROCEDURE — 25010000003 HEPARIN LOCK FLUSH PER 10 UNITS: Performed by: INTERNAL MEDICINE

## 2020-07-31 PROCEDURE — 25010000002 DEXAMETHASONE SODIUM PHOSPHATE 20 MG/5ML SOLUTION: Performed by: INTERNAL MEDICINE

## 2020-07-31 PROCEDURE — 25010000002 PACLITAXEL PER 30 MG: Performed by: INTERNAL MEDICINE

## 2020-07-31 PROCEDURE — 25010000002 DIPHENHYDRAMINE PER 50 MG: Performed by: INTERNAL MEDICINE

## 2020-07-31 PROCEDURE — 96375 TX/PRO/DX INJ NEW DRUG ADDON: CPT

## 2020-07-31 PROCEDURE — 96413 CHEMO IV INFUSION 1 HR: CPT

## 2020-07-31 RX ORDER — SODIUM CHLORIDE 0.9 % (FLUSH) 0.9 %
10 SYRINGE (ML) INJECTION AS NEEDED
Status: CANCELLED | OUTPATIENT
Start: 2020-07-31

## 2020-07-31 RX ORDER — FAMOTIDINE 10 MG/ML
20 INJECTION, SOLUTION INTRAVENOUS ONCE
Status: COMPLETED | OUTPATIENT
Start: 2020-07-31 | End: 2020-07-31

## 2020-07-31 RX ORDER — HEPARIN SODIUM (PORCINE) LOCK FLUSH IV SOLN 100 UNIT/ML 100 UNIT/ML
500 SOLUTION INTRAVENOUS AS NEEDED
Status: CANCELLED | OUTPATIENT
Start: 2020-07-31

## 2020-07-31 RX ORDER — HEPARIN SODIUM (PORCINE) LOCK FLUSH IV SOLN 100 UNIT/ML 100 UNIT/ML
500 SOLUTION INTRAVENOUS AS NEEDED
Status: DISCONTINUED | OUTPATIENT
Start: 2020-07-31 | End: 2020-08-01 | Stop reason: HOSPADM

## 2020-07-31 RX ORDER — SODIUM CHLORIDE 9 MG/ML
250 INJECTION, SOLUTION INTRAVENOUS ONCE
Status: COMPLETED | OUTPATIENT
Start: 2020-07-31 | End: 2020-07-31

## 2020-07-31 RX ADMIN — FAMOTIDINE 20 MG: 10 INJECTION, SOLUTION INTRAVENOUS at 14:02

## 2020-07-31 RX ADMIN — DEXAMETHASONE SODIUM PHOSPHATE 12 MG: 4 INJECTION, SOLUTION INTRAMUSCULAR; INTRAVENOUS at 14:05

## 2020-07-31 RX ADMIN — SODIUM CHLORIDE 250 ML: 9 INJECTION, SOLUTION INTRAVENOUS at 14:02

## 2020-07-31 RX ADMIN — FAMOTIDINE 20 MG: 10 INJECTION, SOLUTION INTRAVENOUS at 14:03

## 2020-07-31 RX ADMIN — HEPARIN 500 UNITS: 100 SYRINGE at 15:45

## 2020-07-31 RX ADMIN — SODIUM CHLORIDE 150 MG: 9 INJECTION, SOLUTION INTRAVENOUS at 14:36

## 2020-07-31 RX ADMIN — DIPHENHYDRAMINE HYDROCHLORIDE 50 MG: 50 INJECTION INTRAMUSCULAR; INTRAVENOUS at 14:02

## 2020-07-31 NOTE — PLAN OF CARE
Outpatient Infusion • [address] • [address] • [place] [id number] • [phone]      CHEMOTHERAPY EDUCATION SHEET    NAME:  Kenya Lemon      : 1965           DATE: 20    Booklets Given: Chemotherapy and You []  Eating Hints []    Sexuality/Fertility Books []     Chemotherapy/Biotherapy Education Sheets: (list all that apply)   Paclitaxel                                                                                                                                                                Chemotherapy Regimen:   paclitaxel weekly x 12 cycles    TOPICS EDUCATION PROVIDED EDUCATION REINFORCED COMMENTS   ANEMIA:  role of RBC, cause, s/s, ways to manage, role of transfusion [x] [] Discussed role of RBC and risk of anemia. Reviewed s/sx of anemia, including fatigue. Encouraged exercise to combat fatigue   THROMBOCYTOPENIA:  role of platelet, cause, s/s, ways to prevent bleeding, things to avoid, when to seek help [x] [] Decreased platelets can increase risk of bleeding. Counseled on signs/symptoms of thrombocytopenia and when to call MD   NEUTROPENIA:  role of WBC, cause, infection precautions, s/s of infection, when to call MD [x] [] Low WBC can increase risk of infection. Counseled on preventing infection and to call MD if temperature reaches 100.4 or higher. Discussed appropriate hand hygiene, avoiding sick contacts, and use of neutropenic precautions when needed   NUTRITION & APPETITE CHANGES:  importance of maintaining healthy diet & weight, ways to manage to improve intake, dietary consult, exercise regimen [x] [] Discussed risk of decreased appetite, reviewed plan for small more frequent meals.   DIARRHEA:  causes, s/s of dehydration, ways to manage, dietary changes, when to call MD [x] [] Can use OTC loperamide, but call MD if 4-6 episodes in 24 hours not relieved by OTC loperamide    CONSTIPATION:  causes, ways to manage, dietary changes, when to call MD [x] [] Discussed  risk of constipation associated with antiemetic regimen. Reviewed over the counter use of stool softners and stimulants as needed should constipation present as a problem.    NAUSEA & VOMITING:  cause, use of antiemetics, dietary changes, when to call MD [x] [] Reviewed risk of N/V, counseled on prn ondansetron and to call MD if taking the max dose and unrelieved.      MOUTH SORES:  causes, oral care, ways to manage [x] [] Discussed preventative measures such as salt/soda rinse, ice during infusion, use of soft brissel tooth brush, avoidance of acidic foods, and avoiding alcohol based mouth rinses   ALOPECIA:  cause, ways to manage, resources [x] [] Discussed temporary hair loss.    INFERTILITY & SEXUALITY:  causes, fertility preservation options, sexuality changes, ways to manage, importance of birth control [x] [] Safe sex practices with two forms of birth control including condom discussed   NERVOUS SYSTEM CHANGES:  causes, s/s, neuropathies, cognitive changes, ways to manage [x] [] Discussed risk peripheral neuropathy associated with paclitaxel , reviewed incidence of neuropathy increases with subsequent dosing.    PAIN:  causes, ways to manage [x] []    SKIN & NAIL CHANGES:  cause, s/s, ways to manage [x] [] Discussed possbility of nail changes such as darkening of nails. Reviewed to avoid manicures during therapy.    ORGAN TOXICITIES:  cause, s/s, need for diagnostic tests, labs, when to notify MD [x] []  Discussed risk of infusion reaction with paclitaxel and the need for pre-medications.   SURVIVORSHIP:  distress, distress assessment, secondary malignancies, early/late effects, follow-up, social issues, social support [] []    HOME CARE:  use of spill kits, storing of PO chemo, how to manage bodily fluids [x] [] Counseled on management of soiled linens and proper flush technique.  Discussed how to manage all the side effects at home and advised when to contact the MD office. Reinforced adequate hydration  with 2-3L a day for 1-2 days after every 2 week infusion. Discussed red body fluid 1-2 days after treatment.   MISCELLANEOUS:  drug interactions, administration, vesicant, et [x] [] DDI: none  Discussed frequency and length of infusion of medication.       Referrals:        Notes:   Discussed aforementioned material with patient in the clinic. All questions and concerns addressed. Provided patient with my contact information and instructions to call should additional questions arise. Provided patient with personalized treatment calendar and written educational material.

## 2020-08-05 ENCOUNTER — TELEPHONE (OUTPATIENT)
Dept: ONCOLOGY | Facility: CLINIC | Age: 55
End: 2020-08-05

## 2020-08-05 NOTE — TELEPHONE ENCOUNTER
Patient is suppose to have chemo every week and she doesn't having any chemo appt for aug 7th       Call patient back at [phone]

## 2020-08-07 ENCOUNTER — HOSPITAL ENCOUNTER (OUTPATIENT)
Dept: ONCOLOGY | Facility: HOSPITAL | Age: 55
Setting detail: INFUSION SERIES
Discharge: HOME | End: 2020-08-07

## 2020-08-07 VITALS
DIASTOLIC BLOOD PRESSURE: 91 MMHG | BODY MASS INDEX: 30.73 KG/M2 | HEIGHT: 62 IN | SYSTOLIC BLOOD PRESSURE: 165 MMHG | HEART RATE: 78 BPM | RESPIRATION RATE: 16 BRPM | WEIGHT: 167 LBS | TEMPERATURE: 97.4 F

## 2020-08-07 DIAGNOSIS — Z17.1 MALIGNANT NEOPLASM OF UPPER-OUTER QUADRANT OF RIGHT BREAST IN FEMALE, ESTROGEN RECEPTOR NEGATIVE (HCC): Primary | ICD-10-CM

## 2020-08-07 DIAGNOSIS — C50.419 MALIGNANT NEOPLASM OF UPPER OUTER QUADRANT OF BREAST IN FEMALE, ESTROGEN RECEPTOR NEGATIVE, UNSPECIFIED LATERALITY (HCC): ICD-10-CM

## 2020-08-07 DIAGNOSIS — Z17.1 MALIGNANT NEOPLASM OF UPPER OUTER QUADRANT OF BREAST IN FEMALE, ESTROGEN RECEPTOR NEGATIVE, UNSPECIFIED LATERALITY (HCC): ICD-10-CM

## 2020-08-07 DIAGNOSIS — C50.411 MALIGNANT NEOPLASM OF UPPER-OUTER QUADRANT OF RIGHT BREAST IN FEMALE, ESTROGEN RECEPTOR NEGATIVE (HCC): Primary | ICD-10-CM

## 2020-08-07 LAB
ALBUMIN SERPL-MCNC: 4 G/DL (ref 3.5–5.2)
ALBUMIN/GLOB SERPL: 1.5 G/DL
ALP SERPL-CCNC: 68 U/L (ref 39–117)
ALT SERPL W P-5'-P-CCNC: 15 U/L (ref 1–33)
ANION GAP SERPL CALCULATED.3IONS-SCNC: 13 MMOL/L (ref 5–15)
AST SERPL-CCNC: 14 U/L (ref 1–32)
BILIRUB SERPL-MCNC: <0.2 MG/DL (ref 0–1.2)
BUN SERPL-MCNC: 18 MG/DL (ref 6–20)
BUN/CREAT SERPL: 23.1 (ref 7–25)
CALCIUM SPEC-SCNC: 9 MG/DL (ref 8.6–10.5)
CHLORIDE SERPL-SCNC: 102 MMOL/L (ref 98–107)
CO2 SERPL-SCNC: 24 MMOL/L (ref 22–29)
CREAT SERPL-MCNC: 0.78 MG/DL (ref 0.57–1)
ERYTHROCYTE [DISTWIDTH] IN BLOOD BY AUTOMATED COUNT: 13.9 % (ref 12.3–15.4)
GFR SERPL CREATININE-BSD FRML MDRD: 77 ML/MIN/1.73
GLOBULIN UR ELPH-MCNC: 2.6 GM/DL
GLUCOSE SERPL-MCNC: 171 MG/DL (ref 65–99)
HCT VFR BLD AUTO: 34.1 % (ref 34–46.6)
HGB BLD-MCNC: 10.9 G/DL (ref 12–15.9)
LYMPHOCYTES # BLD AUTO: 1.6 10*3/MM3 (ref 0.7–3.1)
LYMPHOCYTES NFR BLD AUTO: 42.6 % (ref 19.6–45.3)
MCH RBC QN AUTO: 29.4 PG (ref 26.6–33)
MCHC RBC AUTO-ENTMCNC: 32 G/DL (ref 31.5–35.7)
MCV RBC AUTO: 91.8 FL (ref 79–97)
MONOCYTES # BLD AUTO: 0.3 10*3/MM3 (ref 0.1–0.9)
MONOCYTES NFR BLD AUTO: 9.1 % (ref 5–12)
NEUTROPHILS NFR BLD AUTO: 1.8 10*3/MM3 (ref 1.7–7)
NEUTROPHILS NFR BLD AUTO: 48.3 % (ref 42.7–76)
PLATELET # BLD AUTO: 303 10*3/MM3 (ref 140–450)
PMV BLD AUTO: 7.3 FL (ref 6–12)
POTASSIUM SERPL-SCNC: 4.1 MMOL/L (ref 3.5–5.2)
PROT SERPL-MCNC: 6.6 G/DL (ref 6–8.5)
RBC # BLD AUTO: 3.72 10*6/MM3 (ref 3.77–5.28)
SODIUM SERPL-SCNC: 139 MMOL/L (ref 136–145)
WBC # BLD AUTO: 3.8 10*3/MM3 (ref 3.4–10.8)

## 2020-08-07 PROCEDURE — 85025 COMPLETE CBC W/AUTO DIFF WBC: CPT | Performed by: INTERNAL MEDICINE

## 2020-08-07 PROCEDURE — 96413 CHEMO IV INFUSION 1 HR: CPT

## 2020-08-07 PROCEDURE — 25010000002 DIPHENHYDRAMINE PER 50 MG: Performed by: INTERNAL MEDICINE

## 2020-08-07 PROCEDURE — 96375 TX/PRO/DX INJ NEW DRUG ADDON: CPT

## 2020-08-07 PROCEDURE — 25010000002 PACLITAXEL PER 30 MG: Performed by: INTERNAL MEDICINE

## 2020-08-07 PROCEDURE — 80053 COMPREHEN METABOLIC PANEL: CPT | Performed by: INTERNAL MEDICINE

## 2020-08-07 PROCEDURE — 25010000003 HEPARIN LOCK FLUSH PER 10 UNITS: Performed by: INTERNAL MEDICINE

## 2020-08-07 PROCEDURE — 25010000002 DEXAMETHASONE SODIUM PHOSPHATE 120 MG/30ML SOLUTION: Performed by: INTERNAL MEDICINE

## 2020-08-07 RX ORDER — HEPARIN SODIUM (PORCINE) LOCK FLUSH IV SOLN 100 UNIT/ML 100 UNIT/ML
500 SOLUTION INTRAVENOUS AS NEEDED
Status: CANCELLED | OUTPATIENT
Start: 2020-08-07

## 2020-08-07 RX ORDER — FAMOTIDINE 10 MG/ML
20 INJECTION, SOLUTION INTRAVENOUS ONCE
Status: COMPLETED | OUTPATIENT
Start: 2020-08-07 | End: 2020-08-07

## 2020-08-07 RX ORDER — SODIUM CHLORIDE 9 MG/ML
250 INJECTION, SOLUTION INTRAVENOUS ONCE
Status: COMPLETED | OUTPATIENT
Start: 2020-08-07 | End: 2020-08-07

## 2020-08-07 RX ORDER — SODIUM CHLORIDE 0.9 % (FLUSH) 0.9 %
10 SYRINGE (ML) INJECTION AS NEEDED
Status: CANCELLED | OUTPATIENT
Start: 2020-08-07

## 2020-08-07 RX ORDER — HEPARIN SODIUM (PORCINE) LOCK FLUSH IV SOLN 100 UNIT/ML 100 UNIT/ML
500 SOLUTION INTRAVENOUS AS NEEDED
Status: DISCONTINUED | OUTPATIENT
Start: 2020-08-07 | End: 2020-08-08 | Stop reason: HOSPADM

## 2020-08-07 RX ADMIN — DIPHENHYDRAMINE HYDROCHLORIDE 25 MG: 50 INJECTION INTRAMUSCULAR; INTRAVENOUS at 11:10

## 2020-08-07 RX ADMIN — DEXAMETHASONE SODIUM PHOSPHATE 12 MG: 4 INJECTION, SOLUTION INTRA-ARTICULAR; INTRALESIONAL; INTRAMUSCULAR; INTRAVENOUS; SOFT TISSUE at 11:10

## 2020-08-07 RX ADMIN — HEPARIN 500 UNITS: 100 SYRINGE at 12:55

## 2020-08-07 RX ADMIN — SODIUM CHLORIDE 250 ML: 9 INJECTION, SOLUTION INTRAVENOUS at 11:07

## 2020-08-07 RX ADMIN — FAMOTIDINE 20 MG: 10 INJECTION, SOLUTION INTRAVENOUS at 11:10

## 2020-08-07 RX ADMIN — SODIUM CHLORIDE 150 MG: 9 INJECTION, SOLUTION INTRAVENOUS at 11:40

## 2020-08-07 NOTE — ADDENDUM NOTE
Encounter addended by: Dana Liu RN on: 8/7/2020 4:10 PM   Actions taken: Existing LDA added, LDA properties accepted

## 2020-08-14 ENCOUNTER — HOSPITAL ENCOUNTER (OUTPATIENT)
Dept: ONCOLOGY | Facility: HOSPITAL | Age: 55
Setting detail: INFUSION SERIES
Discharge: HOME | End: 2020-08-14

## 2020-08-14 ENCOUNTER — OFFICE VISIT (OUTPATIENT)
Dept: ONCOLOGY | Facility: CLINIC | Age: 55
End: 2020-08-14

## 2020-08-14 VITALS
BODY MASS INDEX: 31.47 KG/M2 | OXYGEN SATURATION: 95 % | TEMPERATURE: 97.7 F | SYSTOLIC BLOOD PRESSURE: 165 MMHG | WEIGHT: 171 LBS | HEART RATE: 88 BPM | HEIGHT: 62 IN | RESPIRATION RATE: 18 BRPM | DIASTOLIC BLOOD PRESSURE: 80 MMHG

## 2020-08-14 VITALS — SYSTOLIC BLOOD PRESSURE: 163 MMHG | DIASTOLIC BLOOD PRESSURE: 83 MMHG | HEART RATE: 90 BPM

## 2020-08-14 DIAGNOSIS — Z17.1 MALIGNANT NEOPLASM OF UPPER OUTER QUADRANT OF BREAST IN FEMALE, ESTROGEN RECEPTOR NEGATIVE, UNSPECIFIED LATERALITY (HCC): ICD-10-CM

## 2020-08-14 DIAGNOSIS — C50.411 MALIGNANT NEOPLASM OF UPPER-OUTER QUADRANT OF RIGHT BREAST IN FEMALE, ESTROGEN RECEPTOR NEGATIVE (HCC): Primary | ICD-10-CM

## 2020-08-14 DIAGNOSIS — Z17.1 MALIGNANT NEOPLASM OF UPPER-OUTER QUADRANT OF RIGHT BREAST IN FEMALE, ESTROGEN RECEPTOR NEGATIVE (HCC): Primary | ICD-10-CM

## 2020-08-14 DIAGNOSIS — C50.419 MALIGNANT NEOPLASM OF UPPER OUTER QUADRANT OF BREAST IN FEMALE, ESTROGEN RECEPTOR NEGATIVE, UNSPECIFIED LATERALITY (HCC): ICD-10-CM

## 2020-08-14 LAB
ALBUMIN SERPL-MCNC: 4.1 G/DL (ref 3.5–5.2)
ALBUMIN/GLOB SERPL: 1.6 G/DL
ALP SERPL-CCNC: 65 U/L (ref 39–117)
ALT SERPL W P-5'-P-CCNC: 14 U/L (ref 1–33)
ANION GAP SERPL CALCULATED.3IONS-SCNC: 10 MMOL/L (ref 5–15)
AST SERPL-CCNC: 16 U/L (ref 1–32)
BILIRUB SERPL-MCNC: <0.2 MG/DL (ref 0–1.2)
BUN SERPL-MCNC: 17 MG/DL (ref 6–20)
BUN/CREAT SERPL: 23.3 (ref 7–25)
CALCIUM SPEC-SCNC: 9.1 MG/DL (ref 8.6–10.5)
CHLORIDE SERPL-SCNC: 100 MMOL/L (ref 98–107)
CO2 SERPL-SCNC: 29 MMOL/L (ref 22–29)
CREAT SERPL-MCNC: 0.73 MG/DL (ref 0.57–1)
ERYTHROCYTE [DISTWIDTH] IN BLOOD BY AUTOMATED COUNT: 14.8 % (ref 12.3–15.4)
GFR SERPL CREATININE-BSD FRML MDRD: 83 ML/MIN/1.73
GLOBULIN UR ELPH-MCNC: 2.5 GM/DL
GLUCOSE SERPL-MCNC: 194 MG/DL (ref 65–99)
HCT VFR BLD AUTO: 32.4 % (ref 34–46.6)
HGB BLD-MCNC: 10.3 G/DL (ref 12–15.9)
LYMPHOCYTES # BLD AUTO: 1.5 10*3/MM3 (ref 0.7–3.1)
LYMPHOCYTES NFR BLD AUTO: 45.6 % (ref 19.6–45.3)
MCH RBC QN AUTO: 28.8 PG (ref 26.6–33)
MCHC RBC AUTO-ENTMCNC: 31.8 G/DL (ref 31.5–35.7)
MCV RBC AUTO: 90.6 FL (ref 79–97)
MONOCYTES # BLD AUTO: 0.3 10*3/MM3 (ref 0.1–0.9)
MONOCYTES NFR BLD AUTO: 8.7 % (ref 5–12)
NEUTROPHILS NFR BLD AUTO: 1.5 10*3/MM3 (ref 1.7–7)
NEUTROPHILS NFR BLD AUTO: 45.7 % (ref 42.7–76)
PLATELET # BLD AUTO: 282 10*3/MM3 (ref 140–450)
PMV BLD AUTO: 7.1 FL (ref 6–12)
POTASSIUM SERPL-SCNC: 3.9 MMOL/L (ref 3.5–5.2)
PROT SERPL-MCNC: 6.6 G/DL (ref 6–8.5)
RBC # BLD AUTO: 3.58 10*6/MM3 (ref 3.77–5.28)
SODIUM SERPL-SCNC: 139 MMOL/L (ref 136–145)
WBC # BLD AUTO: 3.3 10*3/MM3 (ref 3.4–10.8)

## 2020-08-14 PROCEDURE — 25010000002 DEXAMETHASONE SODIUM PHOSPHATE 120 MG/30ML SOLUTION: Performed by: NURSE PRACTITIONER

## 2020-08-14 PROCEDURE — 96375 TX/PRO/DX INJ NEW DRUG ADDON: CPT

## 2020-08-14 PROCEDURE — 85025 COMPLETE CBC W/AUTO DIFF WBC: CPT | Performed by: INTERNAL MEDICINE

## 2020-08-14 PROCEDURE — 80053 COMPREHEN METABOLIC PANEL: CPT | Performed by: INTERNAL MEDICINE

## 2020-08-14 PROCEDURE — 25010000003 HEPARIN LOCK FLUSH PER 10 UNITS: Performed by: INTERNAL MEDICINE

## 2020-08-14 PROCEDURE — 25010000002 PACLITAXEL PER 30 MG: Performed by: NURSE PRACTITIONER

## 2020-08-14 PROCEDURE — 25010000002 DIPHENHYDRAMINE PER 50 MG: Performed by: NURSE PRACTITIONER

## 2020-08-14 PROCEDURE — 99213 OFFICE O/P EST LOW 20 MIN: CPT | Performed by: NURSE PRACTITIONER

## 2020-08-14 PROCEDURE — 96413 CHEMO IV INFUSION 1 HR: CPT

## 2020-08-14 RX ORDER — SODIUM CHLORIDE 9 MG/ML
250 INJECTION, SOLUTION INTRAVENOUS ONCE
Status: CANCELLED | OUTPATIENT
Start: 2020-08-14

## 2020-08-14 RX ORDER — SODIUM CHLORIDE 0.9 % (FLUSH) 0.9 %
10 SYRINGE (ML) INJECTION AS NEEDED
Status: DISCONTINUED | OUTPATIENT
Start: 2020-08-14 | End: 2020-08-15 | Stop reason: HOSPADM

## 2020-08-14 RX ORDER — HEPARIN SODIUM (PORCINE) LOCK FLUSH IV SOLN 100 UNIT/ML 100 UNIT/ML
500 SOLUTION INTRAVENOUS AS NEEDED
Status: CANCELLED | OUTPATIENT
Start: 2020-08-14

## 2020-08-14 RX ORDER — DIPHENHYDRAMINE HYDROCHLORIDE 50 MG/ML
50 INJECTION INTRAMUSCULAR; INTRAVENOUS AS NEEDED
Status: CANCELLED | OUTPATIENT
Start: 2020-08-14

## 2020-08-14 RX ORDER — FAMOTIDINE 10 MG/ML
20 INJECTION, SOLUTION INTRAVENOUS ONCE
Status: CANCELLED | OUTPATIENT
Start: 2020-08-14

## 2020-08-14 RX ORDER — SODIUM CHLORIDE 9 MG/ML
250 INJECTION, SOLUTION INTRAVENOUS ONCE
Status: COMPLETED | OUTPATIENT
Start: 2020-08-14 | End: 2020-08-14

## 2020-08-14 RX ORDER — FAMOTIDINE 10 MG/ML
20 INJECTION, SOLUTION INTRAVENOUS AS NEEDED
Status: CANCELLED | OUTPATIENT
Start: 2020-08-14

## 2020-08-14 RX ORDER — FAMOTIDINE 10 MG/ML
20 INJECTION, SOLUTION INTRAVENOUS ONCE
Status: COMPLETED | OUTPATIENT
Start: 2020-08-14 | End: 2020-08-14

## 2020-08-14 RX ORDER — SODIUM CHLORIDE 0.9 % (FLUSH) 0.9 %
10 SYRINGE (ML) INJECTION AS NEEDED
Status: CANCELLED | OUTPATIENT
Start: 2020-08-14

## 2020-08-14 RX ORDER — HEPARIN SODIUM (PORCINE) LOCK FLUSH IV SOLN 100 UNIT/ML 100 UNIT/ML
500 SOLUTION INTRAVENOUS AS NEEDED
Status: DISCONTINUED | OUTPATIENT
Start: 2020-08-14 | End: 2020-08-15 | Stop reason: HOSPADM

## 2020-08-14 RX ADMIN — SODIUM CHLORIDE, PRESERVATIVE FREE 10 ML: 5 INJECTION INTRAVENOUS at 12:58

## 2020-08-14 RX ADMIN — DIPHENHYDRAMINE HYDROCHLORIDE 25 MG: 50 INJECTION INTRAMUSCULAR; INTRAVENOUS at 11:18

## 2020-08-14 RX ADMIN — SODIUM CHLORIDE 150 MG: 9 INJECTION, SOLUTION INTRAVENOUS at 11:43

## 2020-08-14 RX ADMIN — HEPARIN 500 UNITS: 100 SYRINGE at 12:58

## 2020-08-14 RX ADMIN — FAMOTIDINE 20 MG: 10 INJECTION, SOLUTION INTRAVENOUS at 11:18

## 2020-08-14 RX ADMIN — DEXAMETHASONE SODIUM PHOSPHATE 12 MG: 4 INJECTION, SOLUTION INTRA-ARTICULAR; INTRALESIONAL; INTRAMUSCULAR; INTRAVENOUS; SOFT TISSUE at 11:18

## 2020-08-14 RX ADMIN — SODIUM CHLORIDE 250 ML: 9 INJECTION, SOLUTION INTRAVENOUS at 11:18

## 2020-08-14 NOTE — PROGRESS NOTES
CHIEF COMPLAINT: Adjuvant therapy breast cancer    Problem List:  Oncology/Hematology History    1.  Stage IIB T2 (2.5cm per Dr. Perez) N0 invasive ductal triple negative breast cancer.  2/16/2020 right upper outer quadrant needle core biopsy showed invasive ductal carcinoma 4.4 mm moderately differentiated negative for estrogen, progesterone receptor both 0% and HER-2/vashti 0+.  Dr. Santo recommended lumpectomy and sentinel node biopsy.  3/12/2020 saw Dr. Gary Perez.perimenopausal breast cancer she felt a 2.5 cm right upper outer quadrant mass almost at the edge of the axilla somewhat fixed.  No skin changes.  She recommended neoadjuvant chemotherapy followed by lumpectomy versus mastectomy then radiation if patient decides on breast conservation or if nodes are found on breast MRI.  She will get formal genetic counseling to help guide the issues of possible prophylactic mastectomy.  With triple negative disease the standard recommendation would be neoadjuvant Adriamycin Cytoxan dose dense x4 followed by Taxol weekly x12 followed by surgery.  If there is residual disease then would consider Xeloda per the create X trial.  We also have  looking at observation versus Keytruda for 1 year in patients with triple negative disease who do not have a complete pathological response to neoadjuvant chemotherapy.  In the absence of symptoms, NCCN guidelines does not recommend routine metastatic surveillance imaging.  Will need ejection fraction before Adriamycin.  -3/24/2020 through 5/4/2020 Adriamycin Cytoxan x4.  Opted out of neoadjuvant Taxol  -6/16/2020 left breast simple mastectomy benign  Right breast simple mastectomy showed residual invasive ductal carcinoma 9 mm with limited DCIS high-grade and negative margins.  0 out of 1 sentinel nodes positive.  Grade 7 out of 9.  Pathologic T1 BN 0.  Postoperative course complicated by localized infections.   -7/6/2020 [facility] medical oncology follow-up visit: Given  residual disease after 4 courses of AC I would want her to complete the Taxol as planned.  There is also new data presented at [facility] that suggests adjuvant Xeloda following adjuvant chemotherapy is reasonable and triple negative disease.  This is not precisely what she is doing as she has now done part of this neoadjuvant point but I think it makes sense to apply in this setting given residual disease after AC x4 but I would not forego the Taxol but will plan on adding Xeloda.  Reinforced the need for genetic testing salivary kit that has been sent to her but she has not completed  2.  Hypertension  3.  Hyperlipidemia  4.  Diabetes  5.  Using IUD and taking estradiol for hormone replacement therapy preoperatively  6.  History of D and C as well as bilateral tubal ligation  7.  History of cholecystectomy  8.  Vasomotor symptoms  9.  Family history of paternal aunt with breast cancer and perhaps a sister with ovarian cancer but she is not sure.  She has nulliparous.  Genetic spit test done results pending.    10.  Anxiety and depression        Malignant neoplasm of upper outer quadrant of breast in female, estrogen receptor negative (CMS/HCC)    3/12/2020 Initial Diagnosis     Malignant neoplasm of upper outer quadrant of breast in female, estrogen receptor negative (CMS/HCC)      3/12/2020 Cancer Staged     Staging form: Breast, AJCC 8th Edition  - Clinical: Stage IIB (cT2, cN0, cM0, G2, ER-, KS-, HER2-) - Signed by Johann Juarez MD on 3/12/2020      3/24/2020 - 5/4/2020 Chemotherapy     OP BREAST AC DD DOXOrubicin / Cyclophosphamide        5/6/2020 -  Chemotherapy     OP SUPPORTIVE HYDRATION + ANTIEMETICS      6/16/2020 Surgery     Surgery       -6/16/2020 left breast simple mastectomy benign  Right breast simple mastectomy showed residual invasive ductal carcinoma 9 mm with limited DCIS high-grade and negative margins.  0 out of 1 sentinel nodes positive.  Grade 7 out of 9.  Pathologic T1 BN 0      7/6/2020 Cancer  Staged     Staging form: Breast, AJCC 8th Edition  - Pathologic: Stage IB (pT1b, pN0, cM0, G2, ER-, PA-, HER2-) - Signed by Johann Juarez MD on 7/6/2020 7/31/2020 -  Chemotherapy     OP CENTRAL VENOUS ACCESS DEVICE ACCESS, CARE, AND MAINTENANCE (CVAD)        Malignant neoplasm of upper-outer quadrant of right breast in female, estrogen receptor negative (CMS/HCC)    3/12/2020 Initial Diagnosis     Malignant neoplasm of upper-outer quadrant of right breast in female, estrogen receptor negative (CMS/HCC)      3/24/2020 - 5/18/2020 Chemotherapy     OP BREAST AC DD DOXOrubicin / Cyclophosphamide      7/31/2020 -  Chemotherapy     OP BREAST PACLitaxel (weekly X 12)         HISTORY OF PRESENT ILLNESS:  The patient is a 54 y.o. female, here for follow up on management of adjuvant therapy breast cancer.  She is status post surgery bilateral mastectomy with 9 mm residual and sentinel node negative right breast.  She has received 2 cycles of Taxol and is tolerating it quite well.  She has some fatigue and intermittent neuropathy that is stable.        Past Medical History:   Diagnosis Date   • Cancer (CMS/HCC) 02/2020    breast cancer per mammogram; chemotherapy    • Diabetes (CMS/HCC)    • History of low potassium 03/2020    prescribed potassium supplements    • History of transfusion 1989    after delivery of son (1 unit only)    • HTN (hypertension)    • Low magnesium level 03/2020    requiring magnesium supplements    • Sleep apnea     no cpap machine currently     Past Surgical History:   Procedure Laterality Date   • BREAST BIOPSY Right 02/2020   • CHOLECYSTECTOMY     • COLONOSCOPY     • LAPAROSCOPIC TUBAL LIGATION     • MASTECTOMY W/ SENTINEL NODE BIOPSY Bilateral 6/16/2020    Procedure: SKIN SPARING MASTECTOMIES BILATERAL AND RIGHT SENTINEL NODE BIOPSY;  Surgeon: Gary Perez MD;  Location: [facility];  Service: General;  Laterality: Bilateral;   • VENOUS ACCESS DEVICE (PORT) INSERTION  02/2020       No Known  "Allergies    Family History and Social History reviewed and changed as necessary      REVIEW OF SYSTEM:   Review of Systems   Constitutional: Negative for appetite change, chills, diaphoresis, fatigue, fever and unexpected weight change.   HENT:   Negative for mouth sores, sore throat and trouble swallowing.    Eyes: Negative for icterus.   Respiratory: Negative for cough, hemoptysis and shortness of breath.    Cardiovascular: Negative for chest pain, leg swelling and palpitations.   Gastrointestinal: Negative for abdominal distention, abdominal pain, blood in stool, constipation, diarrhea, nausea and vomiting.   Endocrine: Negative for hot flashes.   Genitourinary: Negative for bladder incontinence, difficulty urinating, dysuria, frequency and hematuria.    Musculoskeletal: Negative for gait problem, neck pain and neck stiffness.   Skin: Negative for rash.   Neurological: Negative for dizziness, gait problem, headaches, light-headedness and numbness.   Hematological: Negative for adenopathy. Does not bruise/bleed easily.   Psychiatric/Behavioral: Negative for depression. The patient is not nervous/anxious.    All other systems reviewed and are negative.       PHYSICAL EXAM    Vitals:    08/14/20 1000   BP: 165/80   Pulse: 88   Resp: 18   Temp: 97.7 °F (36.5 °C)   TempSrc: Temporal   SpO2: 95%   Weight: 77.6 kg (171 lb)   Height: 157.5 cm (62\")     Vitals:    08/14/20 1000   PainSc: 0-No pain        Constitutional: Appears well-developed and well-nourished. No distress.   ECOG: (1) Restricted in Physically Strenuous Activity, Ambulatory & Able to Do Work of Light Nature  HENT:   Head: Normocephalic.   Mouth/Throat: Oropharynx is clear and moist.   Eyes: Conjunctivae are normal. Pupils are equal, round, and reactive to light. No scleral icterus.   Neck: Neck supple. No JVD present. No thyromegaly present.   Cardiovascular: Normal rate, regular rhythm and normal heart sounds.    Pulmonary/Chest: Breath sounds normal. " No respiratory distress.   Abdominal: Soft. Exhibits no distension and no mass. There is no hepatosplenomegaly. There is no tenderness. There is no rebound and no guarding.   Musculoskeletal:Exhibits no edema, tenderness or deformity.   Neurological: Alert and oriented to person, place, and time. Exhibits normal muscle tone.   Skin: No ecchymosis, no petechiae and no rash noted. Not diaphoretic. No cyanosis. Nails show no clubbing.   Psychiatric: Normal mood and affect.   Vitals reviewed.      Lab Results   Component Value Date    HGB 10.3 (L) 08/14/2020    HCT 32.4 (L) 08/14/2020    MCV 90.6 08/14/2020     08/14/2020    WBC 3.30 (L) 08/14/2020    NEUTROABS 1.50 (L) 08/14/2020    LYMPHSABS 1.50 08/14/2020    MONOSABS 0.30 08/14/2020    EOSABS 0.28 06/22/2020    BASOSABS 0.06 06/22/2020       Lab Results   Component Value Date    GLUCOSE 171 (H) 08/07/2020    BUN 18 08/07/2020    CREATININE 0.78 08/07/2020     08/07/2020    K 4.1 08/07/2020     08/07/2020    CO2 24.0 08/07/2020    CALCIUM 9.0 08/07/2020    PROTEINTOT 6.6 08/07/2020    ALBUMIN 4.00 08/07/2020    BILITOT <0.2 08/07/2020    ALKPHOS 68 08/07/2020    AST 14 08/07/2020    ALT 15 08/07/2020         ASSESSMENT & PLAN:    1.  Stage IIB invasive ductal triple negative breast cancer status post neoadjuvant Adriamycin Cytoxan x4, completed on 5/4/2020.  She opted out of neoadjuvant Taxol and had bilateral mastectomy on 6/16/2020.  Pathology showed residual invasive ductal carcinoma 9 mm with limited DCIS high-grade and negative margins.  0 out of 1 sentinel nodes positive.  Grade 7 out of 9.  Pathological T1 BN 0.  Postoperative course complicated by localized infections.  Started adjuvant weekly Taxol on 7/31/2020.  2.  Family history of paternal aunt with breast cancer and perhaps a sister with ovarian cancer but she is not sure.  She is nulliparous.  Genetic spit test done results pending.    Discussion:  She is tolerating adjuvant weekly  Taxol quite well.  She has received 2 out of 12 planned cycles.  She does have some fatigue and intermittent neuropathy that is stable.  We will proceed with cycle #3 today and weekly cycles as scheduled.  We will see her back in 3 weeks but discussed for her to contact us if symptoms arise and we will see her sooner.  New data presented at [facility] suggests adjuvant Xeloda following adjuvant chemotherapy is reasonable in triple negative disease.  We plan to add Xeloda after she has completed her Taxol.  Patient seen by genetics on 7/7/2020 and had blood drawn for testing.      Valerie Paula, AUDI  08/14/2020

## 2020-08-20 DIAGNOSIS — C50.411 MALIGNANT NEOPLASM OF UPPER-OUTER QUADRANT OF RIGHT BREAST IN FEMALE, ESTROGEN RECEPTOR NEGATIVE (HCC): ICD-10-CM

## 2020-08-20 DIAGNOSIS — Z17.1 MALIGNANT NEOPLASM OF UPPER-OUTER QUADRANT OF RIGHT BREAST IN FEMALE, ESTROGEN RECEPTOR NEGATIVE (HCC): ICD-10-CM

## 2020-08-20 RX ORDER — SODIUM CHLORIDE 9 MG/ML
250 INJECTION, SOLUTION INTRAVENOUS ONCE
Status: CANCELLED | OUTPATIENT
Start: 2020-08-28

## 2020-08-20 RX ORDER — FAMOTIDINE 10 MG/ML
20 INJECTION, SOLUTION INTRAVENOUS ONCE
Status: CANCELLED | OUTPATIENT
Start: 2020-08-28

## 2020-08-20 RX ORDER — SODIUM CHLORIDE 9 MG/ML
250 INJECTION, SOLUTION INTRAVENOUS ONCE
Status: CANCELLED | OUTPATIENT
Start: 2020-08-21

## 2020-08-20 RX ORDER — FAMOTIDINE 10 MG/ML
20 INJECTION, SOLUTION INTRAVENOUS AS NEEDED
Status: CANCELLED | OUTPATIENT
Start: 2020-08-21

## 2020-08-20 RX ORDER — DIPHENHYDRAMINE HYDROCHLORIDE 50 MG/ML
50 INJECTION INTRAMUSCULAR; INTRAVENOUS AS NEEDED
Status: CANCELLED | OUTPATIENT
Start: 2020-08-28

## 2020-08-20 RX ORDER — FAMOTIDINE 10 MG/ML
20 INJECTION, SOLUTION INTRAVENOUS AS NEEDED
Status: CANCELLED | OUTPATIENT
Start: 2020-08-28

## 2020-08-20 RX ORDER — FAMOTIDINE 10 MG/ML
20 INJECTION, SOLUTION INTRAVENOUS ONCE
Status: CANCELLED | OUTPATIENT
Start: 2020-08-21

## 2020-08-20 RX ORDER — DIPHENHYDRAMINE HYDROCHLORIDE 50 MG/ML
50 INJECTION INTRAMUSCULAR; INTRAVENOUS AS NEEDED
Status: CANCELLED | OUTPATIENT
Start: 2020-08-21

## 2020-08-21 ENCOUNTER — HOSPITAL ENCOUNTER (OUTPATIENT)
Dept: ONCOLOGY | Facility: HOSPITAL | Age: 55
Setting detail: INFUSION SERIES
Discharge: HOME | End: 2020-08-21

## 2020-08-21 VITALS
SYSTOLIC BLOOD PRESSURE: 158 MMHG | WEIGHT: 171 LBS | HEIGHT: 62 IN | HEART RATE: 72 BPM | RESPIRATION RATE: 16 BRPM | TEMPERATURE: 97.7 F | DIASTOLIC BLOOD PRESSURE: 72 MMHG | BODY MASS INDEX: 31.47 KG/M2

## 2020-08-21 DIAGNOSIS — C50.419 MALIGNANT NEOPLASM OF UPPER OUTER QUADRANT OF BREAST IN FEMALE, ESTROGEN RECEPTOR NEGATIVE, UNSPECIFIED LATERALITY (HCC): Primary | ICD-10-CM

## 2020-08-21 DIAGNOSIS — Z17.1 MALIGNANT NEOPLASM OF UPPER OUTER QUADRANT OF BREAST IN FEMALE, ESTROGEN RECEPTOR NEGATIVE, UNSPECIFIED LATERALITY (HCC): Primary | ICD-10-CM

## 2020-08-21 DIAGNOSIS — C50.411 MALIGNANT NEOPLASM OF UPPER-OUTER QUADRANT OF RIGHT BREAST IN FEMALE, ESTROGEN RECEPTOR NEGATIVE (HCC): Primary | ICD-10-CM

## 2020-08-21 DIAGNOSIS — C50.419 MALIGNANT NEOPLASM OF UPPER OUTER QUADRANT OF BREAST IN FEMALE, ESTROGEN RECEPTOR NEGATIVE, UNSPECIFIED LATERALITY (HCC): ICD-10-CM

## 2020-08-21 DIAGNOSIS — Z17.1 MALIGNANT NEOPLASM OF UPPER-OUTER QUADRANT OF RIGHT BREAST IN FEMALE, ESTROGEN RECEPTOR NEGATIVE (HCC): Primary | ICD-10-CM

## 2020-08-21 DIAGNOSIS — C50.411 MALIGNANT NEOPLASM OF UPPER-OUTER QUADRANT OF RIGHT BREAST IN FEMALE, ESTROGEN RECEPTOR NEGATIVE (HCC): ICD-10-CM

## 2020-08-21 DIAGNOSIS — Z17.1 MALIGNANT NEOPLASM OF UPPER-OUTER QUADRANT OF RIGHT BREAST IN FEMALE, ESTROGEN RECEPTOR NEGATIVE (HCC): ICD-10-CM

## 2020-08-21 DIAGNOSIS — Z17.1 MALIGNANT NEOPLASM OF UPPER OUTER QUADRANT OF BREAST IN FEMALE, ESTROGEN RECEPTOR NEGATIVE, UNSPECIFIED LATERALITY (HCC): ICD-10-CM

## 2020-08-21 LAB
ALBUMIN SERPL-MCNC: 4.1 G/DL (ref 3.5–5.2)
ALBUMIN/GLOB SERPL: 1.7 G/DL
ALP SERPL-CCNC: 56 U/L (ref 39–117)
ALT SERPL W P-5'-P-CCNC: 13 U/L (ref 1–33)
ANION GAP SERPL CALCULATED.3IONS-SCNC: 11 MMOL/L (ref 5–15)
AST SERPL-CCNC: 14 U/L (ref 1–32)
BILIRUB SERPL-MCNC: <0.2 MG/DL (ref 0–1.2)
BUN SERPL-MCNC: 18 MG/DL (ref 6–20)
BUN/CREAT SERPL: 25.4 (ref 7–25)
CALCIUM SPEC-SCNC: 8.9 MG/DL (ref 8.6–10.5)
CHLORIDE SERPL-SCNC: 103 MMOL/L (ref 98–107)
CO2 SERPL-SCNC: 25 MMOL/L (ref 22–29)
CREAT SERPL-MCNC: 0.71 MG/DL (ref 0.57–1)
ERYTHROCYTE [DISTWIDTH] IN BLOOD BY AUTOMATED COUNT: 14.8 % (ref 12.3–15.4)
GFR SERPL CREATININE-BSD FRML MDRD: 86 ML/MIN/1.73
GLOBULIN UR ELPH-MCNC: 2.4 GM/DL
GLUCOSE SERPL-MCNC: 180 MG/DL (ref 65–99)
HCT VFR BLD AUTO: 33.4 % (ref 34–46.6)
HGB BLD-MCNC: 11 G/DL (ref 12–15.9)
LYMPHOCYTES # BLD AUTO: 1.8 10*3/MM3 (ref 0.7–3.1)
LYMPHOCYTES NFR BLD AUTO: 49.3 % (ref 19.6–45.3)
MCH RBC QN AUTO: 29.5 PG (ref 26.6–33)
MCHC RBC AUTO-ENTMCNC: 32.8 G/DL (ref 31.5–35.7)
MCV RBC AUTO: 89.9 FL (ref 79–97)
MONOCYTES # BLD AUTO: 0.3 10*3/MM3 (ref 0.1–0.9)
MONOCYTES NFR BLD AUTO: 8.2 % (ref 5–12)
NEUTROPHILS NFR BLD AUTO: 1.5 10*3/MM3 (ref 1.7–7)
NEUTROPHILS NFR BLD AUTO: 42.5 % (ref 42.7–76)
PLATELET # BLD AUTO: 329 10*3/MM3 (ref 140–450)
PMV BLD AUTO: 6.8 FL (ref 6–12)
POTASSIUM SERPL-SCNC: 4.3 MMOL/L (ref 3.5–5.2)
PROT SERPL-MCNC: 6.5 G/DL (ref 6–8.5)
RBC # BLD AUTO: 3.72 10*6/MM3 (ref 3.77–5.28)
SODIUM SERPL-SCNC: 139 MMOL/L (ref 136–145)
WBC # BLD AUTO: 3.6 10*3/MM3 (ref 3.4–10.8)

## 2020-08-21 PROCEDURE — 96360 HYDRATION IV INFUSION INIT: CPT

## 2020-08-21 PROCEDURE — 36591 DRAW BLOOD OFF VENOUS DEVICE: CPT

## 2020-08-21 PROCEDURE — 96367 TX/PROPH/DG ADDL SEQ IV INF: CPT

## 2020-08-21 PROCEDURE — 80053 COMPREHEN METABOLIC PANEL: CPT | Performed by: NURSE PRACTITIONER

## 2020-08-21 PROCEDURE — 96368 THER/DIAG CONCURRENT INF: CPT

## 2020-08-21 PROCEDURE — 25010000002 PACLITAXEL PER 30 MG: Performed by: NURSE PRACTITIONER

## 2020-08-21 PROCEDURE — 25010000003 HEPARIN LOCK FLUSH PER 10 UNITS: Performed by: INTERNAL MEDICINE

## 2020-08-21 PROCEDURE — 25010000002 DEXAMETHASONE SODIUM PHOSPHATE 120 MG/30ML SOLUTION: Performed by: NURSE PRACTITIONER

## 2020-08-21 PROCEDURE — 96375 TX/PRO/DX INJ NEW DRUG ADDON: CPT

## 2020-08-21 PROCEDURE — 96374 THER/PROPH/DIAG INJ IV PUSH: CPT

## 2020-08-21 PROCEDURE — 85025 COMPLETE CBC W/AUTO DIFF WBC: CPT | Performed by: NURSE PRACTITIONER

## 2020-08-21 PROCEDURE — 96413 CHEMO IV INFUSION 1 HR: CPT

## 2020-08-21 PROCEDURE — 25010000002 DIPHENHYDRAMINE PER 50 MG: Performed by: NURSE PRACTITIONER

## 2020-08-21 RX ORDER — FAMOTIDINE 10 MG/ML
20 INJECTION, SOLUTION INTRAVENOUS ONCE
Status: CANCELLED | OUTPATIENT
Start: 2020-09-04

## 2020-08-21 RX ORDER — FAMOTIDINE 10 MG/ML
20 INJECTION, SOLUTION INTRAVENOUS ONCE
Status: COMPLETED | OUTPATIENT
Start: 2020-08-21 | End: 2020-08-21

## 2020-08-21 RX ORDER — SODIUM CHLORIDE 9 MG/ML
250 INJECTION, SOLUTION INTRAVENOUS ONCE
Status: COMPLETED | OUTPATIENT
Start: 2020-08-21 | End: 2020-08-21

## 2020-08-21 RX ORDER — HEPARIN SODIUM (PORCINE) LOCK FLUSH IV SOLN 100 UNIT/ML 100 UNIT/ML
500 SOLUTION INTRAVENOUS AS NEEDED
Status: CANCELLED | OUTPATIENT
Start: 2020-08-21

## 2020-08-21 RX ORDER — GABAPENTIN 300 MG/1
300 CAPSULE ORAL
Qty: 30 CAPSULE | Refills: 1 | Status: SHIPPED | OUTPATIENT
Start: 2020-08-21 | End: 2020-10-14 | Stop reason: SDUPTHER

## 2020-08-21 RX ORDER — HEPARIN SODIUM (PORCINE) LOCK FLUSH IV SOLN 100 UNIT/ML 100 UNIT/ML
500 SOLUTION INTRAVENOUS AS NEEDED
Status: DISCONTINUED | OUTPATIENT
Start: 2020-08-21 | End: 2020-08-22 | Stop reason: HOSPADM

## 2020-08-21 RX ORDER — DIPHENHYDRAMINE HYDROCHLORIDE 50 MG/ML
50 INJECTION INTRAMUSCULAR; INTRAVENOUS AS NEEDED
Status: CANCELLED | OUTPATIENT
Start: 2020-09-04

## 2020-08-21 RX ORDER — SODIUM CHLORIDE 9 MG/ML
250 INJECTION, SOLUTION INTRAVENOUS ONCE
Status: CANCELLED | OUTPATIENT
Start: 2020-09-04

## 2020-08-21 RX ORDER — FAMOTIDINE 10 MG/ML
20 INJECTION, SOLUTION INTRAVENOUS AS NEEDED
Status: CANCELLED | OUTPATIENT
Start: 2020-09-04

## 2020-08-21 RX ORDER — SODIUM CHLORIDE 0.9 % (FLUSH) 0.9 %
10 SYRINGE (ML) INJECTION AS NEEDED
Status: CANCELLED | OUTPATIENT
Start: 2020-08-21

## 2020-08-21 RX ADMIN — DIPHENHYDRAMINE HYDROCHLORIDE 25 MG: 50 INJECTION INTRAMUSCULAR; INTRAVENOUS at 08:38

## 2020-08-21 RX ADMIN — DEXAMETHASONE SODIUM PHOSPHATE 12 MG: 4 INJECTION, SOLUTION INTRA-ARTICULAR; INTRALESIONAL; INTRAMUSCULAR; INTRAVENOUS; SOFT TISSUE at 08:39

## 2020-08-21 RX ADMIN — SODIUM CHLORIDE 250 ML: 9 INJECTION, SOLUTION INTRAVENOUS at 08:28

## 2020-08-21 RX ADMIN — SODIUM CHLORIDE 150 MG: 9 INJECTION, SOLUTION INTRAVENOUS at 09:06

## 2020-08-21 RX ADMIN — FAMOTIDINE 20 MG: 10 INJECTION INTRAVENOUS at 08:45

## 2020-08-21 RX ADMIN — HEPARIN 500 UNITS: 100 SYRINGE at 10:13

## 2020-08-21 NOTE — TELEPHONE ENCOUNTER
Patient denies any difficulty with fine motor skills in her hands and no difficulty walking.  Only c/o is pain in her hands and feet.  Discussed with Dr. Juarez.  Will send rx for gabapentin 300 mg qhs and dose reduce her taxol by 20% starting next week.  Jennifer notified and will inform patient.

## 2020-08-21 NOTE — TELEPHONE ENCOUNTER
----- Message from Renu Antonio RN sent at 8/21/2020  8:26 AM EDT -----  Regarding: RASHEED/RAINA  Pt reports peripheral neuropathy in hands and feet has worsened over the last week.

## 2020-08-25 ENCOUNTER — DOCUMENTATION (OUTPATIENT)
Dept: GENETICS | Facility: HOSPITAL | Age: 55
End: 2020-08-25

## 2020-08-25 NOTE — PROGRESS NOTES
Kenya Lemon, a 54-year old female, was provided genetic counseling via telephone consult due to a personal and family history of breast cancer.  Ms. Lemon was diagnosed with a triple negative breast cancer at age 54.  She had a bilateral mastectomy. Ms. Lemon retains her uterus and ovaries.  Ms. Lemon was interested in discussing her risk for a hereditary cancer syndrome, and decided to pursue genetic testing.   Ms. Lemon opted to pursue comprehensive testing via the CancerNext panel ordered through [facility] which includes BRCA1/2 and 32 additional genes associated with an increased risk of breast cancer and other cancers (APC, MAYCOL, BARD1, BMPR1A, BRCA1, BRCA2, BRIP1, CDH1, CDK4, CDKN2A, CHEK2, EPCAM, GREM1, MLH1, MRE11A, MSH2, MSH6, MUTYH, NBN, NF1, PALB2, PMS2, POLD1, POLE, PTEN, RAD50, RAD51C, RAD51D, SMAD4, SMARCA4, STK11, and TP53). Genetic testing was negative for pathogenic mutations in BRCA1/2 and 32 additional genes on the CancerNext panel.  These normal results were discussed with Ms. Lemon by telephone on 8/25/2020.      FAMILY HISTORY:  Pat. Aunt:  Breast cancer, 50s  Pat. Aunt:  Breast cancer; Lung cancer  Father:   Lung cancer, 62  Pat. Uncle (x3): Lung cancer  Mat. Aunt:   Colon cancer, 65  Mat. Uncle:  Stomach cancer, 40s  Mat. 1st cousin: Stomach cancer, 45  Mat. Uncle:  Lung cancer, 71  Mat. Grandmother:  Bladder cancer    Records regarding the family history were not available for review.     RISK ASSESSMENT:  Ms. Lemon’s personal history of triple negative breast cancer raises the question of a hereditary cancer syndrome.   NCCN guidelines recommend BRCA1/2 testing for any individual with triple negative breast cancer diagnosed under the age of 60, regardless of family history.  We discussed BRCA1/2 testing as well as the option of pursuing a panel that would test for other genes known to impact cancer risk. This risk assessment is based on the family history information provided at the  time of the appointment and could change in the future should new information be obtained.    GENETIC COUNSELING: We reviewed the family history information in detail.  Cases of breast cancer follow three general patterns: sporadic, familial, and hereditary.  While most cancer is sporadic, some cases appear to occur in family clusters.  These cases are said to be familial and account for 10-20% of breast cancer cases.  Familial cases may be due to a combination of shared genes and environmental factors among family members.  In even fewer families, the cancer is said to be inherited, and the genes responsible for the cancer are known.      Family histories typical of hereditary cancer syndromes usually include multiple first- and second-degree relatives diagnosed with cancer types that define a syndrome.  These cases tend to be diagnosed at younger-than-expected ages and can be bilateral or multifocal.  The cancer in these families follows an autosomal dominant inheritance pattern, which indicates the likely presence of a mutation in a cancer susceptibility gene.  Children and siblings of an individual believed to carry this mutation have a 50% chance of inheriting that mutation, thereby inheriting the increased risk to develop cancer.  These mutations can be passed down from the maternal or the paternal lineage.    Hereditary breast cancer accounts for 5-10% of all cases of breast cancer.  A significant proportion of hereditary breast cancer can be attributed to mutations in the BRCA1 and BRCA2 genes.  Mutations in these genes confer an increased risk for breast cancer, ovarian cancer, male breast cancer, prostate cancer and pancreatic cancer.  Women with a BRCA1 or BRCA2 mutation who have already been diagnosed with breast cancer have up to a 60% lifetime risk of a second breast cancer.   BRCA1 has been more significantly associated with triple negative breast cancers than other genes.  There are other genes in  addition to BRCA1/2 that are known to be associated with an increased risk for breast cancer. We discussed the PALB2 gene which has also been shown to be related to triple negative breast cancers. Based on Ms. Lemon’s desire to get as much information as possible regarding her personal risks and potential risks for her family, she opted to pursue testing through a multigene panel that would look at several other genes known to increase the risk for cancer.    GENETIC TESTING:  The risks, benefits and limitations of genetic testing and implications for clinical management following testing were reviewed.  DNA test results can influence decisions regarding screening, prevention and surgical management.  Genetic testing can have significant psychological implications for both individuals and families.  Also discussed was the possibility of insurance discrimination based on genetic test results and the laws ([place]) in place to prevent this.    We discussed panel testing, which would involve testing for BRCA1/2 as well as several other cancer susceptibility genes at the same time.  The benefits and limitations of genetic testing were discussed and Ms. Lemon decided to pursue testing via the panel. The implications of a positive or negative test result were discussed. We discussed the possibility that, in some cases, genetic test results may be uninformative due to the identification of a genetic variant. These variants may or may not be associated with an increased cancer risk.  VUSs are frequently reported through multigene panel testing.  Given her personal history, a negative test result would not eliminate all breast cancer risk to her relatives, although the risk would not be as high as it would with positive genetic testing.      TEST RESULTS:  Genetic testing was negative for known pathogenic mutations by sequencing, rearrangement testing, and RNA analysis for the genes on the CancerNext panel (see attached  results).    This negative result greatly lowers but does not eliminate the risk of a hereditary cancer syndrome for Ms. Lemon.  This assessment is based on the information provided at time of consultation.    CANCER SCREENING: Ms. Lemon’s surveillance and management should be determined by her oncology team. Despite the negative genetic test results, Ms. Lemon’s female relatives may have a somewhat increased lifetime risk for breast cancer based on family history.  Given the increased risk, options available to individuals with a high lifetime risk for breast cancer were briefly discussed.  This includes increased surveillance and chemoprevention.     Surveillance for individuals with a high lifetime risk of breast cancer (>20%, versus the average risk of 12%), based on NCCN guidelines, would consist of semi-annual clinical breast exams and monthly self-breast exams starting by age 18 and annual mammography starting 10 years younger than the earliest diagnosis in a close relative, or starting by age 40, whichever is earliest.  According to an American Cancer Society expert panel, annual breast MRI should be offered to women whose lifetime risk of breast cancer is 20-25 percent or more, also starting by age 40 or earlier if indicated by family history.  Female relatives could have a risk assessment performed using a family history-based model, such as the Tyrer-Cuzick model, to determine their individual risks. These assessments are based on the information provided at the time of consultation and could change should new information be obtained.     PLAN: Genetic counseling remains available to Ms. Lemon and her family. She is welcome to contact us with any questions or concerns at [phone].      Shea Hull MS, [facility] [place]  Licensed Certified Genetic Counselor      Cc: MD Johann Chavira MD

## 2020-08-28 ENCOUNTER — HOSPITAL ENCOUNTER (OUTPATIENT)
Dept: ONCOLOGY | Facility: HOSPITAL | Age: 55
Setting detail: INFUSION SERIES
Discharge: HOME | End: 2020-08-28

## 2020-08-28 VITALS
WEIGHT: 172 LBS | DIASTOLIC BLOOD PRESSURE: 61 MMHG | BODY MASS INDEX: 31.65 KG/M2 | TEMPERATURE: 97.3 F | HEART RATE: 85 BPM | RESPIRATION RATE: 16 BRPM | SYSTOLIC BLOOD PRESSURE: 145 MMHG | HEIGHT: 62 IN

## 2020-08-28 DIAGNOSIS — Z17.1 MALIGNANT NEOPLASM OF UPPER OUTER QUADRANT OF BREAST IN FEMALE, ESTROGEN RECEPTOR NEGATIVE, UNSPECIFIED LATERALITY (HCC): ICD-10-CM

## 2020-08-28 DIAGNOSIS — C50.419 MALIGNANT NEOPLASM OF UPPER OUTER QUADRANT OF BREAST IN FEMALE, ESTROGEN RECEPTOR NEGATIVE, UNSPECIFIED LATERALITY (HCC): ICD-10-CM

## 2020-08-28 DIAGNOSIS — C50.411 MALIGNANT NEOPLASM OF UPPER-OUTER QUADRANT OF RIGHT BREAST IN FEMALE, ESTROGEN RECEPTOR NEGATIVE (HCC): Primary | ICD-10-CM

## 2020-08-28 DIAGNOSIS — Z17.1 MALIGNANT NEOPLASM OF UPPER-OUTER QUADRANT OF RIGHT BREAST IN FEMALE, ESTROGEN RECEPTOR NEGATIVE (HCC): Primary | ICD-10-CM

## 2020-08-28 LAB
ALBUMIN SERPL-MCNC: 4.3 G/DL (ref 3.5–5.2)
ALBUMIN/GLOB SERPL: 1.7 G/DL
ALP SERPL-CCNC: 64 U/L (ref 39–117)
ALT SERPL W P-5'-P-CCNC: 15 U/L (ref 1–33)
ANION GAP SERPL CALCULATED.3IONS-SCNC: 10 MMOL/L (ref 5–15)
AST SERPL-CCNC: 14 U/L (ref 1–32)
BILIRUB SERPL-MCNC: 0.2 MG/DL (ref 0–1.2)
BUN SERPL-MCNC: 19 MG/DL (ref 6–20)
BUN/CREAT SERPL: 21.6 (ref 7–25)
CALCIUM SPEC-SCNC: 9.4 MG/DL (ref 8.6–10.5)
CHLORIDE SERPL-SCNC: 99 MMOL/L (ref 98–107)
CO2 SERPL-SCNC: 30 MMOL/L (ref 22–29)
CREAT SERPL-MCNC: 0.88 MG/DL (ref 0.57–1)
ERYTHROCYTE [DISTWIDTH] IN BLOOD BY AUTOMATED COUNT: 15.3 % (ref 12.3–15.4)
GFR SERPL CREATININE-BSD FRML MDRD: 67 ML/MIN/1.73
GLOBULIN UR ELPH-MCNC: 2.5 GM/DL
GLUCOSE SERPL-MCNC: 189 MG/DL (ref 65–99)
HCT VFR BLD AUTO: 33.1 % (ref 34–46.6)
HGB BLD-MCNC: 10.7 G/DL (ref 12–15.9)
LYMPHOCYTES # BLD AUTO: 1.5 10*3/MM3 (ref 0.7–3.1)
LYMPHOCYTES NFR BLD AUTO: 39.4 % (ref 19.6–45.3)
MCH RBC QN AUTO: 29.4 PG (ref 26.6–33)
MCHC RBC AUTO-ENTMCNC: 32.4 G/DL (ref 31.5–35.7)
MCV RBC AUTO: 90.7 FL (ref 79–97)
MONOCYTES # BLD AUTO: 0.4 10*3/MM3 (ref 0.1–0.9)
MONOCYTES NFR BLD AUTO: 10.8 % (ref 5–12)
NEUTROPHILS NFR BLD AUTO: 1.9 10*3/MM3 (ref 1.7–7)
NEUTROPHILS NFR BLD AUTO: 49.8 % (ref 42.7–76)
PLATELET # BLD AUTO: 349 10*3/MM3 (ref 140–450)
PMV BLD AUTO: 6.5 FL (ref 6–12)
POTASSIUM SERPL-SCNC: 4.3 MMOL/L (ref 3.5–5.2)
PROT SERPL-MCNC: 6.8 G/DL (ref 6–8.5)
RBC # BLD AUTO: 3.65 10*6/MM3 (ref 3.77–5.28)
SODIUM SERPL-SCNC: 139 MMOL/L (ref 136–145)
WBC # BLD AUTO: 3.9 10*3/MM3 (ref 3.4–10.8)

## 2020-08-28 PROCEDURE — 25010000002 DEXAMETHASONE SODIUM PHOSPHATE 120 MG/30ML SOLUTION: Performed by: NURSE PRACTITIONER

## 2020-08-28 PROCEDURE — 96413 CHEMO IV INFUSION 1 HR: CPT

## 2020-08-28 PROCEDURE — 25010000002 PACLITAXEL PER 30 MG: Performed by: NURSE PRACTITIONER

## 2020-08-28 PROCEDURE — 25010000003 HEPARIN LOCK FLUSH PER 10 UNITS: Performed by: INTERNAL MEDICINE

## 2020-08-28 PROCEDURE — 80053 COMPREHEN METABOLIC PANEL: CPT | Performed by: NURSE PRACTITIONER

## 2020-08-28 PROCEDURE — 25010000002 DIPHENHYDRAMINE PER 50 MG: Performed by: NURSE PRACTITIONER

## 2020-08-28 PROCEDURE — 96375 TX/PRO/DX INJ NEW DRUG ADDON: CPT

## 2020-08-28 PROCEDURE — 85025 COMPLETE CBC W/AUTO DIFF WBC: CPT | Performed by: NURSE PRACTITIONER

## 2020-08-28 RX ORDER — SODIUM CHLORIDE 9 MG/ML
250 INJECTION, SOLUTION INTRAVENOUS ONCE
Status: COMPLETED | OUTPATIENT
Start: 2020-08-28 | End: 2020-08-28

## 2020-08-28 RX ORDER — FAMOTIDINE 10 MG/ML
20 INJECTION, SOLUTION INTRAVENOUS ONCE
Status: COMPLETED | OUTPATIENT
Start: 2020-08-28 | End: 2020-08-28

## 2020-08-28 RX ORDER — HEPARIN SODIUM (PORCINE) LOCK FLUSH IV SOLN 100 UNIT/ML 100 UNIT/ML
500 SOLUTION INTRAVENOUS AS NEEDED
Status: CANCELLED | OUTPATIENT
Start: 2020-08-28

## 2020-08-28 RX ORDER — SODIUM CHLORIDE 0.9 % (FLUSH) 0.9 %
10 SYRINGE (ML) INJECTION AS NEEDED
Status: CANCELLED | OUTPATIENT
Start: 2020-08-28

## 2020-08-28 RX ORDER — HEPARIN SODIUM (PORCINE) LOCK FLUSH IV SOLN 100 UNIT/ML 100 UNIT/ML
500 SOLUTION INTRAVENOUS AS NEEDED
Status: DISCONTINUED | OUTPATIENT
Start: 2020-08-28 | End: 2020-08-29 | Stop reason: HOSPADM

## 2020-08-28 RX ORDER — SODIUM CHLORIDE 0.9 % (FLUSH) 0.9 %
10 SYRINGE (ML) INJECTION AS NEEDED
Status: DISCONTINUED | OUTPATIENT
Start: 2020-08-28 | End: 2020-08-29 | Stop reason: HOSPADM

## 2020-08-28 RX ADMIN — SODIUM CHLORIDE 250 ML: 9 INJECTION, SOLUTION INTRAVENOUS at 08:47

## 2020-08-28 RX ADMIN — SODIUM CHLORIDE, PRESERVATIVE FREE 10 ML: 5 INJECTION INTRAVENOUS at 10:20

## 2020-08-28 RX ADMIN — DIPHENHYDRAMINE HYDROCHLORIDE 25 MG: 50 INJECTION INTRAMUSCULAR; INTRAVENOUS at 08:46

## 2020-08-28 RX ADMIN — DEXAMETHASONE SODIUM PHOSPHATE 12 MG: 4 INJECTION, SOLUTION INTRA-ARTICULAR; INTRALESIONAL; INTRAMUSCULAR; INTRAVENOUS; SOFT TISSUE at 08:51

## 2020-08-28 RX ADMIN — HEPARIN 500 UNITS: 100 SYRINGE at 10:20

## 2020-08-28 RX ADMIN — SODIUM CHLORIDE 120 MG: 9 INJECTION, SOLUTION INTRAVENOUS at 09:10

## 2020-08-28 RX ADMIN — FAMOTIDINE 20 MG: 10 INJECTION INTRAVENOUS at 08:46

## 2020-09-03 RX ORDER — POTASSIUM CHLORIDE 1500 MG/1
TABLET, FILM COATED, EXTENDED RELEASE ORAL
Qty: 90 TABLET | Refills: 0 | Status: SHIPPED | OUTPATIENT
Start: 2020-09-03 | End: 2021-06-08

## 2020-09-04 ENCOUNTER — HOSPITAL ENCOUNTER (OUTPATIENT)
Dept: ONCOLOGY | Facility: HOSPITAL | Age: 55
Setting detail: INFUSION SERIES
Discharge: HOME | End: 2020-09-04

## 2020-09-04 ENCOUNTER — OFFICE VISIT (OUTPATIENT)
Dept: ONCOLOGY | Facility: CLINIC | Age: 55
End: 2020-09-04

## 2020-09-04 VITALS
RESPIRATION RATE: 18 BRPM | WEIGHT: 166 LBS | HEIGHT: 62 IN | BODY MASS INDEX: 30.55 KG/M2 | SYSTOLIC BLOOD PRESSURE: 152 MMHG | TEMPERATURE: 97.3 F | DIASTOLIC BLOOD PRESSURE: 87 MMHG | OXYGEN SATURATION: 97 % | HEART RATE: 83 BPM

## 2020-09-04 VITALS — HEART RATE: 78 BPM | DIASTOLIC BLOOD PRESSURE: 70 MMHG | SYSTOLIC BLOOD PRESSURE: 162 MMHG

## 2020-09-04 DIAGNOSIS — Z17.1 MALIGNANT NEOPLASM OF UPPER-OUTER QUADRANT OF RIGHT BREAST IN FEMALE, ESTROGEN RECEPTOR NEGATIVE (HCC): Primary | ICD-10-CM

## 2020-09-04 DIAGNOSIS — Z17.1 MALIGNANT NEOPLASM OF UPPER OUTER QUADRANT OF BREAST IN FEMALE, ESTROGEN RECEPTOR NEGATIVE, UNSPECIFIED LATERALITY (HCC): Primary | ICD-10-CM

## 2020-09-04 DIAGNOSIS — C50.411 MALIGNANT NEOPLASM OF UPPER-OUTER QUADRANT OF RIGHT BREAST IN FEMALE, ESTROGEN RECEPTOR NEGATIVE (HCC): ICD-10-CM

## 2020-09-04 DIAGNOSIS — T45.1X5A NEUROPATHY DUE TO CHEMOTHERAPEUTIC DRUG (HCC): ICD-10-CM

## 2020-09-04 DIAGNOSIS — C50.419 MALIGNANT NEOPLASM OF UPPER OUTER QUADRANT OF BREAST IN FEMALE, ESTROGEN RECEPTOR NEGATIVE, UNSPECIFIED LATERALITY (HCC): Primary | ICD-10-CM

## 2020-09-04 DIAGNOSIS — Z17.1 MALIGNANT NEOPLASM OF UPPER-OUTER QUADRANT OF RIGHT BREAST IN FEMALE, ESTROGEN RECEPTOR NEGATIVE (HCC): ICD-10-CM

## 2020-09-04 DIAGNOSIS — C50.411 MALIGNANT NEOPLASM OF UPPER-OUTER QUADRANT OF RIGHT BREAST IN FEMALE, ESTROGEN RECEPTOR NEGATIVE (HCC): Primary | ICD-10-CM

## 2020-09-04 DIAGNOSIS — G62.0 NEUROPATHY DUE TO CHEMOTHERAPEUTIC DRUG (HCC): ICD-10-CM

## 2020-09-04 LAB
ALBUMIN SERPL-MCNC: 4.2 G/DL (ref 3.5–5.2)
ALBUMIN/GLOB SERPL: 1.7 G/DL
ALP SERPL-CCNC: 62 U/L (ref 39–117)
ALT SERPL W P-5'-P-CCNC: 14 U/L (ref 1–33)
ANION GAP SERPL CALCULATED.3IONS-SCNC: 10 MMOL/L (ref 5–15)
AST SERPL-CCNC: 16 U/L (ref 1–32)
BILIRUB SERPL-MCNC: <0.2 MG/DL (ref 0–1.2)
BUN SERPL-MCNC: 15 MG/DL (ref 6–20)
BUN/CREAT SERPL: 22.1 (ref 7–25)
CALCIUM SPEC-SCNC: 9.6 MG/DL (ref 8.6–10.5)
CHLORIDE SERPL-SCNC: 100 MMOL/L (ref 98–107)
CO2 SERPL-SCNC: 29 MMOL/L (ref 22–29)
CREAT SERPL-MCNC: 0.68 MG/DL (ref 0.57–1)
ERYTHROCYTE [DISTWIDTH] IN BLOOD BY AUTOMATED COUNT: 16.3 % (ref 12.3–15.4)
GFR SERPL CREATININE-BSD FRML MDRD: 90 ML/MIN/1.73
GLOBULIN UR ELPH-MCNC: 2.5 GM/DL
GLUCOSE SERPL-MCNC: 141 MG/DL (ref 65–99)
HCT VFR BLD AUTO: 33.5 % (ref 34–46.6)
HGB BLD-MCNC: 10.9 G/DL (ref 12–15.9)
LYMPHOCYTES # BLD AUTO: 1.5 10*3/MM3 (ref 0.7–3.1)
LYMPHOCYTES NFR BLD AUTO: 39.3 % (ref 19.6–45.3)
MCH RBC QN AUTO: 29.5 PG (ref 26.6–33)
MCHC RBC AUTO-ENTMCNC: 32.6 G/DL (ref 31.5–35.7)
MCV RBC AUTO: 90.5 FL (ref 79–97)
MONOCYTES # BLD AUTO: 0.1 10*3/MM3 (ref 0.1–0.9)
MONOCYTES NFR BLD AUTO: 3.2 % (ref 5–12)
NEUTROPHILS NFR BLD AUTO: 2.2 10*3/MM3 (ref 1.7–7)
NEUTROPHILS NFR BLD AUTO: 57.5 % (ref 42.7–76)
PLATELET # BLD AUTO: 307 10*3/MM3 (ref 140–450)
PMV BLD AUTO: 7.2 FL (ref 6–12)
POTASSIUM SERPL-SCNC: 4.5 MMOL/L (ref 3.5–5.2)
PROT SERPL-MCNC: 6.7 G/DL (ref 6–8.5)
RBC # BLD AUTO: 3.7 10*6/MM3 (ref 3.77–5.28)
SODIUM SERPL-SCNC: 139 MMOL/L (ref 136–145)
WBC # BLD AUTO: 3.8 10*3/MM3 (ref 3.4–10.8)

## 2020-09-04 PROCEDURE — 96375 TX/PRO/DX INJ NEW DRUG ADDON: CPT

## 2020-09-04 PROCEDURE — 25010000003 HEPARIN LOCK FLUSH PER 10 UNITS: Performed by: INTERNAL MEDICINE

## 2020-09-04 PROCEDURE — 80053 COMPREHEN METABOLIC PANEL: CPT | Performed by: NURSE PRACTITIONER

## 2020-09-04 PROCEDURE — 99213 OFFICE O/P EST LOW 20 MIN: CPT | Performed by: NURSE PRACTITIONER

## 2020-09-04 PROCEDURE — 25010000002 PACLITAXEL PER 30 MG: Performed by: NURSE PRACTITIONER

## 2020-09-04 PROCEDURE — 96413 CHEMO IV INFUSION 1 HR: CPT

## 2020-09-04 PROCEDURE — 85025 COMPLETE CBC W/AUTO DIFF WBC: CPT | Performed by: NURSE PRACTITIONER

## 2020-09-04 PROCEDURE — 25010000002 DEXAMETHASONE SODIUM PHOSPHATE 20 MG/5ML SOLUTION: Performed by: NURSE PRACTITIONER

## 2020-09-04 PROCEDURE — 25010000002 DIPHENHYDRAMINE PER 50 MG: Performed by: NURSE PRACTITIONER

## 2020-09-04 RX ORDER — FAMOTIDINE 10 MG/ML
20 INJECTION, SOLUTION INTRAVENOUS ONCE
Status: COMPLETED | OUTPATIENT
Start: 2020-09-04 | End: 2020-09-04

## 2020-09-04 RX ORDER — SODIUM CHLORIDE 0.9 % (FLUSH) 0.9 %
10 SYRINGE (ML) INJECTION AS NEEDED
Status: CANCELLED | OUTPATIENT
Start: 2020-09-04

## 2020-09-04 RX ORDER — SODIUM CHLORIDE 9 MG/ML
250 INJECTION, SOLUTION INTRAVENOUS ONCE
Status: COMPLETED | OUTPATIENT
Start: 2020-09-04 | End: 2020-09-04

## 2020-09-04 RX ORDER — HEPARIN SODIUM (PORCINE) LOCK FLUSH IV SOLN 100 UNIT/ML 100 UNIT/ML
500 SOLUTION INTRAVENOUS AS NEEDED
Status: CANCELLED | OUTPATIENT
Start: 2020-09-04

## 2020-09-04 RX ORDER — HEPARIN SODIUM (PORCINE) LOCK FLUSH IV SOLN 100 UNIT/ML 100 UNIT/ML
500 SOLUTION INTRAVENOUS AS NEEDED
Status: DISCONTINUED | OUTPATIENT
Start: 2020-09-04 | End: 2020-09-05 | Stop reason: HOSPADM

## 2020-09-04 RX ADMIN — SODIUM CHLORIDE 120 MG: 9 INJECTION, SOLUTION INTRAVENOUS at 12:01

## 2020-09-04 RX ADMIN — SODIUM CHLORIDE 250 ML: 9 INJECTION, SOLUTION INTRAVENOUS at 11:41

## 2020-09-04 RX ADMIN — DEXAMETHASONE SODIUM PHOSPHATE 12 MG: 4 INJECTION, SOLUTION INTRA-ARTICULAR; INTRALESIONAL; INTRAMUSCULAR; INTRAVENOUS; SOFT TISSUE at 11:43

## 2020-09-04 RX ADMIN — DIPHENHYDRAMINE HYDROCHLORIDE 25 MG: 50 INJECTION INTRAMUSCULAR; INTRAVENOUS at 11:43

## 2020-09-04 RX ADMIN — FAMOTIDINE 20 MG: 10 INJECTION, SOLUTION INTRAVENOUS at 11:40

## 2020-09-04 RX ADMIN — HEPARIN 500 UNITS: 100 SYRINGE at 13:15

## 2020-09-04 NOTE — PROGRESS NOTES
CHIEF COMPLAINT: 1.  Peripheral neuropathy  2.  Adjuvant therapy breast cancer    Problem List:  Oncology/Hematology History    1.  Stage IIB T2 (2.5cm per Dr. Perez) N0 invasive ductal triple negative breast cancer.  2/16/2020 right upper outer quadrant needle core biopsy showed invasive ductal carcinoma 4.4 mm moderately differentiated negative for estrogen, progesterone receptor both 0% and HER-2/vashti 0+.  Dr. Santo recommended lumpectomy and sentinel node biopsy.  3/12/2020 saw Dr. Gary Perez.perimenopausal breast cancer she felt a 2.5 cm right upper outer quadrant mass almost at the edge of the axilla somewhat fixed.  No skin changes.  She recommended neoadjuvant chemotherapy followed by lumpectomy versus mastectomy then radiation if patient decides on breast conservation or if nodes are found on breast MRI.  She will get formal genetic counseling to help guide the issues of possible prophylactic mastectomy.  With triple negative disease the standard recommendation would be neoadjuvant Adriamycin Cytoxan dose dense x4 followed by Taxol weekly x12 followed by surgery.  If there is residual disease then would consider Xeloda per the create X trial.  We also have  looking at observation versus Keytruda for 1 year in patients with triple negative disease who do not have a complete pathological response to neoadjuvant chemotherapy.  In the absence of symptoms, NCCN guidelines does not recommend routine metastatic surveillance imaging.  Will need ejection fraction before Adriamycin.  -3/24/2020 through 5/4/2020 Adriamycin Cytoxan x4.  Opted out of neoadjuvant Taxol  -6/16/2020 left breast simple mastectomy benign  Right breast simple mastectomy showed residual invasive ductal carcinoma 9 mm with limited DCIS high-grade and negative margins.  0 out of 1 sentinel nodes positive.  Grade 7 out of 9.  Pathologic T1 BN 0.  Postoperative course complicated by localized infections.   -7/6/2020 [facility]  oncology follow-up visit: Given residual disease after 4 courses of AC I would want her to complete the Taxol as planned.  There is also new data presented at [facility] that suggests adjuvant Xeloda following adjuvant chemotherapy is reasonable and triple negative disease.  This is not precisely what she is doing as she has now done part of this neoadjuvant point but I think it makes sense to apply in this setting given residual disease after AC x4 but I would not forego the Taxol but will plan on adding Xeloda.  Reinforced the need for genetic testing salivary kit that has been sent to her but she has not completed.    -8/25/2020 Genetic testing was negative for pathogenic mutations in BRCA1/2 and 32 additional genes on the CancerNext panel.       -Taxol dose reduced by 20% with cycle #5 due to peripheral neuropathy    2.  Hypertension  3.  Hyperlipidemia  4.  Diabetes  5.  Using IUD and taking estradiol for hormone replacement therapy preoperatively  6.  History of D and C as well as bilateral tubal ligation  7.  History of cholecystectomy  8.  Vasomotor symptoms  9.  Family history of paternal aunt with breast cancer and perhaps a sister with ovarian cancer but she is not sure.  She has nulliparous.  Genetic spit test done results pending.    10.  Anxiety and depression        Malignant neoplasm of upper outer quadrant of breast in female, estrogen receptor negative (CMS/HCC)    3/12/2020 Initial Diagnosis     Malignant neoplasm of upper outer quadrant of breast in female, estrogen receptor negative (CMS/HCC)      3/12/2020 Cancer Staged     Staging form: Breast, AJCC 8th Edition  - Clinical: Stage IIB (cT2, cN0, cM0, G2, ER-, ND-, HER2-) - Signed by Johann Juarez MD on 3/12/2020      3/24/2020 - 5/4/2020 Chemotherapy     OP BREAST AC DD DOXOrubicin / Cyclophosphamide        5/6/2020 -  Chemotherapy     OP SUPPORTIVE HYDRATION + ANTIEMETICS      6/16/2020 Surgery     Surgery       -6/16/2020 left breast simple  mastectomy benign  Right breast simple mastectomy showed residual invasive ductal carcinoma 9 mm with limited DCIS high-grade and negative margins.  0 out of 1 sentinel nodes positive.  Grade 7 out of 9.  Pathologic T1 BN 0      7/6/2020 Cancer Staged     Staging form: Breast, AJCC 8th Edition  - Pathologic: Stage IB (pT1b, pN0, cM0, G2, ER-, NC-, HER2-) - Signed by Johann Juarez MD on 7/6/2020 7/31/2020 -  Chemotherapy     OP CENTRAL VENOUS ACCESS DEVICE ACCESS, CARE, AND MAINTENANCE (CVAD)        Malignant neoplasm of upper-outer quadrant of right breast in female, estrogen receptor negative (CMS/HCC)    3/12/2020 Initial Diagnosis     Malignant neoplasm of upper-outer quadrant of right breast in female, estrogen receptor negative (CMS/HCC)      3/24/2020 - 5/18/2020 Chemotherapy     OP BREAST AC DD DOXOrubicin / Cyclophosphamide      7/31/2020 -  Chemotherapy     OP BREAST PACLitaxel (weekly X 12)      8/25/2020 Genetic Testing     Genetic testing was negative for pathogenic mutations in BRCA1/2 and 32 additional genes on the CancerNext panel.         8/28/2020 Adverse Reaction     Taxol dose reduced by 20% due to peripheral neuropathy with cycle #5.         HISTORY OF PRESENT ILLNESS:  The patient is a 54 y.o. female, here for follow up on management of adjuvant therapy breast cancer.  She is on gabapentin 300 mg at night for peripheral neuropathy and states this seems to be helping a little bit although she is not fond of its side effects stating that it makes it harder for her to get going in the morning.  Peripheral neuropathy seems to be stable with dose reduction in her Taxol.  No other complaints.    Past Medical History:   Diagnosis Date   • Cancer (CMS/HCC) 02/2020    breast cancer per mammogram; chemotherapy    • Diabetes (CMS/HCC)    • History of low potassium 03/2020    prescribed potassium supplements    • History of transfusion 1989    after delivery of son (1 unit only)    • HTN (hypertension)     • Low magnesium level 03/2020    requiring magnesium supplements    • Sleep apnea     no cpap machine currently     Past Surgical History:   Procedure Laterality Date   • BREAST BIOPSY Right 02/2020   • CHOLECYSTECTOMY     • COLONOSCOPY     • LAPAROSCOPIC TUBAL LIGATION     • MASTECTOMY W/ SENTINEL NODE BIOPSY Bilateral 6/16/2020    Procedure: SKIN SPARING MASTECTOMIES BILATERAL AND RIGHT SENTINEL NODE BIOPSY;  Surgeon: Gary Perez MD;  Location: [facility];  Service: General;  Laterality: Bilateral;   • VENOUS ACCESS DEVICE (PORT) INSERTION  02/2020       No Known Allergies    Family History and Social History reviewed and changed as necessary      REVIEW OF SYSTEM:   Review of Systems   Constitutional: Negative for appetite change, chills, diaphoresis, fatigue, fever and unexpected weight change.   HENT:   Negative for mouth sores, sore throat and trouble swallowing.    Eyes: Negative for icterus.   Respiratory: Negative for cough, hemoptysis and shortness of breath.    Cardiovascular: Negative for chest pain, leg swelling and palpitations.   Gastrointestinal: Negative for abdominal distention, abdominal pain, blood in stool, constipation, diarrhea, nausea and vomiting.   Endocrine: Negative for hot flashes.   Genitourinary: Negative for bladder incontinence, difficulty urinating, dysuria, frequency and hematuria.    Musculoskeletal: Negative for gait problem, neck pain and neck stiffness.   Skin: Negative for rash.   Neurological: Negative for dizziness, gait problem, headaches, light-headedness.  Peripheral neuropathy in her hands and feet.  Hematological: Negative for adenopathy. Does not bruise/bleed easily.   Psychiatric/Behavioral: Negative for depression. The patient is not nervous/anxious.    All other systems reviewed and are negative.       PHYSICAL EXAM    Vitals:    09/04/20 1008   BP: 152/87   Pulse: 83   Resp: 18   Temp: 97.3 °F (36.3 °C)   SpO2: 97%   Weight: 75.3 kg (166 lb)   Height: 157.5  "cm (62\")     Vitals:    09/04/20 1008   PainSc: 0-No pain        Constitutional: Appears well-developed and well-nourished. No distress.   ECOG: (1) Restricted in Physically Strenuous Activity, Ambulatory & Able to Do Work of Light Nature  HENT:   Head: Normocephalic.   Mouth/Throat: Oropharynx is clear and moist.   Eyes: Conjunctivae are normal. Pupils are equal, round, and reactive to light. No scleral icterus.   Neck: Neck supple. No JVD present. No thyromegaly present.   Cardiovascular: Normal rate, regular rhythm and normal heart sounds.    Pulmonary/Chest: Breath sounds normal. No respiratory distress.   Abdominal: Soft. Exhibits no distension and no mass. There is no hepatosplenomegaly. There is no tenderness. There is no rebound and no guarding.   Musculoskeletal:Exhibits no edema, tenderness or deformity.   Neurological: Alert and oriented to person, place, and time. Exhibits normal muscle tone.   Skin: No ecchymosis, no petechiae and no rash noted. Not diaphoretic. No cyanosis. Nails show no clubbing.   Psychiatric: Normal mood and affect.   Vitals reviewed.      Lab Results   Component Value Date    HGB 10.9 (L) 09/04/2020    HCT 33.5 (L) 09/04/2020    MCV 90.5 09/04/2020     09/04/2020    WBC 3.80 09/04/2020    NEUTROABS 2.20 09/04/2020    LYMPHSABS 1.50 09/04/2020    MONOSABS 0.10 09/04/2020    EOSABS 0.28 06/22/2020    BASOSABS 0.06 06/22/2020       Lab Results   Component Value Date    GLUCOSE 141 (H) 09/04/2020    BUN 15 09/04/2020    CREATININE 0.68 09/04/2020     09/04/2020    K 4.5 09/04/2020     09/04/2020    CO2 29.0 09/04/2020    CALCIUM 9.6 09/04/2020    PROTEINTOT 6.7 09/04/2020    ALBUMIN 4.20 09/04/2020    BILITOT <0.2 09/04/2020    ALKPHOS 62 09/04/2020    AST 16 09/04/2020    ALT 14 09/04/2020         ASSESSMENT & PLAN:    1.  Stage IIB invasive ductal triple negative breast cancer status post neoadjuvant Adriamycin Cytoxan x4, completed on 5/4/2020.  She opted out of " neoadjuvant Taxol and had bilateral mastectomy on 6/16/2020.  Pathology showed residual invasive ductal carcinoma 9 mm with limited DCIS high-grade and negative margins.  0 out of 1 sentinel nodes positive.  Grade 7 out of 9.  Pathological T1 BN 0.  Postoperative course complicated by localized infections.  Started adjuvant weekly Taxol on 7/31/2020.  2.  Family history of paternal aunt with breast cancer and perhaps a sister with ovarian cancer but she is not sure.  She is nulliparous.  Genetic spit test done results negative.    Discussion:  She is tolerating adjuvant weekly Taxol fairly well.  She has received 5 out of 12 planned cycles.  She does have neuropathy that is stable, we have placed her on gabapentin at night and also reduce her Taxol dose with cycle number 5 by 20%.  We will proceed with cycle #6 today.  I will see her back in 1 week for follow-up, I will keep a close eye on her as I have a low threshold for stopping further Taxol if her neuropathy is worsening.  We plan to add Xeloda after she has completed her Taxol.  Genetic testing results returned negative.    I spent a total of 15 minutes in direct patient care, greater than 10  minutes face to face, time was spent in coordination of care, and counseling the patient regarding assessment, review of neuropathy, discussion of medication adjustment and plan of care going forward.  Answered any questions patient had regarding medications and plan of care.     Camelia Carranza, AUDI  09/04/2020

## 2020-09-08 NOTE — ADDENDUM NOTE
Encounter addended by: Mulu Almanza RN on: 9/8/2020 10:44 AM   Actions taken: Charge Capture section accepted

## 2020-09-09 ENCOUNTER — TELEPHONE (OUTPATIENT)
Dept: ONCOLOGY | Facility: CLINIC | Age: 55
End: 2020-09-09

## 2020-09-09 NOTE — TELEPHONE ENCOUNTER
----- Message from Keyla Bravo RN sent at 9/9/2020 10:19 AM EDT -----  Regarding: FW: Referral Request  Contact: [phone]  Ann/Camelia pt    ----- Message -----  From: Kenya Lemon  Sent: 9/9/2020   9:45 AM EDT  To: [facility]  Subject: Referral Request                                 I went to the dentist this morning and I have a large cavity in one of my back teeth. (I haven't had a cavity in YEARS!) My local dentist cannot pull it. I need an oral surgeon because it is a 3 root tooth and it is touching my sinuses. I have been approved for the COVID medical card (praise God!) and only  will accept this form of payment.    Can you please see if you can get an appointment as soon as possible. She said the nerve is exposed and gave me antibiotic.  Thanks!

## 2020-09-09 NOTE — TELEPHONE ENCOUNTER
Spoke with patient and recommend she have her dentist refer her since they have her dental records and documented need to see oral surgeon.  Verbalized understanding.

## 2020-09-11 ENCOUNTER — APPOINTMENT (OUTPATIENT)
Dept: ONCOLOGY | Facility: HOSPITAL | Age: 55
End: 2020-09-11

## 2020-09-11 ENCOUNTER — OFFICE VISIT (OUTPATIENT)
Dept: ONCOLOGY | Facility: CLINIC | Age: 55
End: 2020-09-11

## 2020-09-11 VITALS
WEIGHT: 171 LBS | OXYGEN SATURATION: 97 % | TEMPERATURE: 97.8 F | RESPIRATION RATE: 18 BRPM | SYSTOLIC BLOOD PRESSURE: 139 MMHG | HEIGHT: 62 IN | DIASTOLIC BLOOD PRESSURE: 79 MMHG | BODY MASS INDEX: 31.47 KG/M2 | HEART RATE: 85 BPM

## 2020-09-11 DIAGNOSIS — C50.411 MALIGNANT NEOPLASM OF UPPER-OUTER QUADRANT OF RIGHT BREAST IN FEMALE, ESTROGEN RECEPTOR NEGATIVE (HCC): ICD-10-CM

## 2020-09-11 DIAGNOSIS — Z17.1 MALIGNANT NEOPLASM OF UPPER-OUTER QUADRANT OF RIGHT BREAST IN FEMALE, ESTROGEN RECEPTOR NEGATIVE (HCC): ICD-10-CM

## 2020-09-11 DIAGNOSIS — T45.1X5A PERIPHERAL NEUROPATHY DUE TO CHEMOTHERAPY (HCC): Primary | ICD-10-CM

## 2020-09-11 DIAGNOSIS — G62.0 PERIPHERAL NEUROPATHY DUE TO CHEMOTHERAPY (HCC): Primary | ICD-10-CM

## 2020-09-11 PROCEDURE — 99214 OFFICE O/P EST MOD 30 MIN: CPT | Performed by: NURSE PRACTITIONER

## 2020-09-11 RX ORDER — PENICILLIN V POTASSIUM 500 MG/1
TABLET ORAL
COMMUNITY
Start: 2020-09-09 | End: 2021-06-08

## 2020-09-11 RX ORDER — IBUPROFEN 800 MG/1
800 TABLET ORAL AS NEEDED
COMMUNITY
Start: 2020-09-09 | End: 2021-06-10 | Stop reason: HOSPADM

## 2020-09-11 NOTE — PROGRESS NOTES
CHIEF COMPLAINT: 1.  Peripheral neuropathy  2.  Need for oral surgery  3.  Adjuvant therapy breast cancer    Problem List:  Oncology/Hematology History    1.  Stage IIB T2 (2.5cm per Dr. Perez) N0 invasive ductal triple negative breast cancer.  2/16/2020 right upper outer quadrant needle core biopsy showed invasive ductal carcinoma 4.4 mm moderately differentiated negative for estrogen, progesterone receptor both 0% and HER-2/vashti 0+.  Dr. Santo recommended lumpectomy and sentinel node biopsy.  3/12/2020 saw Dr. Gary Perez.perimenopausal breast cancer she felt a 2.5 cm right upper outer quadrant mass almost at the edge of the axilla somewhat fixed.  No skin changes.  She recommended neoadjuvant chemotherapy followed by lumpectomy versus mastectomy then radiation if patient decides on breast conservation or if nodes are found on breast MRI.  She will get formal genetic counseling to help guide the issues of possible prophylactic mastectomy.  With triple negative disease the standard recommendation would be neoadjuvant Adriamycin Cytoxan dose dense x4 followed by Taxol weekly x12 followed by surgery.  If there is residual disease then would consider Xeloda per the create X trial.  We also have  looking at observation versus Keytruda for 1 year in patients with triple negative disease who do not have a complete pathological response to neoadjuvant chemotherapy.  In the absence of symptoms, NCCN guidelines does not recommend routine metastatic surveillance imaging.  Will need ejection fraction before Adriamycin.  -3/24/2020 through 5/4/2020 Adriamycin Cytoxan x4.  Opted out of neoadjuvant Taxol  -6/16/2020 left breast simple mastectomy benign  Right breast simple mastectomy showed residual invasive ductal carcinoma 9 mm with limited DCIS high-grade and negative margins.  0 out of 1 sentinel nodes positive.  Grade 7 out of 9.  Pathologic T1 BN 0.  Postoperative course complicated by localized infections.    -7/6/2020 [facility] medical oncology follow-up visit: Given residual disease after 4 courses of AC I would want her to complete the Taxol as planned.  There is also new data presented at [facility] that suggests adjuvant Xeloda following adjuvant chemotherapy is reasonable and triple negative disease.  This is not precisely what she is doing as she has now done part of this neoadjuvant point but I think it makes sense to apply in this setting given residual disease after AC x4 but I would not forego the Taxol but will plan on adding Xeloda.  Reinforced the need for genetic testing salivary kit that has been sent to her but she has not completed.    -8/25/2020 Genetic testing was negative for pathogenic mutations in BRCA1/2 and 32 additional genes on the CancerNext panel.       -Taxol dose reduced by 20% with cycle #5 due to peripheral neuropathy    -9/11/2020 [facility] oncology clinic visit: Peripheral neuropathy continues, patient also in need of dental work, decision made to hold Taxol for 3 weeks to see if neuropathy improves and to allow time for oral surgery.    2.  Hypertension  3.  Hyperlipidemia  4.  Diabetes  5.  Using IUD and taking estradiol for hormone replacement therapy preoperatively  6.  History of D and C as well as bilateral tubal ligation  7.  History of cholecystectomy  8.  Vasomotor symptoms  9.  Family history of paternal aunt with breast cancer and perhaps a sister with ovarian cancer but she is not sure.  She has nulliparous.  Genetic spit test done results pending.    10.  Anxiety and depression        Malignant neoplasm of upper-outer quadrant of right breast in female, estrogen receptor negative (CMS/HCC)    3/12/2020 Initial Diagnosis     Malignant neoplasm of upper outer quadrant of breast in female, estrogen receptor negative (CMS/HCC)      3/12/2020 Cancer Staged     Staging form: Breast, AJCC 8th Edition  - Clinical: Stage IIB (cT2, cN0, cM0, G2, ER-, VA-, HER2-) - Signed by Johann Juarez MD  on 3/12/2020      3/24/2020 - 5/4/2020 Chemotherapy     OP BREAST AC DD DOXOrubicin / Cyclophosphamide        5/6/2020 -  Chemotherapy     OP SUPPORTIVE HYDRATION + ANTIEMETICS      6/16/2020 Surgery     Surgery       -6/16/2020 left breast simple mastectomy benign  Right breast simple mastectomy showed residual invasive ductal carcinoma 9 mm with limited DCIS high-grade and negative margins.  0 out of 1 sentinel nodes positive.  Grade 7 out of 9.  Pathologic T1 BN 0      7/6/2020 Cancer Staged     Staging form: Breast, AJCC 8th Edition  - Pathologic: Stage IB (pT1b, pN0, cM0, G2, ER-, IN-, HER2-) - Signed by Johann Juarez MD on 7/6/2020 7/31/2020 -  Chemotherapy     OP CENTRAL VENOUS ACCESS DEVICE ACCESS, CARE, AND MAINTENANCE (CVAD)        Malignant neoplasm of upper-outer quadrant of right breast in female, estrogen receptor negative (CMS/HCC)    3/12/2020 Initial Diagnosis     Malignant neoplasm of upper-outer quadrant of right breast in female, estrogen receptor negative (CMS/HCC)      3/24/2020 - 5/18/2020 Chemotherapy     OP BREAST AC DD DOXOrubicin / Cyclophosphamide      7/31/2020 -  Chemotherapy     OP BREAST PACLitaxel (weekly X 12)      8/25/2020 Genetic Testing     Genetic testing was negative for pathogenic mutations in BRCA1/2 and 32 additional genes on the CancerNext panel.         8/28/2020 Adverse Reaction     Taxol dose reduced by 20% due to peripheral neuropathy with cycle #5.         HISTORY OF PRESENT ILLNESS:  The patient is a 54 y.o. female, here for follow up on management of adjuvant therapy breast cancer.  The patient reports her peripheral neuropathy continues to be mildly bothersome particularly in her fingers.  She is unable to put in post type earrings, she can only wear hoop earings as she cannot feel them well enough to attach regular earrings.  She is on gabapentin 300 mg at night.  We have dose reduced her Taxol by 20%.  She also needs to have a tooth pulled, she is currently  on penicillin for 10 days, has an appointment with oral surgeon on the 30th.    Past Medical History:   Diagnosis Date   • Cancer (CMS/HCC) 02/2020    breast cancer per mammogram; chemotherapy    • Diabetes (CMS/HCC)    • History of low potassium 03/2020    prescribed potassium supplements    • History of transfusion 1989    after delivery of son (1 unit only)    • HTN (hypertension)    • Low magnesium level 03/2020    requiring magnesium supplements    • Sleep apnea     no cpap machine currently     Past Surgical History:   Procedure Laterality Date   • BREAST BIOPSY Right 02/2020   • CHOLECYSTECTOMY     • COLONOSCOPY     • LAPAROSCOPIC TUBAL LIGATION     • MASTECTOMY W/ SENTINEL NODE BIOPSY Bilateral 6/16/2020    Procedure: SKIN SPARING MASTECTOMIES BILATERAL AND RIGHT SENTINEL NODE BIOPSY;  Surgeon: Gary Perez MD;  Location: [facility];  Service: General;  Laterality: Bilateral;   • VENOUS ACCESS DEVICE (PORT) INSERTION  02/2020       No Known Allergies    Family History and Social History reviewed and changed as necessary      REVIEW OF SYSTEM:   Review of Systems   Constitutional: Negative for appetite change, chills, diaphoresis, fatigue, fever and unexpected weight change.   HENT:   Negative for mouth sores, sore throat and trouble swallowing.    Eyes: Negative for icterus.   Respiratory: Negative for cough, hemoptysis and shortness of breath.    Cardiovascular: Negative for chest pain, leg swelling and palpitations.   Gastrointestinal: Negative for abdominal distention, abdominal pain, blood in stool, constipation, diarrhea, nausea and vomiting.   Endocrine: Negative for hot flashes.   Genitourinary: Negative for bladder incontinence, difficulty urinating, dysuria, frequency and hematuria.    Musculoskeletal: Negative for gait problem, neck pain and neck stiffness.   Skin: Negative for rash.   Neurological: Negative for dizziness, gait problem, headaches, light-headedness.  Peripheral neuropathy in her  "hands and feet.  Hematological: Negative for adenopathy. Does not bruise/bleed easily.   Psychiatric/Behavioral: Negative for depression. The patient is not nervous/anxious.    All other systems reviewed and are negative.       PHYSICAL EXAM    Vitals:    09/11/20 1118   BP: 139/79   Pulse: 85   Resp: 18   Temp: 97.8 °F (36.6 °C)   SpO2: 97%   Weight: 77.6 kg (171 lb)   Height: 157.5 cm (62\")     Vitals:    09/11/20 1118   PainSc: 0-No pain        Constitutional: Appears well-developed and well-nourished. No distress.   ECOG: (1) Restricted in Physically Strenuous Activity, Ambulatory & Able to Do Work of Light Nature  HENT:   Head: Normocephalic.   Mouth/Throat: Oropharynx is clear and moist.   Eyes: Conjunctivae are normal. Pupils are equal, round, and reactive to light. No scleral icterus.   Neck: Neck supple. No JVD present.   Cardiovascular: Normal rate, regular rhythm and normal heart sounds.    Pulmonary/Chest: Breath sounds normal. No respiratory distress.   Abdominal: Soft. Exhibits no distension. There is no tenderness.    Musculoskeletal:Exhibits no edema, tenderness or deformity.   Neurological: Alert and oriented to person, place, and time. Exhibits normal muscle tone.   Skin: No ecchymosis, no petechiae and no rash noted. Not diaphoretic. No cyanosis.   Psychiatric: Normal mood and affect.   Vitals reviewed.  Labs reviewed.    Lab Results   Component Value Date    HGB 10.9 (L) 09/04/2020    HCT 33.5 (L) 09/04/2020    MCV 90.5 09/04/2020     09/04/2020    WBC 3.80 09/04/2020    NEUTROABS 2.20 09/04/2020    LYMPHSABS 1.50 09/04/2020    MONOSABS 0.10 09/04/2020    EOSABS 0.28 06/22/2020    BASOSABS 0.06 06/22/2020       Lab Results   Component Value Date    GLUCOSE 141 (H) 09/04/2020    BUN 15 09/04/2020    CREATININE 0.68 09/04/2020     09/04/2020    K 4.5 09/04/2020     09/04/2020    CO2 29.0 09/04/2020    CALCIUM 9.6 09/04/2020    PROTEINTOT 6.7 09/04/2020    ALBUMIN 4.20 09/04/2020 "    BILITOT <0.2 09/04/2020    ALKPHOS 62 09/04/2020    AST 16 09/04/2020    ALT 14 09/04/2020         ASSESSMENT & PLAN:    1.  Stage IIB invasive ductal triple negative breast cancer status post neoadjuvant Adriamycin Cytoxan x4, completed on 5/4/2020.  She opted out of neoadjuvant Taxol and had bilateral mastectomy on 6/16/2020.  Pathology showed residual invasive ductal carcinoma 9 mm with limited DCIS high-grade and negative margins.  0 out of 1 sentinel nodes positive.  Grade 7 out of 9.  Pathological T1 BN 0.  Postoperative course complicated by localized infections.  Started adjuvant weekly Taxol on 7/31/2020, dose reduction with cycle #5 due to peripheral neuropathy.  2.  Peripheral neuropathy  3.  Need for oral surgery    Discussion: On adjuvant weekly Taxol with 20% dose reduction with cycle #5 she continues to have peripheral neuropathy that is starting to interfere with normal activities such as putting on her earrings, she also is starting to notice some changes when she is typing.  She has received 6 out of 12 planned cycles thus far.  She is on gabapentin 300 mg at night.    We discussed today at length the fact that there is potential for the neuropathy to be permanent, it may also decrease if we stop Taxol.    She is in need of oral surgery and sees the oral surgeon at  on September 30.  I will hold Taxol for 3 weeks to give her time to have the tooth pulled and to see if her neuropathy improves.  If her neuropathy does not improve off of Taxol then I recommend we stop Taxol and move on to Xeloda.  Patient agrees with plan of care.    We will see her back in 3 weeks for follow-up.    I spent a total of 25 minutes in direct patient care, greater than 17  minutes face to face, time was spent in coordination of care, and counseling the patient regarding assessment, review of neuropathy, discussion of holding chemotherapy, need for dental work and plan of care going forward.  Answered any questions  patient had regarding medications and plan of care.     Camelia Carranza, APRN  09/11/2020

## 2020-09-24 ENCOUNTER — TELEPHONE (OUTPATIENT)
Dept: ONCOLOGY | Facility: CLINIC | Age: 55
End: 2020-09-24

## 2020-10-02 ENCOUNTER — APPOINTMENT (OUTPATIENT)
Dept: ONCOLOGY | Facility: HOSPITAL | Age: 55
End: 2020-10-02

## 2020-10-02 ENCOUNTER — SPECIALTY PHARMACY (OUTPATIENT)
Dept: ONCOLOGY | Facility: HOSPITAL | Age: 55
End: 2020-10-02

## 2020-10-02 ENCOUNTER — OFFICE VISIT (OUTPATIENT)
Dept: ONCOLOGY | Facility: CLINIC | Age: 55
End: 2020-10-02

## 2020-10-02 VITALS
HEART RATE: 84 BPM | HEIGHT: 62 IN | OXYGEN SATURATION: 96 % | SYSTOLIC BLOOD PRESSURE: 167 MMHG | BODY MASS INDEX: 32.02 KG/M2 | RESPIRATION RATE: 18 BRPM | DIASTOLIC BLOOD PRESSURE: 85 MMHG | WEIGHT: 174 LBS | TEMPERATURE: 96.6 F

## 2020-10-02 DIAGNOSIS — Z17.1 MALIGNANT NEOPLASM OF UPPER-OUTER QUADRANT OF RIGHT BREAST IN FEMALE, ESTROGEN RECEPTOR NEGATIVE (HCC): Primary | ICD-10-CM

## 2020-10-02 DIAGNOSIS — C50.411 MALIGNANT NEOPLASM OF UPPER-OUTER QUADRANT OF RIGHT BREAST IN FEMALE, ESTROGEN RECEPTOR NEGATIVE (HCC): Primary | ICD-10-CM

## 2020-10-02 PROBLEM — C50.911 BREAST CANCER, RIGHT: Status: RESOLVED | Noted: 2020-06-16 | Resolved: 2020-10-02

## 2020-10-02 PROCEDURE — 99214 OFFICE O/P EST MOD 30 MIN: CPT | Performed by: INTERNAL MEDICINE

## 2020-10-02 RX ORDER — CAPECITABINE 500 MG/1
TABLET, FILM COATED ORAL
Qty: 84 TABLET | Refills: 5 | Status: SHIPPED | OUTPATIENT
Start: 2020-10-12 | End: 2020-10-05 | Stop reason: SDUPTHER

## 2020-10-02 RX ORDER — ONDANSETRON HYDROCHLORIDE 8 MG/1
8 TABLET, FILM COATED ORAL 3 TIMES DAILY PRN
Qty: 30 TABLET | Refills: 5 | Status: SHIPPED | OUTPATIENT
Start: 2020-10-02 | End: 2021-06-08

## 2020-10-02 NOTE — PROGRESS NOTES
Drug: Xeloda  Strength: 500mg tablet  Directions: Take 3 tablets (1500mg) PO BID x 14 days, followed by 7 days off  QTY: 84  RF:5    Released to pharmacy: [facility]    Completed independent double check on medication order/RX.

## 2020-10-02 NOTE — PROGRESS NOTES
Oral Chemotherapy Teaching      Patient Name/:  Kenya Lemon   1965  Oral Chemotherapy Regimen:  Capecitabine 1500mg PO BID 14 days on/7 days off x 6 total cycles  Date Started Medication: Cycle 1: 20    Additional Notes:  Oral chemotherapy clinic received referral from Dr. Juarez regarding the initiation of capecitabine. Reviewed patient chart. Released script for capecitabine 1500mg PO BID for 14 days on/7 days off, #84 with 5 RF to [facility] to begin processing. Pt scheduled for oral chemotherapy education and financial navigation appt on 10/9/20.  Will obtain consent and CCA at that time.

## 2020-10-02 NOTE — PROGRESS NOTES
CHIEF COMPLAINT: Adjuvant therapy triple negative breast cancer    Problem List:  Oncology/Hematology History Overview Note   1.  Stage IIB T2 (2.5cm per Dr. Perez) N0 invasive ductal triple negative breast cancer.  2/16/2020 right upper outer quadrant needle core biopsy showed invasive ductal carcinoma 4.4 mm moderately differentiated negative for estrogen, progesterone receptor both 0% and HER-2/vashti 0+.  Dr. Santo recommended lumpectomy and sentinel node biopsy.  3/12/2020 saw Dr. Gary Perez.perimenopausal breast cancer she felt a 2.5 cm right upper outer quadrant mass almost at the edge of the axilla somewhat fixed.  No skin changes.  She recommended neoadjuvant chemotherapy followed by lumpectomy versus mastectomy then radiation if patient decides on breast conservation or if nodes are found on breast MRI.  She will get formal genetic counseling to help guide the issues of possible prophylactic mastectomy.  With triple negative disease the standard recommendation would be neoadjuvant Adriamycin Cytoxan dose dense x4 followed by Taxol weekly x12 followed by surgery.  If there is residual disease then would consider Xeloda per the create X trial.  We also have  looking at observation versus Keytruda for 1 year in patients with triple negative disease who do not have a complete pathological response to neoadjuvant chemotherapy.  In the absence of symptoms, NCCN guidelines does not recommend routine metastatic surveillance imaging.  Will need ejection fraction before Adriamycin.  -3/24/2020 through 5/4/2020 Adriamycin Cytoxan x4.  Opted out of neoadjuvant Taxol  -6/16/2020 left breast simple mastectomy benign  Right breast simple mastectomy showed residual invasive ductal carcinoma 9 mm with limited DCIS high-grade and negative margins.  0 out of 1 sentinel nodes positive.  Grade 7 out of 9.  Pathologic T1 BN 0.  Postoperative course complicated by localized infections.   -7/6/2020 [facility] oncology  follow-up visit: Given residual disease after 4 courses of AC I would want her to complete the Taxol as planned.  There is also new data presented at [facility] that suggests adjuvant Xeloda following adjuvant chemotherapy is reasonable and triple negative disease.  This is not precisely what she is doing as she has now done part of this neoadjuvant point but I think it makes sense to apply in this setting given residual disease after AC x4 but I would not forego the Taxol but will plan on adding Xeloda.  Reinforced the need for genetic testing salivary kit that has been sent to her but she has not completed.    -8/25/2020 Genetic testing was negative for pathogenic mutations in BRCA1/2 and 32 additional genes on the CancerNext panel.       -Taxol dose reduced by 20% with cycle #5 due to peripheral neuropathy    -9/11/2020 [facility] visit: Peripheral neuropathy continues, patient also in need of dental work, decision made to hold Taxol for 3 weeks to see if neuropathy improves and to allow time for oral surgery.    -9/4/2020 6th and final cycle of of Taxol as patient had terrible neuropathy despite dose reduction  -10/2/2020 [facility]ology follow-up visit: Neuropathy is not improved.  Does not like gabapentin.  Will stop Taxol permanently and press on with Xeloda after she has tooth extraction on October 14.  We will have her back to my nurse practitioner October 26 for cycle 1 of 6 of Xeloda.    2.  Hypertension  3.  Hyperlipidemia  4.  Diabetes  5.  Using IUD and taking estradiol for hormone replacement therapy preoperatively  6.  History of D and C as well as bilateral tubal ligation  7.  History of cholecystectomy  8.  Vasomotor symptoms  9.  Family history of paternal aunt with breast cancer and perhaps a sister with ovarian cancer but she is not sure.  She has nulliparous.  Genetic spit test done results pending.    10.  Anxiety and depression     Malignant neoplasm of upper-outer quadrant  of right breast in female, estrogen receptor negative (CMS/HCC)   3/12/2020 Initial Diagnosis    Malignant neoplasm of upper outer quadrant of breast in female, estrogen receptor negative (CMS/HCC)     3/12/2020 Cancer Staged    Staging form: Breast, AJCC 8th Edition  - Clinical: Stage IIB (cT2, cN0, cM0, G2, ER-, MT-, HER2-) - Signed by Johann Juarez MD on 3/12/2020     3/24/2020 - 5/4/2020 Chemotherapy    OP BREAST AC DD DOXOrubicin / Cyclophosphamide       5/6/2020 -  Chemotherapy    OP SUPPORTIVE HYDRATION + ANTIEMETICS     6/16/2020 Surgery    Surgery       -6/16/2020 left breast simple mastectomy benign  Right breast simple mastectomy showed residual invasive ductal carcinoma 9 mm with limited DCIS high-grade and negative margins.  0 out of 1 sentinel nodes positive.  Grade 7 out of 9.  Pathologic T1 BN 0     7/6/2020 Cancer Staged    Staging form: Breast, AJCC 8th Edition  - Pathologic: Stage IB (pT1b, pN0, cM0, G2, ER-, MT-, HER2-) - Signed by Johann Juarez MD on 7/6/2020 7/31/2020 -  Chemotherapy    OP CENTRAL VENOUS ACCESS DEVICE ACCESS, CARE, AND MAINTENANCE (CVAD)     10/19/2020 -  Chemotherapy    OP BREAST Capecitabine     Malignant neoplasm of upper-outer quadrant of right breast in female, estrogen receptor negative (CMS/HCC)   3/12/2020 Initial Diagnosis    Malignant neoplasm of upper-outer quadrant of right breast in female, estrogen receptor negative (CMS/HCC)     3/24/2020 - 5/18/2020 Chemotherapy    OP BREAST AC DD DOXOrubicin / Cyclophosphamide     7/31/2020 - 9/10/2020 Chemotherapy    OP BREAST PACLitaxel (weekly X 12)     8/25/2020 Genetic Testing    Genetic testing was negative for pathogenic mutations in BRCA1/2 and 32 additional genes on the CancerNext panel.        8/28/2020 Adverse Reaction    Taxol dose reduced by 20% due to peripheral neuropathy with cycle #5.     10/19/2020 -  Chemotherapy    OP BREAST Capecitabine         HISTORY OF PRESENT ILLNESS:  The patient is a 54 y.o.  female, here for follow up on management of triple negative adjuvant therapy breast cancer.  Severe neuropathy with Taxol persists.  Needs a tooth extracted      Past Medical History:   Diagnosis Date   • Cancer (CMS/HCC) 02/2020    breast cancer per mammogram; chemotherapy    • Diabetes (CMS/HCC)    • History of low potassium 03/2020    prescribed potassium supplements    • History of transfusion 1989    after delivery of son (1 unit only)    • HTN (hypertension)    • Low magnesium level 03/2020    requiring magnesium supplements    • Sleep apnea     no cpap machine currently     Past Surgical History:   Procedure Laterality Date   • BREAST BIOPSY Right 02/2020   • CHOLECYSTECTOMY     • COLONOSCOPY     • LAPAROSCOPIC TUBAL LIGATION     • MASTECTOMY W/ SENTINEL NODE BIOPSY Bilateral 6/16/2020    Procedure: SKIN SPARING MASTECTOMIES BILATERAL AND RIGHT SENTINEL NODE BIOPSY;  Surgeon: Gary Perez MD;  Location: [facility];  Service: General;  Laterality: Bilateral;   • VENOUS ACCESS DEVICE (PORT) INSERTION  02/2020       No Known Allergies    Family History and Social History reviewed and changed as necessary      REVIEW OF SYSTEM:   Review of Systems   Constitutional: Negative for appetite change, chills, diaphoresis, fatigue, fever and unexpected weight change.   HENT:   Negative for mouth sores, sore throat and trouble swallowing.    Eyes: Negative for icterus.   Respiratory: Negative for cough, hemoptysis and shortness of breath.    Cardiovascular: Negative for chest pain, leg swelling and palpitations.   Gastrointestinal: Negative for abdominal distention, abdominal pain, blood in stool, constipation, diarrhea, nausea and vomiting.   Endocrine: Negative for hot flashes.   Genitourinary: Negative for bladder incontinence, difficulty urinating, dysuria, frequency and hematuria.    Musculoskeletal: Negative for gait problem, neck pain and neck stiffness.   Skin: Negative for rash.   Neurological: Negative for  "dizziness, gait problem, headaches, light-headedness and numbness.   Hematological: Negative for adenopathy. Does not bruise/bleed easily.   Psychiatric/Behavioral: Negative for depression. The patient is not nervous/anxious.    All other systems reviewed and are negative.       PHYSICAL EXAM    Vitals:    10/02/20 0816   BP: 167/85   Pulse: 84   Resp: 18   Temp: 96.6 °F (35.9 °C)   TempSrc: Temporal   SpO2: 96%   Weight: 78.9 kg (174 lb)   Height: 157.5 cm (62\")     Vitals:    10/02/20 0816   PainSc: 0-No pain        Constitutional: Appears well-developed and well-nourished. No distress.   ECOG: (1) Restricted in Physically Strenuous Activity, Ambulatory & Able to Do Work of Light Nature  HENT:   Head: Normocephalic.   Mouth/Throat: Oropharynx is clear and moist.   Eyes: Conjunctivae are normal. Pupils are equal, round, and reactive to light. No scleral icterus.   Neck: Neck supple. No JVD present. No thyromegaly present.   Cardiovascular: Normal rate, regular rhythm and normal heart sounds.    Pulmonary/Chest: Breath sounds normal. No respiratory distress.   Abdominal: Soft. Exhibits no distension and no mass. There is no hepatosplenomegaly. There is no tenderness. There is no rebound and no guarding.   Musculoskeletal:Exhibits no edema, tenderness or deformity.   Neurological: Alert and oriented to person, place, and time. Exhibits normal muscle tone.   Skin: No ecchymosis, no petechiae and no rash noted. Not diaphoretic. No cyanosis. Nails show no clubbing.   Psychiatric: Normal mood and affect.   Vitals reviewed.      Lab Results   Component Value Date    HGB 10.9 (L) 09/04/2020    HCT 33.5 (L) 09/04/2020    MCV 90.5 09/04/2020     09/04/2020    WBC 3.80 09/04/2020    NEUTROABS 2.20 09/04/2020    LYMPHSABS 1.50 09/04/2020    MONOSABS 0.10 09/04/2020    EOSABS 0.28 06/22/2020    BASOSABS 0.06 06/22/2020       Lab Results   Component Value Date    GLUCOSE 141 (H) 09/04/2020    BUN 15 09/04/2020    " CREATININE 0.68 09/04/2020     09/04/2020    K 4.5 09/04/2020     09/04/2020    CO2 29.0 09/04/2020    CALCIUM 9.6 09/04/2020    PROTEINTOT 6.7 09/04/2020    ALBUMIN 4.20 09/04/2020    BILITOT <0.2 09/04/2020    ALKPHOS 62 09/04/2020    AST 16 09/04/2020    ALT 14 09/04/2020                   ASSESSMENT & PLAN:  1.  Stage IIB T2 (2.5cm per Dr. Perez) N0 invasive ductal triple negative breast cancer.  2/16/2020 right upper outer quadrant needle core biopsy showed invasive ductal carcinoma 4.4 mm moderately differentiated negative for estrogen, progesterone receptor both 0% and HER-2/vashti 0+.  Dr. Santo recommended lumpectomy and sentinel node biopsy.  3/12/2020 saw Dr. Gary Perez.perimenopausal breast cancer she felt a 2.5 cm right upper outer quadrant mass almost at the edge of the axilla somewhat fixed.  No skin changes.  She recommended neoadjuvant chemotherapy followed by lumpectomy versus mastectomy then radiation if patient decides on breast conservation or if nodes are found on breast MRI.  She will get formal genetic counseling to help guide the issues of possible prophylactic mastectomy.  With triple negative disease the standard recommendation would be neoadjuvant Adriamycin Cytoxan dose dense x4 followed by Taxol weekly x12 followed by surgery.  If there is residual disease then would consider Xeloda per the create X trial.  We also have  looking at observation versus Keytruda for 1 year in patients with triple negative disease who do not have a complete pathological response to neoadjuvant chemotherapy.  In the absence of symptoms, NCCN guidelines does not recommend routine metastatic surveillance imaging.  Will need ejection fraction before Adriamycin.  -3/24/2020 through 5/4/2020 Adriamycin Cytoxan x4.  Opted out of neoadjuvant Taxol  -6/16/2020 left breast simple mastectomy benign  Right breast simple mastectomy showed residual invasive ductal carcinoma 9 mm with limited DCIS  high-grade and negative margins.  0 out of 1 sentinel nodes positive.  Grade 7 out of 9.  Pathologic T1 BN 0.  Postoperative course complicated by localized infections.   -7/6/2020 [facility] medical oncology follow-up visit: Given residual disease after 4 courses of AC I would want her to complete the Taxol as planned.  There is also new data presented at [facility] that suggests adjuvant Xeloda following adjuvant chemotherapy is reasonable and triple negative disease.  This is not precisely what she is doing as she has now done part of this neoadjuvant point but I think it makes sense to apply in this setting given residual disease after AC x4 but I would not forego the Taxol but will plan on adding Xeloda.  Reinforced the need for genetic testing salivary kit that has been sent to her but she has not completed.    -8/25/2020 Genetic testing was negative for pathogenic mutations in BRCA1/2 and 32 additional genes on the CancerNext panel.       -Taxol dose reduced by 20% with cycle #5 due to peripheral neuropathy    -9/11/2020 [facility] oncology clinic visit: Peripheral neuropathy continues, patient also in need of dental work, decision made to hold Taxol for 3 weeks to see if neuropathy improves and to allow time for oral surgery.    -9/4/2020 6th and final cycle of of Taxol as patient had terrible neuropathy despite dose reduction    -10/2/2020 [facility] hematology oncology follow-up visit: Neuropathy is not improved.  Does not like gabapentin.  Will stop Taxol permanently and press on with Xeloda after she has tooth extraction on October 14.  We will have her back to my nurse practitioner October 26 for cycle 1 of 6 of Xeloda.  Discussed with patient face-to-face 30 minutes greater than 50% spent counseling regarding this plan as outlined above.  I will also consult palliative care for her neuropathy management and other symptom management.        Johann Juarez MD    10/02/2020

## 2020-10-05 ENCOUNTER — SPECIALTY PHARMACY (OUTPATIENT)
Dept: ONCOLOGY | Facility: HOSPITAL | Age: 55
End: 2020-10-05

## 2020-10-05 DIAGNOSIS — C50.411 MALIGNANT NEOPLASM OF UPPER-OUTER QUADRANT OF RIGHT BREAST IN FEMALE, ESTROGEN RECEPTOR NEGATIVE (HCC): ICD-10-CM

## 2020-10-05 DIAGNOSIS — Z17.1 MALIGNANT NEOPLASM OF UPPER-OUTER QUADRANT OF RIGHT BREAST IN FEMALE, ESTROGEN RECEPTOR NEGATIVE (HCC): ICD-10-CM

## 2020-10-05 RX ORDER — CAPECITABINE 500 MG/1
TABLET, FILM COATED ORAL
Qty: 84 TABLET | Refills: 5 | Status: SHIPPED | OUTPATIENT
Start: 2020-10-12 | End: 2021-06-08

## 2020-10-09 ENCOUNTER — HOSPITAL ENCOUNTER (OUTPATIENT)
Dept: ONCOLOGY | Facility: HOSPITAL | Age: 55
Discharge: HOME | End: 2020-10-09

## 2020-10-09 ENCOUNTER — NURSE NAVIGATOR (OUTPATIENT)
Dept: OTHER | Facility: HOSPITAL | Age: 55
End: 2020-10-09

## 2020-10-09 ENCOUNTER — HOSPITAL ENCOUNTER (OUTPATIENT)
Dept: OCCUPATIONAL THERAPY | Facility: HOSPITAL | Age: 55
Setting detail: THERAPIES SERIES
Discharge: HOME | End: 2020-10-09

## 2020-10-09 ENCOUNTER — TRANSCRIBE ORDERS (OUTPATIENT)
Dept: OCCUPATIONAL THERAPY | Facility: HOSPITAL | Age: 55
End: 2020-10-09

## 2020-10-09 ENCOUNTER — SPECIALTY PHARMACY (OUTPATIENT)
Dept: ONCOLOGY | Facility: HOSPITAL | Age: 55
End: 2020-10-09

## 2020-10-09 DIAGNOSIS — C50.411 MALIGNANT NEOPLASM OF UPPER-OUTER QUADRANT OF RIGHT FEMALE BREAST, UNSPECIFIED ESTROGEN RECEPTOR STATUS (HCC): Primary | ICD-10-CM

## 2020-10-09 DIAGNOSIS — L90.5 SCAR CONDITIONS AND FIBROSIS OF SKIN: ICD-10-CM

## 2020-10-09 DIAGNOSIS — T45.1X5A CHEMOTHERAPY-INDUCED PERIPHERAL NEUROPATHY (HCC): ICD-10-CM

## 2020-10-09 DIAGNOSIS — G62.0 CHEMOTHERAPY-INDUCED PERIPHERAL NEUROPATHY (HCC): ICD-10-CM

## 2020-10-09 DIAGNOSIS — M25.60 RANGE OF MOTION DEFICIT: ICD-10-CM

## 2020-10-09 DIAGNOSIS — R29.3 ABNORMAL POSTURE: ICD-10-CM

## 2020-10-09 PROCEDURE — 97167 OT EVAL HIGH COMPLEX 60 MIN: CPT

## 2020-10-09 NOTE — PLAN OF CARE
Oral Chemotherapy Teaching      Patient Name/:  Kenya Lemon   1965  Oral Chemotherapy Regimen:  Capecitabine 1,500 mg (three tablets) PO BID for days 1-14, followed by 7 days off  Date Started Medication: Planned 20    Initial Teaching Follow Up Comments     Safety     Storage instructions (away from children; away from heat/cold, sunlight, or moisture), handling - use of gloves (caregivers), washing hands after touching pills, managing waste     “How are you storing your medications?”, reminders on storage, proper handling (caregivers using gloves, washing hands, away from children, managing waste, etc.), disposal of medication with D/C or dosage change    Instructed patient to store medication at room temperature in a dry place. Patient stated she will be administering her own medication. Educated patient that if a caregiver, friend, or family member does help, he/she should wear gloves and wash hands afterward. Counseled patient to wash soiled linens separately for at least one load. Discussed safe sex practices.     Adherence      patient and/or caregiver on how to take medication, take with/without food, assess their adherence potential, stress importance of adherence, ways to manage adherence (pill boxes, phone reminders, calendars), what to do if miss a dose   “How are you taking your medication?” “How are you remembering to take your medication?”, “How many doses have you missed?”, determine reasons for non-adherence (not remembering, side effects, etc), ways to improve, overadherence? Remind patient of ways to improve/maintain adherence   Instructed patient to take three capecitabine tablets (for a total of 1500 mg) in the morning and evening on days 1-14, followed by 7 days off. Instructed patient to use a separate pill box for capecitabine. Counseled patient not to take a double dose if she misses a dose, but to resume at her next regular time. Counseled patient to call specialty  for a refill when she takes her last dose of each cycle.     Side Effects/Adverse Reactions      patient on potential side effects, s/s, ways to manage, when to call MD/seek help     Determine if patient experiencing side effects, ways to manage  Discussed the role of RBCs, risk of anemia, and s/sx of anemia (including fatigue). Encouraged patient to continue usual physical activity as tolerated.  Discussed the role of platelets and increased risk of bleeding/bruising. Educated patient on ways to prevent/stop bleeding and when to call MD.  Discussed the role of WBCs and increased risk of infection. Instructed patient to notify MD immediately for temperatures >/= 100.4F. Encouraged appropriate hand hygiene and avoidance of sick contacts.  Discussed risk of diarrhea. Reviewed OTC use of loperamide and when to call MD.  Discussed risk of constipation. Reviewed OTC use of docusate, PEG, and senna and when to call MD.   Reviewed the risk of N/V and antiemetics in current regimen (ondansetron as needed). Instructed patient to call if N/V is not controlled.  Discussed risk of hair loss/ thinning.  Discussed the risk of organ toxicities, specifically low blood counts, elevations in LFTs, mouth sores, and hand-foot syndrome. Notified patient we will be monitoring toxicities via routine labs. For mouth sores, instructed patient to gargle with mix of salt, baking soda and water when needed. For hand-food syndrome, encouraged patient to use an emollient on hands and feet twice daily.     Miscellaneous     Food interactions, DDIs, financial issues Determine if patient started any new medications since being placed on oral chemo (analyze for DDI) Drug-drug interaction analysis performed. No interactions present.     Additional Notes:  Discussed aforementioned material with patient in Education Office. Instructed patient on how to manage symptoms at home and when to notify MD. Counseled patient on importance of labs  every three weeks while taking this medication. Patient requested order for labs be sent to [email or web address] in [place]. All questions and concerns addressed. Provided patient with Danielle Mcgarry's contact information and instructions to call should additional questions arise. Provided patient with personalized treatment calendar and written educational material.

## 2020-10-09 NOTE — THERAPY EVALUATION
Outpatient Occupational Therapy Ortho Initial Evaluation   [place]     Patient Name: Kenya Lemon  : 1965  MRN: [id number]  Today's Date: 10/9/2020      Visit Date: 10/09/2020    Patient Active Problem List   Diagnosis   • Malignant neoplasm of upper-outer quadrant of right breast in female, estrogen receptor negative (CMS/HCC)   • Encounter for antineoplastic chemotherapy   • Malignant neoplasm of upper-outer quadrant of right breast in female, estrogen receptor negative (CMS/HCC)   • Encounter for adjustment or management of vascular access device   • DM2 (diabetes mellitus, type 2) (CMS/HCC)   • Anemia   • Anxiety and depression   • Essential hypertension   • Acute UTI (urinary tract infection)        Past Medical History:   Diagnosis Date   • Cancer (CMS/HCC) 2020    breast cancer per mammogram; chemotherapy    • Diabetes (CMS/HCC)    • History of low potassium 2020    prescribed potassium supplements    • History of transfusion     after delivery of son (1 unit only)    • HTN (hypertension)    • Low magnesium level 2020    requiring magnesium supplements    • Sleep apnea     no cpap machine currently        Past Surgical History:   Procedure Laterality Date   • BREAST BIOPSY Right 2020   • CHOLECYSTECTOMY     • COLONOSCOPY     • LAPAROSCOPIC TUBAL LIGATION     • MASTECTOMY W/ SENTINEL NODE BIOPSY Bilateral 2020    Procedure: SKIN SPARING MASTECTOMIES BILATERAL AND RIGHT SENTINEL NODE BIOPSY;  Surgeon: Gary Perez MD;  Location: [facility];  Service: General;  Laterality: Bilateral;   • VENOUS ACCESS DEVICE (PORT) INSERTION  2020         Visit Dx:    ICD-10-CM ICD-9-CM   1. Malignant neoplasm of upper-outer quadrant of right female breast, unspecified estrogen receptor status (CMS/HCC)  C50.411 174.4   2. Scar conditions and fibrosis of skin  L90.5 709.2   3. Range of motion deficit  M25.60 719.50   4. Chemotherapy-induced peripheral neuropathy (CMS/HCC)  G62.0  357.7    T45.1X5A E933.1   5. Abnormal posture  R29.3 781.92       Patient History     Row Name 10/09/20 1300             History    Chief Complaint  Tightness;Pain;Joint stiffness;Fatigue/poor endurance;Difficulty with daily activities;Impaired sensation  -SG      Type of Pain  Chest pain;Upper Extremity / Arm;Shoulder pain;Hand pain;Foot pain  -SG      Date Current Problem(s) Began  -- CIPN in hands and feet; tightness since mastectomy 6/16  -SG      Brief Description of Current Complaint  Pt is a 53 yo female w/ Stage IIB invasive ductal triple negative breast cancer status post neoadjuvant Adriamycin Cytoxan x4, completed on 5/4/2020.  She opted out of neoadjuvant Taxol and had bilateral mastectomy on 6/16/2020.  Pathology showed residual invasive ductal carcinoma 9 mm with limited DCIS high-grade and negative margins.  0 out of 1 sentinel nodes positive.  Grade 7 out of 9.  Pathological T1 BN 0.  Postoperative course complicated by localized infections.  Started adjuvant weekly Taxol on 7/31/2020, dose reduction with cycle #5 due to peripheral neuropathy. Pt reports to OP OT today w/ tight soft tissue restrictions and scar tissue around mastectomy incisions, seeking guidance on massage to loosen tissue before next meeting with plastic surgeon for reconstruction. Pt also has significant CIPN which she seeks help for as well.  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  Dr. Juarez, Dr. Perez, Dr. Franco  -SG         Pain     Pain Location  Hand;Foot;Chest  -SG      Pain at Present  8  -SG      Pain at Best  5  -SG      Pain at Worst  10  -SG      Pain Comments  Tightness around mastectomy  areas; CIPN significantly in hands and feet  -SG      Is your sleep disturbed?  Yes  -SG      Difficulties at work?  Yes  -SG      Difficulties with ADL's?  Yes  -SG      Difficulties with recreational activities?  Yes  -SG         Daily Activities    Teaching needs identified  Home Exercise Program;Management of Condition  -SG      Patient is concerned about/has problems with  Difficulty with self care (i.e. bathing, dressing, toileting:;Flexibility;Performing home management (household chores, shopping, care of dependents);Performing job responsibilities/community activities (work, school,;Reaching over head;Repetitive movements of the hand, arm, shoulder  -SG      Pt Participated in POC and Goals  Yes  -SG        User Key  (r) = Recorded By, (t) = Taken By, (c) = Cosigned By    Initials Name Provider Type    Ludivina Jung OTR/L Occupational Therapist                Lymphedema     Row Name 10/09/20 1300             Subjective Pain    Able to rate subjective pain?  yes  -SG      Pre-Treatment Pain Level  8  -SG      Post-Treatment Pain Level  5  -SG         Lymphedema Assessment    Lymphedema Classification  RUE:;at risk/stage 0  -SG      Lymphedema Cancer Related Sx  bilateral;simple mastectomy;right;sentinel node biopsy  -SG      Lymph Nodes Removed #  1  -SG      Positive Lymph Nodes #  0  -SG      Stage of Cancer  Stage III  -SG      Cancer Comments  Stage IIB invasive ductal triple negative breast cancer  -SG      Chemo Received  yes  -SG      Chemo Treatments #/Timeframe  -- chemo currently ongoing  -SG      Adverse Chemo Reactions/Complication  CIPN  -SG      Radiation Therapy Received  no  -SG         Posture/Observations    Alignment Options  Forward head;Rounded shoulders  -SG      Rounded Shoulders  Moderate  -SG      Posture/Observations Comments  Protective posturing  -SG         General ROM    RT Upper Ext  Rt Shoulder ABduction;Rt Shoulder Flexion;Rt Shoulder External Rotation;Rt Shoulder  Internal Rotation  -SG      LT Upper Ext  Lt Shoulder ABduction;Lt Shoulder Flexion;Lt Shoulder External Rotation;Lt Shoulder Internal Rotation  -SG         Right Upper Ext    Rt Shoulder Abduction AROM  160  -SG      Rt Shoulder Flexion AROM  160  -SG      Rt Shoulder External Rotation AROM  HBH lacking approx 20% from plane  -SG      Rt Shoulder Internal Rotation AROM  HBB able to reach low back  -SG         Left Upper Ext    Lt Shoulder Abduction AROM  160  -SG      Lt Shoulder Flexion AROM  160  -SG      Lt Shoulder External Rotation AROM  WNL  -SG      Lt Shoulder Internal Rotation AROM  WNL  -SG         MMT (Manual Muscle Testing)    General MMT Comments  Generalized weakness and fatigue  -SG         Lymphedema Edema Assessment    Edema Assessment Comment  Pt does not demo any signs of edema/lymphedema at this time. Pt edu on signs and symptoms to be mindful of  -SG         Skin Changes/Observations    Location/Assessment  Upper Quadrant  -SG      Upper Quadrant Conditions  intact;clean  -SG      Skin Observations Comment  Tight soft tissue restrictions bilaterally R>L. Right side angulation significant around incision line w/ scar tissue and soft tissue dysfunction  -SG         Lymphedema Sensation    Lymphedema Sensation Reports  RUE:;LUE:;numbness;tingling  -SG      Lymphedema Sensation Comments  Significant CIPN in hands and feet (hands worse than feet)  -SG         Compression/Skin Care    Compression/Skin Care Comments  Ktape to hands and feet for CIPN  -SG         L-Dex Bioimpedence Screening    L-Dex Measurement Extremity  RUE  -SG      L-Dex Patient Position  Standing  -SG      L-Dex UE Dominate Side  Right  -SG      L-Dex UE At Risk Side  Right  -SG      L-Dex UE Pre Surgical Value  No  -SG      L-Dex UE Score  -6.5  -SG      L-Dex UE Comment  WNL  -SG        User Key  (r) = Recorded By, (t) = Taken By, (c) = Cosigned By    Initials Name Provider Type    Ludivina Jung, OTR/L Occupational Therapist  "           Therapy Education  Education Details: Pt was edu on treatment, goals, and POC. HEP: supine shoulder flexion w/ cane assist, deep diaphragmatic breathing, self-MLD, self-scar massage, bye byes for neural tension release, shoulder stabilization/postural support, door frame stretching. Pt was edu on conservative tx for CIPN including rice box, introducing various textures for mind body connection, Kinesiotape  Given: HEP, Symptoms/condition management, Posture/body mechanics  Program: New  How Provided: Written, Demonstration, Verbal  Provided to: Patient  Level of Understanding: Verbalized, Demonstrated    OT Goals     Row Name 10/09/20 1300          OT Short Term Goals    STG Date to Achieve  11/08/20  -SG     STG 1  \"Pt will be independent with HEP for shoulder ROM and soft tissue flexibility.  -SG     STG 2  \"Bilateral chest soft tissue restrictions will demo at least 25% improvement.  -SG     STG 3  Pt will demo bilateral shoulder flexion and abduction to at least 170 degrees.  -SG     STG 4  \"Pt will be independent w/ gentle self-soft tissue/scar and lymphatic massage  -SG        Long Term Goals    LTG 1  \"Pt will restore shoulder AROM to PLOF to improve 1 and 2 handed functional activity ability  -SG     LTG 2  \"Pt will restore functional scoring using DASH assessment tool to pre-operative amounts (per pt report) to ensure full return to baseline function.  -SG     LTG 3  \"Pt will restore full upright posture per pt perception to promote greater functional movement.  -SG     LTG 4  \"Pt will demonstrate awareness of individualized lymphedema precautions based on personal risk factors and when to seek medical attn. for assessment of early signs of lymphedema or cellulitis of the affected region  -SG     LTG 5  \"Bilateral chest soft tissue restrictions will demo at least 75% improvement.  -SG        Time Calculation    OT Goal Re-Cert Due Date  01/07/21  -SG       User Key  (r) = Recorded By, (t) = Taken " By, (c) = Cosigned By    Initials Name Provider Type    Ludivina Jung, OTR/L Occupational Therapist          OT Assessment/Plan     Row Name 10/09/20 1300          OT Assessment    Functional Limitations  Limitations in functional capacity and performance;Performance in self-care ADL;Limitation in home management  -SG     Impairments  Sensation;Range of motion;Posture;Peripheral nerve integrity;Pain;Muscle strength;Joint mobility;Impaired sensory integrity;Impaired lymphatic circulation;Impaired flexibility  -SG     Assessment Comments  EXAMINATION >4 elements = High complexity a) Body Structures (5): (R) shoulder; trunk; back, lymphatic system, nervous system; b) Body function(6): ROM, Strength, Sensation, Pain, Fluid Accumulation, Fatigue; c) Activities of Daily Living (3): Self care; mobility; work; d) Participation in ADLs: 40-60% limited.  Pt presents w the following: limited ROM shoulder for overhead tasks; pain and stiffness in chest and shoulders which limits ROM for dressing and reaching and other basic ADLs; strength: decreased core stabilization and decreaed UE/ strength; posture: forward neck and shoulder posture, disrupted scapulo-humeral rhythm; increased tissue tightness over chest, stress across pectoralis major muscle, s-c joint line, c-v joint line, and associated ST through shoulders and trunk; w/ tight soft tissue restrictions and scar tissue; CIPN: decreased sensation and motor loss affecting LE walking/balance, decreased sensation and motor loss affecting UE hand fine motor control; CRF: physiological fatigue resulting from CA and related treatments resulting in persistent fatigue impairing ability to function WNL and customary ADLs; at risk for lymphedema. She will benefit from continuing with OP OT to address symptoms.  -SG     Please refer to paper survey for additional self-reported information  Yes  -SG     OT Rehab Potential  Good  -SG     Patient/caregiver participated in  establishment of treatment plan and goals  Yes  -SG     Patient would benefit from skilled therapy intervention  Yes  -SG        OT Plan    OT Frequency  1x/week  -SG     Predicted Duration of Therapy Intervention (OT)  20 visits  -SG     Planned CPT's?  OT EVAL HIGH COMPLEXITY: 93428;OT THER ACT EA 15 MIN: 35400CC;OT THER PROC EA 15 MIN: 65838XK;OT NEUROMUSC RE EDUCATION EA 15 MIN: 99948;OT SELF CARE/MGMT/TRAIN 15 MIN: 10864;OT MANUAL THERAPY EA 15 MIN: 43890  -SG     Planned Therapy Interventions (Optional Details)  home exercise program;joint mobilization;manual therapy techniques;neuromuscular re-education;patient/family education;postural re-education;ROM (Range of Motion);strengthening;stretching  -SG     OT Plan Comments  Begin PORi protocol; Begin ASTYM/STM; Begin tx for CIPN; progress ROM and HEP as tolerated  -SG       User Key  (r) = Recorded By, (t) = Taken By, (c) = Cosigned By    Initials Name Provider Type    Ludivina Jung OTR/L Occupational Therapist                 Outcome Measure Options: Disabilities of the Arm, Shoulder, and Hand (DASH)  DASH  Open a tight or new jar.: Severe Difficulty  Write: Moderate Difficulty  Turn a key: Moderate Difficulty  Prepare a meal: Mild Difficulty  Push open a heavy door: Mild Difficulty  Place an object on a shelf above your head: Mild Difficulty  Do heavy household chores (e.g., wash walls, wash floors): Mild Difficulty  Garden or do yard work: Mild Difficulty  Make a bed: Mild Difficulty  Carry a shopping bag or briefcase: Mild Difficulty  Carry a heavy object (over 10 lbs): Mild Difficulty  Change a lightbulb overhead: Moderate Difficulty  Wash or blow dry your hair: Moderate Difficulty  Wash your back: Moderate Difficulty  Put on a pullover sweater: Mild Difficulty  Use a knife to cut food: Moderate Difficulty  Recreational activities in which require little effort (e.g., cardplaying, knitting, etc.): No Difficulty  Recreational activities in which you  take some force or impact through your arm, should or hand (e.g. golf, hammering, tennis, etc.): Moderate Difficulty  Recreational Activities in which you move your arm freely (e.g., frisbee, badminton, etc.): Mild Difficulty  Manage transportation needs (getting from one place to another): No Difficulty  Sexual Activities: No Difficulty  During the past week, to what extent has your arm, shoulder, or hand problem interfered with your normal social activites with family, friends, neighbors or groups?: Extremely  During the past week, were you limited in your work or other regular daily activities as a result of your arm, shoulder or hand problem?: Very limited  Arm, Shoulder, or hand pain: Extreme  Arm, shoulder or hand pain when you performed any specific activity: Extreme  Tingling (pins and needles) in your arm, shoulder, or hand: Extreme  Weakness in your arm, shoulder or hand: Extreme  Stiffness in your arm, shoulder or hand: Extreme  During the past week, how much difficulty have you had sleeping because of the pain in your arm, shoulder or hand?: Severe Difficulty  I feel less capable, less confident or less useful because of my arm, shoulder or hand problem: Strongly Agree  DASH Sum : 91  Number of Questions Answered: 30  DASH Score: 50.83         Time Calculation:    OT Start Time: 1300     Therapy Charges for Today     Code Description Service Date Service Provider Modifiers Qty    [id number]  OT EVAL HIGH COMPLEXITY 4 10/9/2020 Ludivina Roche OTR/L GO 1                  MIRIAN Sevilla/MIKE  10/9/2020

## 2020-10-12 ENCOUNTER — TELEPHONE (OUTPATIENT)
Dept: ONCOLOGY | Facility: CLINIC | Age: 55
End: 2020-10-12

## 2020-10-12 NOTE — TELEPHONE ENCOUNTER
Caller: MARY RUANO    Relationship: SELF    Best call back number: [phone]    Medication needed: capecitabine (XELODA) 500 MG     PHARMACY REQUIRES MORE INFO FROM DR IQBAL TO FILL RX.    [facility] - [address] - [phone]  - [phone] FX

## 2020-10-14 DIAGNOSIS — C50.419 MALIGNANT NEOPLASM OF UPPER OUTER QUADRANT OF BREAST IN FEMALE, ESTROGEN RECEPTOR NEGATIVE, UNSPECIFIED LATERALITY (HCC): ICD-10-CM

## 2020-10-14 DIAGNOSIS — T45.1X5A PERIPHERAL NEUROPATHY DUE TO CHEMOTHERAPY (HCC): Primary | ICD-10-CM

## 2020-10-14 DIAGNOSIS — Z17.1 MALIGNANT NEOPLASM OF UPPER OUTER QUADRANT OF BREAST IN FEMALE, ESTROGEN RECEPTOR NEGATIVE, UNSPECIFIED LATERALITY (HCC): ICD-10-CM

## 2020-10-14 DIAGNOSIS — G62.0 PERIPHERAL NEUROPATHY DUE TO CHEMOTHERAPY (HCC): Primary | ICD-10-CM

## 2020-10-14 RX ORDER — GABAPENTIN 100 MG/1
100 CAPSULE ORAL
Qty: 30 CAPSULE | Refills: 1 | Status: CANCELLED | OUTPATIENT
Start: 2020-10-14

## 2020-10-14 RX ORDER — GABAPENTIN 100 MG/1
100 CAPSULE ORAL
Qty: 30 CAPSULE | Refills: 0 | Status: SHIPPED | OUTPATIENT
Start: 2020-10-14 | End: 2020-11-12 | Stop reason: SDUPTHER

## 2020-10-28 ENCOUNTER — HOSPITAL ENCOUNTER (OUTPATIENT)
Dept: OCCUPATIONAL THERAPY | Facility: HOSPITAL | Age: 55
Setting detail: THERAPIES SERIES
Discharge: HOME | End: 2020-10-28

## 2020-10-28 DIAGNOSIS — T45.1X5A CHEMOTHERAPY-INDUCED PERIPHERAL NEUROPATHY (HCC): ICD-10-CM

## 2020-10-28 DIAGNOSIS — G62.0 CHEMOTHERAPY-INDUCED PERIPHERAL NEUROPATHY (HCC): ICD-10-CM

## 2020-10-28 DIAGNOSIS — M25.60 RANGE OF MOTION DEFICIT: ICD-10-CM

## 2020-10-28 DIAGNOSIS — L90.5 SCAR CONDITIONS AND FIBROSIS OF SKIN: ICD-10-CM

## 2020-10-28 DIAGNOSIS — R29.3 ABNORMAL POSTURE: ICD-10-CM

## 2020-10-28 DIAGNOSIS — C50.411 MALIGNANT NEOPLASM OF UPPER-OUTER QUADRANT OF RIGHT FEMALE BREAST, UNSPECIFIED ESTROGEN RECEPTOR STATUS (HCC): Primary | ICD-10-CM

## 2020-10-28 PROCEDURE — 97535 SELF CARE MNGMENT TRAINING: CPT

## 2020-10-28 PROCEDURE — 97035 APP MDLTY 1+ULTRASOUND EA 15: CPT

## 2020-10-28 NOTE — THERAPY TREATMENT NOTE
Outpatient Occupational Therapy Ortho Treatment Note   [place]     Patient Name: Kenya Lemon  : 1965  MRN: [id number]  Today's Date: 10/28/2020        Visit Date: 10/28/2020    Patient Active Problem List   Diagnosis   • Malignant neoplasm of upper-outer quadrant of right breast in female, estrogen receptor negative (CMS/HCC)   • Encounter for antineoplastic chemotherapy   • Malignant neoplasm of upper-outer quadrant of right breast in female, estrogen receptor negative (CMS/HCC)   • Encounter for adjustment or management of vascular access device   • DM2 (diabetes mellitus, type 2) (CMS/HCC)   • Anemia   • Anxiety and depression   • Essential hypertension   • Acute UTI (urinary tract infection)        Past Medical History:   Diagnosis Date   • Cancer (CMS/HCC) 2020    breast cancer per mammogram; chemotherapy    • Diabetes (CMS/HCC)    • History of low potassium 2020    prescribed potassium supplements    • History of transfusion     after delivery of son (1 unit only)    • HTN (hypertension)    • Low magnesium level 2020    requiring magnesium supplements    • Sleep apnea     no cpap machine currently        Past Surgical History:   Procedure Laterality Date   • BREAST BIOPSY Right 2020   • CHOLECYSTECTOMY     • COLONOSCOPY     • LAPAROSCOPIC TUBAL LIGATION     • MASTECTOMY W/ SENTINEL NODE BIOPSY Bilateral 2020    Procedure: SKIN SPARING MASTECTOMIES BILATERAL AND RIGHT SENTINEL NODE BIOPSY;  Surgeon: Gary Perez MD;  Location: [facility];  Service: General;  Laterality: Bilateral;   • VENOUS ACCESS DEVICE (PORT) INSERTION  2020         Visit Dx:    ICD-10-CM ICD-9-CM   1. Malignant neoplasm of upper-outer quadrant of right female breast, unspecified estrogen receptor status (CMS/HCC)  C50.411 174.4   2. Scar conditions and fibrosis of skin  L90.5 709.2   3. Range of motion deficit  M25.60 719.50   4. Chemotherapy-induced peripheral neuropathy (CMS/HCC)  G62.0  357.7    T45.1X5A E933.1   5. Abnormal posture  R29.3 781.92             Lymphedema     Row Name 10/28/20 1415             Subjective Pain    Able to rate subjective pain?  yes  -SG      Pre-Treatment Pain Level  5  -SG      Post-Treatment Pain Level  4  -SG         Subjective Comments    Subjective Comments  Pt reports that she has been using the Ktape at home for CIPN on hands and feet and that it has been helping. She has been compliant w/ HEP and self-massage, and reports that her bilateral mastectomy incisions are looser  -SG         Lymphedema Assessment    Lymphedema Classification  RUE:;at risk/stage 0  -SG      Lymphedema Cancer Related Sx  bilateral;simple mastectomy;right;sentinel node biopsy  -SG      Lymph Nodes Removed #  1  -SG      Positive Lymph Nodes #  0  -SG      Chemo Received  yes  -SG      Chemo Treatments #/Timeframe  chemo currently ongoing  -SG      Adverse Chemo Reactions/Complication  CIPN  -SG      Radiation Therapy Received  no  -SG         Posture/Observations    Alignment Options  Forward head;Rounded shoulders  -SG      Rounded Shoulders  Moderate  -SG      Posture/Observations Comments  Protective posturing  -SG         MMT (Manual Muscle Testing)    General MMT Comments  Generalized weakness and fatigue  -SG         Lymphedema Edema Assessment    Edema Assessment Comment  Pt does not demo any signs of edema/lymphedema at this time. Pt edu on signs and symptoms to be mindful of  -SG         Skin Changes/Observations    Location/Assessment  Upper Quadrant  -SG      Upper Quadrant Conditions  intact;clean  -SG      Skin Observations Comment  Tight soft tissue restrictions bilaterally R>L. Right side angulation significant around incision line w/ scar tissue and soft tissue dysfunction  -SG         Lymphedema Sensation    Lymphedema Sensation Reports  RUE:;LUE:;numbness;tingling  -SG      Lymphedema Sensation Comments  Significant CIPN in hands and feet (hands worse than feet)  -SG          Manual Lymphatic Drainage    Manual Therapy  Examination of bilateral mastectomy, w/ loosend skin tissue, soft tissue. Plan to begin ASTYM/STM and PORi protocol next session.  -SG         Compression/Skin Care    Compression/Skin Care Comments  Ktape to hands and feet for CIPN  -SG         L-Dex Bioimpedence Screening    L-Dex Measurement Extremity  RUE  -SG      L-Dex Patient Position  Standing  -SG      L-Dex UE Dominate Side  Right  -SG      L-Dex UE At Risk Side  Right  -SG      L-Dex UE Pre Surgical Value  No  -SG        User Key  (r) = Recorded By, (t) = Taken By, (c) = Cosigned By    Initials Name Provider Type    Ludivina Jung, OTR/L Occupational Therapist            Therapy Education  Education Details: Pt was edu on the use of modalities including ultra sound on hands and feet for CIPN; continue with HEP and OT recommendations  Given: HEP, Symptoms/condition management, Posture/body mechanics  Program: New, Reinforced, Progressed  How Provided: Written, Demonstration, Verbal  Provided to: Patient  Level of Understanding: Verbalized, Demonstrated  57595 - OT Self Care/Mgmt Minutes: 25    OT Assessment/Plan     Row Name 10/28/20 1415          OT Assessment    Functional Limitations  Limitations in functional capacity and performance;Performance in self-care ADL;Limitation in home management  -SG     Impairments  Sensation;Range of motion;Posture;Peripheral nerve integrity;Pain;Muscle strength;Joint mobility;Impaired sensory integrity;Impaired lymphatic circulation;Impaired flexibility  -SG     Assessment Comments  Pt presents w the following: limited bilateral ROM shoulders for overhead tasks; pain and stiffness in chest and shoulders which limits ROM for dressing and reaching and other basic ADLs; strength: decreased core stabilization and decreaed UE/ strength; posture: forward neck and shoulder posture, disrupted scapulo-humeral rhythm; increased tissue tightness over chest, stress across  pectoralis major muscle, s-c joint line, c-v joint line, and associated ST through shoulders and trunk; w/ tight soft tissue restrictions and scar tissue; CRF: physiological fatigue resulting from Ca and releated treatments resulting in a persistent fatigue impairing ability  function WNL in customary ADLs; CIPN: decreased sensation and motor loss affecting LE walking/balance, decreased sensation and motor loss affecting UE hand fine motor control; at risk for lymphedema. She will benefit from continuing with OP OT to address symptoms.  -SG     OT Rehab Potential  Good  -SG     Patient/caregiver participated in establishment of treatment plan and goals  Yes  -SG     Patient would benefit from skilled therapy intervention  Yes  -SG        OT Plan    OT Frequency  1x/week  -SG     Predicted Duration of Therapy Intervention (OT)  20 visits  -     Planned CPT's?  OT EVAL HIGH COMPLEXITY: 16218;OT THER ACT EA 15 MIN: [id number];OT THER PROC EA 15 MIN: 51011LS;OT NEUROMUSC RE EDUCATION EA 15 MIN: 12425;OT SELF CARE/MGMT/TRAIN 15 MIN: 84779;OT MANUAL THERAPY EA 15 MIN: 29426  -     Planned Therapy Interventions (Optional Details)  home exercise program;joint mobilization;manual therapy techniques;neuromuscular re-education;patient/family education;postural re-education;ROM (Range of Motion);strengthening;stretching  -SG     OT Plan Comments  Begin PORi protocol; Begin ASTYM/STM; Continue tx for CIPN; progress ROM and HEP as tolerated  -       User Key  (r) = Recorded By, (t) = Taken By, (c) = Cosigned By    Initials Name Provider Type     Ludivina Roche OTR/L Occupational Therapist               Modalities     Row Name 10/28/20 1415             Ultrasound 78733    Location  Palms of hands, bottoms of feet for CIPN  -      Duty Cycle  50  -      Frequency  1.0 MHz  -SG      Intensity - Wts/cm  1  -SG      62954 - OT Ultrasound Minutes  30  -SG        User Key  (r) = Recorded By, (t) = Taken By, (c) = Cosigned By     Initials Name Provider Type    Ludivina Jung OTR/L Occupational Therapist             Manual Rx (last 36 hours)      Manual Treatments     Row Name 10/28/20 1415             Total Minutes    75451 - OT Manual Therapy Minutes  5  -SG        User Key  (r) = Recorded By, (t) = Taken By, (c) = Cosigned By    Initials Name Provider Type    Ludivina Jung OTR/L Occupational Therapist                      Time Calculation:   OT Start Time: 1415     Therapy Charges for Today     Code Description Service Date Service Provider Modifiers Qty    86727373556 HC OT ULTRASOUND EA 15 MIN 10/28/2020 Ludivina Roche OTR/L GO 2    07533679268 HC OT SELF CARE/MGMT/TRAIN EA 15 MIN 10/28/2020 Ludivina Roche OTR/L GO 2                    MIRIAN Sevilla/MIKE  10/28/2020

## 2020-10-30 ENCOUNTER — LAB (OUTPATIENT)
Dept: LAB | Facility: HOSPITAL | Age: 55
End: 2020-10-30

## 2020-10-30 DIAGNOSIS — C50.411 MALIGNANT NEOPLASM OF UPPER-OUTER QUADRANT OF RIGHT BREAST IN FEMALE, ESTROGEN RECEPTOR NEGATIVE (HCC): ICD-10-CM

## 2020-10-30 DIAGNOSIS — Z17.1 MALIGNANT NEOPLASM OF UPPER-OUTER QUADRANT OF RIGHT BREAST IN FEMALE, ESTROGEN RECEPTOR NEGATIVE (HCC): ICD-10-CM

## 2020-10-30 LAB
ALBUMIN SERPL-MCNC: 4.8 G/DL (ref 3.5–5.2)
ALBUMIN/GLOB SERPL: 1.7 G/DL
ALP SERPL-CCNC: 98 U/L (ref 39–117)
ALT SERPL W P-5'-P-CCNC: 16 U/L (ref 1–33)
ANION GAP SERPL CALCULATED.3IONS-SCNC: 14 MMOL/L (ref 5–15)
AST SERPL-CCNC: 16 U/L (ref 1–32)
BILIRUB SERPL-MCNC: 0.2 MG/DL (ref 0–1.2)
BUN SERPL-MCNC: 17 MG/DL (ref 6–20)
BUN/CREAT SERPL: 20.2 (ref 7–25)
CALCIUM SPEC-SCNC: 10.5 MG/DL (ref 8.6–10.5)
CHLORIDE SERPL-SCNC: 99 MMOL/L (ref 98–107)
CO2 SERPL-SCNC: 28 MMOL/L (ref 22–29)
CREAT SERPL-MCNC: 0.84 MG/DL (ref 0.57–1)
ERYTHROCYTE [DISTWIDTH] IN BLOOD BY AUTOMATED COUNT: 14.4 % (ref 12.3–15.4)
GFR SERPL CREATININE-BSD FRML MDRD: 71 ML/MIN/1.73
GLOBULIN UR ELPH-MCNC: 2.8 GM/DL
GLUCOSE SERPL-MCNC: 128 MG/DL (ref 65–99)
HCT VFR BLD AUTO: 37.6 % (ref 34–46.6)
HGB BLD-MCNC: 12.5 G/DL (ref 12–15.9)
LYMPHOCYTES # BLD AUTO: 2.4 10*3/MM3 (ref 0.7–3.1)
LYMPHOCYTES NFR BLD AUTO: 28.8 % (ref 19.6–45.3)
MCH RBC QN AUTO: 31.3 PG (ref 26.6–33)
MCHC RBC AUTO-ENTMCNC: 33.1 G/DL (ref 31.5–35.7)
MCV RBC AUTO: 94.6 FL (ref 79–97)
MONOCYTES # BLD AUTO: 0.6 10*3/MM3 (ref 0.1–0.9)
MONOCYTES NFR BLD AUTO: 7.1 % (ref 5–12)
NEUTROPHILS NFR BLD AUTO: 5.3 10*3/MM3 (ref 1.7–7)
NEUTROPHILS NFR BLD AUTO: 64.1 % (ref 42.7–76)
PLATELET # BLD AUTO: 352 10*3/MM3 (ref 140–450)
PMV BLD AUTO: 7.1 FL (ref 6–12)
POTASSIUM SERPL-SCNC: 4.9 MMOL/L (ref 3.5–5.2)
PROT SERPL-MCNC: 7.6 G/DL (ref 6–8.5)
RBC # BLD AUTO: 3.98 10*6/MM3 (ref 3.77–5.28)
SODIUM SERPL-SCNC: 141 MMOL/L (ref 136–145)
WBC # BLD AUTO: 8.3 10*3/MM3 (ref 3.4–10.8)

## 2020-10-30 PROCEDURE — 85025 COMPLETE CBC W/AUTO DIFF WBC: CPT

## 2020-10-30 PROCEDURE — 80053 COMPREHEN METABOLIC PANEL: CPT

## 2020-10-30 PROCEDURE — 36415 COLL VENOUS BLD VENIPUNCTURE: CPT

## 2020-11-02 DIAGNOSIS — Z51.81 THERAPEUTIC DRUG MONITORING: Primary | ICD-10-CM

## 2020-11-03 ENCOUNTER — HOSPITAL ENCOUNTER (OUTPATIENT)
Dept: OCCUPATIONAL THERAPY | Facility: HOSPITAL | Age: 55
Setting detail: THERAPIES SERIES
Discharge: HOME | End: 2020-11-03

## 2020-11-03 ENCOUNTER — OFFICE VISIT (OUTPATIENT)
Dept: ONCOLOGY | Facility: CLINIC | Age: 55
End: 2020-11-03

## 2020-11-03 VITALS
HEIGHT: 62 IN | HEART RATE: 78 BPM | BODY MASS INDEX: 31.65 KG/M2 | OXYGEN SATURATION: 99 % | SYSTOLIC BLOOD PRESSURE: 164 MMHG | WEIGHT: 172 LBS | TEMPERATURE: 96 F | RESPIRATION RATE: 16 BRPM | DIASTOLIC BLOOD PRESSURE: 78 MMHG

## 2020-11-03 DIAGNOSIS — T45.1X5A CHEMOTHERAPY-INDUCED PERIPHERAL NEUROPATHY (HCC): ICD-10-CM

## 2020-11-03 DIAGNOSIS — G62.0 CHEMOTHERAPY-INDUCED PERIPHERAL NEUROPATHY (HCC): ICD-10-CM

## 2020-11-03 DIAGNOSIS — R29.3 ABNORMAL POSTURE: ICD-10-CM

## 2020-11-03 DIAGNOSIS — C50.411 MALIGNANT NEOPLASM OF UPPER-OUTER QUADRANT OF RIGHT BREAST IN FEMALE, ESTROGEN RECEPTOR NEGATIVE (HCC): Primary | ICD-10-CM

## 2020-11-03 DIAGNOSIS — Z17.1 MALIGNANT NEOPLASM OF UPPER-OUTER QUADRANT OF RIGHT BREAST IN FEMALE, ESTROGEN RECEPTOR NEGATIVE (HCC): Primary | ICD-10-CM

## 2020-11-03 DIAGNOSIS — M25.60 RANGE OF MOTION DEFICIT: ICD-10-CM

## 2020-11-03 DIAGNOSIS — L90.5 SCAR CONDITIONS AND FIBROSIS OF SKIN: ICD-10-CM

## 2020-11-03 DIAGNOSIS — C50.411 MALIGNANT NEOPLASM OF UPPER-OUTER QUADRANT OF RIGHT FEMALE BREAST, UNSPECIFIED ESTROGEN RECEPTOR STATUS (HCC): Primary | ICD-10-CM

## 2020-11-03 PROCEDURE — 99212 OFFICE O/P EST SF 10 MIN: CPT | Performed by: NURSE PRACTITIONER

## 2020-11-03 PROCEDURE — 97140 MANUAL THERAPY 1/> REGIONS: CPT

## 2020-11-03 NOTE — PROGRESS NOTES
CHIEF COMPLAINT: Adjuvant therapy triple negative breast cancer    Problem List:  Oncology/Hematology History Overview Note   1.  Stage IIB T2 (2.5cm per Dr. Perez) N0 invasive ductal triple negative breast cancer.  2/16/2020 right upper outer quadrant needle core biopsy showed invasive ductal carcinoma 4.4 mm moderately differentiated negative for estrogen, progesterone receptor both 0% and HER-2/vashti 0+.  Dr. Santo recommended lumpectomy and sentinel node biopsy.  3/12/2020 saw Dr. Gary Perez.perimenopausal breast cancer she felt a 2.5 cm right upper outer quadrant mass almost at the edge of the axilla somewhat fixed.  No skin changes.  She recommended neoadjuvant chemotherapy followed by lumpectomy versus mastectomy then radiation if patient decides on breast conservation or if nodes are found on breast MRI.  She will get formal genetic counseling to help guide the issues of possible prophylactic mastectomy.  With triple negative disease the standard recommendation would be neoadjuvant Adriamycin Cytoxan dose dense x4 followed by Taxol weekly x12 followed by surgery.  If there is residual disease then would consider Xeloda per the create X trial.  We also have  looking at observation versus Keytruda for 1 year in patients with triple negative disease who do not have a complete pathological response to neoadjuvant chemotherapy.  In the absence of symptoms, NCCN guidelines does not recommend routine metastatic surveillance imaging.  Will need ejection fraction before Adriamycin.  -3/24/2020 through 5/4/2020 Adriamycin Cytoxan x4.  Opted out of neoadjuvant Taxol  -6/16/2020 left breast simple mastectomy benign  Right breast simple mastectomy showed residual invasive ductal carcinoma 9 mm with limited DCIS high-grade and negative margins.  0 out of 1 sentinel nodes positive.  Grade 7 out of 9.  Pathologic T1 BN 0.  Postoperative course complicated by localized infections.   -7/6/2020 [facility] oncology  follow-up visit: Given residual disease after 4 courses of AC I would want her to complete the Taxol as planned.  There is also new data presented at [facility] that suggests adjuvant Xeloda following adjuvant chemotherapy is reasonable and triple negative disease.  This is not precisely what she is doing as she has now done part of this neoadjuvant point but I think it makes sense to apply in this setting given residual disease after AC x4 but I would not forego the Taxol but will plan on adding Xeloda.  Reinforced the need for genetic testing salivary kit that has been sent to her but she has not completed.    -8/25/2020 Genetic testing was negative for pathogenic mutations in BRCA1/2 and 32 additional genes on the CancerNext panel.       -Taxol dose reduced by 20% with cycle #5 due to peripheral neuropathy    -9/11/2020 [facility] oncology clinic visit: Peripheral neuropathy continues, patient also in need of dental work, decision made to hold Taxol for 3 weeks to see if neuropathy improves and to allow time for oral surgery.    -9/4/2020 6th and final cycle of of Taxol as patient had terrible neuropathy despite dose reduction  -10/2/2020 [facility] oncology follow-up visit: Neuropathy is not improved.  Does not like gabapentin.  Will stop Taxol permanently and press on with Xeloda after she has tooth extraction on October 14.  We will have her back to my nurse practitioner October 26 for cycle 1 of 6 of Xeloda.    2.  Hypertension  3.  Hyperlipidemia  4.  Diabetes  5.  Using IUD and taking estradiol for hormone replacement therapy preoperatively  6.  History of D and C as well as bilateral tubal ligation  7.  History of cholecystectomy  8.  Vasomotor symptoms  9.  Family history of paternal aunt with breast cancer and perhaps a sister with ovarian cancer but she is not sure.  She has nulliparous.  Genetic spit test done results pending.    10.  Anxiety and depression     Malignant neoplasm of upper-outer quadrant  of right breast in female, estrogen receptor negative (CMS/HCC)   3/12/2020 Initial Diagnosis    Malignant neoplasm of upper outer quadrant of breast in female, estrogen receptor negative (CMS/HCC)     3/12/2020 Cancer Staged    Staging form: Breast, AJCC 8th Edition  - Clinical: Stage IIB (cT2, cN0, cM0, G2, ER-, AZ-, HER2-) - Signed by Johann Juarez MD on 3/12/2020     3/24/2020 - 5/4/2020 Chemotherapy    OP BREAST AC DD DOXOrubicin / Cyclophosphamide       5/6/2020 -  Chemotherapy    OP SUPPORTIVE HYDRATION + ANTIEMETICS     6/16/2020 Surgery    Surgery       -6/16/2020 left breast simple mastectomy benign  Right breast simple mastectomy showed residual invasive ductal carcinoma 9 mm with limited DCIS high-grade and negative margins.  0 out of 1 sentinel nodes positive.  Grade 7 out of 9.  Pathologic T1 BN 0     7/6/2020 Cancer Staged    Staging form: Breast, AJCC 8th Edition  - Pathologic: Stage IB (pT1b, pN0, cM0, G2, ER-, AZ-, HER2-) - Signed by Johann Juarez MD on 7/6/2020 7/31/2020 -  Chemotherapy    OP CENTRAL VENOUS ACCESS DEVICE ACCESS, CARE, AND MAINTENANCE (CVAD)     7/31/2020 - 9/4/2020 Chemotherapy    Taxol       11/2/2020 -  Chemotherapy    OP BREAST Capecitabine     Malignant neoplasm of upper-outer quadrant of right breast in female, estrogen receptor negative (CMS/HCC)   3/12/2020 Initial Diagnosis    Malignant neoplasm of upper-outer quadrant of right breast in female, estrogen receptor negative (CMS/HCC)     3/24/2020 - 5/18/2020 Chemotherapy    OP BREAST AC DD DOXOrubicin / Cyclophosphamide     7/31/2020 - 9/10/2020 Chemotherapy    OP BREAST PACLitaxel (weekly X 12)     8/25/2020 Genetic Testing    Genetic testing was negative for pathogenic mutations in BRCA1/2 and 32 additional genes on the CancerNext panel.        8/28/2020 Adverse Reaction    Taxol dose reduced by 20% due to peripheral neuropathy with cycle #5.     11/2/2020 -  Chemotherapy    OP BREAST Capecitabine         HISTORY OF  PRESENT ILLNESS:  The patient is a 54 y.o. female, here for follow up on management of triple negative adjuvant therapy breast cancer.  Neuropathy after Taxol persists but may be slightly improving with PT.  Has decreased her Gabapentin to 100 mg nightly as she does not like the side effects and likely will stop soon.  Has an appointment with Palliative Care next week.  Had tooth extracted without complication and has healed and now ready to begin adjuvant Xeloda.  Has the pills at home and plans to begin when she gets home.  Had labs Friday.      Past Medical History:   Diagnosis Date   • Cancer (CMS/HCC) 02/2020    breast cancer per mammogram; chemotherapy    • Diabetes (CMS/HCC)    • History of low potassium 03/2020    prescribed potassium supplements    • History of transfusion 1989    after delivery of son (1 unit only)    • HTN (hypertension)    • Low magnesium level 03/2020    requiring magnesium supplements    • Sleep apnea     no cpap machine currently     Past Surgical History:   Procedure Laterality Date   • BREAST BIOPSY Right 02/2020   • CHOLECYSTECTOMY     • COLONOSCOPY     • LAPAROSCOPIC TUBAL LIGATION     • MASTECTOMY W/ SENTINEL NODE BIOPSY Bilateral 6/16/2020    Procedure: SKIN SPARING MASTECTOMIES BILATERAL AND RIGHT SENTINEL NODE BIOPSY;  Surgeon: Gary Perez MD;  Location: [facility];  Service: General;  Laterality: Bilateral;   • VENOUS ACCESS DEVICE (PORT) INSERTION  02/2020       No Known Allergies    Family History and Social History reviewed and changed as necessary      REVIEW OF SYSTEM:   Review of Systems   Constitutional: Negative for appetite change, chills, diaphoresis, fatigue, fever and unexpected weight change.   HENT:   Negative for mouth sores, sore throat and trouble swallowing.    Eyes: Negative for icterus.   Respiratory: Negative for cough, hemoptysis and shortness of breath.    Cardiovascular: Negative for chest pain, leg swelling and palpitations.   Gastrointestinal:  "Negative for abdominal distention, abdominal pain, blood in stool, constipation, diarrhea, nausea and vomiting.   Endocrine: Negative for hot flashes.   Genitourinary: Negative for bladder incontinence, difficulty urinating, dysuria, frequency and hematuria.    Musculoskeletal: Negative for gait problem, neck pain and neck stiffness.   Skin: Negative for rash.   Neurological: Negative for dizziness, gait problem, headaches, light-headedness.  Positive for neuropathy in hands.  Hematological: Negative for adenopathy. Does not bruise/bleed easily.   Psychiatric/Behavioral: Negative for depression. The patient is not nervous/anxious.    All other systems reviewed and are negative.       PHYSICAL EXAM    Vitals:    20   BP: 164/78   Pulse: 78   Resp: 16   Temp: 96 °F (35.6 °C)   SpO2: 99%   Weight: 78 kg (172 lb)   Height: 157.5 cm (62\")     Vitals:    20   PainSc: 0-No pain        Constitutional: Appears well-developed and well-nourished. No distress.   ECO  HENT:   Head: Normocephalic.   Mouth/Throat: Oropharynx is clear and moist.   Eyes: Conjunctivae are normal. Pupils are equal, round, and reactive to light. No scleral icterus.   Neck: Neck supple. No JVD present.    Cardiovascular: Normal rate, regular rhythm and normal heart sounds.    Pulmonary/Chest: Breath sounds normal. No respiratory distress.   Abdominal: Soft. Exhibits no distension. There is no tenderness.   Musculoskeletal:Exhibits no edema, tenderness or deformity.   Neurological: Alert and oriented to person, place, and time. Exhibits normal muscle tone.   Skin: No ecchymosis, no petechiae and no rash noted. Not diaphoretic. No cyanosis. Nails show no clubbing.   Psychiatric: Normal mood and affect.   Vitals reviewed.  Labs reviewed.    Lab Results   Component Value Date    HGB 12.5 10/30/2020    HCT 37.6 10/30/2020    MCV 94.6 10/30/2020     10/30/2020    WBC 8.30 10/30/2020    NEUTROABS 5.30 10/30/2020    LYMPHSABS " 2.40 10/30/2020    MONOSABS 0.60 10/30/2020    EOSABS 0.28 06/22/2020    BASOSABS 0.06 06/22/2020       Lab Results   Component Value Date    GLUCOSE 128 (H) 10/30/2020    BUN 17 10/30/2020    CREATININE 0.84 10/30/2020     10/30/2020    K 4.9 10/30/2020    CL 99 10/30/2020    CO2 28.0 10/30/2020    CALCIUM 10.5 10/30/2020    PROTEINTOT 7.6 10/30/2020    ALBUMIN 4.80 10/30/2020    BILITOT 0.2 10/30/2020    ALKPHOS 98 10/30/2020    AST 16 10/30/2020    ALT 16 10/30/2020         ASSESSMENT & PLAN:  1.  Stage IIB T2 (2.5cm per Dr. Perez) N0 invasive ductal triple negative breast cancer   2.  Peripheral neuropathy secondary to chemotherapy     2/16/2020 right upper outer quadrant needle core biopsy showed invasive ductal carcinoma 4.4 mm moderately differentiated negative for estrogen, progesterone receptor both 0% and HER-2/vashti 0+.  Dr. Santo recommended lumpectomy and sentinel node biopsy.  3/12/2020 saw Dr. Gary Perez.perimenopausal breast cancer she felt a 2.5 cm right upper outer quadrant mass almost at the edge of the axilla somewhat fixed.  No skin changes.  She recommended neoadjuvant chemotherapy followed by lumpectomy versus mastectomy then radiation if patient decides on breast conservation or if nodes are found on breast MRI.  She will get formal genetic counseling to help guide the issues of possible prophylactic mastectomy.  With triple negative disease the standard recommendation would be neoadjuvant Adriamycin Cytoxan dose dense x4 followed by Taxol weekly x12 followed by surgery.  If there is residual disease then would consider Xeloda per the create X trial.  We also have  looking at observation versus Keytruda for 1 year in patients with triple negative disease who do not have a complete pathological response to neoadjuvant chemotherapy.  In the absence of symptoms, NCCN guidelines does not recommend routine metastatic surveillance imaging.  Will need ejection fraction before  Adriamycin.  -3/24/2020 through 5/4/2020 Adriamycin Cytoxan x4.  Opted out of neoadjuvant Taxol  -6/16/2020 left breast simple mastectomy benign  Right breast simple mastectomy showed residual invasive ductal carcinoma 9 mm with limited DCIS high-grade and negative margins.  0 out of 1 sentinel nodes positive.  Grade 7 out of 9.  Pathologic T1 BN 0.  Postoperative course complicated by localized infections.   -7/6/2020 [facility] follow-up visit: Given residual disease after 4 courses of AC I would want her to complete the Taxol as planned.  There is also new data presented at [facility] that suggests adjuvant Xeloda following adjuvant chemotherapy is reasonable and triple negative disease.  This is not precisely what she is doing as she has now done part of this neoadjuvant point but I think it makes sense to apply in this setting given residual disease after AC x4 but I would not forego the Taxol but will plan on adding Xeloda.  Reinforced the need for genetic testing salivary kit that has been sent to her but she has not completed.    -8/25/2020 Genetic testing was negative for pathogenic mutations in BRCA1/2 and 32 additional genes on the CancerNext panel.       -Taxol dose reduced by 20% with cycle #5 due to peripheral neuropathy    -9/11/2020 [facility] oncology clinic visit: Peripheral neuropathy continues, patient also in need of dental work, decision made to hold Taxol for 3 weeks to see if neuropathy improves and to allow time for oral surgery.    -9/4/2020 6th and final cycle of of Taxol as patient had terrible neuropathy despite dose reduction    -10/2/2020 [facility] oncology follow-up visit: Neuropathy is not improved.  Does not like gabapentin.  Will stop Taxol permanently and press on with Xeloda after she has tooth extraction on October 14.  We will have her back to my nurse practitioner October 26 for cycle 1 of 6 of Xeloda.  Discussed with patient face-to-face 30 minutes greater than  50% spent counseling regarding this plan as outlined above.  I will also consult palliative care for her neuropathy management and other symptom management.    -11/3/2020 [facility] clinic visit: Peripheral neuropathy persist but may be slightly improved with physical therapy.  She currently is on gabapentin 100 mg at night but does not like the side effects and likely will stop that soon.  She has an appointment with palliative care next week.  She has had her tooth extracted and has healed and states her dentist has released her.  She has received her Xeloda, 3 tablets twice daily 14 out of 21 days, she will start that when she gets home today.  She had labs on Friday, CBC and CMP are normal.  We again reviewed side effects.  All questions answered to her satisfaction.  I plan on seeing her back in 3 weeks for follow-up.      Camelia Carranza, APRN    11/03/2020

## 2020-11-03 NOTE — THERAPY TREATMENT NOTE
Outpatient Occupational Therapy Ortho Treatment Note   [place]     Patient Name: Kenya Lemon  : 1965  MRN: [id number]  Today's Date: 11/3/2020        Visit Date: 2020    Patient Active Problem List   Diagnosis   • Malignant neoplasm of upper-outer quadrant of right breast in female, estrogen receptor negative (CMS/HCC)   • Encounter for antineoplastic chemotherapy   • Malignant neoplasm of upper-outer quadrant of right breast in female, estrogen receptor negative (CMS/HCC)   • Encounter for adjustment or management of vascular access device   • DM2 (diabetes mellitus, type 2) (CMS/HCC)   • Anemia   • Anxiety and depression   • Essential hypertension   • Acute UTI (urinary tract infection)        Past Medical History:   Diagnosis Date   • Cancer (CMS/HCC) 2020    breast cancer per mammogram; chemotherapy    • Diabetes (CMS/HCC)    • History of low potassium 2020    prescribed potassium supplements    • History of transfusion     after delivery of son (1 unit only)    • HTN (hypertension)    • Low magnesium level 2020    requiring magnesium supplements    • Sleep apnea     no cpap machine currently        Past Surgical History:   Procedure Laterality Date   • BREAST BIOPSY Right 2020   • CHOLECYSTECTOMY     • COLONOSCOPY     • LAPAROSCOPIC TUBAL LIGATION     • MASTECTOMY W/ SENTINEL NODE BIOPSY Bilateral 2020    Procedure: SKIN SPARING MASTECTOMIES BILATERAL AND RIGHT SENTINEL NODE BIOPSY;  Surgeon: Gary Perez MD;  Location: [facility];  Service: General;  Laterality: Bilateral;   • VENOUS ACCESS DEVICE (PORT) INSERTION  2020         Visit Dx:    ICD-10-CM ICD-9-CM   1. Malignant neoplasm of upper-outer quadrant of right female breast, unspecified estrogen receptor status (CMS/HCC)  C50.411 174.4   2. Scar conditions and fibrosis of skin  L90.5 709.2   3. Range of motion deficit  M25.60 719.50   4. Chemotherapy-induced peripheral neuropathy (CMS/HCC)  G62.0  357.7    T45.1X5A E933.1   5. Abnormal posture  R29.3 781.92             Lymphedema     Row Name 11/03/20 0900             Subjective Pain    Able to rate subjective pain?  yes  -SG      Pre-Treatment Pain Level  5  -SG      Post-Treatment Pain Level  2  -SG         Subjective Comments    Subjective Comments  Pt reports that the self-MLD and massage has really helped w/ her bilateral chest scar tissue and tight soft tissue restrictions. Pt reports that she believes the ultrasound helped her hands and feet last session  -SG         Lymphedema Assessment    Lymphedema Classification  RUE:;at risk/stage 0  -SG      Lymphedema Cancer Related Sx  bilateral;simple mastectomy;right;sentinel node biopsy  -SG      Lymph Nodes Removed #  1  -SG      Positive Lymph Nodes #  0  -SG      Chemo Received  yes  -SG      Radiation Therapy Received  no  -SG         Posture/Observations    Alignment Options  Forward head;Rounded shoulders  -SG      Rounded Shoulders  Moderate  -SG      Posture/Observations Comments  Protective posturing  -SG         MMT (Manual Muscle Testing)    General MMT Comments  Generalized weakness and fatigue  -SG         Lymphedema Edema Assessment    Edema Assessment Comment  Pt does not demo any signs of edema/lymphedema at this time. Pt edu on signs and symptoms to be mindful of  -SG         Skin Changes/Observations    Location/Assessment  Upper Quadrant  -SG      Upper Quadrant Conditions  intact;clean  -SG      Skin Observations Comment  Tight soft tissue restrictions bilaterally R>L. Right side angulation significant around incision line w/ scar tissue and soft tissue dysfunction  -SG         Lymphedema Sensation    Lymphedema Sensation Reports  RUE:;LUE:;numbness;tingling  -SG      Lymphedema Sensation Comments  Significant CIPN in hands and feet (hands worse than feet)  -SG         L-Dex Bioimpedence Screening    L-Dex Measurement Extremity  RUE  -SG      L-Dex Patient Position  Standing  -SG       L-Dex UE Dominate Side  Right  -SG      L-Dex UE At Risk Side  Right  -SG      L-Dex UE Pre Surgical Value  No  -SG        User Key  (r) = Recorded By, (t) = Taken By, (c) = Cosigned By    Initials Name Provider Type    Ludivina Jung OTR/L Occupational Therapist            Therapy Education  Education Details: Continue with HEP and OT recommendations  Given: HEP, Symptoms/condition management, Posture/body mechanics  Program: New, Reinforced, Progressed  How Provided: Written, Demonstration, Verbal  Provided to: Patient  Level of Understanding: Verbalized, Demonstrated    OT Assessment/Plan     Row Name 11/03/20 0900          OT Assessment    Functional Limitations  Limitations in functional capacity and performance;Performance in self-care ADL;Limitation in home management  -SG     Impairments  Sensation;Range of motion;Posture;Peripheral nerve integrity;Pain;Muscle strength;Joint mobility;Impaired sensory integrity;Impaired lymphatic circulation;Impaired flexibility  -SG     Assessment Comments  Pt presents w the following: limited bilateral ROM shoulders for overhead tasks; pain and stiffness in chest and shoulders which limits ROM for dressing and reaching and other basic ADLs; strength: decreased core stabilization and decreaed UE/ strength; posture: forward neck and shoulder posture, disrupted scapulo-humeral rhythm; increased tissue tightness over chest, stress across pectoralis major muscle, s-c joint line, c-v joint line, and associated ST through shoulders and trunk; w/ tight soft tissue restrictions and scar tissue; CRF: physiological fatigue resulting from Ca and releated treatments resulting in a persistent fatigue impairing ability  function WNL in customary ADLs; CIPN: decreased sensation and motor loss affecting LE walking/balance, decreased sensation and motor loss affecting UE hand fine motor control; at risk for lymphedema. She will benefit from continuing with OP OT to address symptoms.   -SG     OT Rehab Potential  Good  -SG     Patient/caregiver participated in establishment of treatment plan and goals  Yes  -SG     Patient would benefit from skilled therapy intervention  Yes  -SG        OT Plan    OT Frequency  1x/week  -SG     Planned CPT's?  OT EVAL HIGH COMPLEXITY: 94488;OT THER ACT EA 15 MIN: [id number];OT THER PROC EA 15 MIN: [id number];OT NEUROMUSC RE EDUCATION EA 15 MIN: 77167;OT SELF CARE/MGMT/TRAIN 15 MIN: 97383;OT MANUAL THERAPY EA 15 MIN: 25762  -SG     Planned Therapy Interventions (Optional Details)  home exercise program;joint mobilization;manual therapy techniques;neuromuscular re-education;patient/family education;postural re-education;ROM (Range of Motion);strengthening;stretching  -SG     OT Plan Comments  Continue PORi protocol; Begin ASTYM/STM; Continue tx for CIPN; progress ROM and HEP as tolerated  -       User Key  (r) = Recorded By, (t) = Taken By, (c) = Cosigned By    Initials Name Provider Type    Ludivina Jung OTR/L Occupational Therapist                      Manual Rx (last 36 hours)      Manual Treatments     Row Name 11/03/20 0900             Total Minutes    71839 - OT Manual Therapy Minutes  50  -         Manual Rx 1    Manual Rx 1 Location  Arm--Orthopedic: Forearm extensors, biceps muscles, lateral intermuscular septum tension line upper arm, pectoralis major  -      Manual Rx 1 Type  Trigger Point Release, Myofascial Bending  -      Manual Rx 1 Duration  Trigger Point Release for count of 3 seconds, repeat each section 5 times; Myofascial stretching w/ tissue/muscle bending 3-5 times  -         Manual Rx 2    Manual Rx 2 Location  Axilla--Orthopedic: Medial Intermuscular Septum tension line upper arm, pectoralis minor, subscapularis, latissimus dorsi, teres major  -      Manual Rx 2 Type  Trigger Point Release; Myofascial bending  -      Manual Rx 2 Duration  Trigger Point Release for count of 3 seconds, repeat each section 5 times; Myofascial  stretching w/ tissue/muscle bending 3-5 times  -SG         Manual Rx 3    Manual Rx 3 Location  Breast: Lymph System: Zone 1--pectoral region, Zone 2--superior breast, Zone 3--inferior breast  -SG      Manual Rx 3 Type  Fluid movement/ MLD; parasternal LN node pumps  -SG      Manual Rx 3 Duration  Repeat each Line x1 and corresponding parasternal node pumps x5, repeat each zone x3  -SG         Manual Rx 4    Manual Rx 4 Location  Lateral chest/trunk--Lymph System: zone 1--lateral, zone 2--inferior, zone 3--central, zone 4-superior  -SG      Manual Rx 4 Type  Intercostal Lymph Node Pumps  -SG      Manual Rx 4 Duration  Repeat all 4 zones x 3  -SG         Manual Rx 5    Manual Rx 5 Location  Upper Arm: Lymph System  -SG      Manual Rx 5 Type  Upper arm fluid clearance of axillary pathway; medial confluence fluid clearance--antecubital fossa; lateral upper arm-cephalic pathway  -SG      Manual Rx 5 Duration  x3 lines of treatment  -SG         Manual Rx 6    Manual Rx 6 Location  Forearm--Orthopedic: radius and ulna-interosseous membrane; carpals-joint capsules-metacarpals-interosseous membranes; thenar muscles  -SG      Manual Rx 6 Type  Joint mobilization, Trigger Point Release, Drainage of forearm  -SG      Manual Rx 6 Duration  Joint Mob: each section x5 in each position; drainage of forearm: perform x3-5 lines of treatment  -SG         Manual Rx 7    Manual Rx 7 Location  Back--orthopedic: upper trapezius, infraspinatus, teres minor, rhomboids, quadratus lumborum; T12, L1, L2 mobilization  -SG      Manual Rx 7 Type  Trigger Point Release, Joint mobilization  -SG      Manual Rx 7 Duration  Trigger Point Release for count of 3 seconds, repeat each section 5 times; perform 5-10 oscillations of each vertebra successively  -SG      Additional Treatment?  Yes  -SG         Manual Rx 8    Manual Rx 8 Location  Back--lymph system  -SG      Manual Rx 8 Type  Fluid movement; paraspinal lymph node pumps  -SG      Manual Rx 8  Duration  Repeat each line x1 and corresponding paraspinal node pumps x5; repeat each zone x3  -SG        User Key  (r) = Recorded By, (t) = Taken By, (c) = Cosigned By    Initials Name Provider Type    Ludivina Jung OTR/L Occupational Therapist                      Time Calculation:   OT Start Time: 0900     Therapy Charges for Today     Code Description Service Date Service Provider Modifiers Qty    87263868868 HC OT MANUAL THERAPY EA 15 MIN 11/3/2020 Ludivina Roche OTR/L GO 3                    MIRIAN Sevilla/MIKE  11/3/2020

## 2020-11-10 DIAGNOSIS — G62.0 PERIPHERAL NEUROPATHY DUE TO CHEMOTHERAPY (HCC): ICD-10-CM

## 2020-11-10 DIAGNOSIS — Z17.1 MALIGNANT NEOPLASM OF UPPER OUTER QUADRANT OF BREAST IN FEMALE, ESTROGEN RECEPTOR NEGATIVE, UNSPECIFIED LATERALITY (HCC): ICD-10-CM

## 2020-11-10 DIAGNOSIS — C50.419 MALIGNANT NEOPLASM OF UPPER OUTER QUADRANT OF BREAST IN FEMALE, ESTROGEN RECEPTOR NEGATIVE, UNSPECIFIED LATERALITY (HCC): ICD-10-CM

## 2020-11-10 DIAGNOSIS — T45.1X5A PERIPHERAL NEUROPATHY DUE TO CHEMOTHERAPY (HCC): ICD-10-CM

## 2020-11-11 RX ORDER — GABAPENTIN 100 MG/1
100 CAPSULE ORAL
Qty: 30 CAPSULE | OUTPATIENT
Start: 2020-11-11

## 2020-11-12 ENCOUNTER — TELEPHONE (OUTPATIENT)
Dept: SOCIAL WORK | Facility: HOSPITAL | Age: 55
End: 2020-11-12

## 2020-11-12 DIAGNOSIS — G62.0 PERIPHERAL NEUROPATHY DUE TO CHEMOTHERAPY (HCC): ICD-10-CM

## 2020-11-12 DIAGNOSIS — Z17.1 MALIGNANT NEOPLASM OF UPPER OUTER QUADRANT OF BREAST IN FEMALE, ESTROGEN RECEPTOR NEGATIVE, UNSPECIFIED LATERALITY (HCC): ICD-10-CM

## 2020-11-12 DIAGNOSIS — T45.1X5A PERIPHERAL NEUROPATHY DUE TO CHEMOTHERAPY (HCC): ICD-10-CM

## 2020-11-12 DIAGNOSIS — C50.419 MALIGNANT NEOPLASM OF UPPER OUTER QUADRANT OF BREAST IN FEMALE, ESTROGEN RECEPTOR NEGATIVE, UNSPECIFIED LATERALITY (HCC): ICD-10-CM
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
## 2020-11-13 RX ORDER — GABAPENTIN 100 MG/1
100 CAPSULE ORAL
Qty: 30 CAPSULE | Refills: 0 | Status: SHIPPED | OUTPATIENT
Start: 2020-11-13 | End: 2020-12-21

## 2020-11-24 ENCOUNTER — OFFICE VISIT (OUTPATIENT)
Dept: ONCOLOGY | Facility: CLINIC | Age: 55
End: 2020-11-24

## 2020-11-24 ENCOUNTER — LAB (OUTPATIENT)
Dept: LAB | Facility: HOSPITAL | Age: 55
End: 2020-11-24

## 2020-11-24 ENCOUNTER — DOCUMENTATION (OUTPATIENT)
Dept: SOCIAL WORK | Facility: HOSPITAL | Age: 55
End: 2020-11-24

## 2020-11-24 ENCOUNTER — HOSPITAL ENCOUNTER (OUTPATIENT)
Dept: OCCUPATIONAL THERAPY | Facility: HOSPITAL | Age: 55
Setting detail: THERAPIES SERIES
Discharge: HOME | End: 2020-11-24

## 2020-11-24 VITALS
HEIGHT: 62 IN | DIASTOLIC BLOOD PRESSURE: 81 MMHG | TEMPERATURE: 97.9 F | WEIGHT: 172 LBS | SYSTOLIC BLOOD PRESSURE: 144 MMHG | HEART RATE: 86 BPM | OXYGEN SATURATION: 98 % | BODY MASS INDEX: 31.65 KG/M2 | RESPIRATION RATE: 16 BRPM

## 2020-11-24 DIAGNOSIS — C50.411 MALIGNANT NEOPLASM OF UPPER-OUTER QUADRANT OF RIGHT BREAST IN FEMALE, ESTROGEN RECEPTOR NEGATIVE (HCC): ICD-10-CM

## 2020-11-24 DIAGNOSIS — T45.1X5A CHEMOTHERAPY-INDUCED PERIPHERAL NEUROPATHY (HCC): ICD-10-CM

## 2020-11-24 DIAGNOSIS — Z51.81 THERAPEUTIC DRUG MONITORING: ICD-10-CM

## 2020-11-24 DIAGNOSIS — C50.411 MALIGNANT NEOPLASM OF UPPER-OUTER QUADRANT OF RIGHT FEMALE BREAST, UNSPECIFIED ESTROGEN RECEPTOR STATUS (HCC): Primary | ICD-10-CM

## 2020-11-24 DIAGNOSIS — Z51.11 ENCOUNTER FOR ANTINEOPLASTIC CHEMOTHERAPY: ICD-10-CM

## 2020-11-24 DIAGNOSIS — Z17.1 MALIGNANT NEOPLASM OF UPPER-OUTER QUADRANT OF RIGHT BREAST IN FEMALE, ESTROGEN RECEPTOR NEGATIVE (HCC): Primary | ICD-10-CM

## 2020-11-24 DIAGNOSIS — M25.60 RANGE OF MOTION DEFICIT: ICD-10-CM

## 2020-11-24 DIAGNOSIS — Z17.1 MALIGNANT NEOPLASM OF UPPER-OUTER QUADRANT OF RIGHT BREAST IN FEMALE, ESTROGEN RECEPTOR NEGATIVE (HCC): ICD-10-CM

## 2020-11-24 DIAGNOSIS — C50.411 MALIGNANT NEOPLASM OF UPPER-OUTER QUADRANT OF RIGHT BREAST IN FEMALE, ESTROGEN RECEPTOR NEGATIVE (HCC): Primary | ICD-10-CM

## 2020-11-24 DIAGNOSIS — L90.5 SCAR CONDITIONS AND FIBROSIS OF SKIN: ICD-10-CM

## 2020-11-24 DIAGNOSIS — R29.3 ABNORMAL POSTURE: ICD-10-CM

## 2020-11-24 DIAGNOSIS — G62.0 CHEMOTHERAPY-INDUCED PERIPHERAL NEUROPATHY (HCC): ICD-10-CM

## 2020-11-24 LAB
ALBUMIN SERPL-MCNC: 4.6 G/DL (ref 3.5–5.2)
ALBUMIN/GLOB SERPL: 1.5 G/DL
ALP SERPL-CCNC: 101 U/L (ref 39–117)
ALT SERPL W P-5'-P-CCNC: 18 U/L (ref 1–33)
AMPHET+METHAMPHET UR QL: NEGATIVE
AMPHETAMINES UR QL: NEGATIVE
ANION GAP SERPL CALCULATED.3IONS-SCNC: 12 MMOL/L (ref 5–15)
AST SERPL-CCNC: 19 U/L (ref 1–32)
BARBITURATES UR QL SCN: NEGATIVE
BENZODIAZ UR QL SCN: NEGATIVE
BILIRUB SERPL-MCNC: 0.2 MG/DL (ref 0–1.2)
BUN SERPL-MCNC: 14 MG/DL (ref 6–20)
BUN/CREAT SERPL: 15.7 (ref 7–25)
BUPRENORPHINE SERPL-MCNC: NEGATIVE NG/ML
CALCIUM SPEC-SCNC: 10.3 MG/DL (ref 8.6–10.5)
CANNABINOIDS SERPL QL: NEGATIVE
CHLORIDE SERPL-SCNC: 98 MMOL/L (ref 98–107)
CO2 SERPL-SCNC: 31 MMOL/L (ref 22–29)
COCAINE UR QL: NEGATIVE
CREAT SERPL-MCNC: 0.89 MG/DL (ref 0.57–1)
ERYTHROCYTE [DISTWIDTH] IN BLOOD BY AUTOMATED COUNT: 14.9 % (ref 12.3–15.4)
GFR SERPL CREATININE-BSD FRML MDRD: 66 ML/MIN/1.73
GLOBULIN UR ELPH-MCNC: 3 GM/DL
GLUCOSE SERPL-MCNC: 131 MG/DL (ref 65–99)
HCT VFR BLD AUTO: 37.8 % (ref 34–46.6)
HGB BLD-MCNC: 12.5 G/DL (ref 12–15.9)
LYMPHOCYTES # BLD AUTO: 2.7 10*3/MM3 (ref 0.7–3.1)
LYMPHOCYTES NFR BLD AUTO: 40.6 % (ref 19.6–45.3)
MCH RBC QN AUTO: 32.3 PG (ref 26.6–33)
MCHC RBC AUTO-ENTMCNC: 33.2 G/DL (ref 31.5–35.7)
MCV RBC AUTO: 97.2 FL (ref 79–97)
METHADONE UR QL SCN: NEGATIVE
MONOCYTES # BLD AUTO: 0.3 10*3/MM3 (ref 0.1–0.9)
MONOCYTES NFR BLD AUTO: 4.7 % (ref 5–12)
NEUTROPHILS NFR BLD AUTO: 3.7 10*3/MM3 (ref 1.7–7)
NEUTROPHILS NFR BLD AUTO: 54.7 % (ref 42.7–76)
OPIATES UR QL: NEGATIVE
OXYCODONE UR QL SCN: NEGATIVE
PCP UR QL SCN: NEGATIVE
PLATELET # BLD AUTO: 330 10*3/MM3 (ref 140–450)
PMV BLD AUTO: 7 FL (ref 6–12)
POTASSIUM SERPL-SCNC: 4.3 MMOL/L (ref 3.5–5.2)
PROPOXYPH UR QL: NEGATIVE
PROT SERPL-MCNC: 7.6 G/DL (ref 6–8.5)
RBC # BLD AUTO: 3.89 10*6/MM3 (ref 3.77–5.28)
SODIUM SERPL-SCNC: 141 MMOL/L (ref 136–145)
TRICYCLICS UR QL SCN: NEGATIVE
WBC # BLD AUTO: 6.7 10*3/MM3 (ref 3.4–10.8)

## 2020-11-24 PROCEDURE — 80053 COMPREHEN METABOLIC PANEL: CPT

## 2020-11-24 PROCEDURE — 80306 DRUG TEST PRSMV INSTRMNT: CPT

## 2020-11-24 PROCEDURE — 97140 MANUAL THERAPY 1/> REGIONS: CPT

## 2020-11-24 PROCEDURE — 85025 COMPLETE CBC W/AUTO DIFF WBC: CPT

## 2020-11-24 PROCEDURE — 36415 COLL VENOUS BLD VENIPUNCTURE: CPT

## 2020-11-24 PROCEDURE — 99213 OFFICE O/P EST LOW 20 MIN: CPT | Performed by: NURSE PRACTITIONER
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
## 2020-11-24 NOTE — PROGRESS NOTES
SW provided a $25 gas card to pt today.  SW encouraged pt to call with any future needs or concerns.

## 2020-11-24 NOTE — PROGRESS NOTES
CHIEF COMPLAINT: Adjuvant therapy triple negative breast cancer    Problem List:  Oncology/Hematology History Overview Note   1.  Stage IIB T2 (2.5cm per Dr. Perez) N0 invasive ductal triple negative breast cancer.  2/16/2020 right upper outer quadrant needle core biopsy showed invasive ductal carcinoma 4.4 mm moderately differentiated negative for estrogen, progesterone receptor both 0% and HER-2/vashti 0+.  Dr. Santo recommended lumpectomy and sentinel node biopsy.  3/12/2020 saw Dr. Gary Perez.perimenopausal breast cancer she felt a 2.5 cm right upper outer quadrant mass almost at the edge of the axilla somewhat fixed.  No skin changes.  She recommended neoadjuvant chemotherapy followed by lumpectomy versus mastectomy then radiation if patient decides on breast conservation or if nodes are found on breast MRI.  She will get formal genetic counseling to help guide the issues of possible prophylactic mastectomy.  With triple negative disease the standard recommendation would be neoadjuvant Adriamycin Cytoxan dose dense x4 followed by Taxol weekly x12 followed by surgery.  If there is residual disease then would consider Xeloda per the create X trial.  We also have  looking at observation versus Keytruda for 1 year in patients with triple negative disease who do not have a complete pathological response to neoadjuvant chemotherapy.  In the absence of symptoms, NCCN guidelines does not recommend routine metastatic surveillance imaging.  Will need ejection fraction before Adriamycin.  -3/24/2020 through 5/4/2020 Adriamycin Cytoxan x4.  Opted out of neoadjuvant Taxol  -6/16/2020 left breast simple mastectomy benign  Right breast simple mastectomy showed residual invasive ductal carcinoma 9 mm with limited DCIS high-grade and negative margins.  0 out of 1 sentinel nodes positive.  Grade 7 out of 9.  Pathologic T1 BN 0.  Postoperative course complicated by localized infections.   -7/6/2020 [facility] oncology  follow-up visit: Given residual disease after 4 courses of AC I would want her to complete the Taxol as planned.  There is also new data presented at [facility] that suggests adjuvant Xeloda following adjuvant chemotherapy is reasonable and triple negative disease.  This is not precisely what she is doing as she has now done part of this neoadjuvant point but I think it makes sense to apply in this setting given residual disease after AC x4 but I would not forego the Taxol but will plan on adding Xeloda.  Reinforced the need for genetic testing salivary kit that has been sent to her but she has not completed.    -8/25/2020 Genetic testing was negative for pathogenic mutations in BRCA1/2 and 32 additional genes on the [facility] panel.       -Taxol dose reduced by 20% with cycle #5 due to peripheral neuropathy    -9/11/2020 [facility] clinic visit: Peripheral neuropathy continues, patient also in need of dental work, decision made to hold Taxol for 3 weeks to see if neuropathy improves and to allow time for oral surgery.    -9/4/2020 6th and final cycle of of Taxol as patient had terrible neuropathy despite dose reduction  -10/2/2020 [facility] oncology follow-up visit: Neuropathy is not improved.  Does not like gabapentin.  Will stop Taxol permanently and press on with Xeloda after she has tooth extraction on October 14.  We will have her back to my nurse practitioner October 26 for cycle 1 of 6 of Xeloda.    -11/3/2020 [facility] clinic visit: Had tooth extraction with no complications.  Has Xeloda tablets in hand, will begin first cycle today.  Neuropathy is stable.  Gabapentin has been decreased 200 mg at night.    -11/24/2020 [facility] clinic visit:   Tolerated first cycle of Xeloda fairly well.  Did have diarrhea controlled with Imodium.  Has fatigue.  Otherwise no side effects.  CBC and CMP are unremarkable.  We will continue with cycle #2 of a planned 6 cycles today.    2.   Hypertension  3.  Hyperlipidemia  4.  Diabetes  5.  Using IUD and taking estradiol for hormone replacement therapy preoperatively  6.  History of D and C as well as bilateral tubal ligation  7.  History of cholecystectomy  8.  Vasomotor symptoms  9.  Family history of paternal aunt with breast cancer and perhaps a sister with ovarian cancer but she is not sure.  She has nulliparous.  Genetic spit test done results pending.    10.  Anxiety and depression     Malignant neoplasm of upper-outer quadrant of right breast in female, estrogen receptor negative (CMS/HCC)   3/12/2020 Initial Diagnosis    Malignant neoplasm of upper outer quadrant of breast in female, estrogen receptor negative (CMS/HCC)     3/12/2020 Cancer Staged    Staging form: Breast, AJCC 8th Edition  - Clinical: Stage IIB (cT2, cN0, cM0, G2, ER-, ND-, HER2-) - Signed by Johann Juarez MD on 3/12/2020     3/24/2020 - 5/4/2020 Chemotherapy    OP BREAST AC DD DOXOrubicin / Cyclophosphamide       5/6/2020 -  Chemotherapy    OP SUPPORTIVE HYDRATION + ANTIEMETICS     6/16/2020 Surgery    Surgery       -6/16/2020 left breast simple mastectomy benign  Right breast simple mastectomy showed residual invasive ductal carcinoma 9 mm with limited DCIS high-grade and negative margins.  0 out of 1 sentinel nodes positive.  Grade 7 out of 9.  Pathologic T1 BN 0     7/6/2020 Cancer Staged    Staging form: Breast, AJCC 8th Edition  - Pathologic: Stage IB (pT1b, pN0, cM0, G2, ER-, ND-, HER2-) - Signed by Johann Juarez MD on 7/6/2020 7/31/2020 -  Chemotherapy    OP CENTRAL VENOUS ACCESS DEVICE ACCESS, CARE, AND MAINTENANCE (CVAD)     7/31/2020 - 9/4/2020 Chemotherapy    Taxol       11/24/2020 -  Chemotherapy    OP BREAST Capecitabine     Malignant neoplasm of upper-outer quadrant of right breast in female, estrogen receptor negative (CMS/HCC)   3/12/2020 Initial Diagnosis    Malignant neoplasm of upper-outer quadrant of right breast in female, estrogen receptor negative  (CMS/HCC)     3/24/2020 - 5/18/2020 Chemotherapy    OP BREAST AC DD DOXOrubicin / Cyclophosphamide     7/31/2020 - 9/10/2020 Chemotherapy    OP BREAST PACLitaxel (weekly X 12)     8/25/2020 Genetic Testing    Genetic testing was negative for pathogenic mutations in BRCA1/2 and 32 additional genes on the [facility] panel.        8/28/2020 Adverse Reaction    Taxol dose reduced by 20% due to peripheral neuropathy with cycle #5.     11/24/2020 -  Chemotherapy    OP BREAST Capecitabine         HISTORY OF PRESENT ILLNESS:  The patient is a 54 y.o. female, here for follow up on management of triple negative adjuvant therapy breast cancer.  Neuropathy after Taxol persists but continues to slightly improve with PT and Gabapentin 100 mg nightly.  Has a consult with palliative care next week and is wondering if she needs to keep that.  She states that she would like to stay on gabapentin while on Xeloda but then likely will stop.  Tolerated her first cycle of Xeloda overall fairly well.  She did have diarrhea that was controlled with Imodium.  Also has fatigue.  Denies any mouth sores.  Has had no skin issues.    Past Medical History:   Diagnosis Date   • Cancer (CMS/HCC) 02/2020    breast cancer per mammogram; chemotherapy    • Diabetes (CMS/HCC)    • History of low potassium 03/2020    prescribed potassium supplements    • History of transfusion 1989    after delivery of son (1 unit only)    • HTN (hypertension)    • Low magnesium level 03/2020    requiring magnesium supplements    • Sleep apnea     no cpap machine currently     Past Surgical History:   Procedure Laterality Date   • BREAST BIOPSY Right 02/2020   • CHOLECYSTECTOMY     • COLONOSCOPY     • LAPAROSCOPIC TUBAL LIGATION     • MASTECTOMY W/ SENTINEL NODE BIOPSY Bilateral 6/16/2020    Procedure: SKIN SPARING MASTECTOMIES BILATERAL AND RIGHT SENTINEL NODE BIOPSY;  Surgeon: Gary Perez MD;  Location: [facility];  Service: General;  Laterality: Bilateral;   •  "VENOUS ACCESS DEVICE (PORT) INSERTION  2020       No Known Allergies    Family History and Social History reviewed and changed as necessary      REVIEW OF SYSTEM:   Review of Systems   Constitutional: Negative for appetite change, chills, diaphoresis, fever and unexpected weight change.  Positive for mild fatigue.  HENT:   Negative for mouth sores, sore throat and trouble swallowing.    Eyes: Negative for icterus.   Respiratory: Negative for cough, hemoptysis and shortness of breath.    Cardiovascular: Negative for chest pain, leg swelling and palpitations.   Gastrointestinal: Negative for abdominal distention, abdominal pain, blood in stool, constipation, nausea and vomiting.  Positive for intermittent diarrhea.  Endocrine: Negative for hot flashes.   Genitourinary: Negative for bladder incontinence, difficulty urinating, dysuria, frequency and hematuria.    Musculoskeletal: Negative for gait problem, neck pain and neck stiffness.   Skin: Negative for rash.   Neurological: Negative for dizziness, gait problem, headaches, light-headedness.  Positive for neuropathy in hands.  Hematological: Negative for adenopathy. Does not bruise/bleed easily.   Psychiatric/Behavioral: Negative for depression. The patient is not nervous/anxious.    All other systems reviewed and are negative.       PHYSICAL EXAM    Vitals:    20 1438   BP: 144/81   Pulse: 86   Resp: 16   Temp: 97.9 °F (36.6 °C)   TempSrc: Temporal   SpO2: 98%   Weight: 78 kg (172 lb)   Height: 157.5 cm (62.01\")     Vitals:    20 1438   PainSc: 0-No pain        Constitutional: Appears well-developed and well-nourished. No distress.   ECO  HENT:   Head: Normocephalic.   Mouth/Throat: Oropharynx is clear and moist.   Eyes: Conjunctivae are normal. Pupils are equal, round, and reactive to light. No scleral icterus.   Neck: Neck supple. No JVD present.    Cardiovascular: Normal rate, regular rhythm and normal heart sounds.    Pulmonary/Chest: Breath " sounds normal. No respiratory distress.   Abdominal: Soft. Exhibits no distension. There is no tenderness.   Musculoskeletal:Exhibits no edema, tenderness or deformity.   Neurological: Alert and oriented to person, place, and time. Exhibits normal muscle tone.   Skin: No ecchymosis, no petechiae and no rash noted. Not diaphoretic. No cyanosis. Nails show no clubbing.   Psychiatric: Normal mood and affect.   Vitals reviewed.  Labs reviewed.    Lab Results   Component Value Date    HGB 12.5 11/24/2020    HCT 37.8 11/24/2020    MCV 97.2 (H) 11/24/2020     11/24/2020    WBC 6.70 11/24/2020    NEUTROABS 3.70 11/24/2020    LYMPHSABS 2.70 11/24/2020    MONOSABS 0.30 11/24/2020    EOSABS 0.28 06/22/2020    BASOSABS 0.06 06/22/2020       Lab Results   Component Value Date    GLUCOSE 131 (H) 11/24/2020    BUN 14 11/24/2020    CREATININE 0.89 11/24/2020     11/24/2020    K 4.3 11/24/2020    CL 98 11/24/2020    CO2 31.0 (H) 11/24/2020    CALCIUM 10.3 11/24/2020    PROTEINTOT 7.6 11/24/2020    ALBUMIN 4.60 11/24/2020    BILITOT 0.2 11/24/2020    ALKPHOS 101 11/24/2020    AST 19 11/24/2020    ALT 18 11/24/2020         ASSESSMENT & PLAN:  1.  Stage IIB T2 (2.5cm per Dr. Perez) N0 invasive ductal triple negative breast cancer   2.  Peripheral neuropathy secondary to chemotherapy  3.  Diarrhea secondary to chemotherapy     2/16/2020 right upper outer quadrant needle core biopsy showed invasive ductal carcinoma 4.4 mm moderately differentiated negative for estrogen, progesterone receptor both 0% and HER-2/vashti 0+.  Dr. Santo recommended lumpectomy and sentinel node biopsy.  3/12/2020 saw Dr. Gary Perez.perimenopausal breast cancer she felt a 2.5 cm right upper outer quadrant mass almost at the edge of the axilla somewhat fixed.  No skin changes.  She recommended neoadjuvant chemotherapy followed by lumpectomy versus mastectomy then radiation if patient decides on breast conservation or if nodes are found on breast  MRI.  She will get formal genetic counseling to help guide the issues of possible prophylactic mastectomy.  With triple negative disease the standard recommendation would be neoadjuvant Adriamycin Cytoxan dose dense x4 followed by Taxol weekly x12 followed by surgery.  If there is residual disease then would consider Xeloda per the create X trial.  We also have  looking at observation versus Keytruda for 1 year in patients with triple negative disease who do not have a complete pathological response to neoadjuvant chemotherapy.  In the absence of symptoms, NCCN guidelines does not recommend routine metastatic surveillance imaging.  Will need ejection fraction before Adriamycin.  -3/24/2020 through 5/4/2020 Adriamycin Cytoxan x4.  Opted out of neoadjuvant Taxol  -6/16/2020 left breast simple mastectomy benign  Right breast simple mastectomy showed residual invasive ductal carcinoma 9 mm with limited DCIS high-grade and negative margins.  0 out of 1 sentinel nodes positive.  Grade 7 out of 9.  Pathologic T1 BN 0.  Postoperative course complicated by localized infections.   -7/6/2020 [facility] medical oncology follow-up visit: Given residual disease after 4 courses of AC I would want her to complete the Taxol as planned.  There is also new data presented at [facility] that suggests adjuvant Xeloda following adjuvant chemotherapy is reasonable and triple negative disease.  This is not precisely what she is doing as she has now done part of this neoadjuvant point but I think it makes sense to apply in this setting given residual disease after AC x4 but I would not forego the Taxol but will plan on adding Xeloda.  Reinforced the need for genetic testing salivary kit that has been sent to her but she has not completed.    -8/25/2020 Genetic testing was negative for pathogenic mutations in BRCA1/2 and 32 additional genes on the [facility] panel.       -Taxol dose reduced by 20% with cycle #5 due to peripheral  neuropathy    -9/11/2020 [facility] oncology clinic visit: Peripheral neuropathy continues, patient also in need of dental work, decision made to hold Taxol for 3 weeks to see if neuropathy improves and to allow time for oral surgery.    -9/4/2020 6th and final cycle of of Taxol as patient had terrible neuropathy despite dose reduction    -10/2/2020 [facility] hematology oncology follow-up visit: Neuropathy is not improved.  Does not like gabapentin.  Will stop Taxol permanently and press on with Xeloda after she has tooth extraction on October 14.  We will have her back to my nurse practitioner October 26 for cycle 1 of 6 of Xeloda.  Discussed with patient face-to-face 30 minutes greater than 50% spent counseling regarding this plan as outlined above.  I will also consult palliative care for her neuropathy management and other symptom management.    -11/3/2020 [facility] Oncology clinic visit: Peripheral neuropathy persist but may be slightly improved with physical therapy.  She currently is on gabapentin 100 mg at night but does not like the side effects and likely will stop that soon.  She has an appointment with palliative care next week.  She has had her tooth extracted and has healed and states her dentist has released her.  She has received her Xeloda, 3 tablets twice daily 14 out of 21 days, she will start that when she gets home today.  She had labs on Friday, CBC and CMP are normal.  We again reviewed side effects.  All questions answered to her satisfaction.  I plan on seeing her back in 3 weeks for follow-up.    -11/24/2020 [facility] oncology clinic visit:   Tolerated first cycle of Xeloda fairly well.  Did have diarrhea controlled with Imodium.  Has fatigue.  Otherwise no side effects.  CBC and CMP are unremarkable.  We will continue with cycle #2 of a planned 6 cycles today.  As long as she is doing well, she will get labs in 3 weeks at home prior to cycle #3, if no concerns and labs are stable she will continue  with cycle 3.  I will see her back in 6 weeks for follow-up with cycle #4.  Peripheral neuropathy is stable, seems to be improving slightly.  She continues on gabapentin 100 mg at night.  Wants to continue that while on Xeloda and then likely will stop it.  Had a referral to palliative care with consult scheduled for next week.  As her neuropathy is stable and we will likely come off of gabapentin she would like to cancel her palliative care appointment and that is reasonable.    Return to clinic in 6 weeks for follow-up.    I spent a total of 15 minutes in direct patient care, greater than 11  minutes face to face, time was spent in coordination of care, and counseling the patient regarding lab review, symptom assessment, discussion of plan of care going forward.  Answered any questions patient had regarding medications and plan of care.     Camelia Carranza, APRN    11/24/2020

## 2020-12-04 ENCOUNTER — HOSPITAL ENCOUNTER (OUTPATIENT)
Dept: OCCUPATIONAL THERAPY | Facility: HOSPITAL | Age: 55
Setting detail: THERAPIES SERIES
Discharge: HOME | End: 2020-12-04

## 2020-12-04 DIAGNOSIS — M25.60 RANGE OF MOTION DEFICIT: ICD-10-CM

## 2020-12-04 DIAGNOSIS — C50.411 MALIGNANT NEOPLASM OF UPPER-OUTER QUADRANT OF RIGHT FEMALE BREAST, UNSPECIFIED ESTROGEN RECEPTOR STATUS (HCC): Primary | ICD-10-CM

## 2020-12-04 DIAGNOSIS — G62.0 CHEMOTHERAPY-INDUCED PERIPHERAL NEUROPATHY (HCC): ICD-10-CM

## 2020-12-04 DIAGNOSIS — L90.5 SCAR CONDITIONS AND FIBROSIS OF SKIN: ICD-10-CM

## 2020-12-04 DIAGNOSIS — R29.3 ABNORMAL POSTURE: ICD-10-CM

## 2020-12-04 DIAGNOSIS — T45.1X5A CHEMOTHERAPY-INDUCED PERIPHERAL NEUROPATHY (HCC): ICD-10-CM

## 2020-12-04 PROCEDURE — 97140 MANUAL THERAPY 1/> REGIONS: CPT
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
## 2020-12-08 RX ORDER — POTASSIUM CHLORIDE 1500 MG/1
TABLET, FILM COATED, EXTENDED RELEASE ORAL
Qty: 90 TABLET | Refills: 0 | OUTPATIENT
Start: 2020-12-08

## 2020-12-21 DIAGNOSIS — Z17.1 MALIGNANT NEOPLASM OF UPPER OUTER QUADRANT OF BREAST IN FEMALE, ESTROGEN RECEPTOR NEGATIVE, UNSPECIFIED LATERALITY (HCC): ICD-10-CM

## 2020-12-21 DIAGNOSIS — G62.0 PERIPHERAL NEUROPATHY DUE TO CHEMOTHERAPY (HCC): ICD-10-CM

## 2020-12-21 DIAGNOSIS — C50.419 MALIGNANT NEOPLASM OF UPPER OUTER QUADRANT OF BREAST IN FEMALE, ESTROGEN RECEPTOR NEGATIVE, UNSPECIFIED LATERALITY (HCC): ICD-10-CM

## 2020-12-21 DIAGNOSIS — T45.1X5A PERIPHERAL NEUROPATHY DUE TO CHEMOTHERAPY (HCC): ICD-10-CM

## 2020-12-21 RX ORDER — GABAPENTIN 100 MG/1
100 CAPSULE ORAL
Qty: 30 CAPSULE | Refills: 0 | Status: SHIPPED | OUTPATIENT
Start: 2020-12-21 | End: 2021-01-18

## 2021-01-04 ENCOUNTER — HOSPITAL ENCOUNTER (OUTPATIENT)
Dept: OCCUPATIONAL THERAPY | Facility: HOSPITAL | Age: 56
Setting detail: THERAPIES SERIES
Discharge: HOME | End: 2021-01-04

## 2021-01-04 DIAGNOSIS — R29.3 ABNORMAL POSTURE: ICD-10-CM

## 2021-01-04 DIAGNOSIS — M25.60 RANGE OF MOTION DEFICIT: ICD-10-CM

## 2021-01-04 DIAGNOSIS — C50.411 MALIGNANT NEOPLASM OF UPPER-OUTER QUADRANT OF RIGHT FEMALE BREAST, UNSPECIFIED ESTROGEN RECEPTOR STATUS (HCC): Primary | ICD-10-CM

## 2021-01-04 DIAGNOSIS — G62.0 CHEMOTHERAPY-INDUCED PERIPHERAL NEUROPATHY (HCC): ICD-10-CM

## 2021-01-04 DIAGNOSIS — L90.5 SCAR CONDITIONS AND FIBROSIS OF SKIN: ICD-10-CM

## 2021-01-04 DIAGNOSIS — T45.1X5A CHEMOTHERAPY-INDUCED PERIPHERAL NEUROPATHY (HCC): ICD-10-CM

## 2021-01-04 PROCEDURE — 97535 SELF CARE MNGMENT TRAINING: CPT

## 2021-01-04 PROCEDURE — 97140 MANUAL THERAPY 1/> REGIONS: CPT
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
## 2021-01-05 ENCOUNTER — OFFICE VISIT (OUTPATIENT)
Dept: ONCOLOGY | Facility: CLINIC | Age: 56
End: 2021-01-05

## 2021-01-05 ENCOUNTER — HOSPITAL ENCOUNTER (OUTPATIENT)
Dept: ONCOLOGY | Facility: HOSPITAL | Age: 56
Setting detail: INFUSION SERIES
Discharge: HOME | End: 2021-01-05

## 2021-01-05 VITALS
SYSTOLIC BLOOD PRESSURE: 182 MMHG | RESPIRATION RATE: 20 BRPM | DIASTOLIC BLOOD PRESSURE: 86 MMHG | HEIGHT: 62 IN | HEART RATE: 80 BPM | WEIGHT: 175 LBS | OXYGEN SATURATION: 97 % | BODY MASS INDEX: 32.2 KG/M2 | TEMPERATURE: 96.8 F

## 2021-01-05 DIAGNOSIS — C50.411 MALIGNANT NEOPLASM OF UPPER-OUTER QUADRANT OF RIGHT BREAST IN FEMALE, ESTROGEN RECEPTOR NEGATIVE (HCC): Primary | ICD-10-CM

## 2021-01-05 DIAGNOSIS — Z17.1 MALIGNANT NEOPLASM OF UPPER-OUTER QUADRANT OF RIGHT BREAST IN FEMALE, ESTROGEN RECEPTOR NEGATIVE (HCC): Primary | ICD-10-CM

## 2021-01-05 LAB
ALBUMIN SERPL-MCNC: 4.3 G/DL (ref 3.5–5.2)
ALBUMIN/GLOB SERPL: 1.5 G/DL
ALP SERPL-CCNC: 101 U/L (ref 39–117)
ALT SERPL W P-5'-P-CCNC: 19 U/L (ref 1–33)
ANION GAP SERPL CALCULATED.3IONS-SCNC: 13 MMOL/L (ref 5–15)
AST SERPL-CCNC: 17 U/L (ref 1–32)
BILIRUB SERPL-MCNC: 0.2 MG/DL (ref 0–1.2)
BUN SERPL-MCNC: 17 MG/DL (ref 6–20)
BUN/CREAT SERPL: 21.5 (ref 7–25)
CALCIUM SPEC-SCNC: 9.3 MG/DL (ref 8.6–10.5)
CHLORIDE SERPL-SCNC: 98 MMOL/L (ref 98–107)
CO2 SERPL-SCNC: 29 MMOL/L (ref 22–29)
CREAT SERPL-MCNC: 0.79 MG/DL (ref 0.57–1)
ERYTHROCYTE [DISTWIDTH] IN BLOOD BY AUTOMATED COUNT: 15.2 % (ref 12.3–15.4)
GFR SERPL CREATININE-BSD FRML MDRD: 76 ML/MIN/1.73
GLOBULIN UR ELPH-MCNC: 2.8 GM/DL
GLUCOSE SERPL-MCNC: 110 MG/DL (ref 65–99)
HCT VFR BLD AUTO: 37.1 % (ref 34–46.6)
HGB BLD-MCNC: 12.2 G/DL (ref 12–15.9)
LYMPHOCYTES # BLD AUTO: 2.2 10*3/MM3 (ref 0.7–3.1)
LYMPHOCYTES NFR BLD AUTO: 33.3 % (ref 19.6–45.3)
MCH RBC QN AUTO: 30.8 PG (ref 26.6–33)
MCHC RBC AUTO-ENTMCNC: 32.8 G/DL (ref 31.5–35.7)
MCV RBC AUTO: 93.9 FL (ref 79–97)
MONOCYTES # BLD AUTO: 0.3 10*3/MM3 (ref 0.1–0.9)
MONOCYTES NFR BLD AUTO: 4.4 % (ref 5–12)
NEUTROPHILS NFR BLD AUTO: 4.1 10*3/MM3 (ref 1.7–7)
NEUTROPHILS NFR BLD AUTO: 62.3 % (ref 42.7–76)
PLATELET # BLD AUTO: 301 10*3/MM3 (ref 140–450)
PMV BLD AUTO: 7.3 FL (ref 6–12)
POTASSIUM SERPL-SCNC: 3.9 MMOL/L (ref 3.5–5.2)
PROT SERPL-MCNC: 7.1 G/DL (ref 6–8.5)
RBC # BLD AUTO: 3.95 10*6/MM3 (ref 3.77–5.28)
SODIUM SERPL-SCNC: 140 MMOL/L (ref 136–145)
WBC # BLD AUTO: 6.6 10*3/MM3 (ref 3.4–10.8)

## 2021-01-05 PROCEDURE — 25010000003 HEPARIN LOCK FLUSH PER 10 UNITS: Performed by: INTERNAL MEDICINE

## 2021-01-05 PROCEDURE — 36591 DRAW BLOOD OFF VENOUS DEVICE: CPT

## 2021-01-05 PROCEDURE — 85025 COMPLETE CBC W/AUTO DIFF WBC: CPT | Performed by: NURSE PRACTITIONER

## 2021-01-05 PROCEDURE — 99213 OFFICE O/P EST LOW 20 MIN: CPT | Performed by: NURSE PRACTITIONER

## 2021-01-05 PROCEDURE — 80053 COMPREHEN METABOLIC PANEL: CPT | Performed by: NURSE PRACTITIONER

## 2021-01-05 RX ORDER — OXYCODONE HYDROCHLORIDE 5 MG/1
TABLET ORAL
COMMUNITY
End: 2021-06-08

## 2021-01-05 RX ORDER — SODIUM CHLORIDE 0.9 % (FLUSH) 0.9 %
10 SYRINGE (ML) INJECTION AS NEEDED
Status: CANCELLED | OUTPATIENT
Start: 2021-01-05

## 2021-01-05 RX ORDER — SODIUM CHLORIDE 0.9 % (FLUSH) 0.9 %
10 SYRINGE (ML) INJECTION AS NEEDED
Status: DISCONTINUED | OUTPATIENT
Start: 2021-01-05 | End: 2021-01-06 | Stop reason: HOSPADM

## 2021-01-05 RX ORDER — HEPARIN SODIUM (PORCINE) LOCK FLUSH IV SOLN 100 UNIT/ML 100 UNIT/ML
500 SOLUTION INTRAVENOUS AS NEEDED
Status: CANCELLED | OUTPATIENT
Start: 2021-01-05

## 2021-01-05 RX ORDER — HEPARIN SODIUM (PORCINE) LOCK FLUSH IV SOLN 100 UNIT/ML 100 UNIT/ML
500 SOLUTION INTRAVENOUS AS NEEDED
Status: DISCONTINUED | OUTPATIENT
Start: 2021-01-05 | End: 2021-01-06 | Stop reason: HOSPADM

## 2021-01-05 RX ADMIN — HEPARIN 500 UNITS: 100 SYRINGE at 15:18

## 2021-01-05 RX ADMIN — SODIUM CHLORIDE, PRESERVATIVE FREE 10 ML: 5 INJECTION INTRAVENOUS at 15:18

## 2021-01-05 NOTE — PROGRESS NOTES
CHIEF COMPLAINT: Adjuvant therapy triple negative breast cancer    Problem List:  Oncology/Hematology History Overview Note   1.  Stage IIB T2 (2.5cm per Dr. Perez) N0 invasive ductal triple negative breast cancer.  2/16/2020 right upper outer quadrant needle core biopsy showed invasive ductal carcinoma 4.4 mm moderately differentiated negative for estrogen, progesterone receptor both 0% and HER-2/vashti 0+.  Dr. aSnto recommended lumpectomy and sentinel node biopsy.  3/12/2020 saw Dr. Gary Perez.perimenopausal breast cancer she felt a 2.5 cm right upper outer quadrant mass almost at the edge of the axilla somewhat fixed.  No skin changes.  She recommended neoadjuvant chemotherapy followed by lumpectomy versus mastectomy then radiation if patient decides on breast conservation or if nodes are found on breast MRI.  She will get formal genetic counseling to help guide the issues of possible prophylactic mastectomy.  With triple negative disease the standard recommendation would be neoadjuvant Adriamycin Cytoxan dose dense x4 followed by Taxol weekly x12 followed by surgery.  If there is residual disease then would consider Xeloda per the create X trial.  We also have  looking at observation versus Keytruda for 1 year in patients with triple negative disease who do not have a complete pathological response to neoadjuvant chemotherapy.  In the absence of symptoms, NCCN guidelines does not recommend routine metastatic surveillance imaging.  Will need ejection fraction before Adriamycin.  -3/24/2020 through 5/4/2020 Adriamycin Cytoxan x4.  Opted out of neoadjuvant Taxol  -6/16/2020 left breast simple mastectomy benign  Right breast simple mastectomy showed residual invasive ductal carcinoma 9 mm with limited DCIS high-grade and negative margins.  0 out of 1 sentinel nodes positive.  Grade 7 out of 9.  Pathologic T1 BN 0.  Postoperative course complicated by localized infections.   -7/6/2020 [facility] oncology  follow-up visit: Given residual disease after 4 courses of AC I would want her to complete the Taxol as planned.  There is also new data presented at [facility] that suggests adjuvant Xeloda following adjuvant chemotherapy is reasonable and triple negative disease.  This is not precisely what she is doing as she has now done part of this neoadjuvant point but I think it makes sense to apply in this setting given residual disease after AC x4 but I would not forego the Taxol but will plan on adding Xeloda.  Reinforced the need for genetic testing salivary kit that has been sent to her but she has not completed.    -8/25/2020 Genetic testing was negative for pathogenic mutations in BRCA1/2 and 32 additional genes on the [employer] panel.       -Taxol dose reduced by 20% with cycle #5 due to peripheral neuropathy    -9/11/2020 [facility] clinic visit: Peripheral neuropathy continues, patient also in need of dental work, decision made to hold Taxol for 3 weeks to see if neuropathy improves and to allow time for oral surgery.    -9/4/2020 6th and final cycle of of Taxol as patient had terrible neuropathy despite dose reduction  -10/2/2020 [facility] oncology follow-up visit: Neuropathy is not improved.  Does not like gabapentin.  Will stop Taxol permanently and press on with Xeloda after she has tooth extraction on October 14.  We will have her back to my nurse practitioner October 26 for cycle 1 of 6 of Xeloda.    -11/3/2020 [facility] clinic visit: Had tooth extraction with no complications.  Has Xeloda tablets in hand, will begin first cycle today.  Neuropathy is stable.  Gabapentin has been decreased 200 mg at night.    -11/24/2020 [facility] clinic visit:   Tolerated first cycle of Xeloda fairly well.  Did have diarrhea controlled with Imodium.  Has fatigue.  Otherwise no side effects.  CBC and CMP are unremarkable.  We will continue with cycle #2 of a planned 6 cycles today.    2.   Hypertension  3.  Hyperlipidemia  4.  Diabetes  5.  Using IUD and taking estradiol for hormone replacement therapy preoperatively  6.  History of D and C as well as bilateral tubal ligation  7.  History of cholecystectomy  8.  Vasomotor symptoms  9.  Family history of paternal aunt with breast cancer and perhaps a sister with ovarian cancer but she is not sure.  She has nulliparous.  Genetic spit test done results pending.    10.  Anxiety and depression     Malignant neoplasm of upper-outer quadrant of right breast in female, estrogen receptor negative (CMS/HCC)   3/12/2020 Initial Diagnosis    Malignant neoplasm of upper outer quadrant of breast in female, estrogen receptor negative (CMS/HCC)     3/12/2020 Cancer Staged    Staging form: Breast, AJCC 8th Edition  - Clinical: Stage IIB (cT2, cN0, cM0, G2, ER-, AL-, HER2-) - Signed by Johann Juarez MD on 3/12/2020     3/24/2020 - 5/4/2020 Chemotherapy    OP BREAST AC DD DOXOrubicin / Cyclophosphamide       5/6/2020 -  Chemotherapy    OP SUPPORTIVE HYDRATION + ANTIEMETICS     6/16/2020 Surgery    Surgery       -6/16/2020 left breast simple mastectomy benign  Right breast simple mastectomy showed residual invasive ductal carcinoma 9 mm with limited DCIS high-grade and negative margins.  0 out of 1 sentinel nodes positive.  Grade 7 out of 9.  Pathologic T1 BN 0     7/6/2020 Cancer Staged    Staging form: Breast, AJCC 8th Edition  - Pathologic: Stage IB (pT1b, pN0, cM0, G2, ER-, AL-, HER2-) - Signed by Johann Juarez MD on 7/6/2020 7/31/2020 -  Chemotherapy    OP CENTRAL VENOUS ACCESS DEVICE ACCESS, CARE, AND MAINTENANCE (CVAD)     7/31/2020 - 9/4/2020 Chemotherapy    Taxol       11/24/2020 -  Chemotherapy    OP BREAST Capecitabine     Malignant neoplasm of upper-outer quadrant of right breast in female, estrogen receptor negative (CMS/HCC)   3/12/2020 Initial Diagnosis    Malignant neoplasm of upper-outer quadrant of right breast in female, estrogen receptor negative  "(CMS/HCC)     3/24/2020 - 5/18/2020 Chemotherapy    OP BREAST AC DD DOXOrubicin / Cyclophosphamide     7/31/2020 - 9/10/2020 Chemotherapy    OP BREAST PACLitaxel (weekly X 12)     8/25/2020 Genetic Testing    Genetic testing was negative for pathogenic mutations in BRCA1/2 and 32 additional genes on the [employer] panel.        8/28/2020 Adverse Reaction    Taxol dose reduced by 20% due to peripheral neuropathy with cycle #5.     11/24/2020 -  Chemotherapy    OP BREAST Capecitabine         HISTORY OF PRESENT ILLNESS:  The patient is a 55 y.o. female, here for follow up on management of triple negative adjuvant therapy breast cancer.  Neuropathy after Taxol persists but continues to slightly improve with PT and Gabapentin 100 mg nightly.  Patient is scheduled to start cycle #4 of 6 planned Xeloda next week.  She states she has decided to forego any further treatment.  She states \"I have just had enough and cannot do anymore\".  She is not interested in dose reduction or delaying treatment for a week to see if side effects improve.  She is having some diarrhea but states it is controlled.  She is having fatigue.      Past Medical History:   Diagnosis Date   • Cancer (CMS/HCC) 02/2020    breast cancer per mammogram; chemotherapy    • Diabetes (CMS/HCC)    • History of low potassium 03/2020    prescribed potassium supplements    • History of transfusion 1989    after delivery of son (1 unit only)    • HTN (hypertension)    • Low magnesium level 03/2020    requiring magnesium supplements    • Sleep apnea     no cpap machine currently     Past Surgical History:   Procedure Laterality Date   • BREAST BIOPSY Right 02/2020   • CHOLECYSTECTOMY     • COLONOSCOPY     • LAPAROSCOPIC TUBAL LIGATION     • MASTECTOMY W/ SENTINEL NODE BIOPSY Bilateral 6/16/2020    Procedure: SKIN SPARING MASTECTOMIES BILATERAL AND RIGHT SENTINEL NODE BIOPSY;  Surgeon: Gary Perez MD;  Location: [facility];  Service: General;  Laterality: " "Bilateral;   • VENOUS ACCESS DEVICE (PORT) INSERTION  2020       No Known Allergies    Family History and Social History reviewed and changed as necessary      REVIEW OF SYSTEM:   Review of Systems   Constitutional: Negative for appetite change, chills, diaphoresis, fever and unexpected weight change.  Positive for mild fatigue.  HENT:   Negative for mouth sores, sore throat and trouble swallowing.    Eyes: Negative for icterus.   Respiratory: Negative for cough, hemoptysis and shortness of breath.    Cardiovascular: Negative for chest pain, leg swelling and palpitations.   Gastrointestinal: Negative for abdominal distention, abdominal pain, blood in stool, constipation, nausea and vomiting.  Positive for intermittent diarrhea.  Endocrine: Negative for hot flashes.   Genitourinary: Negative for bladder incontinence, difficulty urinating, dysuria, frequency and hematuria.    Musculoskeletal: Negative for gait problem, neck pain and neck stiffness.   Skin: Negative for rash.   Neurological: Negative for dizziness, gait problem, headaches, light-headedness.  Positive for neuropathy in hands.  Hematological: Negative for adenopathy. Does not bruise/bleed easily.   Psychiatric/Behavioral: Negative for depression. The patient is not nervous/anxious.    All other systems reviewed and are negative.       PHYSICAL EXAM    Vitals:    21 1406   BP: (!) 182/86   Pulse: 80   Resp: 20   Temp: 96.8 °F (36 °C)   TempSrc: Temporal   SpO2: 97%   Weight: 79.4 kg (175 lb)   Height: 157.5 cm (62.01\")     Vitals:    21 1406   PainSc: 0-No pain        Constitutional: Appears well-developed and well-nourished. No distress.   ECO  HENT:   Head: Normocephalic.   Mouth/Throat: Oropharynx is clear and moist.   Eyes: Conjunctivae are normal. Pupils are equal, round, and reactive to light. No scleral icterus.   Neck: Neck supple. No JVD present.    Cardiovascular: Normal rate, regular rhythm and normal heart sounds.  "   Pulmonary/Chest: Breath sounds normal. No respiratory distress.   Abdominal: Soft. Exhibits no distension. There is no tenderness.   Musculoskeletal:Exhibits no edema, tenderness or deformity.   Neurological: Alert and oriented to person, place, and time. Exhibits normal muscle tone.   Skin: No ecchymosis, no petechiae and no rash noted. Not diaphoretic. No cyanosis. Nails show no clubbing.   Psychiatric: Normal mood and affect.   Vitals reviewed.  Labs reviewed.    Lab Results   Component Value Date    HGB 12.5 11/24/2020    HCT 37.8 11/24/2020    MCV 97.2 (H) 11/24/2020     11/24/2020    WBC 6.70 11/24/2020    NEUTROABS 3.70 11/24/2020    LYMPHSABS 2.70 11/24/2020    MONOSABS 0.30 11/24/2020    EOSABS 0.28 06/22/2020    BASOSABS 0.06 06/22/2020       Lab Results   Component Value Date    GLUCOSE 131 (H) 11/24/2020    BUN 14 11/24/2020    CREATININE 0.89 11/24/2020     11/24/2020    K 4.3 11/24/2020    CL 98 11/24/2020    CO2 31.0 (H) 11/24/2020    CALCIUM 10.3 11/24/2020    PROTEINTOT 7.6 11/24/2020    ALBUMIN 4.60 11/24/2020    BILITOT 0.2 11/24/2020    ALKPHOS 101 11/24/2020    AST 19 11/24/2020    ALT 18 11/24/2020         ASSESSMENT & PLAN:  1.  Stage IIB T2 (2.5cm per Dr. Perez) N0 invasive ductal triple negative breast cancer   2.  Peripheral neuropathy secondary to chemotherapy  3.  Diarrhea secondary to chemotherapy     2/16/2020 right upper outer quadrant needle core biopsy showed invasive ductal carcinoma 4.4 mm moderately differentiated negative for estrogen, progesterone receptor both 0% and HER-2/vashti 0+.  Dr. Santo recommended lumpectomy and sentinel node biopsy.  3/12/2020 saw Dr. Gary Perez.perimenopausal breast cancer she felt a 2.5 cm right upper outer quadrant mass almost at the edge of the axilla somewhat fixed.  No skin changes.  She recommended neoadjuvant chemotherapy followed by lumpectomy versus mastectomy then radiation if patient decides on breast conservation or if  nodes are found on breast MRI.  She will get formal genetic counseling to help guide the issues of possible prophylactic mastectomy.  With triple negative disease the standard recommendation would be neoadjuvant Adriamycin Cytoxan dose dense x4 followed by Taxol weekly x12 followed by surgery.  If there is residual disease then would consider Xeloda per the create X trial.  We also have  looking at observation versus Keytruda for 1 year in patients with triple negative disease who do not have a complete pathological response to neoadjuvant chemotherapy.  In the absence of symptoms, NCCN guidelines does not recommend routine metastatic surveillance imaging.  Will need ejection fraction before Adriamycin.  -3/24/2020 through 5/4/2020 Adriamycin Cytoxan x4.  Opted out of neoadjuvant Taxol  -6/16/2020 left breast simple mastectomy benign  Right breast simple mastectomy showed residual invasive ductal carcinoma 9 mm with limited DCIS high-grade and negative margins.  0 out of 1 sentinel nodes positive.  Grade 7 out of 9.  Pathologic T1 BN 0.  Postoperative course complicated by localized infections.   -7/6/2020 [facility] medical oncology follow-up visit: Given residual disease after 4 courses of AC I would want her to complete the Taxol as planned.  There is also new data presented at [facility] that suggests adjuvant Xeloda following adjuvant chemotherapy is reasonable and triple negative disease.  This is not precisely what she is doing as she has now done part of this neoadjuvant point but I think it makes sense to apply in this setting given residual disease after AC x4 but I would not forego the Taxol but will plan on adding Xeloda.  Reinforced the need for genetic testing salivary kit that has been sent to her but she has not completed.    -8/25/2020 Genetic testing was negative for pathogenic mutations in BRCA1/2 and 32 additional genes on the [employer] panel.       -Taxol dose reduced by 20% with cycle #5 due to  peripheral neuropathy    -9/11/2020 [facility] oncology clinic visit: Peripheral neuropathy continues, patient also in need of dental work, decision made to hold Taxol for 3 weeks to see if neuropathy improves and to allow time for oral surgery.    -9/4/2020 6th and final cycle of of Taxol as patient had terrible neuropathy despite dose reduction    -10/2/2020 [facility] hematology oncology follow-up visit: Neuropathy is not improved.  Does not like gabapentin.  Will stop Taxol permanently and press on with Xeloda after she has tooth extraction on October 14.  We will have her back to my nurse practitioner October 26 for cycle 1 of 6 of Xeloda.  Discussed with patient face-to-face 30 minutes greater than 50% spent counseling regarding this plan as outlined above.  I will also consult palliative care for her neuropathy management and other symptom management.    -11/3/2020 [facility] Oncology clinic visit: Peripheral neuropathy persist but may be slightly improved with physical therapy.  She currently is on gabapentin 100 mg at night but does not like the side effects and likely will stop that soon.  She has an appointment with palliative care next week.  She has had her tooth extracted and has healed and states her dentist has released her.  She has received her Xeloda, 3 tablets twice daily 14 out of 21 days, she will start that when she gets home today.  She had labs on Friday, CBC and CMP are normal.  We again reviewed side effects.  All questions answered to her satisfaction.  I plan on seeing her back in 3 weeks for follow-up.    -11/24/2020 [facility] oncology clinic visit:   Tolerated first cycle of Xeloda fairly well.  Did have diarrhea controlled with Imodium.  Has fatigue.  Otherwise no side effects.  CBC and CMP are unremarkable.  We will continue with cycle #2 of a planned 6 cycles today.  As long as she is doing well, she will get labs in 3 weeks at home prior to cycle #3, if no concerns and labs are stable she  will continue with cycle 3.  I will see her back in 6 weeks for follow-up with cycle #4.  Peripheral neuropathy is stable, seems to be improving slightly.  She continues on gabapentin 100 mg at night.  Wants to continue that while on Xeloda and then likely will stop it.  Had a referral to palliative care with consult scheduled for next week.  As her neuropathy is stable and we will likely come off of gabapentin she would like to cancel her palliative care appointment and that is reasonable.    -1/5/2021 [facility] oncology clinic visit:  She has completed 4 out of 6 planned cycles of Xeloda with 2 weeks on 1 week off.  Peripheral neuropathy is stable with PT and gabapentin 100 mg at night.  She is planning to taper off the gabapentin if tolerated.  She has decided that she doesn't want to continue Xeloda treatment and she is comfortable with her decision and knows that her breast cancer has a high risk of recurring.  We reviewed signs and symptoms that would be concerning.  She is planning to have breast reconstruction with Dr. Franco in March.  She will have labs drawn today along with a port flush.  We will see her back in 3 months or sooner if needed.  Spent 20 minutes with patient discussing plan of care and follow up.        Valerie Paula, APRN  01/05/2021

## 2021-01-15 DIAGNOSIS — G62.0 PERIPHERAL NEUROPATHY DUE TO CHEMOTHERAPY (HCC): ICD-10-CM

## 2021-01-15 DIAGNOSIS — Z17.1 MALIGNANT NEOPLASM OF UPPER OUTER QUADRANT OF BREAST IN FEMALE, ESTROGEN RECEPTOR NEGATIVE, UNSPECIFIED LATERALITY (HCC): ICD-10-CM

## 2021-01-15 DIAGNOSIS — C50.419 MALIGNANT NEOPLASM OF UPPER OUTER QUADRANT OF BREAST IN FEMALE, ESTROGEN RECEPTOR NEGATIVE, UNSPECIFIED LATERALITY (HCC): ICD-10-CM

## 2021-01-15 DIAGNOSIS — T45.1X5A PERIPHERAL NEUROPATHY DUE TO CHEMOTHERAPY (HCC): ICD-10-CM

## 2021-01-18 RX ORDER — GABAPENTIN 100 MG/1
100 CAPSULE ORAL
Qty: 30 CAPSULE | Refills: 3 | Status: SHIPPED | OUTPATIENT
Start: 2021-01-18 | End: 2021-05-20

## 2021-01-25 ENCOUNTER — HOSPITAL ENCOUNTER (OUTPATIENT)
Dept: OCCUPATIONAL THERAPY | Facility: HOSPITAL | Age: 56
Setting detail: THERAPIES SERIES
Discharge: HOME | End: 2021-01-25

## 2021-01-25 ENCOUNTER — TRANSCRIBE ORDERS (OUTPATIENT)
Dept: PHYSICAL THERAPY | Facility: HOSPITAL | Age: 56
End: 2021-01-25

## 2021-01-25 ENCOUNTER — HOSPITAL ENCOUNTER (OUTPATIENT)
Dept: PHYSICAL THERAPY | Facility: HOSPITAL | Age: 56
Setting detail: THERAPIES SERIES
Discharge: HOME | End: 2021-01-25

## 2021-01-25 DIAGNOSIS — L90.5 SCAR CONDITIONS AND FIBROSIS OF SKIN: ICD-10-CM

## 2021-01-25 DIAGNOSIS — T45.1X5A CHEMOTHERAPY-INDUCED PERIPHERAL NEUROPATHY (HCC): ICD-10-CM

## 2021-01-25 DIAGNOSIS — G62.0 CHEMOTHERAPY-INDUCED PERIPHERAL NEUROPATHY (HCC): ICD-10-CM

## 2021-01-25 DIAGNOSIS — M54.2 NECK PAIN: Primary | ICD-10-CM

## 2021-01-25 DIAGNOSIS — M54.50 LOW BACK PAIN, UNSPECIFIED BACK PAIN LATERALITY, UNSPECIFIED CHRONICITY, UNSPECIFIED WHETHER SCIATICA PRESENT: ICD-10-CM

## 2021-01-25 DIAGNOSIS — C50.411 MALIGNANT NEOPLASM OF UPPER-OUTER QUADRANT OF RIGHT FEMALE BREAST, UNSPECIFIED ESTROGEN RECEPTOR STATUS (HCC): Primary | ICD-10-CM

## 2021-01-25 DIAGNOSIS — R29.3 ABNORMAL POSTURE: ICD-10-CM

## 2021-01-25 DIAGNOSIS — S39.012A STRAIN OF LUMBAR REGION, INITIAL ENCOUNTER: Primary | ICD-10-CM

## 2021-01-25 DIAGNOSIS — M25.60 RANGE OF MOTION DEFICIT: ICD-10-CM

## 2021-01-25 PROCEDURE — 97140 MANUAL THERAPY 1/> REGIONS: CPT

## 2021-01-25 PROCEDURE — 97535 SELF CARE MNGMENT TRAINING: CPT

## 2021-01-25 PROCEDURE — 97161 PT EVAL LOW COMPLEX 20 MIN: CPT

## 2021-01-25 NOTE — THERAPY PROGRESS REPORT/RE-CERT
Outpatient Occupational Therapy Ortho Progress Note   [place]     Patient Name: Kenya Lemon  : 1965  MRN: [id number]  Today's Date: 2021        Visit Date: 2021    Patient Active Problem List   Diagnosis   • Malignant neoplasm of upper-outer quadrant of right breast in female, estrogen receptor negative (CMS/HCC)   • Encounter for antineoplastic chemotherapy   • Malignant neoplasm of upper-outer quadrant of right breast in female, estrogen receptor negative (CMS/HCC)   • Encounter for adjustment or management of vascular access device   • DM2 (diabetes mellitus, type 2) (CMS/HCC)   • Anemia   • Anxiety and depression   • Essential hypertension   • Acute UTI (urinary tract infection)   • Chemotherapy-induced peripheral neuropathy (CMS/HCC)        Past Medical History:   Diagnosis Date   • Cancer (CMS/HCC) 2020    breast cancer per mammogram; chemotherapy    • Diabetes (CMS/HCC)    • History of low potassium 2020    prescribed potassium supplements    • History of transfusion     after delivery of son (1 unit only)    • HTN (hypertension)    • Low magnesium level 2020    requiring magnesium supplements    • Sleep apnea     no cpap machine currently        Past Surgical History:   Procedure Laterality Date   • BREAST BIOPSY Right 2020   • CHOLECYSTECTOMY     • COLONOSCOPY     • LAPAROSCOPIC TUBAL LIGATION     • MASTECTOMY W/ SENTINEL NODE BIOPSY Bilateral 2020    Procedure: SKIN SPARING MASTECTOMIES BILATERAL AND RIGHT SENTINEL NODE BIOPSY;  Surgeon: Gary Perez MD;  Location: [facility];  Service: General;  Laterality: Bilateral;   • VENOUS ACCESS DEVICE (PORT) INSERTION  2020         Visit Dx:    ICD-10-CM ICD-9-CM   1. Malignant neoplasm of upper-outer quadrant of right female breast, unspecified estrogen receptor status (CMS/HCC)  C50.411 174.4   2. Scar conditions and fibrosis of skin  L90.5 709.2   3. Range of motion deficit  M25.60 719.50   4.  Chemotherapy-induced peripheral neuropathy (CMS/HCC)  G62.0 357.7    T45.1X5A E933.1   5. Abnormal posture  R29.3 781.92             Lymphedema     Row Name 01/25/21 1300             Subjective Pain    Able to rate subjective pain?  yes  -SG      Pre-Treatment Pain Level  3  -SG      Post-Treatment Pain Level  1  -SG         Subjective Comments    Subjective Comments  Pt reports that she has been diligent about cleaning her incisions, especially the angular folds of the right chest. She plans to see PT today to address back/neck pain;  -SG         Lymphedema Assessment    Lymphedema Classification  RUE:;at risk/stage 0  -SG      Lymphedema Cancer Related Sx  bilateral;simple mastectomy;right;sentinel node biopsy  -SG      Lymph Nodes Removed #  1  -SG      Positive Lymph Nodes #  0  -SG      Chemo Received  yes  -SG      Radiation Therapy Received  no  -SG         Posture/Observations    Alignment Options  Forward head;Rounded shoulders  -SG      Rounded Shoulders  Moderate  -SG      Posture/Observations Comments  Protective posturing  -SG         Right Upper Ext    Rt Shoulder Abduction AROM  170  -SG      Rt Shoulder Flexion AROM  170  -SG      Rt Shoulder External Rotation AROM  WFL  -SG      Rt Shoulder Internal Rotation AROM  WFL  -SG         Left Upper Ext    Lt Shoulder Abduction AROM  170  -SG      Lt Shoulder Flexion AROM  170  -SG      Lt Shoulder External Rotation AROM  WFL  -SG      Lt Shoulder Internal Rotation AROM  WFL  -SG         MMT (Manual Muscle Testing)    General MMT Comments  Generalized weakness  grossly 4/5  -SG         Skin Changes/Observations    Location/Assessment  Upper Quadrant  -SG      Upper Quadrant Conditions  intact;clean  -SG      Skin Observations Comment  Tight soft tissue restrictions bilaterally R>L; angular folds bilaterally esepcially on R side, althoug improving in pliability  -SG         Lymphedema Sensation    Lymphedema Sensation Reports  RUE:;LUE:;numbness;tingling   -SG      Lymphedema Sensation Comments  Significant CIPN in hands and feet  -SG         Manual Lymphatic Drainage    Manual Lymphatic Drainage  --  -SG      Astym  --  -SG      Mastectomy Protocol  --  -SG      Manual Therapy  MLD incorporated throughout PORi protocol; extra STM to chest  -SG         L-Dex Bioimpedence Screening    L-Dex Measurement Extremity  RUE  -SG      L-Dex Patient Position  Standing  -SG      L-Dex UE Dominate Side  Right  -SG      L-Dex UE At Risk Side  Right  -SG      L-Dex UE Pre Surgical Value  No  -SG        User Key  (r) = Recorded By, (t) = Taken By, (c) = Cosigned By    Initials Name Provider Type    Ludivina Jung, OTR/L Occupational Therapist            Therapy Education  Education Details: Continue HEP and OT recommendations  Given: HEP, Symptoms/condition management, Posture/body mechanics  Program: New, Reinforced, Progressed  How Provided: Written, Demonstration, Verbal  Provided to: Patient  Level of Understanding: Verbalized, Demonstrated  85263 - OT Self Care/Mgmt Minutes: 10    OT Assessment/Plan     Row Name 01/25/21 1300          OT Assessment    Functional Limitations  Limitations in functional capacity and performance;Performance in self-care ADL;Limitation in home management  -SG     Impairments  Sensation;Range of motion;Posture;Peripheral nerve integrity;Pain;Muscle strength;Joint mobility;Impaired sensory integrity;Impaired lymphatic circulation;Impaired flexibility  -SG     Assessment Comments  Pt presents w the following: limited bilateral ROM shoulders for overhead tasks; pain and stiffness in chest and shoulders which limits ROM for dressing and reaching and other basic ADLs; strength: decreased core stabilization and decreaed UE/ strength; posture: forward neck and shoulder posture, disrupted scapulo-humeral rhythm; increased tissue tightness over chest, stress across pectoralis major muscle, s-c joint line, c-v joint line, and associated ST through  "shoulders and trunk; w/ tight soft tissue restrictions and scar tissue; CRF: physiological fatigue resulting from Ca and releated treatments resulting in a persistent fatigue impairing ability  function WNL in customary ADLs; CIPN: decreased sensation and motor loss affecting LE walking/balance, decreased sensation and motor loss affecting UE hand fine motor control; at risk for lymphedema. She will benefit from continuing with OP OT to address symptoms.  -SG     OT Rehab Potential  Good  -SG     Patient/caregiver participated in establishment of treatment plan and goals  Yes  -SG     Patient would benefit from skilled therapy intervention  Yes  -SG        OT Plan    OT Frequency  1x/week  -SG     Planned CPT's?  OT EVAL HIGH COMPLEXITY: 82917;OT THER ACT EA 15 MIN: [id number];OT THER PROC EA 15 MIN: [id number];OT NEUROMUSC RE EDUCATION EA 15 MIN: 80523;OT SELF CARE/MGMT/TRAIN 15 MIN: 41269;OT MANUAL THERAPY EA 15 MIN: 62946  -SG     Planned Therapy Interventions (Optional Details)  home exercise program;joint mobilization;manual therapy techniques;neuromuscular re-education;patient/family education;postural re-education;ROM (Range of Motion);strengthening;stretching  -SG     OT Plan Comments  Continue PORi protocol; Continue ASTYM/STM; Continue tx for CIPN; progress ROM and HEP as tolerated  -       User Key  (r) = Recorded By, (t) = Taken By, (c) = Cosigned By    Initials Name Provider Type    Ludivina Jung OTR/L Occupational Therapist           OT Goals     Row Name 01/25/21 1300          OT Short Term Goals    STG Date to Achieve  11/08/20  -     STG 1  \"Pt will be independent with HEP for shoulder ROM and soft tissue flexibility.  -SG     STG 1 Progress  Met  -SG     STG 2  \"Bilateral chest soft tissue restrictions will demo at least 25% improvement.  -SG     STG 2 Progress  Met  -SG     STG 3  Pt will demo bilateral shoulder flexion and abduction to at least 170 degrees.  -SG     STG 3 Progress  Met  -SG     " "STG 4  \"Pt will be independent w/ gentle self-soft tissue/scar and lymphatic massage  -SG     STG 4 Progress  Met  -        Long Term Goals    LTG 1  \"Pt will restore shoulder AROM to PLOF to improve 1 and 2 handed functional activity ability  -SG     LTG 1 Progress  Progressing  -SG     LTG 2  \"Pt will restore functional scoring using DASH assessment tool to pre-operative amounts (per pt report) to ensure full return to baseline function.  -SG     LTG 2 Progress  Progressing  -SG     LTG 3  \"Pt will restore full upright posture per pt perception to promote greater functional movement.  -SG     LTG 3 Progress  Progressing  -SG     LTG 4  \"Pt will demonstrate awareness of individualized lymphedema precautions based on personal risk factors and when to seek medical attn. for assessment of early signs of lymphedema or cellulitis of the affected region  -SG     LTG 4 Progress  Progressing  -SG     LTG 5  \"Bilateral chest soft tissue restrictions will demo at least 75% improvement.  -SG     LTG 5 Progress  Progressing  -SG        Time Calculation    OT Goal Re-Cert Due Date  04/04/21  -       User Key  (r) = Recorded By, (t) = Taken By, (c) = Cosigned By    Initials Name Provider Type    Ludivina Jung OTR/L Occupational Therapist                 Manual Rx (last 36 hours)      Manual Treatments     Row Name 01/25/21 1300             Total Minutes    60566 - OT Manual Therapy Minutes  45  -         Manual Rx 1    Manual Rx 1 Location  Arm--Orthopedic: Forearm extensors, biceps muscles, lateral intermuscular septum tension line upper arm, pectoralis major  -      Manual Rx 1 Type  Trigger Point Release, Myofascial Bending  -      Manual Rx 1 Duration  Trigger Point Release for count of 3 seconds, repeat each section 5 times; Myofascial stretching w/ tissue/muscle bending 3-5 times  -         Manual Rx 2    Manual Rx 2 Location  Axilla--Orthopedic: Medial Intermuscular Septum tension line upper arm, " pectoralis minor, subscapularis, latissimus dorsi, teres major  -SG      Manual Rx 2 Type  Trigger Point Release; Myofascial bending  -SG      Manual Rx 2 Duration  Trigger Point Release for count of 3 seconds, repeat each section 5 times; Myofascial stretching w/ tissue/muscle bending 3-5 times  -SG         Manual Rx 3    Manual Rx 3 Location  Breast: Lymph System: Zone 1--pectoral region, Zone 2--superior breast, Zone 3--inferior breast  -SG      Manual Rx 3 Type  Fluid movement/ MLD; parasternal LN node pumps  -SG      Manual Rx 3 Duration  Repeat each Line x1 and corresponding parasternal node pumps x5, repeat each zone x3  -SG         Manual Rx 4    Manual Rx 4 Location  Lateral chest/trunk--Lymph System: zone 1--lateral, zone 2--inferior, zone 3--central, zone 4-superior  -SG      Manual Rx 4 Type  Intercostal Lymph Node Pumps  -SG      Manual Rx 4 Duration  Repeat all 4 zones x 3  -SG         Manual Rx 5    Manual Rx 5 Location  Upper Arm: Lymph System  -SG      Manual Rx 5 Type  Upper arm fluid clearance of axillary pathway; medial confluence fluid clearance--antecubital fossa; lateral upper arm-cephalic pathway  -SG      Manual Rx 5 Duration  x3 lines of treatment  -SG         Manual Rx 6    Manual Rx 6 Location  Forearm--Orthopedic: radius and ulna-interosseous membrane; carpals-joint capsules-metacarpals-interosseous membranes; thenar muscles  -SG      Manual Rx 6 Type  Joint mobilization, Trigger Point Release, Drainage of forearm  -SG      Manual Rx 6 Duration  Joint Mob: each section x5 in each position; drainage of forearm: perform x3-5 lines of treatment  -SG         Manual Rx 7    Manual Rx 7 Location  Back--orthopedic: upper trapezius, infraspinatus, teres minor, rhomboids, quadratus lumborum; T12, L1, L2 mobilization  -SG      Manual Rx 7 Type  Trigger Point Release, Joint mobilization  -SG      Manual Rx 7 Duration  Trigger Point Release for count of 3 seconds, repeat each section 5 times;  perform 5-10 oscillations of each vertebra successively  -SG      Additional Treatment?  Yes  -SG         Manual Rx 8    Manual Rx 8 Location  Back--lymph system  -SG      Manual Rx 8 Type  Fluid movement; paraspinal lymph node pumps  -SG      Manual Rx 8 Duration  Repeat each line x1 and corresponding paraspinal node pumps x5; repeat each zone x3  -SG        User Key  (r) = Recorded By, (t) = Taken By, (c) = Cosigned By    Initials Name Provider Type    Ludivina Jung OTR/L Occupational Therapist                      Time Calculation:   OT Start Time: 1300     Therapy Charges for Today     Code Description Service Date Service Provider Modifiers Qty    [id number]  OT MANUAL THERAPY EA 15 MIN 1/25/2021 Ludivina Roche OTR/L GO 3    [id number] HC OT SELF CARE/MGMT/TRAIN EA 15 MIN 1/25/2021 Ludivina Roche OTR/L GO 1                    MIRIAN Sevilla/MIKE  1/25/2021
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
## 2021-02-09 ENCOUNTER — HOSPITAL ENCOUNTER (OUTPATIENT)
Dept: OCCUPATIONAL THERAPY | Facility: HOSPITAL | Age: 56
Setting detail: THERAPIES SERIES
Discharge: HOME | End: 2021-02-09

## 2021-02-09 DIAGNOSIS — G62.0 CHEMOTHERAPY-INDUCED PERIPHERAL NEUROPATHY (HCC): ICD-10-CM

## 2021-02-09 DIAGNOSIS — C50.411 MALIGNANT NEOPLASM OF UPPER-OUTER QUADRANT OF RIGHT FEMALE BREAST, UNSPECIFIED ESTROGEN RECEPTOR STATUS (HCC): Primary | ICD-10-CM

## 2021-02-09 DIAGNOSIS — T45.1X5A CHEMOTHERAPY-INDUCED PERIPHERAL NEUROPATHY (HCC): ICD-10-CM

## 2021-02-09 DIAGNOSIS — R29.3 ABNORMAL POSTURE: ICD-10-CM

## 2021-02-09 DIAGNOSIS — M25.60 RANGE OF MOTION DEFICIT: ICD-10-CM

## 2021-02-09 DIAGNOSIS — L90.5 SCAR CONDITIONS AND FIBROSIS OF SKIN: ICD-10-CM

## 2021-02-09 PROCEDURE — 97140 MANUAL THERAPY 1/> REGIONS: CPT

## 2021-02-09 NOTE — THERAPY TREATMENT NOTE
Outpatient Occupational Therapy Ortho Treatment Note   [place]     Patient Name: Kenya Lemon  : 1965  MRN: [id number]  Today's Date: 2021        Visit Date: 2021    Patient Active Problem List   Diagnosis   • Malignant neoplasm of upper-outer quadrant of right breast in female, estrogen receptor negative (CMS/HCC)   • Encounter for antineoplastic chemotherapy   • Malignant neoplasm of upper-outer quadrant of right breast in female, estrogen receptor negative (CMS/HCC)   • Encounter for adjustment or management of vascular access device   • DM2 (diabetes mellitus, type 2) (CMS/HCC)   • Anemia   • Anxiety and depression   • Essential hypertension   • Acute UTI (urinary tract infection)   • Chemotherapy-induced peripheral neuropathy (CMS/HCC)        Past Medical History:   Diagnosis Date   • Cancer (CMS/HCC) 2020    breast cancer per mammogram; chemotherapy    • Diabetes (CMS/HCC)    • History of low potassium 2020    prescribed potassium supplements    • History of transfusion     after delivery of son (1 unit only)    • HTN (hypertension)    • Low magnesium level 2020    requiring magnesium supplements    • Sleep apnea     no cpap machine currently        Past Surgical History:   Procedure Laterality Date   • BREAST BIOPSY Right 2020   • CHOLECYSTECTOMY     • COLONOSCOPY     • LAPAROSCOPIC TUBAL LIGATION     • MASTECTOMY W/ SENTINEL NODE BIOPSY Bilateral 2020    Procedure: SKIN SPARING MASTECTOMIES BILATERAL AND RIGHT SENTINEL NODE BIOPSY;  Surgeon: Gary Perez MD;  Location: [facility];  Service: General;  Laterality: Bilateral;   • VENOUS ACCESS DEVICE (PORT) INSERTION  2020         Visit Dx:    ICD-10-CM ICD-9-CM   1. Malignant neoplasm of upper-outer quadrant of right female breast, unspecified estrogen receptor status (CMS/HCC)  C50.411 174.4   2. Range of motion deficit  M25.60 719.50   3. Scar conditions and fibrosis of skin  L90.5 709.2   4.  Chemotherapy-induced peripheral neuropathy (CMS/HCC)  G62.0 357.7    T45.1X5A E933.1   5. Abnormal posture  R29.3 781.92             Lymphedema     Row Name 02/09/21 1415             Subjective Pain    Able to rate subjective pain?  yes  -SG      Pre-Treatment Pain Level  0  -SG      Post-Treatment Pain Level  0  -SG         Subjective Comments    Subjective Comments  Pt reports that the CIPN in her feet have been bothering her more this past week, attributing it to the coldness  -SG         Lymphedema Assessment    Lymphedema Classification  RUE:;at risk/stage 0  -SG      Lymphedema Cancer Related Sx  bilateral;simple mastectomy;right;sentinel node biopsy  -SG      Lymph Nodes Removed #  1  -SG      Positive Lymph Nodes #  0  -SG      Chemo Received  yes  -SG      Radiation Therapy Received  no  -SG         Posture/Observations    Alignment Options  Forward head;Rounded shoulders  -SG      Rounded Shoulders  Moderate  -SG      Posture/Observations Comments  Protective posturing  -SG         MMT (Manual Muscle Testing)    General MMT Comments  Generalized weakness  grossly 4/5  -SG         Skin Changes/Observations    Location/Assessment  Upper Quadrant  -SG      Upper Quadrant Conditions  intact;clean  -SG      Skin Observations Comment  Tight soft tissue restrictions bilaterally R>L; angular folds bilaterally esepcially on R side, althoug improving in pliability  -SG         Lymphedema Sensation    Lymphedema Sensation Reports  RUE:;LUE:;numbness;tingling  -SG      Lymphedema Sensation Comments  Significant CIPN in hands and feet  -SG         Manual Lymphatic Drainage    Manual Therapy  MLD incorporated throughout PORi protocol; extra STM to chest  -SG         L-Dex Bioimpedence Screening    L-Dex Measurement Extremity  RUE  -SG      L-Dex Patient Position  Standing  -SG      L-Dex UE Dominate Side  Right  -SG      L-Dex UE At Risk Side  Right  -SG      L-Dex UE Pre Surgical Value  No  -SG        User Key  (r) =  Recorded By, (t) = Taken By, (c) = Cosigned By    Initials Name Provider Type    Ludivina Jung OTR/L Occupational Therapist            Therapy Education  Education Details: Continue w/ HEP and OT recommendations  Given: HEP, Symptoms/condition management, Posture/body mechanics  Program: New, Reinforced, Progressed  How Provided: Written, Demonstration, Verbal  Provided to: Patient  Level of Understanding: Verbalized, Demonstrated    OT Assessment/Plan     Row Name 02/09/21 1415          OT Assessment    Functional Limitations  Limitations in functional capacity and performance;Performance in self-care ADL;Limitation in home management  -SG     Impairments  Sensation;Range of motion;Posture;Peripheral nerve integrity;Pain;Muscle strength;Joint mobility;Impaired sensory integrity;Impaired lymphatic circulation;Impaired flexibility  -SG     Assessment Comments  Pt presents w the following: limited bilateral ROM shoulders for overhead tasks; pain and stiffness in chest and shoulders which limits ROM for dressing and reaching and other basic ADLs; strength: decreased core stabilization and decreaed UE/ strength; posture: forward neck and shoulder posture, disrupted scapulo-humeral rhythm; increased tissue tightness over chest, stress across pectoralis major muscle, s-c joint line, c-v joint line, and associated ST through shoulders and trunk; w/ tight soft tissue restrictions and scar tissue; CRF: physiological fatigue resulting from Ca and releated treatments resulting in a persistent fatigue impairing ability  function WNL in customary ADLs; CIPN: decreased sensation and motor loss affecting LE walking/balance, decreased sensation and motor loss affecting UE hand fine motor control; at risk for lymphedema. She will benefit from continuing with OP OT to address symptoms.  -SG     OT Rehab Potential  Good  -SG     Patient/caregiver participated in establishment of treatment plan and goals  Yes  -SG     Patient  would benefit from skilled therapy intervention  Yes  -SG        OT Plan    OT Frequency  1x/week  -     Planned CPT's?  OT EVAL HIGH COMPLEXITY: 76047;OT THER ACT EA 15 MIN: 89121TJ;OT THER PROC EA 15 MIN: 42228JX;OT NEUROMUSC RE EDUCATION EA 15 MIN: 52995;OT SELF CARE/MGMT/TRAIN 15 MIN: 85347;OT MANUAL THERAPY EA 15 MIN: 05843  -     Planned Therapy Interventions (Optional Details)  home exercise program;joint mobilization;manual therapy techniques;neuromuscular re-education;patient/family education;postural re-education;ROM (Range of Motion);strengthening;stretching  -SG     OT Plan Comments  Continue PORi protocol; Continue ASTYM/STM; Continue tx for CIPN; progress ROM and HEP as tolerated  -       User Key  (r) = Recorded By, (t) = Taken By, (c) = Cosigned By    Initials Name Provider Type    Ludivina Jung OTR/L Occupational Therapist                      Manual Rx (last 36 hours)      Manual Treatments     Row Name 02/09/21 1415             Total Minutes    46643 - OT Manual Therapy Minutes  45  -         Manual Rx 1    Manual Rx 1 Location  Arm--Orthopedic: Forearm extensors, biceps muscles, lateral intermuscular septum tension line upper arm, pectoralis major  -      Manual Rx 1 Type  Trigger Point Release, Myofascial Bending  -      Manual Rx 1 Duration  Trigger Point Release for count of 3 seconds, repeat each section 5 times; Myofascial stretching w/ tissue/muscle bending 3-5 times  -         Manual Rx 2    Manual Rx 2 Location  Axilla--Orthopedic: Medial Intermuscular Septum tension line upper arm, pectoralis minor, subscapularis, latissimus dorsi, teres major  -      Manual Rx 2 Type  Trigger Point Release; Myofascial bending  -      Manual Rx 2 Duration  Trigger Point Release for count of 3 seconds, repeat each section 5 times; Myofascial stretching w/ tissue/muscle bending 3-5 times  -         Manual Rx 3    Manual Rx 3 Location  Breast: Lymph System: Zone 1--pectoral region,  Zone 2--superior breast, Zone 3--inferior breast  -SG      Manual Rx 3 Type  Fluid movement/ MLD; parasternal LN node pumps  -SG      Manual Rx 3 Duration  Repeat each Line x1 and corresponding parasternal node pumps x5, repeat each zone x3  -SG         Manual Rx 4    Manual Rx 4 Location  Lateral chest/trunk--Lymph System: zone 1--lateral, zone 2--inferior, zone 3--central, zone 4-superior  -SG      Manual Rx 4 Type  Intercostal Lymph Node Pumps  -SG      Manual Rx 4 Duration  Repeat all 4 zones x 3  -SG         Manual Rx 5    Manual Rx 5 Location  Upper Arm: Lymph System  -SG      Manual Rx 5 Type  Upper arm fluid clearance of axillary pathway; medial confluence fluid clearance--antecubital fossa; lateral upper arm-cephalic pathway  -SG      Manual Rx 5 Duration  x3 lines of treatment  -SG         Manual Rx 6    Manual Rx 6 Location  Forearm--Orthopedic: radius and ulna-interosseous membrane; carpals-joint capsules-metacarpals-interosseous membranes; thenar muscles  -SG      Manual Rx 6 Type  Joint mobilization, Trigger Point Release, Drainage of forearm  -SG      Manual Rx 6 Duration  Joint Mob: each section x5 in each position; drainage of forearm: perform x3-5 lines of treatment  -SG         Manual Rx 7    Manual Rx 7 Location  Back--orthopedic: upper trapezius, infraspinatus, teres minor, rhomboids, quadratus lumborum; T12, L1, L2 mobilization  -SG      Manual Rx 7 Type  Trigger Point Release, Joint mobilization  -SG      Manual Rx 7 Duration  Trigger Point Release for count of 3 seconds, repeat each section 5 times; perform 5-10 oscillations of each vertebra successively  -SG      Additional Treatment?  Yes  -SG         Manual Rx 8    Manual Rx 8 Location  Back--lymph system  -SG      Manual Rx 8 Type  Fluid movement; paraspinal lymph node pumps  -SG      Manual Rx 8 Duration  Repeat each line x1 and corresponding paraspinal node pumps x5; repeat each zone x3  -SG        User Key  (r) = Recorded By, (t) =  Taken By, (c) = Cosigned By    Initials Name Provider Type     Ludivina Roche OTR/L Occupational Therapist                      Time Calculation:   OT Start Time: 1415     Therapy Charges for Today     Code Description Service Date Service Provider Modifiers Qty    [id number]  OT MANUAL THERAPY EA 15 MIN 2/9/2021 Ludivina Roche OTR/L GO 3                    MIRIAN Sevilla/MIKE  2/9/2021

## 2021-03-09 ENCOUNTER — APPOINTMENT (OUTPATIENT)
Dept: PREADMISSION TESTING | Facility: HOSPITAL | Age: 56
End: 2021-03-09

## 2021-03-17 ENCOUNTER — HOSPITAL ENCOUNTER (OUTPATIENT)
Dept: OCCUPATIONAL THERAPY | Facility: HOSPITAL | Age: 56
Setting detail: THERAPIES SERIES
Discharge: HOME | End: 2021-03-17

## 2021-03-17 ENCOUNTER — HOSPITAL ENCOUNTER (OUTPATIENT)
Dept: ONCOLOGY | Facility: HOSPITAL | Age: 56
Setting detail: INFUSION SERIES
Discharge: HOME | End: 2021-03-17

## 2021-03-17 VITALS
RESPIRATION RATE: 16 BRPM | HEIGHT: 62 IN | WEIGHT: 179 LBS | TEMPERATURE: 97.8 F | DIASTOLIC BLOOD PRESSURE: 84 MMHG | HEART RATE: 84 BPM | BODY MASS INDEX: 32.94 KG/M2 | SYSTOLIC BLOOD PRESSURE: 136 MMHG

## 2021-03-17 DIAGNOSIS — T45.1X5A CHEMOTHERAPY-INDUCED PERIPHERAL NEUROPATHY (HCC): ICD-10-CM

## 2021-03-17 DIAGNOSIS — C50.411 MALIGNANT NEOPLASM OF UPPER-OUTER QUADRANT OF RIGHT FEMALE BREAST, UNSPECIFIED ESTROGEN RECEPTOR STATUS (HCC): Primary | ICD-10-CM

## 2021-03-17 DIAGNOSIS — L90.5 SCAR CONDITIONS AND FIBROSIS OF SKIN: ICD-10-CM

## 2021-03-17 DIAGNOSIS — M25.60 RANGE OF MOTION DEFICIT: ICD-10-CM

## 2021-03-17 DIAGNOSIS — Z17.1 MALIGNANT NEOPLASM OF UPPER-OUTER QUADRANT OF RIGHT BREAST IN FEMALE, ESTROGEN RECEPTOR NEGATIVE (HCC): Primary | ICD-10-CM

## 2021-03-17 DIAGNOSIS — G62.0 CHEMOTHERAPY-INDUCED PERIPHERAL NEUROPATHY (HCC): ICD-10-CM

## 2021-03-17 DIAGNOSIS — R29.3 ABNORMAL POSTURE: ICD-10-CM

## 2021-03-17 DIAGNOSIS — C50.411 MALIGNANT NEOPLASM OF UPPER-OUTER QUADRANT OF RIGHT BREAST IN FEMALE, ESTROGEN RECEPTOR NEGATIVE (HCC): Primary | ICD-10-CM

## 2021-03-17 PROCEDURE — 25010000003 HEPARIN LOCK FLUSH PER 10 UNITS: Performed by: INTERNAL MEDICINE

## 2021-03-17 PROCEDURE — 96523 IRRIG DRUG DELIVERY DEVICE: CPT

## 2021-03-17 PROCEDURE — 97140 MANUAL THERAPY 1/> REGIONS: CPT

## 2021-03-17 PROCEDURE — 97535 SELF CARE MNGMENT TRAINING: CPT

## 2021-03-17 RX ORDER — SODIUM CHLORIDE 0.9 % (FLUSH) 0.9 %
10 SYRINGE (ML) INJECTION AS NEEDED
Status: CANCELLED | OUTPATIENT
Start: 2021-03-17

## 2021-03-17 RX ORDER — HEPARIN SODIUM (PORCINE) LOCK FLUSH IV SOLN 100 UNIT/ML 100 UNIT/ML
500 SOLUTION INTRAVENOUS AS NEEDED
Status: CANCELLED | OUTPATIENT
Start: 2021-03-17

## 2021-03-17 RX ORDER — HEPARIN SODIUM (PORCINE) LOCK FLUSH IV SOLN 100 UNIT/ML 100 UNIT/ML
500 SOLUTION INTRAVENOUS AS NEEDED
Status: DISCONTINUED | OUTPATIENT
Start: 2021-03-17 | End: 2021-03-18 | Stop reason: HOSPADM

## 2021-03-17 RX ADMIN — HEPARIN 500 UNITS: 100 SYRINGE at 12:33

## 2021-03-17 NOTE — THERAPY DISCHARGE NOTE
Outpatient Occupational Therapy Ortho Progress Note/Discharge Summary   [place]     Patient Name: Kenya Lemon  : 1965  MRN: [id number]  Today's Date: 3/17/2021        Visit Date: 2021    Patient Active Problem List   Diagnosis   • Malignant neoplasm of upper-outer quadrant of right breast in female, estrogen receptor negative (CMS/HCC)   • Encounter for antineoplastic chemotherapy   • Malignant neoplasm of upper-outer quadrant of right breast in female, estrogen receptor negative (CMS/HCC)   • Encounter for adjustment or management of vascular access device   • DM2 (diabetes mellitus, type 2) (CMS/HCC)   • Anemia   • Anxiety and depression   • Essential hypertension   • Acute UTI (urinary tract infection)   • Chemotherapy-induced peripheral neuropathy (CMS/HCC)        Past Medical History:   Diagnosis Date   • Cancer (CMS/HCC) 2020    breast cancer per mammogram; chemotherapy    • Diabetes (CMS/HCC)    • History of low potassium 2020    prescribed potassium supplements    • History of transfusion     after delivery of son (1 unit only)    • HTN (hypertension)    • Low magnesium level 2020    requiring magnesium supplements    • Sleep apnea     no cpap machine currently        Past Surgical History:   Procedure Laterality Date   • BREAST BIOPSY Right 2020   • CHOLECYSTECTOMY     • COLONOSCOPY     • LAPAROSCOPIC TUBAL LIGATION     • MASTECTOMY W/ SENTINEL NODE BIOPSY Bilateral 2020    Procedure: SKIN SPARING MASTECTOMIES BILATERAL AND RIGHT SENTINEL NODE BIOPSY;  Surgeon: Gary Perez MD;  Location: [facility] [place];  Service: General;  Laterality: Bilateral;   • VENOUS ACCESS DEVICE (PORT) INSERTION  2020         Visit Dx:    ICD-10-CM ICD-9-CM   1. Malignant neoplasm of upper-outer quadrant of right female breast, unspecified estrogen receptor status (CMS/HCC)  C50.411 174.4   2. Range of motion deficit  M25.60 719.50   3. Scar conditions and fibrosis of skin  L90.5  709.2   4. Abnormal posture  R29.3 781.92   5. Chemotherapy-induced peripheral neuropathy (CMS/HCC)  G62.0 357.7    T45.1X5A E933.1             Lymphedema     Row Name 03/17/21 0915             Subjective Pain    Able to rate subjective pain?  yes  -SG      Pre-Treatment Pain Level  0  -SG      Post-Treatment Pain Level  0  -SG         Subjective Comments    Subjective Comments  Pt reports that as soon as she can get her AC levels to 7.1 she will be ready for surgery. She continues w/ self massage and HEP and is ready for surgery physically  -SG         Lymphedema Assessment    Lymphedema Classification  RUE:;at risk/stage 0  -SG      Lymphedema Cancer Related Sx  bilateral;simple mastectomy;right;sentinel node biopsy  -SG      Lymph Nodes Removed #  1  -SG      Positive Lymph Nodes #  0  -SG      Chemo Received  yes  -SG      Radiation Therapy Received  no  -SG         Posture/Observations    Alignment Options  Forward head;Rounded shoulders  -SG      Rounded Shoulders  Moderate  -SG      Posture/Observations Comments  Protective posturing  -SG         Right Upper Ext    Rt Shoulder Abduction AROM  170  -SG      Rt Shoulder Flexion AROM  170  -SG      Rt Shoulder External Rotation AROM  WFL  -SG      Rt Shoulder Internal Rotation AROM  WFL  -SG         Left Upper Ext    Lt Shoulder Abduction AROM  170  -SG      Lt Shoulder Flexion AROM  170  -SG      Lt Shoulder External Rotation AROM  WFL  -SG      Lt Shoulder Internal Rotation AROM  WFL  -SG         MMT (Manual Muscle Testing)    General MMT Comments  Grossly 4/5  -SG         Skin Changes/Observations    Location/Assessment  Upper Quadrant  -SG      Upper Quadrant Conditions  intact;clean  -SG         Lymphedema Sensation    Lymphedema Sensation Reports  RUE:;LUE:;numbness;tingling  -SG      Lymphedema Sensation Comments  Significant CIPN in hands and feet, improving but still impairs FMC and mobility  -SG         Manual Lymphatic Drainage    Manual Therapy  MLD  incorporated throughout PORi protocol; extra STM to chest  -SG         L-Dex Bioimpedence Screening    L-Dex Measurement Extremity  RUE  -SG      L-Dex Patient Position  Standing  -SG      L-Dex UE Dominate Side  Right  -SG      L-Dex UE At Risk Side  Right  -SG      L-Dex UE Pre Surgical Value  No  -SG        User Key  (r) = Recorded By, (t) = Taken By, (c) = Cosigned By    Initials Name Provider Type    Ludivina Jung, OTR/L Occupational Therapist            Therapy Education  Education Details: Continue with HEP and OT recommendations, especially self-massage  Given: HEP, Symptoms/condition management, Posture/body mechanics  Program: Reinforced, Progressed  How Provided: Written, Demonstration, Verbal  Provided to: Patient  Level of Understanding: Verbalized, Demonstrated  71197 - OT Self Care/Mgmt Minutes: 10    OT Assessment/Plan     Row Name 03/17/21 0900          OT Assessment    Functional Limitations  Limitations in functional capacity and performance;Performance in self-care ADL;Limitation in home management  -SG     Impairments  Sensation;Range of motion;Posture;Peripheral nerve integrity;Pain;Muscle strength;Joint mobility;Impaired sensory integrity;Impaired lymphatic circulation;Impaired flexibility  -SG     Assessment Comments  Pt has made great progress during her time in therapy. She has restored her AROM bilaterally, and has improved her DASH score from 50.83 to 19.17.  Her soft tissue restrictions through bilateral mastectomy sites have significantly improved, especially on R side.  Skin tissue is soft, pliable and easy to move during STM, and pt is indpendent w/ self-massage. She is now ready for reconstruction surgery. She has been instructed to contact me should she need anything post op. She will continue with her HEP and OT recommendations until then.  CIPN continues in hands and feet, although this is improving slowly. Her strength and fatigue levels are also improving as she returns to  "work and daily activities.  -     OT Rehab Potential  Good  -SG     Patient/caregiver participated in establishment of treatment plan and goals  Yes  -SG     Patient would benefit from skilled therapy intervention  Yes  -SG        OT Plan    Planned CPT's?  OT EVAL HIGH COMPLEXITY: 26455;OT THER ACT EA 15 MIN: 56960DI;OT THER PROC EA 15 MIN: 41170NS;OT NEUROMUSC RE EDUCATION EA 15 MIN: 17123;OT SELF CARE/MGMT/TRAIN 15 MIN: 05288;OT MANUAL THERAPY EA 15 MIN: 71335  -SG     Planned Therapy Interventions (Optional Details)  home exercise program;joint mobilization;manual therapy techniques;neuromuscular re-education;patient/family education;postural re-education;ROM (Range of Motion);strengthening;stretching  -SG     OT Plan Comments  Continue HEP and OT recommendations after d/c  -SG       User Key  (r) = Recorded By, (t) = Taken By, (c) = Cosigned By    Initials Name Provider Type    Ludivina Jung, OTR/L Occupational Therapist           OT Goals     Row Name 03/17/21 0900          OT Short Term Goals    STG Date to Achieve  11/08/20  -     STG 1  \"Pt will be independent with HEP for shoulder ROM and soft tissue flexibility.  -     STG 1 Progress  Met  -SG     STG 2  \"Bilateral chest soft tissue restrictions will demo at least 25% improvement.  -     STG 2 Progress  Met  -SG     STG 3  Pt will demo bilateral shoulder flexion and abduction to at least 170 degrees.  -     STG 3 Progress  Met  -SG     STG 4  \"Pt will be independent w/ gentle self-soft tissue/scar and lymphatic massage  -     STG 4 Progress  Met  -        Long Term Goals    LTG 1  \"Pt will restore shoulder AROM to PLOF to improve 1 and 2 handed functional activity ability  -     LTG 1 Progress  Met  -SG     LTG 2  \"Pt will restore functional scoring using DASH assessment tool to pre-operative amounts (per pt report) to ensure full return to baseline function.  -     LTG 2 Progress  Met  -SG     LTG 3  \"Pt will restore full upright " "posture per pt perception to promote greater functional movement.  -     LTG 3 Progress  Met  -SG     LTG 4  \"Pt will demonstrate awareness of individualized lymphedema precautions based on personal risk factors and when to seek medical attn. for assessment of early signs of lymphedema or cellulitis of the affected region  -SG     LTG 4 Progress  Met  -SG     LTG 5  \"Bilateral chest soft tissue restrictions will demo at least 75% improvement.  -     LTG 5 Progress  Met  -SG        Time Calculation    OT Goal Re-Cert Due Date  04/04/21  -       User Key  (r) = Recorded By, (t) = Taken By, (c) = Cosigned By    Initials Name Provider Type    Ludivina Jung, OTR/L Occupational Therapist                     Manual Rx (last 36 hours)      Manual Treatments     Row Name 03/17/21 0900             Total Minutes    54498 - OT Manual Therapy Minutes  35  -         Manual Rx 1    Manual Rx 1 Location  Arm--Orthopedic: Forearm extensors, biceps muscles, lateral intermuscular septum tension line upper arm, pectoralis major  -      Manual Rx 1 Type  Trigger Point Release, Myofascial Bending  -      Manual Rx 1 Duration  Trigger Point Release for count of 3 seconds, repeat each section 5 times; Myofascial stretching w/ tissue/muscle bending 3-5 times  -         Manual Rx 2    Manual Rx 2 Location  Axilla--Orthopedic: Medial Intermuscular Septum tension line upper arm, pectoralis minor, subscapularis, latissimus dorsi, teres major  -      Manual Rx 2 Type  Trigger Point Release; Myofascial bending  -      Manual Rx 2 Duration  Trigger Point Release for count of 3 seconds, repeat each section 5 times; Myofascial stretching w/ tissue/muscle bending 3-5 times  -         Manual Rx 3    Manual Rx 3 Location  Breast: Lymph System: Zone 1--pectoral region, Zone 2--superior breast, Zone 3--inferior breast  -      Manual Rx 3 Type  Fluid movement/ MLD; parasternal LN node pumps  -      Manual Rx 3 Duration  Repeat " each Line x1 and corresponding parasternal node pumps x5, repeat each zone x3  -SG         Manual Rx 4    Manual Rx 4 Location  Lateral chest/trunk--Lymph System: zone 1--lateral, zone 2--inferior, zone 3--central, zone 4-superior  -SG      Manual Rx 4 Type  Intercostal Lymph Node Pumps  -SG      Manual Rx 4 Duration  Repeat all 4 zones x 3  -SG         Manual Rx 5    Manual Rx 5 Location  Upper Arm: Lymph System  -SG      Manual Rx 5 Type  Upper arm fluid clearance of axillary pathway; medial confluence fluid clearance--antecubital fossa; lateral upper arm-cephalic pathway  -SG      Manual Rx 5 Duration  x3 lines of treatment  -SG         Manual Rx 6    Manual Rx 6 Location  Forearm--Orthopedic: radius and ulna-interosseous membrane; carpals-joint capsules-metacarpals-interosseous membranes; thenar muscles  -SG      Manual Rx 6 Type  Joint mobilization, Trigger Point Release, Drainage of forearm  -SG      Manual Rx 6 Duration  Joint Mob: each section x5 in each position; drainage of forearm: perform x3-5 lines of treatment  -SG         Manual Rx 7    Manual Rx 7 Location  Back--orthopedic: upper trapezius, infraspinatus, teres minor, rhomboids, quadratus lumborum; T12, L1, L2 mobilization  -      Manual Rx 7 Type  Trigger Point Release, Joint mobilization  -SG      Manual Rx 7 Duration  Trigger Point Release for count of 3 seconds, repeat each section 5 times; perform 5-10 oscillations of each vertebra successively  -      Additional Treatment?  Yes  -         Manual Rx 8    Manual Rx 8 Location  Back--lymph system  -SG      Manual Rx 8 Type  Fluid movement; paraspinal lymph node pumps  -SG      Manual Rx 8 Duration  Repeat each line x1 and corresponding paraspinal node pumps x5; repeat each zone x3  -SG        User Key  (r) = Recorded By, (t) = Taken By, (c) = Cosigned By    Initials Name Provider Type    Ludivina Jung OTR/L Occupational Therapist          Outcome Measure Options: Disabilities of the  Arm, Shoulder, and Hand (DASH)  DASH  Open a tight or new jar.: Mild Difficulty  Write: Mild Difficulty  Turn a key: No Difficulty  Prepare a meal: Mild Difficulty  Push open a heavy door: Mild Difficulty  Place an object on a shelf above your head: Mild Difficulty  Do heavy household chores (e.g., wash walls, wash floors): Mild Difficulty  Garden or do yard work: Mild Difficulty  Make a bed: No Difficulty  Carry a shopping bag or briefcase: Mild Difficulty  Carry a heavy object (over 10 lbs): Mild Difficulty  Change a lightbulb overhead: Mild Difficulty  Wash or blow dry your hair: Mild Difficulty  Wash your back: Mild Difficulty  Put on a pullover sweater: Mild Difficulty  Use a knife to cut food: Mild Difficulty  Recreational activities in which require little effort (e.g., cardplaying, knitting, etc.): No Difficulty  Recreational activities in which you take some force or impact through your arm, should or hand (e.g. golf, hammering, tennis, etc.): Mild Difficulty  Recreational Activities in which you move your arm freely (e.g., frisbee, badminton, etc.): No Difficulty  Manage transportation needs (getting from one place to another): No Difficulty  Sexual Activities: No Difficulty  During the past week, to what extent has your arm, shoulder, or hand problem interfered with your normal social activites with family, friends, neighbors or groups?: Slightly  During the past week, were you limited in your work or other regular daily activities as a result of your arm, shoulder or hand problem?: Slightly Limited  Arm, Shoulder, or hand pain: Mild  Arm, shoulder or hand pain when you performed any specific activity: Mild  Tingling (pins and needles) in your arm, shoulder, or hand: Mild  Weakness in your arm, shoulder or hand: Mild  Stiffness in your arm, shoulder or hand: Mild  During the past week, how much difficulty have you had sleeping because of the pain in your arm, shoulder or hand?: Mild Difficulty  I feel  less capable, less confident or less useful because of my arm, shoulder or hand problem: Strongly disagree  DASH Sum : 53  Number of Questions Answered: 30  DASH Score: 19.17         Time Calculation:   OT Start Time: 0900     Therapy Charges for Today     Code Description Service Date Service Provider Modifiers Qty    49203486915 HC OT MANUAL THERAPY EA 15 MIN 3/17/2021 Ludivina Roche OTR/L GO 2    78207755263 HC OT SELF CARE/MGMT/TRAIN EA 15 MIN 3/17/2021 Ludivina Roche OTR/L GO 1                OP OT Discharge Summary  Date of Discharge: 03/17/21  Reason for Discharge: Independent  Outcomes Achieved: Able to achieve all goals within established timeline  Discharge Destination: Home with home program        MIRIAN Sevilla/MIKE  3/17/2021

## 2021-04-06 ENCOUNTER — CLINICAL SUPPORT (OUTPATIENT)
Dept: ONCOLOGY | Facility: CLINIC | Age: 56
End: 2021-04-06

## 2021-04-06 VITALS
DIASTOLIC BLOOD PRESSURE: 91 MMHG | HEIGHT: 62 IN | BODY MASS INDEX: 32.94 KG/M2 | HEART RATE: 81 BPM | SYSTOLIC BLOOD PRESSURE: 154 MMHG | TEMPERATURE: 96.2 F | WEIGHT: 179 LBS | OXYGEN SATURATION: 97 % | RESPIRATION RATE: 18 BRPM

## 2021-04-06 DIAGNOSIS — Z85.3 HISTORY OF BREAST CANCER: Primary | ICD-10-CM

## 2021-04-06 DIAGNOSIS — G62.0 CHEMOTHERAPY-INDUCED PERIPHERAL NEUROPATHY (HCC): ICD-10-CM

## 2021-04-06 DIAGNOSIS — T45.1X5A CHEMOTHERAPY-INDUCED PERIPHERAL NEUROPATHY (HCC): ICD-10-CM

## 2021-04-06 PROCEDURE — 99214 OFFICE O/P EST MOD 30 MIN: CPT | Performed by: NURSE PRACTITIONER

## 2021-04-06 RX ORDER — EMPAGLIFLOZIN 25 MG/1
1 TABLET, FILM COATED ORAL DAILY
COMMUNITY
Start: 2021-03-22

## 2021-04-06 NOTE — PROGRESS NOTES
MEDICAL ONCOLOGY CANCER SURVIVORSHIP VISIT    Kenya Lemon  [id number]  1965    Chief Complaint:   Triple negative breast cancer    History of present illness:  Kenya Lemon is a 55 y.o. year old female who is here today for the Cancer Survivorship visit, see oncology history below.  Kenya has been doing well overall since we saw her last.  She is working on getting her hemoglobin A1c down so that she can undergo breast reconstruction surgery.  Stable peripheral neuropathy, continues to work with physical therapy.  Takes gabapentin 100 mg nightly.  No new concerns.     Cancer History:   Oncology/Hematology History Overview Note   1.  Stage IIB T2 (2.5cm per Dr. Perez) N0 invasive ductal triple negative breast cancer.  2/16/2020 right upper outer quadrant needle core biopsy showed invasive ductal carcinoma 4.4 mm moderately differentiated negative for estrogen, progesterone receptor both 0% and HER-2/vashti 0+.  Dr. Santo recommended lumpectomy and sentinel node biopsy.  3/12/2020 saw Dr. Gary Perez.perimenopausal breast cancer she felt a 2.5 cm right upper outer quadrant mass almost at the edge of the axilla somewhat fixed.  No skin changes.  She recommended neoadjuvant chemotherapy followed by lumpectomy versus mastectomy then radiation if patient decides on breast conservation or if nodes are found on breast MRI.  She will get formal genetic counseling to help guide the issues of possible prophylactic mastectomy.  With triple negative disease the standard recommendation would be neoadjuvant Adriamycin Cytoxan dose dense x4 followed by Taxol weekly x12 followed by surgery.  If there is residual disease then would consider Xeloda per the create X trial.  We also have  looking at observation versus Keytruda for 1 year in patients with triple negative disease who do not have a complete pathological response to neoadjuvant chemotherapy.  In the absence of symptoms, NCCN guidelines does  not recommend routine metastatic surveillance imaging.  Will need ejection fraction before Adriamycin.  -3/24/2020 through 5/4/2020 Adriamycin Cytoxan x4.  Opted out of neoadjuvant Taxol  -6/16/2020 left breast simple mastectomy benign  Right breast simple mastectomy showed residual invasive ductal carcinoma 9 mm with limited DCIS high-grade and negative margins.  0 out of 1 sentinel nodes positive.  Grade 7 out of 9.  Pathologic T1 BN 0.  Postoperative course complicated by localized infections.   -7/6/2020 [facility] follow-up visit: Given residual disease after 4 courses of AC I would want her to complete the Taxol as planned.  There is also new data presented at [facility] that suggests adjuvant Xeloda following adjuvant chemotherapy is reasonable and triple negative disease.  This is not precisely what she is doing as she has now done part of this neoadjuvant point but I think it makes sense to apply in this setting given residual disease after AC x4 but I would not forego the Taxol but will plan on adding Xeloda.  Reinforced the need for genetic testing salivary kit that has been sent to her but she has not completed.    -8/25/2020 Genetic testing was negative for pathogenic mutations in BRCA1/2 and 32 additional genes on the CancerNext panel.       -Taxol dose reduced by 20% with cycle #5 due to peripheral neuropathy    -9/11/2020 [facility] clinic visit: Peripheral neuropathy continues, patient also in need of dental work, decision made to hold Taxol for 3 weeks to see if neuropathy improves and to allow time for oral surgery.    -9/4/2020 6th and final cycle of of Taxol as patient had terrible neuropathy despite dose reduction  -10/2/2020 [facility] follow-up visit: Neuropathy is not improved.  Does not like gabapentin.  Will stop Taxol permanently and press on with Xeloda after she has tooth extraction on October 14.  We will have her back to my nurse practitioner October 26  for cycle 1 of 6 of Xeloda.    -11/3/2020 [facility] visit: Had tooth extraction with no complications.  Has Xeloda tablets in hand, will begin first cycle today.  Neuropathy is stable.  Gabapentin has been decreased 200 mg at night.    -11/24/2020 [facility] visit:   Tolerated first cycle of Xeloda fairly well.  Did have diarrhea controlled with Imodium.  Has fatigue.  Otherwise no side effects.  CBC and CMP are unremarkable.  We will continue with cycle #2 of a planned 6 cycles today.    2.  Hypertension  3.  Hyperlipidemia  4.  Diabetes  5.  Using IUD and taking estradiol for hormone replacement therapy preoperatively  6.  History of D and C as well as bilateral tubal ligation  7.  History of cholecystectomy  8.  Vasomotor symptoms  9.  Family history of paternal aunt with breast cancer and perhaps a sister with ovarian cancer but she is not sure.  She has nulliparous.  Genetic spit test done results pending.    10.  Anxiety and depression     Malignant neoplasm of upper-outer quadrant of right breast in female, estrogen receptor negative (CMS/HCC)   3/12/2020 Initial Diagnosis    Malignant neoplasm of upper outer quadrant of breast in female, estrogen receptor negative (CMS/HCC)     3/12/2020 Cancer Staged    Staging form: Breast, AJCC 8th Edition  - Clinical: Stage IIB (cT2, cN0, cM0, G2, ER-, CA-, HER2-) - Signed by Johann Juarez MD on 3/12/2020     3/24/2020 - 5/4/2020 Chemotherapy    OP BREAST AC DD DOXOrubicin / Cyclophosphamide       5/6/2020 -  Chemotherapy    OP SUPPORTIVE HYDRATION + ANTIEMETICS     6/16/2020 Surgery    Surgery       -6/16/2020 left breast simple mastectomy benign  Right breast simple mastectomy showed residual invasive ductal carcinoma 9 mm with limited DCIS high-grade and negative margins.  0 out of 1 sentinel nodes positive.  Grade 7 out of 9.  Pathologic T1 BN 0     7/6/2020 Cancer Staged    Staging form: Breast, AJCC 8th Edition  - Pathologic: Stage IB  (pT1b, pN0, cM0, G2, ER-, W[place]-, HER2-) - Signed by Joahnn Juarez MD on 7/6/2020 7/31/2020 -  Chemotherapy    OP CENTRAL VENOUS ACCESS DEVICE ACCESS, CARE, AND MAINTENANCE ([facility])     7/31/2020 - 9/4/2020 Chemotherapy    Taxol       11/24/2020 - 1/5/2021 Chemotherapy    OP BREAST Capecitabine  Completed 4 of 6 planned cycles     Malignant neoplasm of upper-outer quadrant of right breast in female, estrogen receptor negative (CMS/HCC)   3/12/2020 Initial Diagnosis    Malignant neoplasm of upper-outer quadrant of right breast in female, estrogen receptor negative (CMS/HCC)     3/24/2020 - 5/18/2020 Chemotherapy    OP BREAST AC DD DOXOrubicin / Cyclophosphamide     7/31/2020 - 9/10/2020 Chemotherapy    OP BREAST PACLitaxel (weekly X 12)     8/25/2020 Genetic Testing    Genetic testing was negative for pathogenic mutations in BRCA1/2 and 32 additional genes on the CancerNext panel.        8/28/2020 Adverse Reaction    Taxol dose reduced by 20% due to peripheral neuropathy with cycle #5.         Past Medical History:   Diagnosis Date   • Cancer (CMS/HCC) 02/2020    breast cancer per mammogram; chemotherapy    • Diabetes (CMS/HCC)    • History of low potassium 03/2020    prescribed potassium supplements    • History of transfusion 1989    after delivery of son (1 unit only)    • HTN (hypertension)    • Low magnesium level 03/2020    requiring magnesium supplements    • Sleep apnea     no cpap machine currently       Past Surgical History:   Procedure Laterality Date   • BREAST BIOPSY Right 02/2020   • CHOLECYSTECTOMY     • COLONOSCOPY     • LAPAROSCOPIC TUBAL LIGATION     • MASTECTOMY W/ SENTINEL NODE BIOPSY Bilateral 6/16/2020    Procedure: SKIN SPARING MASTECTOMIES BILATERAL AND RIGHT SENTINEL NODE BIOPSY;  Surgeon: Gary Perez MD;  Location: [facility];  Service: General;  Laterality: Bilateral;   • VENOUS ACCESS DEVICE (PORT) INSERTION  02/2020       MEDICATIONS: The current medication list was reviewed and  "reconciled.     Allergies:  has No Known Allergies.    Family History   Problem Relation Age of Onset   • Diabetes Mother    • Lung cancer Father    • Breast cancer Paternal Aunt          Review of Systems   Positive for stable peripheral neuropathy in her hands and feet    Physical Exam  Vital Signs: /91   Pulse 81   Temp 96.2 °F (35.7 °C)   Resp 18   Ht 157.5 cm (62\")   Wt 81.2 kg (179 lb)   SpO2 97%   BMI 32.74 kg/m²    General Appearance:  alert, cooperative, no apparent distress and appears stated age   Neurologic/Psychiatric: A&O x 3, gait steady, appropriate affect                           Lymph nodes: No cervical or supraclavicular adenopathy noted   Extremities: Normal, atraumatic; no clubbing, cyanosis, or edema      ECOG Performance Status: (0) Fully Active - Able to Carry On All Pre-disease Performance Without Restriction        Assessment and Plan:  Diagnoses and all orders for this visit:    1. History of breast cancer (Primary)    2. Chemotherapy-induced peripheral neuropathy (CMS/HCC)        Discussion:    We reviewed her survivorship care plan.  Currently she is feeling well.  She is working on getting her hemoglobin A1c lower so that she can have breast reconstruction.  We discussed symptoms to notify us for any concerns.    Her peripheral neuropathy is stable, she will continue gabapentin 100 mg nightly for now.    The patient and I have reviewed anthony personal Survivorship Care Plan in detail. We discussed diagnosis, pathology, histology, all treatments, and ongoing surveillance recommendations. All questions were answered to her satisfaction. The patient is in agreement with our plan for ongoing surveillance as outlined in the plan. A copy of this document was provided at the completion of our visit.  A copy has also been sent to the patient's primary care provider.    This was a 30 minute visit with 20 minutes spent in direct face to face review of the Survivorship Care " Plan.    Return to clinic in 6 months for ongoing cancer surveillance.    I spent 30 minutes caring for Kenya on this date of service. This time includes time spent by me in the following activities: preparing for the visit, counseling and educating the patient/family/caregiver, documenting information in the medical record and Preparing survivorship document.     Camelia Carranza, APRN  04/06/2021

## 2021-04-28 ENCOUNTER — HOSPITAL ENCOUNTER (OUTPATIENT)
Dept: ONCOLOGY | Facility: HOSPITAL | Age: 56
Setting detail: INFUSION SERIES
Discharge: HOME | End: 2021-04-28

## 2021-04-28 VITALS
WEIGHT: 178 LBS | BODY MASS INDEX: 32.76 KG/M2 | HEIGHT: 62 IN | RESPIRATION RATE: 18 BRPM | HEART RATE: 83 BPM | TEMPERATURE: 97.2 F | DIASTOLIC BLOOD PRESSURE: 82 MMHG | SYSTOLIC BLOOD PRESSURE: 134 MMHG

## 2021-04-28 DIAGNOSIS — Z17.1 MALIGNANT NEOPLASM OF UPPER-OUTER QUADRANT OF RIGHT BREAST IN FEMALE, ESTROGEN RECEPTOR NEGATIVE (HCC): Primary | ICD-10-CM

## 2021-04-28 DIAGNOSIS — C50.411 MALIGNANT NEOPLASM OF UPPER-OUTER QUADRANT OF RIGHT BREAST IN FEMALE, ESTROGEN RECEPTOR NEGATIVE (HCC): Primary | ICD-10-CM

## 2021-04-28 PROCEDURE — 96523 IRRIG DRUG DELIVERY DEVICE: CPT

## 2021-04-28 PROCEDURE — 25010000002 HEPARIN LOCK FLUSH PER 10 UNITS: Performed by: INTERNAL MEDICINE

## 2021-04-28 RX ORDER — HEPARIN SODIUM (PORCINE) LOCK FLUSH IV SOLN 100 UNIT/ML 100 UNIT/ML
500 SOLUTION INTRAVENOUS AS NEEDED
OUTPATIENT
Start: 2021-04-28

## 2021-04-28 RX ORDER — SODIUM CHLORIDE 0.9 % (FLUSH) 0.9 %
10 SYRINGE (ML) INJECTION AS NEEDED
OUTPATIENT
Start: 2021-04-28

## 2021-04-28 RX ORDER — HEPARIN SODIUM (PORCINE) LOCK FLUSH IV SOLN 100 UNIT/ML 100 UNIT/ML
500 SOLUTION INTRAVENOUS AS NEEDED
Status: DISCONTINUED | OUTPATIENT
Start: 2021-04-28 | End: 2021-04-29 | Stop reason: HOSPADM

## 2021-04-28 RX ADMIN — Medication 500 UNITS: at 13:28

## 2021-05-17 ENCOUNTER — DOCUMENTATION (OUTPATIENT)
Dept: PHYSICAL THERAPY | Facility: HOSPITAL | Age: 56
End: 2021-05-17

## 2021-05-17 DIAGNOSIS — M54.2 NECK PAIN: Primary | ICD-10-CM

## 2021-05-17 DIAGNOSIS — M54.50 LOW BACK PAIN, UNSPECIFIED BACK PAIN LATERALITY, UNSPECIFIED CHRONICITY, UNSPECIFIED WHETHER SCIATICA PRESENT: ICD-10-CM

## 2021-05-17 NOTE — THERAPY DISCHARGE NOTE
Outpatient Physical Therapy Discharge Summary         Patient Name: Kenya Lemon  : 1965  MRN: [id number]    Today's Date: 2021    Visit Dx:    ICD-10-CM ICD-9-CM   1. Neck pain  M54.2 723.1   2. Low back pain, unspecified back pain laterality, unspecified chronicity, unspecified whether sciatica present  M54.5 724.2       PT OP Goals     Row Name 21 1054          PT Short Term Goals    STG Date to Achieve  02/15/21  -AC     STG 1  Pt will be independent with HEP to improve mobility and reduce pain.  -AC     STG 1 Progress  Met  -AC     STG 2  Pt will report 50% improvement in overall symptoms.  -AC     STG 2 Progress  Not Met  -AC     STG 3  Restore ROM to 80% of WNL with min-no pain.  -AC     STG 3 Progress  Not Met  -AC        Time Calculation    PT Goal Re-Cert Due Date  21  -AC       User Key  (r) = Recorded By, (t) = Taken By, (c) = Cosigned By    Initials Name Provider Type    AC Manuela Boland, PT Physical Therapist          OP PT Discharge Summary  Date of Discharge: 21  Reason for Discharge: Maximum functional potential achieved  Outcomes Achieved: Patient able to partially acheive established goals  Discharge Destination: Home with home program, Home without follow-up  Discharge Instructions/Additional Comments: Pt was seen for IE only to receive HEP.      Time Calculation:   Start Time: 105                Manuela Boland PT  2021

## 2021-05-20 DIAGNOSIS — C50.419 MALIGNANT NEOPLASM OF UPPER OUTER QUADRANT OF BREAST IN FEMALE, ESTROGEN RECEPTOR NEGATIVE, UNSPECIFIED LATERALITY (HCC): ICD-10-CM

## 2021-05-20 DIAGNOSIS — T45.1X5A PERIPHERAL NEUROPATHY DUE TO CHEMOTHERAPY (HCC): ICD-10-CM

## 2021-05-20 DIAGNOSIS — Z17.1 MALIGNANT NEOPLASM OF UPPER OUTER QUADRANT OF BREAST IN FEMALE, ESTROGEN RECEPTOR NEGATIVE, UNSPECIFIED LATERALITY (HCC): ICD-10-CM

## 2021-05-20 DIAGNOSIS — G62.0 PERIPHERAL NEUROPATHY DUE TO CHEMOTHERAPY (HCC): ICD-10-CM

## 2021-05-20 RX ORDER — GABAPENTIN 100 MG/1
100 CAPSULE ORAL
Qty: 30 CAPSULE | Refills: 3 | Status: SHIPPED | OUTPATIENT
Start: 2021-05-20 | End: 2021-09-30

## 2021-05-25 ENCOUNTER — HOSPITAL ENCOUNTER (OUTPATIENT)
Facility: HOSPITAL | Age: 56
Discharge: HOME | End: 2021-05-25
Payer: COMMERCIAL

## 2021-05-25 LAB — HBA1C MFR BLD: 7.2 %

## 2021-05-25 PROCEDURE — 83036 HEMOGLOBIN GLYCOSYLATED A1C: CPT

## 2021-05-25 PROCEDURE — 36415 COLL VENOUS BLD VENIPUNCTURE: CPT

## 2021-06-08 ENCOUNTER — TELEPHONE (OUTPATIENT)
Dept: ONCOLOGY | Facility: CLINIC | Age: 56
End: 2021-06-08

## 2021-06-08 ENCOUNTER — PRE-ADMISSION TESTING (OUTPATIENT)
Dept: PREADMISSION TESTING | Facility: HOSPITAL | Age: 56
End: 2021-06-08

## 2021-06-08 ENCOUNTER — APPOINTMENT (OUTPATIENT)
Dept: PREADMISSION TESTING | Facility: HOSPITAL | Age: 56
End: 2021-06-08

## 2021-06-08 VITALS — HEIGHT: 62 IN | WEIGHT: 174.16 LBS | BODY MASS INDEX: 32.05 KG/M2

## 2021-06-08 DIAGNOSIS — Z85.3 HISTORY OF BREAST CANCER: Primary | ICD-10-CM

## 2021-06-08 LAB
ANION GAP SERPL CALCULATED.3IONS-SCNC: 13 MMOL/L (ref 5–15)
BUN SERPL-MCNC: 17 MG/DL (ref 6–20)
BUN/CREAT SERPL: 16.3 (ref 7–25)
CALCIUM SPEC-SCNC: 10.5 MG/DL (ref 8.6–10.5)
CHLORIDE SERPL-SCNC: 98 MMOL/L (ref 98–107)
CO2 SERPL-SCNC: 31 MMOL/L (ref 22–29)
CREAT SERPL-MCNC: 1.04 MG/DL (ref 0.57–1)
DEPRECATED RDW RBC AUTO: 45 FL (ref 37–54)
ERYTHROCYTE [DISTWIDTH] IN BLOOD BY AUTOMATED COUNT: 13.2 % (ref 12.3–15.4)
GFR SERPL CREATININE-BSD FRML MDRD: 55 ML/MIN/1.73
GLUCOSE SERPL-MCNC: 132 MG/DL (ref 65–99)
HCT VFR BLD AUTO: 41.3 % (ref 34–46.6)
HGB BLD-MCNC: 13.3 G/DL (ref 12–15.9)
MCH RBC QN AUTO: 29.8 PG (ref 26.6–33)
MCHC RBC AUTO-ENTMCNC: 32.2 G/DL (ref 31.5–35.7)
MCV RBC AUTO: 92.6 FL (ref 79–97)
PLATELET # BLD AUTO: 330 10*3/MM3 (ref 140–450)
PMV BLD AUTO: 9.8 FL (ref 6–12)
POTASSIUM SERPL-SCNC: 4.2 MMOL/L (ref 3.5–5.2)
QT INTERVAL: 380 MS
QTC INTERVAL: 435 MS
RBC # BLD AUTO: 4.46 10*6/MM3 (ref 3.77–5.28)
SARS-COV-2 RNA PNL SPEC NAA+PROBE: NOT DETECTED
SODIUM SERPL-SCNC: 142 MMOL/L (ref 136–145)
WBC # BLD AUTO: 5.59 10*3/MM3 (ref 3.4–10.8)

## 2021-06-08 PROCEDURE — U0004 COV-19 TEST NON-CDC HGH THRU: HCPCS

## 2021-06-08 PROCEDURE — 36415 COLL VENOUS BLD VENIPUNCTURE: CPT

## 2021-06-08 PROCEDURE — C9803 HOPD COVID-19 SPEC COLLECT: HCPCS

## 2021-06-08 PROCEDURE — 93005 ELECTROCARDIOGRAM TRACING: CPT

## 2021-06-08 PROCEDURE — 85027 COMPLETE CBC AUTOMATED: CPT

## 2021-06-08 PROCEDURE — 93010 ELECTROCARDIOGRAM REPORT: CPT | Performed by: INTERNAL MEDICINE

## 2021-06-08 PROCEDURE — 80048 BASIC METABOLIC PNL TOTAL CA: CPT

## 2021-06-08 NOTE — PAT
Patient instructed to drink 20 ounces (or until full) of Gatorade and it needs to be completed 1 hour before given arrival time for procedure (NO RED Gatorade)    Patient verbalized understanding.    Patient to apply Chlorhexadine wipes  to surgical area (as instructed) the night before procedure and the AM of procedure. Wipes provided.    Abnormal EKG. Cleared by Leandra.

## 2021-06-09 ENCOUNTER — ANESTHESIA EVENT (OUTPATIENT)
Dept: PERIOP | Facility: HOSPITAL | Age: 56
End: 2021-06-09

## 2021-06-09 RX ORDER — FAMOTIDINE 10 MG/ML
20 INJECTION, SOLUTION INTRAVENOUS ONCE
Status: CANCELLED | OUTPATIENT
Start: 2021-06-09 | End: 2021-06-09

## 2021-06-09 RX ORDER — SODIUM CHLORIDE 0.9 % (FLUSH) 0.9 %
10 SYRINGE (ML) INJECTION AS NEEDED
Status: CANCELLED | OUTPATIENT
Start: 2021-06-09

## 2021-06-09 RX ORDER — SODIUM CHLORIDE 0.9 % (FLUSH) 0.9 %
10 SYRINGE (ML) INJECTION EVERY 12 HOURS SCHEDULED
Status: CANCELLED | OUTPATIENT
Start: 2021-06-09

## 2021-06-10 ENCOUNTER — ANESTHESIA (OUTPATIENT)
Dept: PERIOP | Facility: HOSPITAL | Age: 56
End: 2021-06-10

## 2021-06-10 ENCOUNTER — HOSPITAL ENCOUNTER (OUTPATIENT)
Facility: HOSPITAL | Age: 56
Setting detail: SURGERY ADMIT
Discharge: HOME | End: 2021-06-10
Attending: PLASTIC SURGERY | Admitting: PLASTIC SURGERY

## 2021-06-10 VITALS
BODY MASS INDEX: 32.02 KG/M2 | WEIGHT: 174 LBS | RESPIRATION RATE: 18 BRPM | HEIGHT: 62 IN | HEART RATE: 72 BPM | SYSTOLIC BLOOD PRESSURE: 111 MMHG | OXYGEN SATURATION: 94 % | TEMPERATURE: 98.1 F | DIASTOLIC BLOOD PRESSURE: 62 MMHG

## 2021-06-10 DIAGNOSIS — C50.919 BREAST CANCER (HCC): ICD-10-CM

## 2021-06-10 LAB
B-HCG UR QL: NEGATIVE
GLUCOSE BLDC GLUCOMTR-MCNC: 123 MG/DL (ref 70–130)
GLUCOSE BLDC GLUCOMTR-MCNC: 150 MG/DL (ref 70–130)
INTERNAL NEGATIVE CONTROL: NORMAL
INTERNAL POSITIVE CONTROL: NORMAL
Lab: NORMAL

## 2021-06-10 PROCEDURE — 25010000002 FENTANYL CITRATE (PF) 50 MCG/ML SOLUTION: Performed by: NURSE ANESTHETIST, CERTIFIED REGISTERED

## 2021-06-10 PROCEDURE — C1889 IMPLANT/INSERT DEVICE, NOC: HCPCS | Performed by: PLASTIC SURGERY

## 2021-06-10 PROCEDURE — 25010000002 PROPOFOL 10 MG/ML EMULSION: Performed by: NURSE ANESTHETIST, CERTIFIED REGISTERED

## 2021-06-10 PROCEDURE — 25010000002 ONDANSETRON PER 1 MG: Performed by: NURSE ANESTHETIST, CERTIFIED REGISTERED

## 2021-06-10 PROCEDURE — 81025 URINE PREGNANCY TEST: CPT | Performed by: PLASTIC SURGERY

## 2021-06-10 PROCEDURE — 25010000002 NEOSTIGMINE 10 MG/10ML SOLUTION: Performed by: NURSE ANESTHETIST, CERTIFIED REGISTERED

## 2021-06-10 PROCEDURE — 25010000002 HYDROMORPHONE PER 4 MG: Performed by: NURSE ANESTHETIST, CERTIFIED REGISTERED

## 2021-06-10 PROCEDURE — 88300 SURGICAL PATH GROSS: CPT | Performed by: PLASTIC SURGERY

## 2021-06-10 PROCEDURE — C1781 MESH (IMPLANTABLE): HCPCS | Performed by: PLASTIC SURGERY

## 2021-06-10 PROCEDURE — 82962 GLUCOSE BLOOD TEST: CPT

## 2021-06-10 PROCEDURE — 25010000002 DEXAMETHASONE PER 1 MG: Performed by: NURSE ANESTHETIST, CERTIFIED REGISTERED

## 2021-06-10 PROCEDURE — C1789 PROSTHESIS, BREAST, IMP: HCPCS | Performed by: PLASTIC SURGERY

## 2021-06-10 PROCEDURE — 25010000003 CEFAZOLIN IN DEXTROSE 2-4 GM/100ML-% SOLUTION: Performed by: PLASTIC SURGERY

## 2021-06-10 PROCEDURE — C1713 ANCHOR/SCREW BN/BN,TIS/BN: HCPCS | Performed by: PLASTIC SURGERY

## 2021-06-10 PROCEDURE — 25010000003 CEFAZOLIN PER 500 MG: Performed by: PLASTIC SURGERY

## 2021-06-10 DEVICE — GRFT TISS ALLODERM RTM PERF 16X20CM 2.4MM/THK .4MM: Type: IMPLANTABLE DEVICE | Site: BREAST | Status: FUNCTIONAL

## 2021-06-10 DEVICE — UNDYED KNITTED (POLYGLACTIN 910), SYNTHETIC ABSORBABLE MESH
Type: IMPLANTABLE DEVICE | Site: BREAST | Status: FUNCTIONAL
Brand: VICRYL

## 2021-06-10 DEVICE — EXPNDR TISS BRST F/HT V/P STL 133S FV/T 500CC: Type: IMPLANTABLE DEVICE | Site: BREAST | Status: FUNCTIONAL

## 2021-06-10 DEVICE — STIMULAN® RAPID CURE PROVIDED STERILE FOR SINGLE PATIENT USE. STIMULAN® RAPID CURE CONTAINS CALCIUM SULFATE POWDER AND MIXING SOLUTION IN PRE-MEASURED QUANTITIES SO THAT WHEN MIXED TOGETHER IN A STERILE MIXING BOWL, THE RESULTANT PASTE IS TO BE DIGITALLY PACKED INTO OPEN BONE VOID/GAP TO SET INSITU OR PLACED INTO THE MOULD PROVIDED, THE MIXTURE SETS TO FORM BEADS. THE BIODEGRADABLE, RADIOPAQUE BEADS ARE RESORBED IN APPROXIMATELY 30 – 60 DAYS WHEN USED IN ACCORDANCE WITH THE DEVICE LABELLING. STIMULAN® RAPID CURE IS MANUFACTURED FROM SYNTHETIC IMPLANT GRADE CALCIUM SULFATE DIHYDRATE(CASO4.2H2O) THAT RESORBS AND IS REPLACED WITH BONE DURING THE HEALING PROCESS. ALSO, AS THE BONE VOID FILLER BEADS ARE BIODEGRADABLE AND BIOCOMPATIBLE, THEY MAY BE USED AT AN INFECTED SITE.
Type: IMPLANTABLE DEVICE | Site: BREAST | Status: FUNCTIONAL
Brand: STIMULAN® RAPID CURE

## 2021-06-10 DEVICE — DEV CONTRL TISS STRATAFIX SPIRAL MNCRYL UD 3/0 PLS 30CM: Type: IMPLANTABLE DEVICE | Site: BREAST | Status: FUNCTIONAL

## 2021-06-10 RX ORDER — CEFAZOLIN SODIUM 2 G/100ML
2 INJECTION, SOLUTION INTRAVENOUS ONCE
Status: COMPLETED | OUTPATIENT
Start: 2021-06-10 | End: 2021-06-10

## 2021-06-10 RX ORDER — PROMETHAZINE HYDROCHLORIDE 25 MG/1
25 SUPPOSITORY RECTAL ONCE AS NEEDED
Status: DISCONTINUED | OUTPATIENT
Start: 2021-06-10 | End: 2021-06-10 | Stop reason: HOSPADM

## 2021-06-10 RX ORDER — PROMETHAZINE HYDROCHLORIDE 25 MG/1
25 TABLET ORAL ONCE AS NEEDED
Status: DISCONTINUED | OUTPATIENT
Start: 2021-06-10 | End: 2021-06-10 | Stop reason: HOSPADM

## 2021-06-10 RX ORDER — LIDOCAINE HYDROCHLORIDE 10 MG/ML
INJECTION, SOLUTION EPIDURAL; INFILTRATION; INTRACAUDAL; PERINEURAL AS NEEDED
Status: DISCONTINUED | OUTPATIENT
Start: 2021-06-10 | End: 2021-06-10 | Stop reason: SURG

## 2021-06-10 RX ORDER — ONDANSETRON 2 MG/ML
4 INJECTION INTRAMUSCULAR; INTRAVENOUS ONCE AS NEEDED
Status: DISCONTINUED | OUTPATIENT
Start: 2021-06-10 | End: 2021-06-10 | Stop reason: HOSPADM

## 2021-06-10 RX ORDER — EPHEDRINE SULFATE 50 MG/ML
INJECTION, SOLUTION INTRAVENOUS AS NEEDED
Status: DISCONTINUED | OUTPATIENT
Start: 2021-06-10 | End: 2021-06-10 | Stop reason: SURG

## 2021-06-10 RX ORDER — LIDOCAINE HYDROCHLORIDE 10 MG/ML
0.5 INJECTION, SOLUTION EPIDURAL; INFILTRATION; INTRACAUDAL; PERINEURAL ONCE AS NEEDED
Status: COMPLETED | OUTPATIENT
Start: 2021-06-10 | End: 2021-06-10

## 2021-06-10 RX ORDER — SODIUM CHLORIDE, SODIUM LACTATE, POTASSIUM CHLORIDE, CALCIUM CHLORIDE 600; 310; 30; 20 MG/100ML; MG/100ML; MG/100ML; MG/100ML
9 INJECTION, SOLUTION INTRAVENOUS CONTINUOUS
Status: DISCONTINUED | OUTPATIENT
Start: 2021-06-10 | End: 2021-06-10 | Stop reason: HOSPADM

## 2021-06-10 RX ORDER — BUPIVACAINE HCL/0.9 % NACL/PF 0.125 %
PLASTIC BAG, INJECTION (ML) EPIDURAL AS NEEDED
Status: DISCONTINUED | OUTPATIENT
Start: 2021-06-10 | End: 2021-06-10 | Stop reason: SURG

## 2021-06-10 RX ORDER — HYDROMORPHONE HYDROCHLORIDE 1 MG/ML
0.5 INJECTION, SOLUTION INTRAMUSCULAR; INTRAVENOUS; SUBCUTANEOUS
Status: DISCONTINUED | OUTPATIENT
Start: 2021-06-10 | End: 2021-06-10 | Stop reason: HOSPADM

## 2021-06-10 RX ORDER — IPRATROPIUM BROMIDE AND ALBUTEROL SULFATE 2.5; .5 MG/3ML; MG/3ML
3 SOLUTION RESPIRATORY (INHALATION) ONCE AS NEEDED
Status: DISCONTINUED | OUTPATIENT
Start: 2021-06-10 | End: 2021-06-10 | Stop reason: HOSPADM

## 2021-06-10 RX ORDER — ONDANSETRON 2 MG/ML
INJECTION INTRAMUSCULAR; INTRAVENOUS AS NEEDED
Status: DISCONTINUED | OUTPATIENT
Start: 2021-06-10 | End: 2021-06-10 | Stop reason: SURG

## 2021-06-10 RX ORDER — FENTANYL CITRATE 50 UG/ML
INJECTION, SOLUTION INTRAMUSCULAR; INTRAVENOUS AS NEEDED
Status: DISCONTINUED | OUTPATIENT
Start: 2021-06-10 | End: 2021-06-10 | Stop reason: SURG

## 2021-06-10 RX ORDER — DROPERIDOL 2.5 MG/ML
0.62 INJECTION, SOLUTION INTRAMUSCULAR; INTRAVENOUS ONCE AS NEEDED
Status: DISCONTINUED | OUTPATIENT
Start: 2021-06-10 | End: 2021-06-10 | Stop reason: HOSPADM

## 2021-06-10 RX ORDER — PROPOFOL 10 MG/ML
VIAL (ML) INTRAVENOUS AS NEEDED
Status: DISCONTINUED | OUTPATIENT
Start: 2021-06-10 | End: 2021-06-10 | Stop reason: SURG

## 2021-06-10 RX ORDER — NEOSTIGMINE METHYLSULFATE 1 MG/ML
INJECTION, SOLUTION INTRAVENOUS AS NEEDED
Status: DISCONTINUED | OUTPATIENT
Start: 2021-06-10 | End: 2021-06-10 | Stop reason: SURG

## 2021-06-10 RX ORDER — FENTANYL CITRATE 50 UG/ML
50 INJECTION, SOLUTION INTRAMUSCULAR; INTRAVENOUS
Status: DISCONTINUED | OUTPATIENT
Start: 2021-06-10 | End: 2021-06-10 | Stop reason: HOSPADM

## 2021-06-10 RX ORDER — LIDOCAINE HYDROCHLORIDE AND EPINEPHRINE 10; 10 MG/ML; UG/ML
INJECTION, SOLUTION INFILTRATION; PERINEURAL AS NEEDED
Status: DISCONTINUED | OUTPATIENT
Start: 2021-06-10 | End: 2021-06-10 | Stop reason: HOSPADM

## 2021-06-10 RX ORDER — MAGNESIUM HYDROXIDE 1200 MG/15ML
LIQUID ORAL AS NEEDED
Status: DISCONTINUED | OUTPATIENT
Start: 2021-06-10 | End: 2021-06-10 | Stop reason: HOSPADM

## 2021-06-10 RX ORDER — ROCURONIUM BROMIDE 10 MG/ML
INJECTION, SOLUTION INTRAVENOUS AS NEEDED
Status: DISCONTINUED | OUTPATIENT
Start: 2021-06-10 | End: 2021-06-10 | Stop reason: SURG

## 2021-06-10 RX ORDER — MIDAZOLAM HYDROCHLORIDE 1 MG/ML
1 INJECTION INTRAMUSCULAR; INTRAVENOUS
Status: DISCONTINUED | OUTPATIENT
Start: 2021-06-10 | End: 2021-06-10 | Stop reason: HOSPADM

## 2021-06-10 RX ORDER — DEXAMETHASONE SODIUM PHOSPHATE 4 MG/ML
INJECTION, SOLUTION INTRA-ARTICULAR; INTRALESIONAL; INTRAMUSCULAR; INTRAVENOUS; SOFT TISSUE AS NEEDED
Status: DISCONTINUED | OUTPATIENT
Start: 2021-06-10 | End: 2021-06-10 | Stop reason: SURG

## 2021-06-10 RX ORDER — SODIUM CHLORIDE 9 MG/ML
INJECTION, SOLUTION INTRAVENOUS AS NEEDED
Status: DISCONTINUED | OUTPATIENT
Start: 2021-06-10 | End: 2021-06-10 | Stop reason: HOSPADM

## 2021-06-10 RX ORDER — FAMOTIDINE 20 MG/1
20 TABLET, FILM COATED ORAL ONCE
Status: COMPLETED | OUTPATIENT
Start: 2021-06-10 | End: 2021-06-10

## 2021-06-10 RX ORDER — GLYCOPYRROLATE 0.2 MG/ML
INJECTION INTRAMUSCULAR; INTRAVENOUS AS NEEDED
Status: DISCONTINUED | OUTPATIENT
Start: 2021-06-10 | End: 2021-06-10 | Stop reason: SURG

## 2021-06-10 RX ADMIN — EPHEDRINE SULFATE 5 MG: 50 INJECTION INTRAVENOUS at 08:22

## 2021-06-10 RX ADMIN — DEXAMETHASONE SODIUM PHOSPHATE 8 MG: 4 INJECTION, SOLUTION INTRA-ARTICULAR; INTRALESIONAL; INTRAMUSCULAR; INTRAVENOUS; SOFT TISSUE at 07:44

## 2021-06-10 RX ADMIN — PROPOFOL 25 MCG/KG/MIN: 10 INJECTION, EMULSION INTRAVENOUS at 07:43

## 2021-06-10 RX ADMIN — ROCURONIUM BROMIDE 10 MG: 10 INJECTION, SOLUTION INTRAVENOUS at 09:58

## 2021-06-10 RX ADMIN — FENTANYL CITRATE 50 MCG: 50 INJECTION, SOLUTION INTRAMUSCULAR; INTRAVENOUS at 09:16

## 2021-06-10 RX ADMIN — ROCURONIUM BROMIDE 10 MG: 10 INJECTION, SOLUTION INTRAVENOUS at 09:16

## 2021-06-10 RX ADMIN — HYDROMORPHONE HYDROCHLORIDE 0.5 MG: 1 INJECTION, SOLUTION INTRAMUSCULAR; INTRAVENOUS; SUBCUTANEOUS at 12:12

## 2021-06-10 RX ADMIN — FENTANYL CITRATE 50 MCG: 50 INJECTION, SOLUTION INTRAMUSCULAR; INTRAVENOUS at 07:38

## 2021-06-10 RX ADMIN — FENTANYL CITRATE 50 MCG: 50 INJECTION INTRAMUSCULAR; INTRAVENOUS at 11:30

## 2021-06-10 RX ADMIN — Medication 160 MCG: at 07:57

## 2021-06-10 RX ADMIN — PROPOFOL 200 MG: 10 INJECTION, EMULSION INTRAVENOUS at 07:38

## 2021-06-10 RX ADMIN — LIDOCAINE HYDROCHLORIDE 0.5 ML: 10 INJECTION, SOLUTION EPIDURAL; INFILTRATION; INTRACAUDAL; PERINEURAL at 07:03

## 2021-06-10 RX ADMIN — SODIUM CHLORIDE, POTASSIUM CHLORIDE, SODIUM LACTATE AND CALCIUM CHLORIDE 9 ML/HR: 600; 310; 30; 20 INJECTION, SOLUTION INTRAVENOUS at 07:03

## 2021-06-10 RX ADMIN — Medication 80 MCG: at 07:55

## 2021-06-10 RX ADMIN — ONDANSETRON 4 MG: 2 INJECTION INTRAMUSCULAR; INTRAVENOUS at 10:34

## 2021-06-10 RX ADMIN — FENTANYL CITRATE 50 MCG: 50 INJECTION INTRAMUSCULAR; INTRAVENOUS at 11:21

## 2021-06-10 RX ADMIN — LIDOCAINE HYDROCHLORIDE 50 MG: 10 INJECTION, SOLUTION EPIDURAL; INFILTRATION; INTRACAUDAL; PERINEURAL at 07:38

## 2021-06-10 RX ADMIN — CEFAZOLIN SODIUM 2 G: 10 INJECTION, POWDER, FOR SOLUTION INTRAVENOUS at 07:34

## 2021-06-10 RX ADMIN — SODIUM CHLORIDE, POTASSIUM CHLORIDE, SODIUM LACTATE AND CALCIUM CHLORIDE: 600; 310; 30; 20 INJECTION, SOLUTION INTRAVENOUS at 09:32

## 2021-06-10 RX ADMIN — FENTANYL CITRATE 50 MCG: 50 INJECTION INTRAMUSCULAR; INTRAVENOUS at 14:04

## 2021-06-10 RX ADMIN — FAMOTIDINE 20 MG: 20 TABLET ORAL at 07:03

## 2021-06-10 RX ADMIN — NEOSTIGMINE 4 MG: 1 INJECTION INTRAVENOUS at 10:35

## 2021-06-10 RX ADMIN — Medication 80 MCG: at 08:22

## 2021-06-10 RX ADMIN — ROCURONIUM BROMIDE 40 MG: 10 INJECTION, SOLUTION INTRAVENOUS at 07:38

## 2021-06-10 RX ADMIN — GLYCOPYRROLATE 0.6 MG: 0.4 INJECTION INTRAMUSCULAR; INTRAVENOUS at 10:35

## 2021-06-10 NOTE — BRIEF OP NOTE
BREAST RECONSTRUCTION, BREAST TISSUE EXPANDER INSERTION, REMOVAL VENOUS ACCESS DEVICE  Progress Note    Kenya Lemon  6/10/2021    Pre-op Diagnosis:   * Personal history of breast cancer [Z85.3]     * Acquired absence of breast and absent nipple, bilateral [Z90.13]       Post-Op Diagnosis Codes:     * Personal history of breast cancer [Z85.3]     * Acquired absence of breast and absent nipple, bilateral [Z90.13]    Procedure/CPT® Codes:  GA TISSUE EXPANDER PLACEMENT BREAST RECONSTRUCTION [29459]  GA IMPLNT BIO IMPLNT FOR SOFT TISSUE REINFORCEMENT [88434]  Removal of left subclavian tunneled catheter/port      Procedure(s):  BREAST TISSUE EXPANDER AND ALLODERM PLACEMENT BILATERAL  PORT REMOVAL    Surgeon(s):  Isiah Franco MD    Anesthesia: General    Staff:   Circulator: Brittany Dobbins RN; Paulino Kraft RN  Scrub Person: Danni Pisaon  Assistant: Chiquis Serna PA  Assistant: Chiquis Serna PA      Estimated Blood Loss: minimal    Urine Voided: * No values recorded between 6/10/2021  7:33 AM and 6/10/2021 10:48 AM *    Specimens:                Specimens     ID Source Type Tests Collected By Collected At Frozen?    A Chest, Left Tissue · TISSUE PATHOLOGY EXAM   Isiah Franco MD 6/10/21 1022 No    Description: PORT A CATH- GROSS ONLY                Drains:   Closed/Suction Drain 1 Inferior;Right Breast Bulb 15 Fr. (Active)       Closed/Suction Drain 2 Inferior;Left Breast Bulb 15 Fr. (Active)       Closed/Suction Drain 3 Lateral;Right Breast Bulb 15 Fr. (Active)       Closed/Suction Drain 4 Inferior;Left Breast Bulb 15 Fr. (Active)       Findings: absent breast    Complications: none immediate    Assistant: Chiquis Serna PA  was responsible for performing the following activities: Retraction, Suction, Irrigation, Suturing, Closing and Placing Dressing and their skilled assistance was necessary for the success of this case.    Isiah Franco MD     Date: 6/10/2021  Time: 10:48 EDT

## 2021-06-10 NOTE — ANESTHESIA PREPROCEDURE EVALUATION
Anesthesia Evaluation                  Airway   Mallampati: I  TM distance: >3 FB  Neck ROM: full  No difficulty expected  Dental      Pulmonary    (+) sleep apnea,   Cardiovascular     ECG reviewed    (+) hypertension,       Neuro/Psych  (+) numbness, psychiatric history Anxiety and Depression,     GI/Hepatic/Renal/Endo    (+)   diabetes mellitus,     Musculoskeletal     Abdominal    Substance History      OB/GYN          Other                        Anesthesia Plan    ASA 3     general     intravenous induction     Anesthetic plan, all risks, benefits, and alternatives have been provided, discussed and informed consent has been obtained with: patient.    Plan discussed with CRNA.

## 2021-06-10 NOTE — ANESTHESIA PROCEDURE NOTES
Airway  Urgency: elective    Date/Time: 6/10/2021 7:39 AM  Airway not difficult    General Information and Staff    Patient location during procedure: OR  CRNA: Fabiola Moralez CRNA    Indications and Patient Condition  Indications for airway management: airway protection    Preoxygenated: yes  MILS not maintained throughout  Mask difficulty assessment: 1 - vent by mask    Final Airway Details  Final airway type: endotracheal airway      Successful airway: ETT  Cuffed: yes   Successful intubation technique: direct laryngoscopy  Facilitating devices/methods: intubating stylet  Endotracheal tube insertion site: oral  Blade: Evangelist  Blade size: 3  ETT size (mm): 7.0  Cormack-Lehane Classification: grade I - full view of glottis  Placement verified by: chest auscultation and capnometry   Measured from: lips  ETT/EBT  to lips (cm): 20  Number of attempts at approach: 1  Assessment: lips, teeth, and gum same as pre-op and atraumatic intubation    Additional Comments  Negative epigastric sounds, Breath sound equal bilaterally with symmetric chest rise and fall

## 2021-06-10 NOTE — OP NOTE
Preoperative diagnosis:  1.  Personal history of breast cancer  2.  Absent bilateral breast and nipple areolar complex    Postoperative diagnosis:  1.  Personal history of breast cancer  2.  Absent bilateral breast and nipple areolar complex    Surgeon: Isiah Franco M.D.    Assistant: Melody BLACKMON was responsible for performing the following activities: Retraction, Suction, Irrigation, Suturing, Closing and Placing Dressing and their skilled assistance was necessary for the success of this case.    Anesthesia: General    Procedure: 1.  Bilateral immediate implant-based breast reconstruction with placement of tissue expanders and AlloDerm.  The tissue expanders are Allergan volume 500 cc basewidth 13 cm filled with 100 cc of air.  The serial number on the right is [id number] and the serial number on the left is [id number].  The AlloDerm was 320 cm².  The lot number on the right is [id number] in the lot number on the left is [id number].  Antibiotic beads were also used during this endeavor.  The reference number is [id number] and the lot number is [id number].  2.  Removal of left subclavian tunneled catheter/port.    Indication: The patient is a 55-year-old white female who developed breast cancer.  She has since undergone bilateral mastectomy and interested in breast reconstruction.  The main techniques were discussed.  She ultimately elected to pursue implant-based breast reconstruction.  The technique potential complications and typical postoperative course were discussed with the patient.  She elected to proceed with this procedure.    Findings:  1.  Absent breast and nipple or areolar complex  2.  Viable skin flaps  3.  Negative sentinel lymph node biopsy    Description: The patient was taken from preoperative holding to the operating room after informed consent was signed on the chart and placed under general anesthesia successfully.  The patient received a prophylactic dose of antibiotics and had bilateral  lower extremity sequential compression devices in place and operational prior to induction of anesthesia.  She was supine on the operating table.  She had a pillow placed beneath her knees.  Her shoulders were gently abducted to 90° and she had ulnar nerve padding.  Her chest wall was prepped in the usual sterile fashion.  After properly identifying the patient patient's problem, bilateral mastectomy incisions were marked for incision.  The areas were infiltrated with 1% lidocaine with 1 100,000 epinephrine.  Starting on the left side, the skin was incised with a 10 blade.  The subcutaneous tissue was dissected with electrocautery down to chest wall.  Skin flaps were developed down the level of my marks.  Hemostasis was achieved with electrocautery.  The pocket was copiously irrigated with antibiotic solution.  The tissue expander and AlloDerm were brought in the operative field.  It was soaked in saline for 2 minutes.  The tissue expander was placed into the subcutaneous pocket and secured at the medial lateral and superior suture tab with 2-0 silk suture.  The AlloDerm was used for soft tissue support and secured with 3-0 Vicryl suture in a horizontal mattress fashion.  215 Pashto Dontae drains were placed in the subcutaneous space.  They were brought out the thoracoabdominal region and secured in standard fashion.  The skin was temporarily closed with staples.  The expander was filled with 100 cc of air.  My attention then turned to the right side.  The planned incision was incised with a 10 blade.  The subcutaneous tissue was dissected down to chest wall.  Skin flaps were then developed to the level of my preoperative marks.  The pocket was copiously irrigated with antibiotic solution.  Hemostasis was achieved with electrocautery.  The tissue expander and AlloDerm were brought in the operative field.  It was soaked in saline for 2 minutes.  The tissue expander was placed into the subcutaneous pocket and secured  at the medial lateral and superior suture tab with 2-0 silk suture.  The AlloDerm was used for soft tissue support and secured with 3-0 Vicryl suture in a horizontal mattress fashion.  215 Latvian Dontae drains were placed in the subcutaneous space.  They were brought out the thoracoabdominal region and secured in standard fashion.  The skin was temporarily closed with staples.  The expander was filled with 100 cc of air.  Simultaneously, the skin was closed with 3-0 Monocryl suture in a deep dermal buried interrupted fashion and 3 -0 strata fix suture in an intracuticular fashion.  A Prevena incisional wound VAC was applied.  The left subclavian port site was marked for incision.  The area was infiltrated with 1% lidocaine with 1-100,000 epinephrine.  The planned incision was incised with a 10 blade.  The subcutaneous tissue was dissected with electrocautery down the level of the port.  The capsule was incised with electrocautery.  The port was mobilized.  It was released from all tissues.  The patient was sat into Trendelenburg position and the catheter was subsequently removed with pressure underneath the subclavian.  The incision was closed with 3-0 Monocryl suture in a deep dermal buried interrupted fashion and 5-0 Monocryl suture in an intracuticular fashion.  Skin glue was applied.  A surgical bra and Kerlix fluffs was also applied.  The case was then turned over to anesthesia at which point the patient was awoken from general anesthesia successfully and taken to PACU in stable condition.  I was present for the entire procedure.  All counts were correct.    Estimated blood loss: Minimal    Drains: 4    Complications: None immediate

## 2021-06-10 NOTE — ANESTHESIA POSTPROCEDURE EVALUATION
Patient: Kenya Lemon    Procedure Summary     Date: 06/10/21 Room / Location:  [facility]    Anesthesia Start: 0734 Anesthesia Stop:     Procedures:       BREAST TISSUE EXPANDER AND ALLODERM PLACEMENT BILATERAL (Bilateral Breast)      PORT REMOVAL (Bilateral ) Diagnosis:       Personal history of breast cancer      Acquired absence of breast and absent nipple, bilateral    Surgeons: Isiah Franco MD Provider: Phillip Bryson MD    Anesthesia Type: general ASA Status: 3          Anesthesia Type: general    Vitals  Vitals Value Taken Time   BP     Temp     Pulse     Resp     SpO2 97 % 06/10/21 1107   Vitals shown include unvalidated device data.        Post Anesthesia Care and Evaluation    Patient location during evaluation: PACU  Patient participation: complete - patient participated  Level of consciousness: awake and alert  Pain management: adequate  Airway patency: patent  Anesthetic complications: No anesthetic complications  PONV Status: none  Cardiovascular status: hemodynamically stable and acceptable  Respiratory status: nonlabored ventilation, acceptable and nasal cannula  Hydration status: acceptable

## 2021-06-10 NOTE — H&P
Patient Care Team:      Chief complaint: Previous bilateral mastectomies for right breast cancer    Subjective:  Patient is a 55 y.o.female presents with a history of bilateral mastectomies for cancer in 6/2020.  She healed well.  She took chemotherapy. She denies any changes in her mastectomy sites.  No recent change in her genera;l health.    Review of Systems:  General ROS: negative  Cardiovascular ROS: no chest pain or dyspnea on exertion  Respiratory ROS: no cough, shortness of breath, or wheezing      Allergies: No Known Allergies       Latex: no  Contrast Dye: no    Home Meds    Medications Prior to Admission   Medication Sig Dispense Refill Last Dose   • atenolol (TENORMIN) 50 MG tablet Take 75 mg by mouth 2 (two) times a day.   6/10/2021 at 0445   • gabapentin (NEURONTIN) 100 MG capsule TAKE 1 CAPSULE BY MOUTH EVERY NIGHT AT BEDTIME 30 capsule 3 6/9/2021 at Unknown time   • hydroCHLOROthiazide (HYDRODIURIL) 25 MG tablet Take 25 mg by mouth Daily.   6/10/2021 at Unknown time   • ibuprofen (ADVIL,MOTRIN) 800 MG tablet Take 800 mg by mouth As Needed.   Past Month at Unknown time   • Jardiance 25 MG tablet Take 1 tablet by mouth Daily.   6/9/2021 at Unknown time   • Loratadine 10 MG capsule Take 10 mg by mouth Every Morning.   6/9/2021 at Unknown time   • melatonin 5 MG tablet tablet Take 1 tablet by mouth At Night As Needed (sleep).   6/9/2021 at Unknown time   • metFORMIN (GLUCOPHAGE) 1000 MG tablet Take 1,000 mg by mouth 2 (Two) Times a Day.   6/9/2021 at Unknown time   • modafinil (PROVIGIL) 200 MG tablet Take 200 mg by mouth Daily.   6/9/2021 at Unknown time   • Multiple Vitamins-Minerals (MULTIVITAMIN ADULT PO) Take 1 dose by mouth Every Morning.   6/9/2021 at Unknown time   • SITagliptin (JANUVIA) 100 MG tablet Take 100 mg by mouth Daily.   6/9/2021 at Unknown time   • venlafaxine XR (EFFEXOR-XR) 75 MG 24 hr capsule Take 75 mg by mouth Daily.   6/10/2021 at Unknown time     PMH:   Past Medical History:  "  Diagnosis Date   • Cancer (CMS/HCC) 02/2020    breast cancer per mammogram; chemotherapy    • Diabetes (CMS/[facility])     type 2 dm, check bloos suagr twice a week   • History of low potassium 03/2020    prescribed potassium supplements    • History of transfusion 1989    after delivery of son (1 unit only)    • HTN (hypertension)    • IUD (intrauterine device) in place    • Low magnesium level 03/2020    requiring magnesium supplements    • Neuropathy    • Sleep apnea     no cpap machine currently   • Wears partial dentures     bottom     PSH:    Past Surgical History:   Procedure Laterality Date   • BREAST BIOPSY Right 02/2020   • CHOLECYSTECTOMY     • COLONOSCOPY     • LAPAROSCOPIC TUBAL LIGATION     • LASIK Right     7-   • MASTECTOMY W/ SENTINEL NODE BIOPSY Bilateral 6/16/2020    Procedure: SKIN SPARING MASTECTOMIES BILATERAL AND RIGHT SENTINEL NODE BIOPSY;  Surgeon: Gary Perez MD;  Location: [facility];  Service: General;  Laterality: Bilateral;   • VENOUS ACCESS DEVICE (PORT) INSERTION  02/2020     Immunization History: pneumo: no   Flu: no  Tetanus: yes  Social History:   Tobacco: no   Alcohol: no      Physical Exam:/80 (BP Location: Right arm, Patient Position: Lying)   Pulse 69   Temp 97.6 °F (36.4 °C) (Tympanic)   Resp 18   Ht 157.5 cm (62\")   Wt 78.9 kg (174 lb)   SpO2 97%   BMI 31.83 kg/m²       General Appearance:    Alert, cooperative, no distress, appears stated age   Head:    Normocephalic, without obvious abnormality, atraumatic   Lungs:     Clear to auscultation bilaterally, respirations unlabored    Heart: Regular rate and rhythm, S1 and S2 normal, no murmur, rub    or gallop    Abdomen:    Soft without tenderness   Breast Exam:    deferred   Genitalia:    deferred   Extremities:   Extremities normal, atraumatic, no cyanosis or edema   Skin:   Skin color, texture, turgor normal, no rashes or lesions   Neurologic:   Grossly intact     Results Review: Chem profile, CBC, EKG on " chart.  Covid-neg, ECG-neg     Impression: History of bilateral mastectomies.    Plan: For bilateral breast tissue expanders with Alloderm placement and port removal today  NEYMAR Becerra 6/10/2021 06:56 EDT

## 2021-06-25 ENCOUNTER — HOSPITAL ENCOUNTER (OUTPATIENT)
Facility: HOSPITAL | Age: 56
Setting detail: OBSERVATION
Discharge: HOME | End: 2021-06-26
Attending: EMERGENCY MEDICINE | Admitting: INTERNAL MEDICINE

## 2021-06-25 ENCOUNTER — APPOINTMENT (OUTPATIENT)
Dept: GENERAL RADIOLOGY | Facility: HOSPITAL | Age: 56
End: 2021-06-25

## 2021-06-25 ENCOUNTER — APPOINTMENT (OUTPATIENT)
Dept: CARDIOLOGY | Facility: HOSPITAL | Age: 56
End: 2021-06-25

## 2021-06-25 ENCOUNTER — APPOINTMENT (OUTPATIENT)
Dept: CT IMAGING | Facility: HOSPITAL | Age: 56
End: 2021-06-25

## 2021-06-25 DIAGNOSIS — R27.8 DYSMETRIA: ICD-10-CM

## 2021-06-25 DIAGNOSIS — E87.6 HYPOKALEMIA: ICD-10-CM

## 2021-06-25 DIAGNOSIS — E83.42 HYPOMAGNESEMIA: ICD-10-CM

## 2021-06-25 DIAGNOSIS — R73.9 HYPERGLYCEMIA: ICD-10-CM

## 2021-06-25 DIAGNOSIS — R47.9 DIFFICULTY WITH SPEECH: Primary | ICD-10-CM

## 2021-06-25 DIAGNOSIS — R41.841 COGNITIVE COMMUNICATION DEFICIT: ICD-10-CM

## 2021-06-25 PROBLEM — G47.9 SLEEP DISORDER: Status: ACTIVE | Noted: 2021-06-25

## 2021-06-25 PROBLEM — R29.90 STROKE-LIKE SYMPTOMS: Status: ACTIVE | Noted: 2021-06-25

## 2021-06-25 LAB
ALBUMIN SERPL-MCNC: 4.1 G/DL (ref 3.5–5.2)
ALBUMIN/GLOB SERPL: 1.4 G/DL
ALP SERPL-CCNC: 85 U/L (ref 39–117)
ALT SERPL W P-5'-P-CCNC: 18 U/L (ref 1–33)
AMPHET+METHAMPHET UR QL: NEGATIVE
AMPHETAMINES UR QL: NEGATIVE
ANION GAP SERPL CALCULATED.3IONS-SCNC: 13 MMOL/L (ref 5–15)
APAP SERPL-MCNC: <5 MCG/ML (ref 0–30)
APTT PPP: 25.6 SECONDS (ref 22–39)
AST SERPL-CCNC: 19 U/L (ref 1–32)
B-OH-BUTYR SERPL-SCNC: 0.23 MMOL/L (ref 0.02–0.27)
BARBITURATES UR QL SCN: NEGATIVE
BASE EXCESS BLDA CALC-SCNC: 7 MMOL/L (ref -5–5)
BASOPHILS # BLD AUTO: 0.06 10*3/MM3 (ref 0–0.2)
BASOPHILS NFR BLD AUTO: 0.8 % (ref 0–1.5)
BENZODIAZ UR QL SCN: NEGATIVE
BILIRUB SERPL-MCNC: <0.2 MG/DL (ref 0–1.2)
BILIRUB UR QL STRIP: NEGATIVE
BUN SERPL-MCNC: 26 MG/DL (ref 6–20)
BUN/CREAT SERPL: 25.7 (ref 7–25)
BUPRENORPHINE SERPL-MCNC: NEGATIVE NG/ML
CA-I BLDA-SCNC: 1.22 MMOL/L (ref 1.2–1.32)
CALCIUM SPEC-SCNC: 9.6 MG/DL (ref 8.6–10.5)
CANNABINOIDS SERPL QL: POSITIVE
CHLORIDE SERPL-SCNC: 98 MMOL/L (ref 98–107)
CHOLEST SERPL-MCNC: 185 MG/DL (ref 0–200)
CLARITY UR: CLEAR
CO2 BLDA-SCNC: 33 MMOL/L (ref 24–29)
CO2 SERPL-SCNC: 30 MMOL/L (ref 22–29)
COCAINE UR QL: NEGATIVE
COLOR UR: YELLOW
CREAT BLDA-MCNC: 1 MG/DL (ref 0.6–1.3)
CREAT SERPL-MCNC: 1.01 MG/DL (ref 0.57–1)
D DIMER PPP FEU-MCNC: 0.55 MCGFEU/ML (ref 0–0.56)
D-LACTATE SERPL-SCNC: 2.4 MMOL/L (ref 0.5–2)
D-LACTATE SERPL-SCNC: 3.3 MMOL/L (ref 0.5–2)
DEPRECATED RDW RBC AUTO: 44.9 FL (ref 37–54)
EOSINOPHIL # BLD AUTO: 0.24 10*3/MM3 (ref 0–0.4)
EOSINOPHIL NFR BLD AUTO: 3.2 % (ref 0.3–6.2)
ERYTHROCYTE [DISTWIDTH] IN BLOOD BY AUTOMATED COUNT: 13.2 % (ref 12.3–15.4)
ETHANOL BLD-MCNC: <10 MG/DL (ref 0–10)
FLUAV RNA RESP QL NAA+PROBE: NOT DETECTED
FLUBV RNA RESP QL NAA+PROBE: NOT DETECTED
GFR SERPL CREATININE-BSD FRML MDRD: 57 ML/MIN/1.73
GLOBULIN UR ELPH-MCNC: 3 GM/DL
GLUCOSE BLDC GLUCOMTR-MCNC: 100 MG/DL (ref 70–130)
GLUCOSE BLDC GLUCOMTR-MCNC: 133 MG/DL (ref 70–130)
GLUCOSE BLDC GLUCOMTR-MCNC: 136 MG/DL (ref 70–130)
GLUCOSE BLDC GLUCOMTR-MCNC: 223 MG/DL (ref 70–130)
GLUCOSE BLDC GLUCOMTR-MCNC: 227 MG/DL (ref 70–130)
GLUCOSE BLDC GLUCOMTR-MCNC: 245 MG/DL (ref 70–130)
GLUCOSE SERPL-MCNC: 248 MG/DL (ref 65–99)
GLUCOSE UR STRIP-MCNC: ABNORMAL MG/DL
HBA1C MFR BLD: 6.6 % (ref 4.8–5.6)
HCO3 BLDA-SCNC: 31.7 MMOL/L (ref 22–26)
HCT VFR BLD AUTO: 37.9 % (ref 34–46.6)
HCT VFR BLDA CALC: 38 % (ref 38–51)
HDLC SERPL-MCNC: 48 MG/DL (ref 40–60)
HGB BLD-MCNC: 12.2 G/DL (ref 12–15.9)
HGB BLDA-MCNC: 12.9 G/DL (ref 12–17)
HGB UR QL STRIP.AUTO: NEGATIVE
HOLD SPECIMEN: NORMAL
IMM GRANULOCYTES # BLD AUTO: 0.02 10*3/MM3 (ref 0–0.05)
IMM GRANULOCYTES NFR BLD AUTO: 0.3 % (ref 0–0.5)
KETONES UR QL STRIP: NEGATIVE
LDLC SERPL CALC-MCNC: 114 MG/DL (ref 0–100)
LDLC/HDLC SERPL: 2.33 {RATIO}
LEUKOCYTE ESTERASE UR QL STRIP.AUTO: NEGATIVE
LYMPHOCYTES # BLD AUTO: 1.9 10*3/MM3 (ref 0.7–3.1)
LYMPHOCYTES NFR BLD AUTO: 25 % (ref 19.6–45.3)
MAGNESIUM SERPL-MCNC: 1.2 MG/DL (ref 1.6–2.6)
MAGNESIUM SERPL-MCNC: 1.5 MG/DL (ref 1.6–2.6)
MCH RBC QN AUTO: 29.9 PG (ref 26.6–33)
MCHC RBC AUTO-ENTMCNC: 32.2 G/DL (ref 31.5–35.7)
MCV RBC AUTO: 92.9 FL (ref 79–97)
METHADONE UR QL SCN: NEGATIVE
MONOCYTES # BLD AUTO: 0.57 10*3/MM3 (ref 0.1–0.9)
MONOCYTES NFR BLD AUTO: 7.5 % (ref 5–12)
NEUTROPHILS NFR BLD AUTO: 4.81 10*3/MM3 (ref 1.7–7)
NEUTROPHILS NFR BLD AUTO: 63.2 % (ref 42.7–76)
NITRITE UR QL STRIP: NEGATIVE
NRBC BLD AUTO-RTO: 0 /100 WBC (ref 0–0.2)
OPIATES UR QL: NEGATIVE
OXYCODONE UR QL SCN: NEGATIVE
PCO2 BLDA: 53.1 MM HG (ref 35–45)
PCP UR QL SCN: NEGATIVE
PH BLDA: 7.38 PH UNITS (ref 7.35–7.6)
PH UR STRIP.AUTO: <=5 [PH] (ref 5–8)
PLATELET # BLD AUTO: 350 10*3/MM3 (ref 140–450)
PMV BLD AUTO: 9.7 FL (ref 6–12)
PO2 BLDA: 31 MMHG (ref 80–105)
POTASSIUM BLDA-SCNC: 2.8 MMOL/L (ref 3.5–4.9)
POTASSIUM SERPL-SCNC: 3.1 MMOL/L (ref 3.5–5.2)
POTASSIUM SERPL-SCNC: 3.5 MMOL/L (ref 3.5–5.2)
POTASSIUM SERPL-SCNC: 3.6 MMOL/L (ref 3.5–5.2)
PROCALCITONIN SERPL-MCNC: 0.02 NG/ML (ref 0–0.25)
PROPOXYPH UR QL: NEGATIVE
PROT SERPL-MCNC: 7.1 G/DL (ref 6–8.5)
PROT UR QL STRIP: NEGATIVE
RBC # BLD AUTO: 4.08 10*6/MM3 (ref 3.77–5.28)
SALICYLATES SERPL-MCNC: <0.3 MG/DL
SAO2 % BLDA: 58 % (ref 95–98)
SARS-COV-2 RNA RESP QL NAA+PROBE: NOT DETECTED
SODIUM BLD-SCNC: 142 MMOL/L (ref 138–146)
SODIUM SERPL-SCNC: 141 MMOL/L (ref 136–145)
SP GR UR STRIP: 1.05 (ref 1–1.03)
T4 FREE SERPL-MCNC: 1.3 NG/DL (ref 0.93–1.7)
TRICYCLICS UR QL SCN: NEGATIVE
TRIGL SERPL-MCNC: 126 MG/DL (ref 0–150)
TROPONIN T SERPL-MCNC: <0.01 NG/ML (ref 0–0.03)
TSH SERPL DL<=0.05 MIU/L-ACNC: 1.48 UIU/ML (ref 0.27–4.2)
UROBILINOGEN UR QL STRIP: ABNORMAL
VLDLC SERPL-MCNC: 23 MG/DL (ref 5–40)
WBC # BLD AUTO: 7.6 10*3/MM3 (ref 3.4–10.8)
WHOLE BLOOD HOLD SPECIMEN: NORMAL

## 2021-06-25 PROCEDURE — 96375 TX/PRO/DX INJ NEW DRUG ADDON: CPT

## 2021-06-25 PROCEDURE — 85379 FIBRIN DEGRADATION QUANT: CPT | Performed by: INTERNAL MEDICINE

## 2021-06-25 PROCEDURE — 80306 DRUG TEST PRSMV INSTRMNT: CPT | Performed by: EMERGENCY MEDICINE

## 2021-06-25 PROCEDURE — 80053 COMPREHEN METABOLIC PANEL: CPT | Performed by: EMERGENCY MEDICINE

## 2021-06-25 PROCEDURE — G0378 HOSPITAL OBSERVATION PER HR: HCPCS

## 2021-06-25 PROCEDURE — 92523 SPEECH SOUND LANG COMPREHEN: CPT

## 2021-06-25 PROCEDURE — 82947 ASSAY GLUCOSE BLOOD QUANT: CPT

## 2021-06-25 PROCEDURE — 85014 HEMATOCRIT: CPT

## 2021-06-25 PROCEDURE — 84132 ASSAY OF SERUM POTASSIUM: CPT

## 2021-06-25 PROCEDURE — 84484 ASSAY OF TROPONIN QUANT: CPT | Performed by: EMERGENCY MEDICINE

## 2021-06-25 PROCEDURE — 99220 PR INITIAL OBSERVATION CARE/DAY 70 MINUTES: CPT | Performed by: INTERNAL MEDICINE

## 2021-06-25 PROCEDURE — 71250 CT THORAX DX C-: CPT

## 2021-06-25 PROCEDURE — 82077 ASSAY SPEC XCP UR&BREATH IA: CPT | Performed by: EMERGENCY MEDICINE

## 2021-06-25 PROCEDURE — 83036 HEMOGLOBIN GLYCOSYLATED A1C: CPT | Performed by: INTERNAL MEDICINE

## 2021-06-25 PROCEDURE — 83735 ASSAY OF MAGNESIUM: CPT | Performed by: NURSE PRACTITIONER

## 2021-06-25 PROCEDURE — 82803 BLOOD GASES ANY COMBINATION: CPT

## 2021-06-25 PROCEDURE — 99285 EMERGENCY DEPT VISIT HI MDM: CPT

## 2021-06-25 PROCEDURE — 83605 ASSAY OF LACTIC ACID: CPT | Performed by: NURSE PRACTITIONER

## 2021-06-25 PROCEDURE — 85730 THROMBOPLASTIN TIME PARTIAL: CPT | Performed by: EMERGENCY MEDICINE

## 2021-06-25 PROCEDURE — 84439 ASSAY OF FREE THYROXINE: CPT | Performed by: EMERGENCY MEDICINE

## 2021-06-25 PROCEDURE — 85025 COMPLETE CBC W/AUTO DIFF WBC: CPT | Performed by: EMERGENCY MEDICINE

## 2021-06-25 PROCEDURE — 84132 ASSAY OF SERUM POTASSIUM: CPT | Performed by: INTERNAL MEDICINE

## 2021-06-25 PROCEDURE — 99214 OFFICE O/P EST MOD 30 MIN: CPT | Performed by: NURSE PRACTITIONER

## 2021-06-25 PROCEDURE — 82010 KETONE BODYS QUAN: CPT | Performed by: INTERNAL MEDICINE

## 2021-06-25 PROCEDURE — 70498 CT ANGIOGRAPHY NECK: CPT

## 2021-06-25 PROCEDURE — 81003 URINALYSIS AUTO W/O SCOPE: CPT | Performed by: EMERGENCY MEDICINE

## 2021-06-25 PROCEDURE — 83735 ASSAY OF MAGNESIUM: CPT | Performed by: INTERNAL MEDICINE

## 2021-06-25 PROCEDURE — 25010000002 MAGNESIUM SULFATE IN D5W 1G/100ML (PREMIX) 1-5 GM/100ML-% SOLUTION: Performed by: EMERGENCY MEDICINE

## 2021-06-25 PROCEDURE — 83605 ASSAY OF LACTIC ACID: CPT | Performed by: INTERNAL MEDICINE

## 2021-06-25 PROCEDURE — 82565 ASSAY OF CREATININE: CPT

## 2021-06-25 PROCEDURE — 25010000002 LORAZEPAM PER 2 MG: Performed by: EMERGENCY MEDICINE

## 2021-06-25 PROCEDURE — 70496 CT ANGIOGRAPHY HEAD: CPT

## 2021-06-25 PROCEDURE — 96361 HYDRATE IV INFUSION ADD-ON: CPT

## 2021-06-25 PROCEDURE — 97535 SELF CARE MNGMENT TRAINING: CPT

## 2021-06-25 PROCEDURE — 80061 LIPID PANEL: CPT | Performed by: NURSE PRACTITIONER

## 2021-06-25 PROCEDURE — P9612 CATHETERIZE FOR URINE SPEC: HCPCS

## 2021-06-25 PROCEDURE — 87636 SARSCOV2 & INF A&B AMP PRB: CPT | Performed by: INTERNAL MEDICINE

## 2021-06-25 PROCEDURE — 80143 DRUG ASSAY ACETAMINOPHEN: CPT | Performed by: EMERGENCY MEDICINE

## 2021-06-25 PROCEDURE — G0108 DIAB MANAGE TRN  PER INDIV: HCPCS

## 2021-06-25 PROCEDURE — 96365 THER/PROPH/DIAG IV INF INIT: CPT

## 2021-06-25 PROCEDURE — 0 IOPAMIDOL PER 1 ML: Performed by: EMERGENCY MEDICINE

## 2021-06-25 PROCEDURE — 84145 PROCALCITONIN (PCT): CPT | Performed by: INTERNAL MEDICINE

## 2021-06-25 PROCEDURE — 87040 BLOOD CULTURE FOR BACTERIA: CPT | Performed by: INTERNAL MEDICINE

## 2021-06-25 PROCEDURE — 70450 CT HEAD/BRAIN W/O DYE: CPT

## 2021-06-25 PROCEDURE — 80179 DRUG ASSAY SALICYLATE: CPT | Performed by: EMERGENCY MEDICINE

## 2021-06-25 PROCEDURE — 93005 ELECTROCARDIOGRAM TRACING: CPT | Performed by: EMERGENCY MEDICINE

## 2021-06-25 PROCEDURE — 84295 ASSAY OF SERUM SODIUM: CPT

## 2021-06-25 PROCEDURE — 80048 BASIC METABOLIC PNL TOTAL CA: CPT | Performed by: NURSE PRACTITIONER

## 2021-06-25 PROCEDURE — 0042T HC CT CEREBRAL PERFUSION W/WO CONTRAST: CPT

## 2021-06-25 PROCEDURE — 97165 OT EVAL LOW COMPLEX 30 MIN: CPT

## 2021-06-25 PROCEDURE — 96368 THER/DIAG CONCURRENT INF: CPT

## 2021-06-25 PROCEDURE — 82330 ASSAY OF CALCIUM: CPT

## 2021-06-25 PROCEDURE — 84443 ASSAY THYROID STIM HORMONE: CPT | Performed by: EMERGENCY MEDICINE

## 2021-06-25 PROCEDURE — 83735 ASSAY OF MAGNESIUM: CPT | Performed by: EMERGENCY MEDICINE

## 2021-06-25 PROCEDURE — C9803 HOPD COVID-19 SPEC COLLECT: HCPCS

## 2021-06-25 PROCEDURE — 25010000003 POTASSIUM CHLORIDE 10 MEQ/100ML SOLUTION: Performed by: EMERGENCY MEDICINE

## 2021-06-25 RX ORDER — SODIUM CHLORIDE 0.9 % (FLUSH) 0.9 %
10 SYRINGE (ML) INJECTION AS NEEDED
Status: DISCONTINUED | OUTPATIENT
Start: 2021-06-25 | End: 2021-06-26 | Stop reason: HOSPADM

## 2021-06-25 RX ORDER — SODIUM CHLORIDE 0.9 % (FLUSH) 0.9 %
10 SYRINGE (ML) INJECTION EVERY 12 HOURS SCHEDULED
Status: DISCONTINUED | OUTPATIENT
Start: 2021-06-25 | End: 2021-06-26 | Stop reason: HOSPADM

## 2021-06-25 RX ORDER — ASPIRIN 81 MG/1
81 TABLET, CHEWABLE ORAL DAILY
Status: DISCONTINUED | OUTPATIENT
Start: 2021-06-26 | End: 2021-06-26 | Stop reason: HOSPADM

## 2021-06-25 RX ORDER — VENLAFAXINE HYDROCHLORIDE 75 MG/1
75 CAPSULE, EXTENDED RELEASE ORAL DAILY
Status: DISCONTINUED | OUTPATIENT
Start: 2021-06-25 | End: 2021-06-26 | Stop reason: HOSPADM

## 2021-06-25 RX ORDER — GABAPENTIN 100 MG/1
100 CAPSULE ORAL NIGHTLY
Status: DISCONTINUED | OUTPATIENT
Start: 2021-06-25 | End: 2021-06-26 | Stop reason: HOSPADM

## 2021-06-25 RX ORDER — MAGNESIUM SULFATE HEPTAHYDRATE 40 MG/ML
4 INJECTION, SOLUTION INTRAVENOUS AS NEEDED
Status: DISCONTINUED | OUTPATIENT
Start: 2021-06-25 | End: 2021-06-26 | Stop reason: HOSPADM

## 2021-06-25 RX ORDER — MAGNESIUM SULFATE HEPTAHYDRATE 40 MG/ML
2 INJECTION, SOLUTION INTRAVENOUS AS NEEDED
Status: DISCONTINUED | OUTPATIENT
Start: 2021-06-25 | End: 2021-06-26 | Stop reason: HOSPADM

## 2021-06-25 RX ORDER — ONDANSETRON 2 MG/ML
4 INJECTION INTRAMUSCULAR; INTRAVENOUS EVERY 6 HOURS PRN
Status: DISCONTINUED | OUTPATIENT
Start: 2021-06-25 | End: 2021-06-26 | Stop reason: HOSPADM

## 2021-06-25 RX ORDER — NICOTINE POLACRILEX 4 MG
15 LOZENGE BUCCAL
Status: DISCONTINUED | OUTPATIENT
Start: 2021-06-25 | End: 2021-06-26 | Stop reason: HOSPADM

## 2021-06-25 RX ORDER — ASPIRIN 81 MG/1
81 TABLET, CHEWABLE ORAL DAILY
Status: DISCONTINUED | OUTPATIENT
Start: 2021-06-26 | End: 2021-06-25

## 2021-06-25 RX ORDER — LORAZEPAM 2 MG/ML
1 INJECTION INTRAMUSCULAR ONCE
Status: COMPLETED | OUTPATIENT
Start: 2021-06-25 | End: 2021-06-25

## 2021-06-25 RX ORDER — POTASSIUM CHLORIDE 1.5 G/1.77G
40 POWDER, FOR SOLUTION ORAL AS NEEDED
Status: DISCONTINUED | OUTPATIENT
Start: 2021-06-25 | End: 2021-06-26 | Stop reason: HOSPADM

## 2021-06-25 RX ORDER — ASPIRIN 81 MG/1
324 TABLET, CHEWABLE ORAL ONCE
Status: COMPLETED | OUTPATIENT
Start: 2021-06-25 | End: 2021-06-25

## 2021-06-25 RX ORDER — POTASSIUM CHLORIDE 750 MG/1
40 CAPSULE, EXTENDED RELEASE ORAL ONCE
Status: DISCONTINUED | OUTPATIENT
Start: 2021-06-25 | End: 2021-06-25

## 2021-06-25 RX ORDER — ATORVASTATIN CALCIUM 40 MG/1
80 TABLET, FILM COATED ORAL NIGHTLY
Status: DISCONTINUED | OUTPATIENT
Start: 2021-06-25 | End: 2021-06-26 | Stop reason: HOSPADM

## 2021-06-25 RX ORDER — POTASSIUM CHLORIDE 750 MG/1
30 CAPSULE, EXTENDED RELEASE ORAL ONCE
Status: COMPLETED | OUTPATIENT
Start: 2021-06-25 | End: 2021-06-25

## 2021-06-25 RX ORDER — ASPIRIN 300 MG/1
300 SUPPOSITORY RECTAL DAILY
Status: DISCONTINUED | OUTPATIENT
Start: 2021-06-26 | End: 2021-06-26 | Stop reason: HOSPADM

## 2021-06-25 RX ORDER — POTASSIUM CHLORIDE 7.45 MG/ML
10 INJECTION INTRAVENOUS ONCE
Status: COMPLETED | OUTPATIENT
Start: 2021-06-25 | End: 2021-06-25

## 2021-06-25 RX ORDER — MODAFINIL 100 MG/1
200 TABLET ORAL DAILY
Status: DISCONTINUED | OUTPATIENT
Start: 2021-06-25 | End: 2021-06-26 | Stop reason: HOSPADM

## 2021-06-25 RX ORDER — CHOLECALCIFEROL (VITAMIN D3) 125 MCG
5 CAPSULE ORAL NIGHTLY PRN
Status: DISCONTINUED | OUTPATIENT
Start: 2021-06-25 | End: 2021-06-26 | Stop reason: HOSPADM

## 2021-06-25 RX ORDER — POTASSIUM CHLORIDE 750 MG/1
40 CAPSULE, EXTENDED RELEASE ORAL AS NEEDED
Status: DISCONTINUED | OUTPATIENT
Start: 2021-06-25 | End: 2021-06-26 | Stop reason: HOSPADM

## 2021-06-25 RX ORDER — DEXTROSE MONOHYDRATE 25 G/50ML
25 INJECTION, SOLUTION INTRAVENOUS
Status: DISCONTINUED | OUTPATIENT
Start: 2021-06-25 | End: 2021-06-26 | Stop reason: HOSPADM

## 2021-06-25 RX ORDER — MAGNESIUM SULFATE 1 G/100ML
1 INJECTION INTRAVENOUS ONCE
Status: COMPLETED | OUTPATIENT
Start: 2021-06-25 | End: 2021-06-25

## 2021-06-25 RX ADMIN — SODIUM CHLORIDE, PRESERVATIVE FREE 10 ML: 5 INJECTION INTRAVENOUS at 21:41

## 2021-06-25 RX ADMIN — MAGNESIUM SULFATE HEPTAHYDRATE 1 G: 1 INJECTION, SOLUTION INTRAVENOUS at 04:16

## 2021-06-25 RX ADMIN — POTASSIUM CHLORIDE 10 MEQ: 7.46 INJECTION, SOLUTION INTRAVENOUS at 04:16

## 2021-06-25 RX ADMIN — ASPIRIN 324 MG: 81 TABLET, CHEWABLE ORAL at 05:24

## 2021-06-25 RX ADMIN — ATENOLOL 75 MG: 25 TABLET ORAL at 20:49

## 2021-06-25 RX ADMIN — SODIUM CHLORIDE 500 ML: 9 INJECTION, SOLUTION INTRAVENOUS at 17:56

## 2021-06-25 RX ADMIN — SODIUM CHLORIDE, PRESERVATIVE FREE 10 ML: 5 INJECTION INTRAVENOUS at 20:49

## 2021-06-25 RX ADMIN — IOPAMIDOL 115 ML: 755 INJECTION, SOLUTION INTRAVENOUS at 03:00

## 2021-06-25 RX ADMIN — LORAZEPAM 1 MG: 2 INJECTION INTRAMUSCULAR; INTRAVENOUS at 03:29

## 2021-06-25 RX ADMIN — POTASSIUM CHLORIDE 30 MEQ: 750 CAPSULE, EXTENDED RELEASE ORAL at 06:01

## 2021-06-25 RX ADMIN — SODIUM CHLORIDE, POTASSIUM CHLORIDE, SODIUM LACTATE AND CALCIUM CHLORIDE 1500 ML: 600; 310; 30; 20 INJECTION, SOLUTION INTRAVENOUS at 06:03

## 2021-06-25 RX ADMIN — GABAPENTIN 100 MG: 100 CAPSULE ORAL at 20:49

## 2021-06-25 RX ADMIN — Medication 5 MG: at 21:41

## 2021-06-26 VITALS
SYSTOLIC BLOOD PRESSURE: 125 MMHG | HEIGHT: 65 IN | RESPIRATION RATE: 18 BRPM | DIASTOLIC BLOOD PRESSURE: 71 MMHG | BODY MASS INDEX: 30.6 KG/M2 | WEIGHT: 183.7 LBS | HEART RATE: 66 BPM | TEMPERATURE: 98.6 F | OXYGEN SATURATION: 95 %

## 2021-06-26 LAB
ANION GAP SERPL CALCULATED.3IONS-SCNC: 10 MMOL/L (ref 5–15)
ANION GAP SERPL CALCULATED.3IONS-SCNC: 6 MMOL/L (ref 5–15)
BUN SERPL-MCNC: 17 MG/DL (ref 6–20)
BUN SERPL-MCNC: 20 MG/DL (ref 6–20)
BUN/CREAT SERPL: 23.8 (ref 7–25)
BUN/CREAT SERPL: 26.6 (ref 7–25)
CALCIUM SPEC-SCNC: 8.5 MG/DL (ref 8.6–10.5)
CALCIUM SPEC-SCNC: 8.7 MG/DL (ref 8.6–10.5)
CHLORIDE SERPL-SCNC: 102 MMOL/L (ref 98–107)
CHLORIDE SERPL-SCNC: 105 MMOL/L (ref 98–107)
CO2 SERPL-SCNC: 28 MMOL/L (ref 22–29)
CO2 SERPL-SCNC: 28 MMOL/L (ref 22–29)
CREAT SERPL-MCNC: 0.64 MG/DL (ref 0.57–1)
CREAT SERPL-MCNC: 0.84 MG/DL (ref 0.57–1)
D-LACTATE SERPL-SCNC: 1.1 MMOL/L (ref 0.5–2)
D-LACTATE SERPL-SCNC: 1.2 MMOL/L (ref 0.5–2)
DEPRECATED RDW RBC AUTO: 47.8 FL (ref 37–54)
ERYTHROCYTE [DISTWIDTH] IN BLOOD BY AUTOMATED COUNT: 13.3 % (ref 12.3–15.4)
GFR SERPL CREATININE-BSD FRML MDRD: 70 ML/MIN/1.73
GFR SERPL CREATININE-BSD FRML MDRD: 96 ML/MIN/1.73
GLUCOSE BLDC GLUCOMTR-MCNC: 113 MG/DL (ref 70–130)
GLUCOSE BLDC GLUCOMTR-MCNC: 121 MG/DL (ref 70–130)
GLUCOSE SERPL-MCNC: 110 MG/DL (ref 65–99)
GLUCOSE SERPL-MCNC: 111 MG/DL (ref 65–99)
HCT VFR BLD AUTO: 36.4 % (ref 34–46.6)
HGB BLD-MCNC: 11.3 G/DL (ref 12–15.9)
MAGNESIUM SERPL-MCNC: 1.4 MG/DL (ref 1.6–2.6)
MAGNESIUM SERPL-MCNC: 1.9 MG/DL (ref 1.6–2.6)
MCH RBC QN AUTO: 30.1 PG (ref 26.6–33)
MCHC RBC AUTO-ENTMCNC: 31 G/DL (ref 31.5–35.7)
MCV RBC AUTO: 97.1 FL (ref 79–97)
PLATELET # BLD AUTO: 310 10*3/MM3 (ref 140–450)
PMV BLD AUTO: 9.5 FL (ref 6–12)
POTASSIUM SERPL-SCNC: 3.4 MMOL/L (ref 3.5–5.2)
POTASSIUM SERPL-SCNC: 3.7 MMOL/L (ref 3.5–5.2)
QT INTERVAL: 300 MS
QTC INTERVAL: 373 MS
RBC # BLD AUTO: 3.75 10*6/MM3 (ref 3.77–5.28)
SODIUM SERPL-SCNC: 139 MMOL/L (ref 136–145)
SODIUM SERPL-SCNC: 140 MMOL/L (ref 136–145)
WBC # BLD AUTO: 6.56 10*3/MM3 (ref 3.4–10.8)

## 2021-06-26 PROCEDURE — G0378 HOSPITAL OBSERVATION PER HR: HCPCS

## 2021-06-26 PROCEDURE — 83605 ASSAY OF LACTIC ACID: CPT | Performed by: INTERNAL MEDICINE

## 2021-06-26 PROCEDURE — 99217 PR OBSERVATION CARE DISCHARGE MANAGEMENT: CPT | Performed by: INTERNAL MEDICINE

## 2021-06-26 PROCEDURE — 97161 PT EVAL LOW COMPLEX 20 MIN: CPT

## 2021-06-26 PROCEDURE — 80048 BASIC METABOLIC PNL TOTAL CA: CPT | Performed by: INTERNAL MEDICINE

## 2021-06-26 PROCEDURE — 96366 THER/PROPH/DIAG IV INF ADDON: CPT

## 2021-06-26 PROCEDURE — 25010000002 MAGNESIUM SULFATE 2 GM/50ML SOLUTION: Performed by: INTERNAL MEDICINE

## 2021-06-26 PROCEDURE — 85027 COMPLETE CBC AUTOMATED: CPT | Performed by: INTERNAL MEDICINE

## 2021-06-26 PROCEDURE — 82962 GLUCOSE BLOOD TEST: CPT

## 2021-06-26 PROCEDURE — 83735 ASSAY OF MAGNESIUM: CPT | Performed by: INTERNAL MEDICINE

## 2021-06-26 RX ORDER — HYDROXYZINE HYDROCHLORIDE 25 MG/1
25 TABLET, FILM COATED ORAL 3 TIMES DAILY PRN
Status: DISCONTINUED | OUTPATIENT
Start: 2021-06-26 | End: 2021-06-26 | Stop reason: HOSPADM

## 2021-06-26 RX ADMIN — ASPIRIN 81 MG: 81 TABLET, CHEWABLE ORAL at 09:14

## 2021-06-26 RX ADMIN — HYDROXYZINE HYDROCHLORIDE 25 MG: 25 TABLET, FILM COATED ORAL at 00:16

## 2021-06-26 RX ADMIN — MAGNESIUM SULFATE HEPTAHYDRATE 2 G: 2 INJECTION, SOLUTION INTRAVENOUS at 05:10

## 2021-06-26 RX ADMIN — SODIUM CHLORIDE, PRESERVATIVE FREE 10 ML: 5 INJECTION INTRAVENOUS at 09:18

## 2021-06-26 RX ADMIN — MODAFINIL 200 MG: 100 TABLET ORAL at 11:45

## 2021-06-26 RX ADMIN — VENLAFAXINE HYDROCHLORIDE 75 MG: 75 CAPSULE, EXTENDED RELEASE ORAL at 09:12

## 2021-06-26 RX ADMIN — ATENOLOL 75 MG: 25 TABLET ORAL at 09:12

## 2021-06-26 RX ADMIN — POTASSIUM CHLORIDE 40 MEQ: 750 CAPSULE, EXTENDED RELEASE ORAL at 05:07

## 2021-06-30 LAB
BACTERIA SPEC AEROBE CULT: NORMAL
BACTERIA SPEC AEROBE CULT: NORMAL

## 2021-09-16 LAB
CYTO UR: NORMAL
LAB AP CASE REPORT: NORMAL
LAB AP CLINICAL INFORMATION: NORMAL
PATH REPORT.FINAL DX SPEC: NORMAL
PATH REPORT.GROSS SPEC: NORMAL

## 2021-09-27 ENCOUNTER — HOSPITAL ENCOUNTER (EMERGENCY)
Facility: HOSPITAL | Age: 56
Discharge: HOME | End: 2021-09-27
Attending: EMERGENCY MEDICINE
Payer: COMMERCIAL

## 2021-09-27 VITALS
TEMPERATURE: 98.6 F | OXYGEN SATURATION: 100 % | WEIGHT: 174 LBS | BODY MASS INDEX: 32.02 KG/M2 | HEIGHT: 62 IN | DIASTOLIC BLOOD PRESSURE: 77 MMHG | SYSTOLIC BLOOD PRESSURE: 142 MMHG | RESPIRATION RATE: 16 BRPM | HEART RATE: 75 BPM

## 2021-09-27 DIAGNOSIS — S61.216A LACERATION OF RIGHT LITTLE FINGER WITHOUT FOREIGN BODY WITHOUT DAMAGE TO NAIL, INITIAL ENCOUNTER: Primary | ICD-10-CM

## 2021-09-27 PROCEDURE — 99282 EMERGENCY DEPT VISIT SF MDM: CPT

## 2021-09-27 PROCEDURE — 12002 RPR S/N/AX/GEN/TRNK2.6-7.5CM: CPT

## 2021-09-27 PROCEDURE — 2500000003 HC RX 250 WO HCPCS: Performed by: EMERGENCY MEDICINE

## 2021-09-27 RX ORDER — VENLAFAXINE HYDROCHLORIDE 75 MG/1
75 CAPSULE, EXTENDED RELEASE ORAL DAILY
COMMUNITY

## 2021-09-27 RX ORDER — LIDOCAINE HYDROCHLORIDE 10 MG/ML
5 INJECTION, SOLUTION INFILTRATION; PERINEURAL ONCE
Status: COMPLETED | OUTPATIENT
Start: 2021-09-27 | End: 2021-09-27

## 2021-09-27 RX ORDER — EMPAGLIFLOZIN 25 MG/1
25 TABLET, FILM COATED ORAL DAILY
COMMUNITY

## 2021-09-27 RX ORDER — POTASSIUM CHLORIDE 20 MEQ/1
20 TABLET, EXTENDED RELEASE ORAL DAILY
COMMUNITY

## 2021-09-27 RX ORDER — GABAPENTIN 100 MG/1
100 CAPSULE ORAL NIGHTLY
COMMUNITY

## 2021-09-27 RX ADMIN — LIDOCAINE HYDROCHLORIDE 5 ML: 10 INJECTION, SOLUTION INFILTRATION; PERINEURAL at 20:42

## 2021-09-27 ASSESSMENT — PAIN DESCRIPTION - PAIN TYPE: TYPE: ACUTE PAIN

## 2021-09-27 ASSESSMENT — PAIN SCALES - GENERAL
PAINLEVEL_OUTOF10: 7
PAINLEVEL_OUTOF10: 7

## 2021-09-27 ASSESSMENT — PAIN DESCRIPTION - DESCRIPTORS: DESCRIPTORS: THROBBING

## 2021-09-27 ASSESSMENT — PAIN DESCRIPTION - ORIENTATION: ORIENTATION: RIGHT

## 2021-09-27 ASSESSMENT — PAIN DESCRIPTION - LOCATION: LOCATION: FINGER (COMMENT WHICH ONE)

## 2021-09-28 NOTE — ED PROVIDER NOTES
[address] [place] [address] Court  eMERGENCY dEPARTMENT eNCOUnter      Pt Name: Mitzi Rosas  MRN: [id number]  [place]: 1965  Date ofevaluation: 8/91/8368  Provider: Jeimy Richards MD    [address] [place] [address]       Chief Complaint   Patient presents with    Laceration     fifth finger right         HISTORY OF PRESENT ILLNESS  (Location/Symptom, Timing/Onset, Context/Setting, Quality, Duration, Modifying Factors, Severity.)   Mitzi Rosas is a 54 y.o. female who presents to the emergency department with a right fifth finger laceration. Patient was doing some home remodeling when a piece of metal sliced her finger. Injury occurred approximately 1 hour ago but she cannot get it to stop bleeding. She does not need a tetanus. Nursing notes were reviewed. REVIEW OF SYSTEMS    (2-9systems for level 4, 10 or more for level 5)   ROS:  General:  No fevers, no chills, no weakness  HEENT: No sore throat, runny nose or ear pain  Cardiovascular:  No chest pain, no palpitations  Respiratory:  No shortness of breath, no cough, no wheezing  Gastrointestinal:  No pain, no nausea, no vomiting, no diarrhea  Musculoskeletal:  No muscle pain, no joint pain  Skin:  + finger laceration. No rash, no easy bruising  Genitourinary:  No dysuria, no hematuria    Except as noted above theremainder of the review of systems was reviewed and negative.        PASTMEDICAL HISTORY     Past Medical History:   Diagnosis Date    Diabetes mellitus ([facility] [address]     Hypertension     Narcolepsy     Sleep apnea     Sleep apnea     uses c-pap         SURGICAL HISTORY       Past Surgical History:   Procedure Laterality Date    APPENDECTOMY      CHOLECYSTECTOMY      DILATION AND CURETTAGE OF UTERUS  1989    TUBAL LIGATION           CURRENT MEDICATIONS       Previous Medications    ATENOLOL (TENORMIN) 50 MG TABLET    Take 50 mg by mouth 2 times daily    EMPAGLIFLOZIN (JARDIANCE) 25 MG TABLET    Take 25 mg by mouth daily GABAPENTIN (NEURONTIN) 100 MG CAPSULE    Take 100 mg by mouth nightly. HYDROCHLOROTHIAZIDE (HYDRODIURIL) 25 MG TABLET    Take 10 mg by mouth daily    METFORMIN (GLUCOPHAGE) 500 MG TABLET    Take 1,000 mg by mouth 2 times daily (with meals)     MODAFINIL (PROVIGIL) 200 MG TABLET    Take 200 mg by mouth daily    MULTIPLE VITAMINS-MINERALS (THERAPEUTIC MULTIVITAMIN-MINERALS) TABLET    Take 1 tablet by mouth daily    POTASSIUM CHLORIDE (KLOR-CON M) 20 MEQ EXTENDED RELEASE TABLET    Take 20 mEq by mouth daily    SITAGLIPTIN (JANUVIA) 100 MG TABLET    Take 100 mg by mouth daily    VENLAFAXINE (EFFEXOR XR) 75 MG EXTENDED RELEASE CAPSULE    Take 75 mg by mouth daily       ALLERGIES     Patient has no known allergies. FAMILY HISTORY       Family History   Problem Relation Age of Onset    Diabetes Mother     High Blood Pressure Mother     High Blood Pressure Father     Cancer Father         lung    High Blood Pressure Sister     No Known Problems Sister           SOCIAL HISTORY       Social History     Socioeconomic History    Marital status:      Spouse name: None    Number of children: None    Years of education: None    Highest education level: None   Occupational History    None   Tobacco Use    Smoking status: Never Smoker    Smokeless tobacco: Never Used   Substance and Sexual Activity    Alcohol use: No    Drug use: No    Sexual activity: None   Other Topics Concern    None   Social History Narrative    None     Social Determinants of Health     Financial Resource Strain:     Difficulty of Paying Living Expenses:    Food Insecurity:     Worried About Running Out of Food in the Last Year:     Ran Out of Food in the Last Year:    Transportation Needs:     Lack of Transportation (Medical):      Lack of Transportation (Non-Medical):    Physical Activity:     Days of Exercise per Week:     Minutes of Exercise per Session:    Stress:     Feeling of Stress :    Social Connections:  Frequency of Communication with Friends and Family:     Frequency of Social Gatherings with Friends and Family:     Attends Spiritism Services:     Active Member of Clubs or Organizations:     Attends Club or Organization Meetings:     Marital Status:    Intimate Partner Violence:     Fear of Current or Ex-Partner:     Emotionally Abused:     Physically Abused:     Sexually Abused:          PHYSICAL EXAM    (up to 7 forlevel 4, 8 or more for level 5)     ED Triage Vitals   BP Temp Temp src Pulse Resp SpO2 Height Weight   -- -- -- -- -- -- -- --       Physical Exam  General :Patient is awake, alert, oriented, in no acute distress, nontoxic appearing  HEENT: Pupils are equally round and reactive to light, EOMI. Cardiac: Heart regular rate, rhythm, no murmurs, rubs, or gallops  Lungs: Lungs are clear to auscultation, there is no wheezing, rhonchi, or rales. Abdomen:Abdomen is soft, nontender, nondistended. Musculoskeletal: Ambulatory  Back: No midline or bony tenderness. Dermatology: Skin is warm and dry; 3cm linear lac to the right 5th finger on the volar side into subcutaneous tissue; mild active bleeding; FROM at pip and DIP joints. Psych: Mentation is grossly normal, cognition is grossly normal. Affect is appropriate. DIAGNOSTIC RESULTS       RADIOLOGY:   Non-plain film images such as CT, Ultrasoundand MRI are read by the radiologist. Plain radiographic images are visualized and preliminarily interpreted by the emergency physician with the below findings:      [] Radiologist's Report Reviewed:  No orders to display         ED BEDSIDE ULTRASOUND:   Performed by ED Physician - none    LABS:  Labs Reviewed - No data to display    I have reviewed and interpreted all of the currently available lab resultsfrom this visit (if applicable):  No results found for this visit on 09/27/21. All other labs were within normal range or not returned as of this dictation.     EMERGENCY DEPARTMENT COURSE and DIFFERENTIAL DIAGNOSIS/MDM:   Vitals:    Vitals:    09/27/21 2008 09/27/21 2015 09/27/21 2030   BP: (!) 152/89 (!) 141/92 (!) 142/77   Pulse: 75     Resp: 16     Temp: 98.6 °F (37 °C)     TempSrc: Oral     SpO2: 98% 98% 100%   Weight: 174 lb (78.9 kg)     Height: 5' 2\" (1.575 m)             Finger laceration--suture. The patient will follow-up with their PCP in 1-2 days for reevaluation. If the patient or family members have any further concerns or any worsening symptoms they will return to the ED for reevaluation. CONSULTS:  None    PROCEDURES:  Procedures     Procedure Note - Digital Block:  Patient offered a digital block for pain control. Questions were sought and answered and verbal consent was given by patient for the procedure. Digital block of affected digit performed with 5cc 1% Lidocaine without added sodium bicarbonate. Kal Pardo tolerated the procedure well, no acute complications. Adequate anesthesia achieved. Procedure Note - Laceration repair:  Questions were sought and answered and verbal consent was given by patient for the procedure. The area was prepped and draped in standard bedside fashion. The wound area was anesthetized with 5ml of Lidocaine 1% without epinephrine without added sodium bicarbonate for a digital block. The wound was explored with No foreign bodies found. The wound was repaired with 5-0 Ethilon; 7 interrupted sutures were used. The patient tolerated the procedure well without complications and my repeat neurovascular exam post-procedure is unchanged. Wound care and scar minimization education was provided. Instructions were given to return for increasing pain, redness, streaking, discharge, or any other worsening or worrisome concerns. CRITICAL CARE TIME    Total Critical Care time was 0 minutes, excluding separately reportable procedures.    There was a high probability of clinically significant/life threatening deterioration in the patient's condition which required my urgent intervention. FINAL IMPRESSION      1. Laceration of right little finger without foreign body without damage to nail, initial encounter          DISPOSITION/PLAN   DISPOSITION Decision To Discharge 09/27/2021 08:37:01 PM      PATIENT REFERRED TO:  KAYLEE Barfield NP  [address] [employer]  [phone]    Schedule an appointment as soon as possible for a visit in 2 days  As needed      DISCHARGE MEDICATIONS:  New Prescriptions    No medications on file       Comment: Please note this report has been produced using speech recognition software and may contain errors related tothat system including errors in grammar, punctuation, and spelling, as well as words and phrases that may be inappropriate. If there are any questions or concerns please feel free to contact the dictating provider forclarification.     Rhiannon Yousif MD  Attending Emergency Physician                  Rhiannon Yousif MD  02/48/28 8209       Rhiannon Yousif MD  06/75/81 4565       Rhiannon Yousif MD  09/27/21 2476

## 2021-09-28 NOTE — ED NOTES
Reviewed discharge plan with Bill Marte. Encouraged her to f/u with KAYLEE Barfield NP and she understood.     Electronically signed by Milon Schaumann, RN on 9/27/2021 at 8:46 PM     Milon Schaumann, RN  09/27/21 2046

## 2021-09-29 DIAGNOSIS — C50.419 MALIGNANT NEOPLASM OF UPPER OUTER QUADRANT OF BREAST IN FEMALE, ESTROGEN RECEPTOR NEGATIVE, UNSPECIFIED LATERALITY (HCC): ICD-10-CM

## 2021-09-29 DIAGNOSIS — G62.0 PERIPHERAL NEUROPATHY DUE TO CHEMOTHERAPY (HCC): ICD-10-CM

## 2021-09-29 DIAGNOSIS — Z17.1 MALIGNANT NEOPLASM OF UPPER OUTER QUADRANT OF BREAST IN FEMALE, ESTROGEN RECEPTOR NEGATIVE, UNSPECIFIED LATERALITY (HCC): ICD-10-CM

## 2021-09-29 DIAGNOSIS — T45.1X5A PERIPHERAL NEUROPATHY DUE TO CHEMOTHERAPY (HCC): ICD-10-CM

## 2021-09-30 RX ORDER — GABAPENTIN 100 MG/1
100 CAPSULE ORAL
Qty: 30 CAPSULE | Refills: 0 | Status: SHIPPED | OUTPATIENT
Start: 2021-09-30 | End: 2021-11-02

## 2021-10-18 ENCOUNTER — OFFICE VISIT (OUTPATIENT)
Dept: ONCOLOGY | Facility: CLINIC | Age: 56
End: 2021-10-18

## 2021-10-18 VITALS
RESPIRATION RATE: 18 BRPM | DIASTOLIC BLOOD PRESSURE: 82 MMHG | SYSTOLIC BLOOD PRESSURE: 165 MMHG | WEIGHT: 181 LBS | BODY MASS INDEX: 33.31 KG/M2 | TEMPERATURE: 96.3 F | HEIGHT: 62 IN | OXYGEN SATURATION: 97 % | HEART RATE: 87 BPM

## 2021-10-18 DIAGNOSIS — Z85.3 HISTORY OF BREAST CANCER: Primary | ICD-10-CM

## 2021-10-18 PROCEDURE — 99213 OFFICE O/P EST LOW 20 MIN: CPT | Performed by: NURSE PRACTITIONER

## 2021-10-18 RX ORDER — POTASSIUM CHLORIDE 1500 MG/1
20 TABLET, FILM COATED, EXTENDED RELEASE ORAL DAILY
COMMUNITY
Start: 2021-09-14

## 2021-10-18 RX ORDER — ROSUVASTATIN CALCIUM 20 MG/1
20 TABLET, COATED ORAL DAILY
COMMUNITY
Start: 2021-10-04

## 2021-10-18 RX ORDER — HYDROCHLOROTHIAZIDE 25 MG/1
25 TABLET ORAL DAILY
COMMUNITY
Start: 2021-08-26

## 2021-10-18 NOTE — PROGRESS NOTES
CHIEF COMPLAINT: History of breast cancer    Problem List:  Oncology/Hematology History Overview Note   1.  Stage IIB T2 (2.5cm per Dr. Perez) N0 invasive ductal triple negative breast cancer.  2/16/2020 right upper outer quadrant needle core biopsy showed invasive ductal carcinoma 4.4 mm moderately differentiated negative for estrogen, progesterone receptor both 0% and HER-2/vashti 0+.  Dr. Sanot recommended lumpectomy and sentinel node biopsy.  3/12/2020 saw Dr. Gary Perez.perimenopausal breast cancer she felt a 2.5 cm right upper outer quadrant mass almost at the edge of the axilla somewhat fixed.  No skin changes.  She recommended neoadjuvant chemotherapy followed by lumpectomy versus mastectomy then radiation if patient decides on breast conservation or if nodes are found on breast MRI.  She will get formal genetic counseling to help guide the issues of possible prophylactic mastectomy.  With triple negative disease the standard recommendation would be neoadjuvant Adriamycin Cytoxan dose dense x4 followed by Taxol weekly x12 followed by surgery.  If there is residual disease then would consider Xeloda per the create X trial.  We also have  looking at observation versus Keytruda for 1 year in patients with triple negative disease who do not have a complete pathological response to neoadjuvant chemotherapy.  In the absence of symptoms, NCCN guidelines does not recommend routine metastatic surveillance imaging.  Will need ejection fraction before Adriamycin.  -3/24/2020 through 5/4/2020 Adriamycin Cytoxan x4.  Opted out of neoadjuvant Taxol  -6/16/2020 left breast simple mastectomy benign  Right breast simple mastectomy showed residual invasive ductal carcinoma 9 mm with limited DCIS high-grade and negative margins.  0 out of 1 sentinel nodes positive.  Grade 7 out of 9.  Pathologic T1 BN 0.  Postoperative course complicated by localized infections.   -7/6/2020 [facility] medical oncology follow-up visit:  Given residual disease after 4 courses of AC I would want her to complete the Taxol as planned.  There is also new data presented at [facility] that suggests adjuvant Xeloda following adjuvant chemotherapy is reasonable and triple negative disease.  This is not precisely what she is doing as she has now done part of this neoadjuvant point but I think it makes sense to apply in this setting given residual disease after AC x4 but I would not forego the Taxol but will plan on adding Xeloda.  Reinforced the need for genetic testing salivary kit that has been sent to her but she has not completed.    -8/25/2020 Genetic testing was negative for pathogenic mutations in BRCA1/2 and 32 additional genes on the CancerNext panel.       -Taxol dose reduced by 20% with cycle #5 due to peripheral neuropathy    -9/11/2020 [facility] clinic visit: Peripheral neuropathy continues, patient also in need of dental work, decision made to hold Taxol for 3 weeks to see if neuropathy improves and to allow time for oral surgery.    -9/4/2020 6th and final cycle of of Taxol as patient had terrible neuropathy despite dose reduction  -10/2/2020 [facility] oncology follow-up visit: Neuropathy is not improved.  Does not like gabapentin.  Will stop Taxol permanently and press on with Xeloda after she has tooth extraction on October 14.  We will have her back to my nurse practitioner October 26 for cycle 1 of 6 of Xeloda.    -11/3/2020 [facility]ology clinic visit: Had tooth extraction with no complications.  Has Xeloda tablets in hand, will begin first cycle today.  Neuropathy is stable.  Gabapentin has been decreased 200 mg at night.    -11/24/2020 [facility] clinic visit:   Tolerated first cycle of Xeloda fairly well.  Did have diarrhea controlled with Imodium.  Has fatigue.  Otherwise no side effects.  CBC and CMP are unremarkable.  We will continue with cycle #2 of a planned 6 cycles today.    2.  Hypertension  3.   Hyperlipidemia  4.  Diabetes  5.  Using IUD and taking estradiol for hormone replacement therapy preoperatively  6.  History of D and C as well as bilateral tubal ligation  7.  History of cholecystectomy  8.  Vasomotor symptoms  9.  Family history of paternal aunt with breast cancer and perhaps a sister with ovarian cancer but she is not sure.  She has nulliparous.  Genetic spit test done results pending.    10.  Anxiety and depression     Malignant neoplasm of upper-outer quadrant of right breast in female, estrogen receptor negative (HCC)   3/12/2020 Initial Diagnosis    Malignant neoplasm of upper outer quadrant of breast in female, estrogen receptor negative (CMS/HCC)     3/12/2020 Cancer Staged    Staging form: Breast, AJCC 8th Edition  - Clinical: Stage IIB (cT2, cN0, cM0, G2, ER-, VT-, HER2-) - Signed by Johann Juarez MD on 3/12/2020     3/24/2020 - 5/4/2020 Chemotherapy    OP BREAST AC DD DOXOrubicin / Cyclophosphamide       5/6/2020 -  Chemotherapy    OP SUPPORTIVE HYDRATION + ANTIEMETICS     6/16/2020 Surgery    Surgery       -6/16/2020 left breast simple mastectomy benign  Right breast simple mastectomy showed residual invasive ductal carcinoma 9 mm with limited DCIS high-grade and negative margins.  0 out of 1 sentinel nodes positive.  Grade 7 out of 9.  Pathologic T1 BN 0     7/6/2020 Cancer Staged    Staging form: Breast, AJCC 8th Edition  - Pathologic: Stage IB (pT1b, pN0, cM0, G2, ER-, VT-, HER2-) - Signed by Johann Juarez MD on 7/6/2020 7/31/2020 -  Chemotherapy    OP CENTRAL VENOUS ACCESS DEVICE ACCESS, CARE, AND MAINTENANCE (CVAD)     7/31/2020 - 9/4/2020 Chemotherapy    Taxol       11/24/2020 - 1/5/2021 Chemotherapy    OP BREAST Capecitabine  Completed 4 of 6 planned cycles     Malignant neoplasm of upper-outer quadrant of right breast in female, estrogen receptor negative (HCC)   3/12/2020 Initial Diagnosis    Malignant neoplasm of upper-outer quadrant of right breast in female, estrogen  receptor negative (CMS/HCC)     3/24/2020 - 5/18/2020 Chemotherapy    OP BREAST AC DD DOXOrubicin / Cyclophosphamide     7/31/2020 - 9/10/2020 Chemotherapy    OP BREAST PACLitaxel (weekly X 12)     8/25/2020 Genetic Testing    Genetic testing was negative for pathogenic mutations in BRCA1/2 and 32 additional genes on the CancerNext panel.        8/28/2020 Adverse Reaction    Taxol dose reduced by 20% due to peripheral neuropathy with cycle #5.         HISTORY OF PRESENT ILLNESS:  The patient is a 55 y.o. female, here for follow up on management of history of triple negative breast cancer.  [place] currently is doing well with no new concerns.  She is getting ready to complete breast reconstruction surgery with exchange of her expanders for implants.  She is denies any new or concerning bony aches or pains.  She feels like she may have some fluid in her ear.    Past Medical History:   Diagnosis Date   • Cancer (HCC) 02/2020    breast cancer per mammogram; chemotherapy    • Diabetes (HCC)     type 2 dm, check bloos suagr twice a week   • History of low potassium 03/2020    prescribed potassium supplements    • History of transfusion 1989    after delivery of son (1 unit only)    • HTN (hypertension)    • IUD (intrauterine device) in place    • Low magnesium level 03/2020    requiring magnesium supplements    • Narcolepsy    • Neuropathy    • Sleep apnea     no cpap machine currently; d/t weight loss   • Wears partial dentures     bottom     Past Surgical History:   Procedure Laterality Date   • BREAST BIOPSY Right 02/2020   • BREAST RECONSTRUCTION, BREAST TISSUE EXPANDER INSERTION Bilateral 6/10/2021    Procedure: BREAST TISSUE EXPANDER AND ALLODERM PLACEMENT BILATERAL;  Surgeon: Isiah Franco MD;  Location: [facility];  Service: Plastics;  Laterality: Bilateral;   • CHOLECYSTECTOMY     • COLONOSCOPY     • LAPAROSCOPIC TUBAL LIGATION     • LASIK Right     7-   • MASTECTOMY W/ SENTINEL NODE BIOPSY Bilateral  "6/16/2020    Procedure: SKIN SPARING MASTECTOMIES BILATERAL AND RIGHT SENTINEL NODE BIOPSY;  Surgeon: Gary Perez MD;  Location:  [facility];  Service: General;  Laterality: Bilateral;   • VENOUS ACCESS DEVICE (PORT) INSERTION  02/2020   • VENOUS ACCESS DEVICE (PORT) REMOVAL Bilateral 6/10/2021    Procedure: PORT REMOVAL;  Surgeon: Isiah Franco MD;  Location:  [facility];  Service: Plastics;  Laterality: Bilateral;       No Known Allergies    Family History and Social History reviewed and changed as necessary    REVIEW OF SYSTEM:   Concerned possible fluid in her left ear    PHYSICAL EXAM:  General: Well-developed, well-nourished healthy-appearing female in no distress  Nodes: No cervical, supraclavicular or axillary nodes palpable on exam.  HEENT: Otic exam is normal bilaterally.    Vitals:    10/18/21 1537   BP: 165/82   Pulse: 87   Resp: 18   Temp: 96.3 °F (35.7 °C)   SpO2: 97%   Weight: 82.1 kg (181 lb)   Height: 157.5 cm (62\")     Vitals:    10/18/21 1537   PainSc: 0-No pain          ECOG score: 0         Records available in [facility] from  Vitals reviewed.  8/16/2021 UC clinic visit and 6/25/2020 ED visit reviewed at time of appointment.      ECOG: (0) Fully Active - Able to Carry On All Pre-disease Performance Without Restriction        ASSESSMENT & PLAN:    1.  History of stage IIb triple negative right breast cancer blood level completed adjuvant therapy as outlined above in the oncology history: [place] is doing well, no evidence of disease on clinical exam and has had the same symptoms.  She is about to complete breast reconstruction with exchange of her expanders for implants.  She has no new worrisome symptoms.  I will see her back in 6 months for follow-up and sooner if she has any concerns.    I spent 20 minutes caring for [place] on this date of service. This time includes time spent by me in the following activities: preparing for the visit, obtaining and/or reviewing a separately obtained history, " performing a medically appropriate examination and/or evaluation and documenting information in the medical record.     Camelia Carranza, APRN    10/18/2021

## 2021-11-02 DIAGNOSIS — C50.419 MALIGNANT NEOPLASM OF UPPER OUTER QUADRANT OF BREAST IN FEMALE, ESTROGEN RECEPTOR NEGATIVE, UNSPECIFIED LATERALITY (HCC): ICD-10-CM

## 2021-11-02 DIAGNOSIS — T45.1X5A PERIPHERAL NEUROPATHY DUE TO CHEMOTHERAPY (HCC): ICD-10-CM

## 2021-11-02 DIAGNOSIS — Z17.1 MALIGNANT NEOPLASM OF UPPER OUTER QUADRANT OF BREAST IN FEMALE, ESTROGEN RECEPTOR NEGATIVE, UNSPECIFIED LATERALITY (HCC): ICD-10-CM

## 2021-11-02 DIAGNOSIS — G62.0 PERIPHERAL NEUROPATHY DUE TO CHEMOTHERAPY (HCC): ICD-10-CM

## 2021-11-02 RX ORDER — GABAPENTIN 100 MG/1
100 CAPSULE ORAL
Qty: 30 CAPSULE | Refills: 0 | Status: SHIPPED | OUTPATIENT
Start: 2021-11-02 | End: 2021-12-06

## 2021-12-05 DIAGNOSIS — T45.1X5A PERIPHERAL NEUROPATHY DUE TO CHEMOTHERAPY (HCC): ICD-10-CM

## 2021-12-05 DIAGNOSIS — G62.0 PERIPHERAL NEUROPATHY DUE TO CHEMOTHERAPY (HCC): ICD-10-CM

## 2021-12-05 DIAGNOSIS — C50.419 MALIGNANT NEOPLASM OF UPPER OUTER QUADRANT OF BREAST IN FEMALE, ESTROGEN RECEPTOR NEGATIVE, UNSPECIFIED LATERALITY (HCC): ICD-10-CM

## 2021-12-05 DIAGNOSIS — Z17.1 MALIGNANT NEOPLASM OF UPPER OUTER QUADRANT OF BREAST IN FEMALE, ESTROGEN RECEPTOR NEGATIVE, UNSPECIFIED LATERALITY (HCC): ICD-10-CM

## 2021-12-06 RX ORDER — GABAPENTIN 100 MG/1
100 CAPSULE ORAL
Qty: 30 CAPSULE | Refills: 0 | Status: SHIPPED | OUTPATIENT
Start: 2021-12-06 | End: 2021-12-29

## 2021-12-19 ENCOUNTER — HOSPITAL ENCOUNTER (EMERGENCY)
Facility: HOSPITAL | Age: 56
Discharge: HOME | End: 2021-12-19
Attending: HOSPITALIST
Payer: COMMERCIAL

## 2021-12-19 VITALS
WEIGHT: 179 LBS | HEIGHT: 62 IN | RESPIRATION RATE: 14 BRPM | BODY MASS INDEX: 32.94 KG/M2 | SYSTOLIC BLOOD PRESSURE: 125 MMHG | TEMPERATURE: 98.1 F | DIASTOLIC BLOOD PRESSURE: 79 MMHG | OXYGEN SATURATION: 99 % | HEART RATE: 71 BPM

## 2021-12-19 DIAGNOSIS — R19.7 NAUSEA VOMITING AND DIARRHEA: Primary | ICD-10-CM

## 2021-12-19 DIAGNOSIS — R11.2 NAUSEA VOMITING AND DIARRHEA: Primary | ICD-10-CM

## 2021-12-19 LAB
A/G RATIO: 1.4 (ref 0.8–2)
ALBUMIN SERPL-MCNC: 4.8 G/DL (ref 3.4–4.8)
ALP BLD-CCNC: 98 U/L (ref 25–100)
ALT SERPL-CCNC: 21 U/L (ref 4–36)
ANION GAP SERPL CALCULATED.3IONS-SCNC: 14 MMOL/L (ref 3–16)
AST SERPL-CCNC: 24 U/L (ref 8–33)
BACTERIA: ABNORMAL /HPF
BASOPHILS ABSOLUTE: 0.1 K/UL (ref 0–0.1)
BASOPHILS RELATIVE PERCENT: 0.7 %
BILIRUB SERPL-MCNC: <0.2 MG/DL (ref 0.3–1.2)
BILIRUBIN URINE: ABNORMAL
BLOOD, URINE: NEGATIVE
BUN BLDV-MCNC: 14 MG/DL (ref 6–20)
CALCIUM SERPL-MCNC: 9.9 MG/DL (ref 8.5–10.5)
CHLORIDE BLD-SCNC: 95 MMOL/L (ref 98–107)
CLARITY: ABNORMAL
CO2: 29 MMOL/L (ref 20–30)
COLOR: YELLOW
CREAT SERPL-MCNC: 0.8 MG/DL (ref 0.4–1.2)
EOSINOPHILS ABSOLUTE: 0.2 K/UL (ref 0–0.4)
EOSINOPHILS RELATIVE PERCENT: 3.3 %
EPITHELIAL CELLS, UA: ABNORMAL /HPF (ref 0–5)
GFR AFRICAN AMERICAN: >59
GFR NON-AFRICAN AMERICAN: >60
GLOBULIN: 3.5 G/DL
GLUCOSE BLD-MCNC: 157 MG/DL (ref 74–106)
GLUCOSE URINE: NEGATIVE MG/DL
HCT VFR BLD CALC: 46.4 % (ref 37–47)
HEMOGLOBIN: 14.7 G/DL (ref 11.5–16.5)
IMMATURE GRANULOCYTES #: 0 K/UL
IMMATURE GRANULOCYTES %: 0.3 % (ref 0–5)
KETONES, URINE: ABNORMAL MG/DL
LEUKOCYTE ESTERASE, URINE: ABNORMAL
LIPASE: 48 U/L (ref 5.6–51.3)
LYMPHOCYTES ABSOLUTE: 1.9 K/UL (ref 1.5–4)
LYMPHOCYTES RELATIVE PERCENT: 26.7 %
MCH RBC QN AUTO: 29.8 PG (ref 27–32)
MCHC RBC AUTO-ENTMCNC: 31.7 G/DL (ref 31–35)
MCV RBC AUTO: 94.1 FL (ref 80–100)
MICROSCOPIC EXAMINATION: YES
MONOCYTES ABSOLUTE: 0.5 K/UL (ref 0.2–0.8)
MONOCYTES RELATIVE PERCENT: 7.7 %
MUCUS: ABNORMAL /LPF
NEUTROPHILS ABSOLUTE: 4.2 K/UL (ref 2–7.5)
NEUTROPHILS RELATIVE PERCENT: 61.3 %
NITRITE, URINE: NEGATIVE
PDW BLD-RTO: 14.6 % (ref 11–16)
PH UA: 5.5 (ref 5–8)
PLATELET # BLD: 298 K/UL (ref 150–400)
PMV BLD AUTO: 9.7 FL (ref 6–10)
POTASSIUM REFLEX MAGNESIUM: 4.2 MMOL/L (ref 3.4–5.1)
PROTEIN UA: 30 MG/DL
RAPID INFLUENZA  B AGN: NEGATIVE
RAPID INFLUENZA A AGN: NEGATIVE
RBC # BLD: 4.93 M/UL (ref 3.8–5.8)
RBC UA: ABNORMAL /HPF (ref 0–4)
S PYO AG THROAT QL: NEGATIVE
SARS-COV-2, NAAT: NOT DETECTED
SODIUM BLD-SCNC: 138 MMOL/L (ref 136–145)
SPECIFIC GRAVITY UA: >=1.03 (ref 1–1.03)
TOTAL PROTEIN: 8.3 G/DL (ref 6.4–8.3)
URINE REFLEX TO CULTURE: ABNORMAL
URINE TYPE: ABNORMAL
UROBILINOGEN, URINE: 0.2 E.U./DL
WBC # BLD: 6.9 K/UL (ref 4–11)
WBC UA: ABNORMAL /HPF (ref 0–5)

## 2021-12-19 PROCEDURE — 87635 SARS-COV-2 COVID-19 AMP PRB: CPT

## 2021-12-19 PROCEDURE — 80053 COMPREHEN METABOLIC PANEL: CPT

## 2021-12-19 PROCEDURE — 2580000003 HC RX 258: Performed by: HOSPITALIST

## 2021-12-19 PROCEDURE — 6360000002 HC RX W HCPCS: Performed by: HOSPITALIST

## 2021-12-19 PROCEDURE — 93005 ELECTROCARDIOGRAM TRACING: CPT

## 2021-12-19 PROCEDURE — 85025 COMPLETE CBC W/AUTO DIFF WBC: CPT

## 2021-12-19 PROCEDURE — 87804 INFLUENZA ASSAY W/OPTIC: CPT

## 2021-12-19 PROCEDURE — 96374 THER/PROPH/DIAG INJ IV PUSH: CPT

## 2021-12-19 PROCEDURE — 99282 EMERGENCY DEPT VISIT SF MDM: CPT

## 2021-12-19 PROCEDURE — 87880 STREP A ASSAY W/OPTIC: CPT

## 2021-12-19 PROCEDURE — 36415 COLL VENOUS BLD VENIPUNCTURE: CPT

## 2021-12-19 PROCEDURE — 83690 ASSAY OF LIPASE: CPT

## 2021-12-19 PROCEDURE — 96375 TX/PRO/DX INJ NEW DRUG ADDON: CPT

## 2021-12-19 PROCEDURE — 81001 URINALYSIS AUTO W/SCOPE: CPT

## 2021-12-19 RX ORDER — ONDANSETRON 2 MG/ML
4 INJECTION INTRAMUSCULAR; INTRAVENOUS EVERY 30 MIN PRN
Status: DISCONTINUED | OUTPATIENT
Start: 2021-12-19 | End: 2021-12-19 | Stop reason: HOSPADM

## 2021-12-19 RX ORDER — KETOROLAC TROMETHAMINE 30 MG/ML
30 INJECTION, SOLUTION INTRAMUSCULAR; INTRAVENOUS ONCE
Status: COMPLETED | OUTPATIENT
Start: 2021-12-19 | End: 2021-12-19

## 2021-12-19 RX ORDER — METOCLOPRAMIDE 10 MG/1
10 TABLET ORAL
Qty: 15 TABLET | Refills: 0 | Status: SHIPPED | OUTPATIENT
Start: 2021-12-19 | End: 2021-12-24

## 2021-12-19 RX ORDER — 0.9 % SODIUM CHLORIDE 0.9 %
1000 INTRAVENOUS SOLUTION INTRAVENOUS ONCE
Status: COMPLETED | OUTPATIENT
Start: 2021-12-19 | End: 2021-12-19

## 2021-12-19 RX ADMIN — SODIUM CHLORIDE 1000 ML: 9 INJECTION, SOLUTION INTRAVENOUS at 13:05

## 2021-12-19 RX ADMIN — KETOROLAC TROMETHAMINE 30 MG: 30 INJECTION, SOLUTION INTRAMUSCULAR at 13:03

## 2021-12-19 RX ADMIN — ONDANSETRON 4 MG: 2 INJECTION INTRAMUSCULAR; INTRAVENOUS at 13:02

## 2021-12-19 ASSESSMENT — PAIN DESCRIPTION - ONSET: ONSET: GRADUAL

## 2021-12-19 ASSESSMENT — PAIN DESCRIPTION - FREQUENCY: FREQUENCY: CONTINUOUS

## 2021-12-19 ASSESSMENT — PAIN SCALES - GENERAL
PAINLEVEL_OUTOF10: 7
PAINLEVEL_OUTOF10: 7

## 2021-12-19 ASSESSMENT — PAIN DESCRIPTION - LOCATION: LOCATION: BACK;GENERALIZED

## 2021-12-19 ASSESSMENT — PAIN DESCRIPTION - PROGRESSION: CLINICAL_PROGRESSION: GRADUALLY WORSENING

## 2021-12-19 ASSESSMENT — PAIN DESCRIPTION - DESCRIPTORS: DESCRIPTORS: ACHING

## 2021-12-19 NOTE — ED PROVIDER NOTES
[facility] ENCOUNTER      Pt Name: Jahaira Toney  MRN: [id number]  YOB: 1965  Date of evaluation: 12/19/2021  Provider: Yrn Sanabria, [facility]       Chief Complaint   Patient presents with    Generalized Body Aches    Nausea    Diarrhea    Dizziness         HISTORY OF PRESENT ILLNESS  (Location/Symptom, Timing/Onset, Context/Setting, Quality, Duration, Modifying Factors, Severity.)   Jahaira Toney is a 54 y.o. female who presents to the emergency department for body aches, sore throat, nausea vomiting and diarrhea. 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 Patient states that she did receive 2 vaccinations in the booster from [facility]. Patient states her booster shots been greater than 2 weeks ago. Denies any fevers or chills but she states that she feels like she gets hot and breaks out in a sweat. She states that she been trying to keep some fluids down with the water and juice. Patient states that she did eat some peanut butter crackers this morning but has had diarrhea with the symptoms. Positive for sick contacts from workplace. Patient states she does have some mild abdominal discomfort and cramping. Denies any cough with her symptoms. She states she does have a sore throat. She denies any earaches out of ordinary. Denies any loss of taste or smell. Denies any dysuria or change urinary frequency. Denies any chest pain with her symptoms. Denies any headache with the symptoms. Nursing notes were reviewed.     REVIEW OFSYSTEMS    (2-9 systems for level 4, 10 or more for level 5)   ROS:  General:  No fevers, no chills, no weakness, +hot/sweaty, +body aches  Cardiovascular:  No chest pain, no palpitations  Respiratory:  No shortness of breath, no cough, no wheezing  Gastrointestinal: +pain-diffuse, + nausea, + vomiting, +diarrhea  Musculoskeletal:  No muscle pain, no joint pain  Skin:  No rash, no easy bruising  Neurologic:  No speech problems, no headache, no extremity weakness  Psychiatric:  No anxiety  Genitourinary:  No dysuria, no hematuria    Except as noted above the remainder of the review of systems was reviewed and negative. PAST MEDICAL HISTORY     Past Medical History:   Diagnosis Date    Cancer [facility])     breast    Diabetes mellitus ([address]     Hypertension     Narcolepsy     Sleep apnea     Sleep apnea     uses c-pap         SURGICAL HISTORY       Past Surgical History:   Procedure Laterality Date    APPENDECTOMY      CHOLECYSTECTOMY      DILATION AND CURETTAGE OF UTERUS  1989    MASTECTOMY, BILATERAL  03/2020    TUBAL LIGATION           CURRENT MEDICATIONS       Previous Medications    ATENOLOL (TENORMIN) 50 MG TABLET    Take 50 mg by mouth 2 times daily    EMPAGLIFLOZIN (JARDIANCE) 25 MG TABLET    Take 25 mg by mouth daily    GABAPENTIN (NEURONTIN) 100 MG CAPSULE    Take 100 mg by mouth nightly. HYDROCHLOROTHIAZIDE (HYDRODIURIL) 25 MG TABLET    Take 10 mg by mouth daily    METFORMIN (GLUCOPHAGE) 500 MG TABLET    Take 1,000 mg by mouth 2 times daily (with meals)     MODAFINIL (PROVIGIL) 200 MG TABLET    Take 200 mg by mouth daily    MULTIPLE VITAMINS-MINERALS (THERAPEUTIC MULTIVITAMIN-MINERALS) TABLET    Take 1 tablet by mouth daily    POTASSIUM CHLORIDE (KLOR-CON M) 20 MEQ EXTENDED RELEASE TABLET    Take 20 mEq by mouth daily    SITAGLIPTIN (JANUVIA) 100 MG TABLET    Take 100 mg by mouth daily    VENLAFAXINE (EFFEXOR XR) 75 MG EXTENDED RELEASE CAPSULE    Take 75 mg by mouth daily       ALLERGIES     Patient has no known allergies.     FAMILY HISTORY       Family History   Problem Relation Age of Onset    Diabetes Mother     High Blood Pressure Mother     High Blood Pressure Father     Cancer Father         lung    High Blood Pressure Sister     No Known Problems Sister           SOCIAL HISTORY       Social History     Socioeconomic History    Marital status:      Spouse name: None    Number of children: None    Years of education: None    Highest education level: None   Occupational History    None   Tobacco Use    Smoking status: Never Smoker    Smokeless tobacco: Never Used   Substance and Sexual Activity    Alcohol use: No    Drug use: No    Sexual activity: None   Other Topics Concern    None   Social History Narrative    None     Social Determinants of Health     Financial Resource Strain:     Difficulty of Paying Living Expenses: Not on file   Food Insecurity:     Worried About Running Out of Food in the Last Year: Not on file    Heidi of Food in the Last Year: Not on file   Transportation Needs:     Lack of Transportation (Medical): Not on file    Lack of Transportation (Non-Medical):  Not on file   Physical Activity:     Days of Exercise per Week: Not on file    Minutes of Exercise per Session: Not on file   Stress:     Feeling of Stress : Not on file   Social Connections:     Frequency of Communication with Friends and Family: Not on file    Frequency of Social Gatherings with Friends and Family: Not on file    Attends Spiritism Services: Not on file    Active Member of [address] [place] [address] or Organizations: Not on file    Attends Club or Organization Meetings: Not on file    Marital Status: Not on file   Intimate Partner Violence:     Fear of Current or Ex-Partner: Not on file    Emotionally Abused: Not on file    Physically Abused: Not on file    Sexually Abused: Not on file   Housing Stability:     Unable to Pay for Housing in the Last Year: Not on file    Number of [place] in the Last Year: Not on file    Unstable Housing in the Last Year: Not on file         PHYSICAL EXAM    (up to 7 for level 4, 8 or more for level 5)     ED Triage Vitals   BP Temp Temp src Pulse Resp SpO2 Height Weight   -- -- -- -- -- -- -- -- Physical Exam  General :Patient is awake, alert, oriented, in no acute distress, nontoxic appearing  HEENT: Pupils are equally round and reactive to light, EOMI, conjunctivae clear. Oral mucosa is moist, no exudate. Uvula is midline, no posterior pharynx erythema noted no asymmetrical tissue  Neck: Neck is supple, full range of motion, trachea midline, no palpable adenopathy  Cardiac: Heart regular rate, rhythm, no murmurs, rubs, or gallops  Lungs: Lungs are clear to auscultation, there is no wheezing, rhonchi, or rales. There is no use of accessory muscles. Abdomen: Abdomen is soft, diffuse tenderness to the entire abdomen to deep palpation, nondistended. There is no firm or pulsatile masses, no rebound rigidity or guarding. Musculoskeletal: 5 out of 5 strength in all 4 extremities. No focal muscle deficits are appreciated  Neuro: Motor intact, sensory intact, level of consciousness is normal   Dermatology: Skin is warm and dry  Psych: Mentation is grossly normal, cognition is grossly normal. Affect is appropriate. DIAGNOSTIC RESULTS     EKG: All EKG's are interpreted by the Emergency Department Physician who either signs or Co-signs this chart in the [address] a cardiologist.    G interpreted by me as sinus rhythm. Heart rate is 74, HI interval is 143, QRS durations 93, QT is 518 and QTc is 575 ms. No acute T wave inversions concerning for acute myocardial ischemia. No ST elevations concerning for acute myocardial infarction.   Prolonged QT interval.    RADIOLOGY:   Non-plain film images such as CT, Ultrasound and MRI are read by the radiologist. Plain radiographic images are visualized and preliminarily interpreted by the emergency physician with the below findings:      ? Radiologist's Report Reviewed:  No orders to display         ED BEDSIDE ULTRASOUND:   Performed by ED Physician - none    LABS:    I have reviewed and interpreted all of the currently available lab results from this visit (ifapplicable):  Results for orders placed or performed during the hospital encounter of 12/19/21   COVID-19, Rapid    Specimen: Nasopharyngeal Swab   Result Value Ref Range    SARS-CoV-2, NAAT Not Detected Not Detected   Rapid Influenza A/B Antigens    Specimen: Nasopharyngeal   Result Value Ref Range    Rapid Influenza A Ag Negative Negative    Rapid Influenza B Ag Negative Negative   Strep Screen Group A Throat    Specimen: Throat   Result Value Ref Range    Rapid Strep A Screen Negative Negative   CBC Auto Differential   Result Value Ref Range    WBC 6.9 4.0 - 11.0 K/uL    RBC 4.93 3.80 - 5.80 M/uL    Hemoglobin 14.7 11.5 - 16.5 g/dL    Hematocrit 46.4 37.0 - 47.0 %    MCV 94.1 80.0 - 100.0 fL    MCH 29.8 27.0 - 32.0 pg    MCHC 31.7 31.0 - 35.0 g/dL    RDW 14.6 11.0 - 16.0 %    Platelets 670 501 - 793 K/uL    MPV 9.7 6.0 - 10.0 fL    Neutrophils % 61.3 %    Immature Granulocytes % 0.3 0.0 - 5.0 %    Lymphocytes % 26.7 %    Monocytes % 7.7 %    Eosinophils % 3.3 %    Basophils % 0.7 %    Neutrophils Absolute 4.2 2.0 - 7.5 K/uL    Immature Granulocytes # 0.0 K/uL    Lymphocytes Absolute 1.9 1.5 - 4.0 K/uL    Monocytes Absolute 0.5 0.2 - 0.8 K/uL    Eosinophils Absolute 0.2 0.0 - 0.4 K/uL    Basophils Absolute 0.1 0.0 - 0.1 K/uL   Comprehensive Metabolic Panel w/ Reflex to MG   Result Value Ref Range    Sodium 138 136 - 145 mmol/L    Potassium reflex Magnesium 4.2 3.4 - 5.1 mmol/L    Chloride 95 (L) 98 - 107 mmol/L    CO2 29 20 - 30 mmol/L    Anion Gap 14 3 - 16    Glucose 157 (H) 74 - 106 mg/dL    BUN 14 6 - 20 mg/dL    CREATININE 0.8 0.4 - 1.2 mg/dL    GFR Non-African American >60 >59    GFR African American >59 >59    Calcium 9.9 8.5 - 10.5 mg/dL    Total Protein 8.3 6.4 - 8.3 g/dL    Albumin 4.8 3.4 - 4.8 g/dL    Albumin/Globulin Ratio 1.4 0.8 - 2.0    Total Bilirubin <0.2 (L) 0.3 - 1.2 mg/dL    Alkaline Phosphatase 98 25 - 100 U/L    ALT 21 4 - 36 U/L    AST 24 8 - 33 U/L    Globulin 3.5 Not Established g/dL Lipase   Result Value Ref Range    Lipase 48.0 5.6 - 51.3 U/L   Urinalysis Reflex to Culture    Specimen: Urine, clean catch   Result Value Ref Range    Color, UA Yellow Straw/Yellow    Clarity, UA SLCLOUDY Clear    Glucose, Ur Negative Negative mg/dL    Bilirubin Urine SMALL (A) Negative    Ketones, Urine TRACE (A) Negative mg/dL    Specific Gravity, UA >=1.030 1.005 - 1.030    Blood, Urine Negative Negative    pH, UA 5.5 5.0 - 8.0    Protein, UA 30 (A) Negative mg/dL    Urobilinogen, Urine 0.2 <2.0 E.U./dL    Nitrite, Urine Negative Negative    Leukocyte Esterase, Urine SMALL (A) Negative    Microscopic Examination YES     Urine Type NotGiven     Urine Reflex to Culture Not Indicated    Microscopic Urinalysis   Result Value Ref Range    Mucus, UA 1+ (A) None Seen /LPF    WBC, UA 6-9 (A) 0 - 5 /HPF    RBC, UA None seen 0 - 4 /HPF    Epithelial Cells, UA 21-50 (A) 0 - 5 /HPF    Bacteria, UA 1+ (A) None Seen /HPF        All other labs were within normal range or not returned as of this dictation. EMERGENCY DEPARTMENT COURSE and DIFFERENTIAL DIAGNOSIS/MDM:   Vitals:    Vitals:    12/19/21 1239   BP: (!) 153/80   Pulse: 72   Resp: 16   Temp: 98.1 °F (36.7 °C)   TempSrc: Oral   SpO2: 99%   Weight: 179 lb (81.2 kg)   Height: 5' 2\" (1.575 m)       MEDICATIONS ADMINISTERED IN ED:  Medications   ondansetron (ZOFRAN) injection 4 mg (4 mg IntraVENous Given 12/19/21 1302)   0.9 % sodium chloride bolus (1,000 mLs IntraVENous New Bag 12/19/21 1305)   ketorolac (TORADOL) injection 30 mg (30 mg IntraVENous Given 12/19/21 1303)       After initial evaluation examination I did have a conversation with the patient about the upcoming plan, treatment possible disposition which they were agreeable to the times dictation. Advised we give her fluid bolus normal saline 1 L. Will offer Toradol and Zofran for nausea and pain control.   Patient advised that on examination her abdomen is sort of diffusely tender but with deep palpation I do not feel that we need to perform CT scan of the abdomen pelvis at this time the abdomen does not seem to be acute in presentation and she is agreeable to this. Advised we would going perform rapid Covid, strep and influenza swab. Patient will also have CBC, CMP, lipase and UA performed. Patient had twelve-lead EKG performed since she was feeling lightheaded and dizzy. No neurological symptoms noted this is secondary to her nausea and vomiting. Final disposition will determine once her diagnostic studies been performed reviewed. Patient nontoxic-appearing. Rapid Covid screen was negative    Rapid influenza is swab was negative    Rapid strep screen was negative    Blood work showed white count 6900, hemoglobin is 14.7, hematocrit 46.4, platelet count 280. Comprehensive metabolic panel was benign except for chloride 95, glucose 157. Lipase is normal at 48. UA showed small amount of bilirubin, trace ketones greater than 1.030 specific gravity as patient's urine is concentrated most likely secondary to her poor oral intake. Microscopy showed +1 mucus, 6-9 white cells, no red cells seen, 21-50 epithelials and +1 bacteria. Did not meet criteria for culture. Patient's radiological diagnostic studies were discussed with her she does state her understanding. Her work-up was benign here. Hearing was negative did not meet criteria for culture but did have increased specific gravity meaning she could possibly a little dry as far as her fluid intake however her renal function was normal.  Patient tolerated bag of fluids here without any difficulty. Advised that she does need to increase her fluid intake and she states her understanding. Discussed with her about fluid resuscitation with Pedialyte versus Gatorade/Powerade. Patient be written a prescription for some Reglan for nausea. Otherwise discharged home in stable condition.     The patient advised that she does need to follow-up with her regular family physician within the next 1 to 2 days for evaluation. Patient was also given instructions of her symptoms worsens or new symptoms arise she should return back to the emergency department for further evaluation work-up. CONSULTS:  None    PROCEDURES:  Procedures    CRITICAL CARE TIME    Total Critical Care time was 0 minutes, excluding separately reportable procedures. There was a high probability of clinically significant/life threatening deterioration in the patient's condition which required my urgent intervention. FINAL IMPRESSION      1. Nausea vomiting and diarrhea          DISPOSITION/PLAN   DISPOSITION        PATIENT REFERRED TO:  Flip Ponce, APRN - NP  [address] [facility]  [phone]    In 2 days      [facility] Emergency Department  [facility]  [address] [facility]  [phone]    As needed, If symptoms worsen      DISCHARGE MEDICATIONS:  New Prescriptions    METOCLOPRAMIDE (REGLAN) 10 MG TABLET    Take 1 tablet by mouth 3 times daily (with meals) for 15 doses       Comment: Please note this report has been produced using speech recognition software and may contain errorsrelated to that system including errors in grammar, punctuation, and spelling, as well as words and phrases that may be inappropriate. If there are any questions or concerns please feel free to contact the dictating providerfor clarification.     Fito Prado DO  Attending Emergency Physician              Fito Prado DO  12/19/21 2029

## 2021-12-29 DIAGNOSIS — T45.1X5A PERIPHERAL NEUROPATHY DUE TO CHEMOTHERAPY (HCC): ICD-10-CM

## 2021-12-29 DIAGNOSIS — G62.0 PERIPHERAL NEUROPATHY DUE TO CHEMOTHERAPY (HCC): ICD-10-CM

## 2021-12-29 DIAGNOSIS — Z17.1 MALIGNANT NEOPLASM OF UPPER OUTER QUADRANT OF BREAST IN FEMALE, ESTROGEN RECEPTOR NEGATIVE, UNSPECIFIED LATERALITY (HCC): ICD-10-CM

## 2021-12-29 DIAGNOSIS — C50.419 MALIGNANT NEOPLASM OF UPPER OUTER QUADRANT OF BREAST IN FEMALE, ESTROGEN RECEPTOR NEGATIVE, UNSPECIFIED LATERALITY (HCC): ICD-10-CM

## 2021-12-29 RX ORDER — GABAPENTIN 100 MG/1
100 CAPSULE ORAL
Qty: 30 CAPSULE | Refills: 3 | Status: SHIPPED | OUTPATIENT
Start: 2021-12-29 | End: 2022-05-02

## 2022-05-01 DIAGNOSIS — T45.1X5A PERIPHERAL NEUROPATHY DUE TO CHEMOTHERAPY: ICD-10-CM

## 2022-05-01 DIAGNOSIS — G62.0 PERIPHERAL NEUROPATHY DUE TO CHEMOTHERAPY: ICD-10-CM

## 2022-05-01 DIAGNOSIS — Z17.1 MALIGNANT NEOPLASM OF UPPER OUTER QUADRANT OF BREAST IN FEMALE, ESTROGEN RECEPTOR NEGATIVE, UNSPECIFIED LATERALITY: ICD-10-CM

## 2022-05-01 DIAGNOSIS — C50.419 MALIGNANT NEOPLASM OF UPPER OUTER QUADRANT OF BREAST IN FEMALE, ESTROGEN RECEPTOR NEGATIVE, UNSPECIFIED LATERALITY: ICD-10-CM

## 2022-05-02 RX ORDER — GABAPENTIN 100 MG/1
100 CAPSULE ORAL
Qty: 30 CAPSULE | Refills: 3 | Status: SHIPPED | OUTPATIENT
Start: 2022-05-02 | End: 2022-09-28

## 2022-05-17 ENCOUNTER — OFFICE VISIT (OUTPATIENT)
Dept: ONCOLOGY | Facility: CLINIC | Age: 57
End: 2022-05-17

## 2022-05-17 VITALS
OXYGEN SATURATION: 98 % | RESPIRATION RATE: 18 BRPM | DIASTOLIC BLOOD PRESSURE: 79 MMHG | BODY MASS INDEX: 33.68 KG/M2 | TEMPERATURE: 97.6 F | SYSTOLIC BLOOD PRESSURE: 144 MMHG | HEART RATE: 76 BPM | WEIGHT: 183 LBS | HEIGHT: 62 IN

## 2022-05-17 DIAGNOSIS — Z85.3 HISTORY OF BREAST CANCER: Primary | ICD-10-CM

## 2022-05-17 PROCEDURE — 99214 OFFICE O/P EST MOD 30 MIN: CPT | Performed by: NURSE PRACTITIONER

## 2022-05-17 NOTE — PROGRESS NOTES
CHIEF COMPLAINT: 1.  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  History of breast cancer    Problem List:  Oncology/Hematology History Overview Note   1.  Stage IIB T2 (2.5cm per Dr. Perez) N0 invasive ductal triple negative breast cancer.  2/16/2020 right upper outer quadrant needle core biopsy showed invasive ductal carcinoma 4.4 mm moderately differentiated negative for estrogen, progesterone receptor both 0% and HER-2/vashti 0+.  Dr. Santo recommended lumpectomy and sentinel node biopsy.  3/12/2020 saw Dr. Gary Perez.perimenopausal breast cancer she felt a 2.5 cm right upper outer quadrant mass almost at the edge of the axilla somewhat fixed.  No skin changes.  She recommended neoadjuvant chemotherapy followed by lumpectomy versus mastectomy then radiation if patient decides on breast conservation or if nodes are found on breast MRI.  She will get formal genetic counseling to help guide the issues of possible prophylactic mastectomy.  With triple negative disease the standard recommendation would be neoadjuvant Adriamycin Cytoxan dose dense x4 followed by Taxol weekly x12 followed by surgery.  If there is residual disease then would consider Xeloda per the create X trial.  We also have  looking at observation versus Keytruda for 1 year in patients with triple negative disease who do not have a complete pathological response to neoadjuvant chemotherapy.  In the absence of symptoms, NCCN guidelines does not recommend routine metastatic surveillance imaging.  Will need ejection fraction before Adriamycin.  -3/24/2020 through 5/4/2020 Adriamycin Cytoxan x4.  Opted out of neoadjuvant Taxol  -6/16/2020 left breast simple mastectomy benign  Right breast simple mastectomy showed residual invasive ductal carcinoma 9 mm with limited DCIS high-grade and negative margins.  0 out of 1 sentinel nodes positive.  Grade 7 out of 9.  Pathologic T1 BN 0.  Postoperative course complicated by localized infections.    -7/6/2020 [facility] medical oncology follow-up visit: Given residual disease after 4 courses of AC I would want her to complete the Taxol as planned.  There is also new data presented at [facility] that suggests adjuvant Xeloda following adjuvant chemotherapy is reasonable and triple negative disease.  This is not precisely what she is doing as she has now done part of this neoadjuvant point but I think it makes sense to apply in this setting given residual disease after AC x4 but I would not forego the Taxol but will plan on adding Xeloda.  Reinforced the need for genetic testing salivary kit that has been sent to her but she has not completed.    -8/25/2020 Genetic testing was negative for pathogenic mutations in BRCA1/2 and 32 additional genes on the CancerXYverifyt panel.       -Taxol dose reduced by 20% with cycle #5 due to peripheral neuropathy    -9/11/2020 [facility] oncology clinic visit: Peripheral neuropathy continues, patient also in need of dental work, decision made to hold Taxol for 3 weeks to see if neuropathy improves and to allow time for oral surgery.    -9/4/2020 6th and final cycle of of Taxol as patient had terrible neuropathy despite dose reduction  -10/2/2020 [facility] hematology oncology follow-up visit: Neuropathy is not improved.  Does not like gabapentin.  Will stop Taxol permanently and press on with Xeloda after she has tooth extraction on October 14.  We will have her back to my nurse practitioner October 26 for cycle 1 of 6 of Xeloda.    -11/3/2020 [facility] oncology clinic visit: Had tooth extraction with no complications.  Has Xeloda tablets in hand, will begin first cycle today.  Neuropathy is stable.  Gabapentin has been decreased 200 mg at night.    -11/24/2020 [facility] oncology clinic visit:   Tolerated first cycle of Xeloda fairly well.  Did have diarrhea controlled with Imodium.  Has fatigue.  Otherwise no side effects.  CBC and CMP are unremarkable.  We will continue with cycle #2 of a planned  6 cycles today.    -1/5/2021 Completed 4 of a planned 6 cycles of Xeloda.  Opted out of any further Xeloda.    2.  Hypertension  3.  Hyperlipidemia  4.  Diabetes  5.  Using IUD and taking estradiol for hormone replacement therapy preoperatively  6.  History of D and C as well as bilateral tubal ligation  7.  History of cholecystectomy  8.  Vasomotor symptoms  9.  Family history of paternal aunt with breast cancer and perhaps a sister with ovarian cancer but she is not sure.  She has nulliparous.  Genetic spit test done results pending.    10.  Anxiety and depression     Malignant neoplasm of upper-outer quadrant of right breast in female, estrogen receptor negative (HCC)   3/12/2020 Initial Diagnosis    Malignant neoplasm of upper outer quadrant of breast in female, estrogen receptor negative (CMS/HCC)     3/12/2020 Cancer Staged    Staging form: Breast, AJCC 8th Edition  - Clinical: Stage IIB (cT2, cN0, cM0, G2, ER-, DC-, HER2-) - Signed by Johann Juarez MD on 3/12/2020     3/24/2020 - 5/4/2020 Chemotherapy    OP BREAST AC DD DOXOrubicin / Cyclophosphamide       5/6/2020 -  Chemotherapy    OP SUPPORTIVE HYDRATION + ANTIEMETICS     6/16/2020 Surgery    Surgery       -6/16/2020 left breast simple mastectomy benign  Right breast simple mastectomy showed residual invasive ductal carcinoma 9 mm with limited DCIS high-grade and negative margins.  0 out of 1 sentinel nodes positive.  Grade 7 out of 9.  Pathologic T1 BN 0     7/6/2020 Cancer Staged    Staging form: Breast, AJCC 8th Edition  - Pathologic: Stage IB (pT1b, pN0, cM0, G2, ER-, DC-, HER2-) - Signed by Johann Juarez MD on 7/6/2020 7/31/2020 -  Chemotherapy    OP CENTRAL VENOUS ACCESS DEVICE ACCESS, CARE, AND MAINTENANCE (CVAD)     7/31/2020 - 9/4/2020 Chemotherapy    Taxol       11/24/2020 - 1/5/2021 Chemotherapy    OP BREAST Capecitabine  Completed 4 of 6 planned cycles     Malignant neoplasm of upper-outer quadrant of right breast in female, estrogen  receptor negative (HCC)   3/12/2020 Initial Diagnosis    Malignant neoplasm of upper-outer quadrant of right breast in female, estrogen receptor negative (CMS/HCC)     3/24/2020 - 5/18/2020 Chemotherapy    OP BREAST AC DD DOXOrubicin / Cyclophosphamide     7/31/2020 - 9/10/2020 Chemotherapy    OP BREAST PACLitaxel (weekly X 12)     8/25/2020 Genetic Testing    Genetic testing was negative for pathogenic mutations in BRCA1/2 and 32 additional genes on the CancerNext panel.        8/28/2020 Adverse Reaction    Taxol dose reduced by 20% due to peripheral neuropathy with cycle #5.         HISTORY OF PRESENT ILLNESS:  The patient is a 56 y.o. female, here for follow up on management of history of triple negative breast cancer as outlined above in the oncology history, she also has peripheral neuropathy in her feet secondary to chemotherapy.  Kenya has been doing well since we saw her last with no new concerns.  She is scheduled to undergo revision of her breast reconstruction with fat grafting in early June.  She has no new or concerning bony aches or pains, no new or concerning findings on chest exam.  She did have some questions regarding scans for surveillance as a neighbor of hers was recently diagnosed with recurrent and now metastatic breast cancer.  Her neuropathy in her feet is stable, she takes gabapentin at night and this helps.    Past Medical History:   Diagnosis Date   • Cancer (HCC) 02/2020    breast cancer per mammogram; chemotherapy    • Diabetes (HCC)     type 2 dm, check bloos suagr twice a week   • History of low potassium 03/2020    prescribed potassium supplements    • History of transfusion 1989    after delivery of son (1 unit only)    • HTN (hypertension)    • IUD (intrauterine device) in place    • Low magnesium level 03/2020    requiring magnesium supplements    • Narcolepsy    • Neuropathy    • Sleep apnea     no cpap machine currently; d/t weight loss   • Wears partial dentures     bottom  "    Past Surgical History:   Procedure Laterality Date   • BREAST BIOPSY Right 02/2020   • BREAST RECONSTRUCTION, BREAST TISSUE EXPANDER INSERTION Bilateral 6/10/2021    Procedure: BREAST TISSUE EXPANDER AND ALLODERM PLACEMENT BILATERAL;  Surgeon: Isiah Franco MD;  Location: [facility] OR;  Service: Plastics;  Laterality: Bilateral;   • CHOLECYSTECTOMY     • COLONOSCOPY     • LAPAROSCOPIC TUBAL LIGATION     • LASIK Right     7-   • MASTECTOMY W/ SENTINEL NODE BIOPSY Bilateral 6/16/2020    Procedure: SKIN SPARING MASTECTOMIES BILATERAL AND RIGHT SENTINEL NODE BIOPSY;  Surgeon: Gary Perez MD;  Location:  [facility];  Service: General;  Laterality: Bilateral;   • VENOUS ACCESS DEVICE (PORT) INSERTION  02/2020   • VENOUS ACCESS DEVICE (PORT) REMOVAL Bilateral 6/10/2021    Procedure: PORT REMOVAL;  Surgeon: Isiah Franco MD;  Location: [facility] OR;  Service: Plastics;  Laterality: Bilateral;       No Known Allergies    Family History and Social History reviewed and changed as necessary    REVIEW OF SYSTEM:   Positive for peripheral neuropathy in her feet    PHYSICAL EXAM:  Well-developed, well-nourished female in no distress  No cervical, supraclavicular or axillary nodes palpable on exam  Skin tissue over bilateral reconstructed breast is soft, no abnormal masses or nodules, no rashes or lesions.    Vitals:    05/17/22 1455   BP: 144/79   Pulse: 76   Resp: 18   Temp: 97.6 °F (36.4 °C)   SpO2: 98%   Weight: 83 kg (183 lb)   Height: 157.5 cm (62\")     Vitals:    05/17/22 1455   PainSc: 0-No pain          ECOG score: 0           Vitals reviewed.    ECOG: (0) Fully Active - Able to Carry On All Pre-disease Performance Without Restriction      ASSESSMENT & PLAN:    1.  History of triple negative right breast cancer as outlined above in the oncology history  2.  Peripheral neuropathy of her feet secondary to chemotherapy    Discussion: Kenya is doing well with no evidence of disease on clinical exam and no new " worrisome symptoms.  She did have questions today about surveillance imaging as she had a neighbor who recently was unfortunately diagnosed with recurrent metastatic breast cancer.  I discussed with [place] NCCN guidelines and the reasoning behind the recommendation for no routine scans in the absence of new symptoms and she currently has no symptoms and is feeling well.  She stated understanding.    She is on gabapentin for peripheral neuropathy in her feet and this has been helpful and has not worsened with time.  She does not need a refill today.  She has an IUD still in place that she states was put there previously for irregular menses as she was perimenopausal.  She was asking if this should be removed, I asked her to discuss this with her gynecologist.  She states her gynecologist has since moved but she will look into finding another gynecologist in the practice.  We will continue to see her for follow-up in 6 months and sooner if she has new concerns.    This was a level 4, moderate MDM visit with 2 stable chronic illnesses and prescription drug management of her gabapentin.    Camelia Carranza, APRN    05/17/2022

## 2022-06-07 ENCOUNTER — PRE-ADMISSION TESTING (OUTPATIENT)
Dept: PREADMISSION TESTING | Facility: HOSPITAL | Age: 57
End: 2022-06-07

## 2022-06-07 VITALS — WEIGHT: 181.99 LBS | BODY MASS INDEX: 33.49 KG/M2 | HEIGHT: 62 IN

## 2022-06-07 LAB
DEPRECATED RDW RBC AUTO: 44.6 FL (ref 37–54)
ERYTHROCYTE [DISTWIDTH] IN BLOOD BY AUTOMATED COUNT: 13.1 % (ref 12.3–15.4)
HCT VFR BLD AUTO: 39.6 % (ref 34–46.6)
HGB BLD-MCNC: 12.9 G/DL (ref 12–15.9)
MCH RBC QN AUTO: 30.1 PG (ref 26.6–33)
MCHC RBC AUTO-ENTMCNC: 32.6 G/DL (ref 31.5–35.7)
MCV RBC AUTO: 92.3 FL (ref 79–97)
PLATELET # BLD AUTO: 358 10*3/MM3 (ref 140–450)
PMV BLD AUTO: 9.7 FL (ref 6–12)
POTASSIUM SERPL-SCNC: 3.7 MMOL/L (ref 3.5–5.2)
RBC # BLD AUTO: 4.29 10*6/MM3 (ref 3.77–5.28)
SARS-COV-2 RNA PNL SPEC NAA+PROBE: NOT DETECTED
WBC NRBC COR # BLD: 8.82 10*3/MM3 (ref 3.4–10.8)

## 2022-06-07 PROCEDURE — 93005 ELECTROCARDIOGRAM TRACING: CPT

## 2022-06-07 PROCEDURE — U0004 COV-19 TEST NON-CDC HGH THRU: HCPCS

## 2022-06-07 PROCEDURE — 36415 COLL VENOUS BLD VENIPUNCTURE: CPT

## 2022-06-07 PROCEDURE — 85027 COMPLETE CBC AUTOMATED: CPT

## 2022-06-07 PROCEDURE — 84132 ASSAY OF SERUM POTASSIUM: CPT

## 2022-06-07 PROCEDURE — 93010 ELECTROCARDIOGRAM REPORT: CPT | Performed by: INTERNAL MEDICINE

## 2022-06-07 PROCEDURE — C9803 HOPD COVID-19 SPEC COLLECT: HCPCS

## 2022-06-07 NOTE — PAT
"An arrival time for procedure was not given during PAT visit. If patient had any questions or concerns about their arrival time, they were instructed to contact their surgeon/physician.  Additionally, if the patient referred to an arrival time that was acquired from their my chart account, patient was encouraged to verify that time with their surgeon/physician.  NO arrival times given in Pre Admission Testing Department.    Patient to apply Chlorhexadine wipes  to surgical area (as instructed) the night before procedure and the AM of procedure. Wipes provided.    Patient denies any current skin issues.     Patient viewed general PAT education video as instructed in their preoperative information received from their surgeon.  Patient stated the general PAT education video was viewed in its entirety and survey completed.  Copies of PAT general education handouts (Incentive Spirometry, Meds to Beds Program, Patient Belongings, Pre-op skin preparation instructions, Blood Glucose testing, Visitor policy, Surgery FAQ, Code H) distributed to patient if not printed. Education related to the PAT pass and skin preparation for surgery (if applicable) completed in PAT as a reinforcement to PAT education video. Patient instructed to return PAT pass provided today as well as completed skin preparation sheet (if applicable) on the day of procedure.     Additionally if patient had not viewed video yet but intended to view it at home or in our waiting area, then referred them to the handout with QR code/link provided during PAT visit.  Instructed patient to complete survey after viewing the video in its entirety.  Encouraged patient/family to read PAT general education handouts thoroughly and notify PAT staff with any questions or concerns. Patient verbalized understanding of all information and priority content.    PATIENT STATES THAT SHE HAS BEEN ON ANTIBIOTICS (AUGMENTIN) \"ABOUT A WEEK\" AS OF 06/07/2022. CALLED AND SPOKE TO " MEGAN WITH DR DUVAL'S OFFICE. SHE SAID THAT SHE WOULD FOLLOW UP WITH DR DUVAL ON 06/08/2022 AND WOULD THEN GIVE PATIENT FURTHER INSTRUCTIONS. PATIENT AWARE AND WILL AWAIT FOR COMMUNICATION FROM DR DUVAL/MEGAN.

## 2022-06-08 LAB
QT INTERVAL: 390 MS
QTC INTERVAL: 417 MS

## 2022-06-09 ENCOUNTER — HOSPITAL ENCOUNTER (OUTPATIENT)
Facility: HOSPITAL | Age: 57
Setting detail: HOSPITAL OUTPATIENT SURGERY
Discharge: HOME | End: 2022-06-09
Attending: PLASTIC SURGERY | Admitting: PLASTIC SURGERY

## 2022-06-09 ENCOUNTER — ANESTHESIA EVENT (OUTPATIENT)
Dept: PERIOP | Facility: HOSPITAL | Age: 57
End: 2022-06-09

## 2022-06-09 ENCOUNTER — ANESTHESIA (OUTPATIENT)
Dept: PERIOP | Facility: HOSPITAL | Age: 57
End: 2022-06-09

## 2022-06-09 ENCOUNTER — ANESTHESIA EVENT CONVERTED (OUTPATIENT)
Dept: ANESTHESIOLOGY | Facility: HOSPITAL | Age: 57
End: 2022-06-09

## 2022-06-09 VITALS
TEMPERATURE: 99.3 F | HEART RATE: 75 BPM | RESPIRATION RATE: 20 BRPM | SYSTOLIC BLOOD PRESSURE: 144 MMHG | BODY MASS INDEX: 33.31 KG/M2 | OXYGEN SATURATION: 97 % | WEIGHT: 181 LBS | DIASTOLIC BLOOD PRESSURE: 66 MMHG | HEIGHT: 62 IN

## 2022-06-09 LAB
GLUCOSE BLDC GLUCOMTR-MCNC: 155 MG/DL (ref 70–130)
GLUCOSE BLDC GLUCOMTR-MCNC: 192 MG/DL (ref 70–130)
GLUCOSE BLDC GLUCOMTR-MCNC: 201 MG/DL (ref 70–130)

## 2022-06-09 PROCEDURE — 25010000002 DEXAMETHASONE SODIUM PHOSPHATE 10 MG/ML SOLUTION: Performed by: NURSE ANESTHETIST, CERTIFIED REGISTERED

## 2022-06-09 PROCEDURE — 25010000002 CEFAZOLIN IN DEXTROSE 2-4 GM/100ML-% SOLUTION

## 2022-06-09 PROCEDURE — 82962 GLUCOSE BLOOD TEST: CPT

## 2022-06-09 PROCEDURE — 25010000002 FENTANYL CITRATE (PF) 50 MCG/ML SOLUTION: Performed by: NURSE ANESTHETIST, CERTIFIED REGISTERED

## 2022-06-09 PROCEDURE — 25010000002 PROPOFOL 10 MG/ML EMULSION: Performed by: NURSE ANESTHETIST, CERTIFIED REGISTERED

## 2022-06-09 PROCEDURE — 25010000002 ONDANSETRON PER 1 MG: Performed by: NURSE ANESTHETIST, CERTIFIED REGISTERED

## 2022-06-09 PROCEDURE — 25010000002 KETOROLAC TROMETHAMINE PER 15 MG: Performed by: NURSE ANESTHETIST, CERTIFIED REGISTERED

## 2022-06-09 PROCEDURE — 25010000002 DEXAMETHASONE PER 1 MG: Performed by: NURSE ANESTHETIST, CERTIFIED REGISTERED

## 2022-06-09 PROCEDURE — 25010000002 EPINEPHRINE PER 0.1 MG: Performed by: PLASTIC SURGERY

## 2022-06-09 RX ORDER — DEXAMETHASONE SODIUM PHOSPHATE 10 MG/ML
INJECTION INTRAMUSCULAR; INTRAVENOUS AS NEEDED
Status: DISCONTINUED | OUTPATIENT
Start: 2022-06-09 | End: 2022-06-09 | Stop reason: SURG

## 2022-06-09 RX ORDER — LIDOCAINE HYDROCHLORIDE 10 MG/ML
0.5 INJECTION, SOLUTION EPIDURAL; INFILTRATION; INTRACAUDAL; PERINEURAL ONCE AS NEEDED
Status: DISCONTINUED | OUTPATIENT
Start: 2022-06-09 | End: 2022-06-09 | Stop reason: HOSPADM

## 2022-06-09 RX ORDER — ROCURONIUM BROMIDE 10 MG/ML
INJECTION, SOLUTION INTRAVENOUS AS NEEDED
Status: DISCONTINUED | OUTPATIENT
Start: 2022-06-09 | End: 2022-06-09 | Stop reason: SURG

## 2022-06-09 RX ORDER — HYDROMORPHONE HYDROCHLORIDE 1 MG/ML
0.5 INJECTION, SOLUTION INTRAMUSCULAR; INTRAVENOUS; SUBCUTANEOUS
Status: DISCONTINUED | OUTPATIENT
Start: 2022-06-09 | End: 2022-06-09 | Stop reason: HOSPADM

## 2022-06-09 RX ORDER — SODIUM CHLORIDE 0.9 % (FLUSH) 0.9 %
10 SYRINGE (ML) INJECTION AS NEEDED
Status: DISCONTINUED | OUTPATIENT
Start: 2022-06-09 | End: 2022-06-09

## 2022-06-09 RX ORDER — LABETALOL HYDROCHLORIDE 5 MG/ML
INJECTION, SOLUTION INTRAVENOUS
Status: COMPLETED
Start: 2022-06-09 | End: 2022-06-09

## 2022-06-09 RX ORDER — LIDOCAINE HYDROCHLORIDE AND EPINEPHRINE 10; 10 MG/ML; UG/ML
INJECTION, SOLUTION INFILTRATION; PERINEURAL AS NEEDED
Status: DISCONTINUED | OUTPATIENT
Start: 2022-06-09 | End: 2022-06-09 | Stop reason: HOSPADM

## 2022-06-09 RX ORDER — OXYCODONE HYDROCHLORIDE AND ACETAMINOPHEN 5; 325 MG/1; MG/1
1 TABLET ORAL ONCE AS NEEDED
Status: COMPLETED | OUTPATIENT
Start: 2022-06-09 | End: 2022-06-09

## 2022-06-09 RX ORDER — MIDAZOLAM HYDROCHLORIDE 1 MG/ML
1 INJECTION INTRAMUSCULAR; INTRAVENOUS
Status: DISCONTINUED | OUTPATIENT
Start: 2022-06-09 | End: 2022-06-09 | Stop reason: HOSPADM

## 2022-06-09 RX ORDER — LIDOCAINE HYDROCHLORIDE 20 MG/ML
INJECTION, SOLUTION INFILTRATION; PERINEURAL AS NEEDED
Status: DISCONTINUED | OUTPATIENT
Start: 2022-06-09 | End: 2022-06-09 | Stop reason: SURG

## 2022-06-09 RX ORDER — LABETALOL HYDROCHLORIDE 5 MG/ML
5 INJECTION, SOLUTION INTRAVENOUS
Status: DISCONTINUED | OUTPATIENT
Start: 2022-06-09 | End: 2022-06-09 | Stop reason: HOSPADM

## 2022-06-09 RX ORDER — BUPIVACAINE HYDROCHLORIDE 2.5 MG/ML
INJECTION, SOLUTION EPIDURAL; INFILTRATION; INTRACAUDAL
Status: COMPLETED | OUTPATIENT
Start: 2022-06-09 | End: 2022-06-09

## 2022-06-09 RX ORDER — FENTANYL CITRATE 50 UG/ML
INJECTION, SOLUTION INTRAMUSCULAR; INTRAVENOUS AS NEEDED
Status: DISCONTINUED | OUTPATIENT
Start: 2022-06-09 | End: 2022-06-09 | Stop reason: SURG

## 2022-06-09 RX ORDER — BUPIVACAINE HCL/0.9 % NACL/PF 0.125 %
PLASTIC BAG, INJECTION (ML) EPIDURAL AS NEEDED
Status: DISCONTINUED | OUTPATIENT
Start: 2022-06-09 | End: 2022-06-09 | Stop reason: SURG

## 2022-06-09 RX ORDER — SODIUM CHLORIDE, SODIUM LACTATE, POTASSIUM CHLORIDE, CALCIUM CHLORIDE 600; 310; 30; 20 MG/100ML; MG/100ML; MG/100ML; MG/100ML
9 INJECTION, SOLUTION INTRAVENOUS CONTINUOUS
Status: DISCONTINUED | OUTPATIENT
Start: 2022-06-09 | End: 2022-06-09 | Stop reason: HOSPADM

## 2022-06-09 RX ORDER — SODIUM CHLORIDE, SODIUM LACTATE, POTASSIUM CHLORIDE, AND CALCIUM CHLORIDE .6; .31; .03; .02 G/100ML; G/100ML; G/100ML; G/100ML
IRRIGANT IRRIGATION AS NEEDED
Status: DISCONTINUED | OUTPATIENT
Start: 2022-06-09 | End: 2022-06-09 | Stop reason: HOSPADM

## 2022-06-09 RX ORDER — SODIUM CHLORIDE, SODIUM LACTATE, POTASSIUM CHLORIDE, CALCIUM CHLORIDE 600; 310; 30; 20 MG/100ML; MG/100ML; MG/100ML; MG/100ML
9 INJECTION, SOLUTION INTRAVENOUS CONTINUOUS
Status: DISCONTINUED | OUTPATIENT
Start: 2022-06-09 | End: 2022-06-09 | Stop reason: SDUPTHER

## 2022-06-09 RX ORDER — SODIUM CHLORIDE, SODIUM LACTATE, POTASSIUM CHLORIDE, CALCIUM CHLORIDE 600; 310; 30; 20 MG/100ML; MG/100ML; MG/100ML; MG/100ML
INJECTION, SOLUTION INTRAVENOUS CONTINUOUS PRN
Status: DISCONTINUED | OUTPATIENT
Start: 2022-06-09 | End: 2022-06-09 | Stop reason: SURG

## 2022-06-09 RX ORDER — FAMOTIDINE 10 MG/ML
20 INJECTION, SOLUTION INTRAVENOUS ONCE
Status: DISCONTINUED | OUTPATIENT
Start: 2022-06-09 | End: 2022-06-09 | Stop reason: SDUPTHER

## 2022-06-09 RX ORDER — KETOROLAC TROMETHAMINE 30 MG/ML
INJECTION, SOLUTION INTRAMUSCULAR; INTRAVENOUS AS NEEDED
Status: DISCONTINUED | OUTPATIENT
Start: 2022-06-09 | End: 2022-06-09 | Stop reason: SURG

## 2022-06-09 RX ORDER — SODIUM CHLORIDE 0.9 % (FLUSH) 0.9 %
10 SYRINGE (ML) INJECTION EVERY 12 HOURS SCHEDULED
Status: DISCONTINUED | OUTPATIENT
Start: 2022-06-09 | End: 2022-06-09

## 2022-06-09 RX ORDER — PROPOFOL 10 MG/ML
VIAL (ML) INTRAVENOUS AS NEEDED
Status: DISCONTINUED | OUTPATIENT
Start: 2022-06-09 | End: 2022-06-09 | Stop reason: SURG

## 2022-06-09 RX ORDER — CEFAZOLIN SODIUM 2 G/100ML
2 INJECTION, SOLUTION INTRAVENOUS ONCE
Status: COMPLETED | OUTPATIENT
Start: 2022-06-09 | End: 2022-06-09

## 2022-06-09 RX ORDER — LIDOCAINE HYDROCHLORIDE 10 MG/ML
0.5 INJECTION, SOLUTION EPIDURAL; INFILTRATION; INTRACAUDAL; PERINEURAL ONCE
Status: COMPLETED | OUTPATIENT
Start: 2022-06-09 | End: 2022-06-09

## 2022-06-09 RX ORDER — MAGNESIUM HYDROXIDE 1200 MG/15ML
LIQUID ORAL AS NEEDED
Status: DISCONTINUED | OUTPATIENT
Start: 2022-06-09 | End: 2022-06-09 | Stop reason: HOSPADM

## 2022-06-09 RX ORDER — OXYCODONE HYDROCHLORIDE AND ACETAMINOPHEN 5; 325 MG/1; MG/1
TABLET ORAL
Status: DISCONTINUED
Start: 2022-06-09 | End: 2022-06-09 | Stop reason: HOSPADM

## 2022-06-09 RX ORDER — DEXAMETHASONE SODIUM PHOSPHATE 10 MG/ML
INJECTION, SOLUTION INTRAMUSCULAR; INTRAVENOUS
Status: COMPLETED | OUTPATIENT
Start: 2022-06-09 | End: 2022-06-09

## 2022-06-09 RX ORDER — FAMOTIDINE 20 MG/1
20 TABLET, FILM COATED ORAL ONCE
Status: COMPLETED | OUTPATIENT
Start: 2022-06-09 | End: 2022-06-09

## 2022-06-09 RX ORDER — ONDANSETRON 2 MG/ML
INJECTION INTRAMUSCULAR; INTRAVENOUS AS NEEDED
Status: DISCONTINUED | OUTPATIENT
Start: 2022-06-09 | End: 2022-06-09 | Stop reason: SURG

## 2022-06-09 RX ORDER — FAMOTIDINE 20 MG/1
20 TABLET, FILM COATED ORAL ONCE
Status: DISCONTINUED | OUTPATIENT
Start: 2022-06-09 | End: 2022-06-09 | Stop reason: SDUPTHER

## 2022-06-09 RX ORDER — FENTANYL CITRATE 50 UG/ML
50 INJECTION, SOLUTION INTRAMUSCULAR; INTRAVENOUS
Status: DISCONTINUED | OUTPATIENT
Start: 2022-06-09 | End: 2022-06-09 | Stop reason: HOSPADM

## 2022-06-09 RX ORDER — ROPIVACAINE HYDROCHLORIDE 2 MG/ML
INJECTION, SOLUTION EPIDURAL; INFILTRATION; PERINEURAL
Status: DISCONTINUED | OUTPATIENT
Start: 2022-06-09 | End: 2022-06-09

## 2022-06-09 RX ORDER — CEFAZOLIN SODIUM IN 0.9 % NACL 2 G/100 ML
2 PLASTIC BAG, INJECTION (ML) INTRAVENOUS ONCE
Status: DISCONTINUED | OUTPATIENT
Start: 2022-06-09 | End: 2022-06-09

## 2022-06-09 RX ORDER — ROPIVACAINE HYDROCHLORIDE 5 MG/ML
INJECTION, SOLUTION EPIDURAL; INFILTRATION; PERINEURAL
Status: DISCONTINUED | OUTPATIENT
Start: 2022-06-09 | End: 2022-06-09

## 2022-06-09 RX ORDER — AMOXICILLIN AND CLAVULANATE POTASSIUM 875; 125 MG/1; MG/1
1 TABLET, FILM COATED ORAL 2 TIMES DAILY
COMMUNITY

## 2022-06-09 RX ADMIN — FENTANYL CITRATE 50 MCG: 50 INJECTION, SOLUTION INTRAMUSCULAR; INTRAVENOUS at 07:36

## 2022-06-09 RX ADMIN — SODIUM CHLORIDE, POTASSIUM CHLORIDE, SODIUM LACTATE AND CALCIUM CHLORIDE 9 ML/HR: 600; 310; 30; 20 INJECTION, SOLUTION INTRAVENOUS at 06:00

## 2022-06-09 RX ADMIN — FENTANYL CITRATE 25 MCG: 50 INJECTION, SOLUTION INTRAMUSCULAR; INTRAVENOUS at 09:04

## 2022-06-09 RX ADMIN — KETOROLAC TROMETHAMINE 30 MG: 30 INJECTION, SOLUTION INTRAMUSCULAR; INTRAVENOUS at 09:04

## 2022-06-09 RX ADMIN — PROPOFOL 200 MG: 10 INJECTION, EMULSION INTRAVENOUS at 07:36

## 2022-06-09 RX ADMIN — SUGAMMADEX 200 MG: 100 INJECTION, SOLUTION INTRAVENOUS at 09:15

## 2022-06-09 RX ADMIN — FENTANYL CITRATE 25 MCG: 50 INJECTION, SOLUTION INTRAMUSCULAR; INTRAVENOUS at 09:33

## 2022-06-09 RX ADMIN — SODIUM CHLORIDE, POTASSIUM CHLORIDE, SODIUM LACTATE AND CALCIUM CHLORIDE: 600; 310; 30; 20 INJECTION, SOLUTION INTRAVENOUS at 07:36

## 2022-06-09 RX ADMIN — FAMOTIDINE 20 MG: 20 TABLET ORAL at 06:15

## 2022-06-09 RX ADMIN — DEXAMETHASONE SODIUM PHOSPHATE 8 MG: 10 INJECTION INTRAMUSCULAR; INTRAVENOUS at 07:51

## 2022-06-09 RX ADMIN — LIDOCAINE HYDROCHLORIDE 80 MG: 20 INJECTION, SOLUTION INFILTRATION; PERINEURAL at 07:36

## 2022-06-09 RX ADMIN — ROCURONIUM BROMIDE 50 MG: 10 INJECTION, SOLUTION INTRAVENOUS at 07:36

## 2022-06-09 RX ADMIN — Medication 100 MCG: at 08:03

## 2022-06-09 RX ADMIN — ONDANSETRON 4 MG: 2 INJECTION INTRAMUSCULAR; INTRAVENOUS at 08:55

## 2022-06-09 RX ADMIN — Medication 100 MCG: at 08:07

## 2022-06-09 RX ADMIN — FENTANYL CITRATE 25 MCG: 50 INJECTION, SOLUTION INTRAMUSCULAR; INTRAVENOUS at 09:22

## 2022-06-09 RX ADMIN — BUPIVACAINE HYDROCHLORIDE 60 ML: 2.5 INJECTION, SOLUTION EPIDURAL; INFILTRATION; INTRACAUDAL; PERINEURAL at 07:45

## 2022-06-09 RX ADMIN — FENTANYL CITRATE 50 MCG: 50 INJECTION, SOLUTION INTRAMUSCULAR; INTRAVENOUS at 07:31

## 2022-06-09 RX ADMIN — DEXAMETHASONE SODIUM PHOSPHATE 2 MG: 10 INJECTION, SOLUTION INTRAMUSCULAR; INTRAVENOUS at 07:45

## 2022-06-09 RX ADMIN — FENTANYL CITRATE 25 MCG: 50 INJECTION, SOLUTION INTRAMUSCULAR; INTRAVENOUS at 09:14

## 2022-06-09 RX ADMIN — LABETALOL HYDROCHLORIDE 5 MG: 5 INJECTION INTRAVENOUS at 10:13

## 2022-06-09 RX ADMIN — PROPOFOL 25 MCG/KG/MIN: 10 INJECTION, EMULSION INTRAVENOUS at 07:43

## 2022-06-09 RX ADMIN — OXYCODONE HYDROCHLORIDE AND ACETAMINOPHEN 1 TABLET: 5; 325 TABLET ORAL at 11:58

## 2022-06-09 RX ADMIN — SODIUM CHLORIDE, POTASSIUM CHLORIDE, SODIUM LACTATE AND CALCIUM CHLORIDE: 600; 310; 30; 20 INJECTION, SOLUTION INTRAVENOUS at 09:19

## 2022-06-09 RX ADMIN — CEFAZOLIN SODIUM 2 G: 2 INJECTION, SOLUTION INTRAVENOUS at 07:39

## 2022-06-09 RX ADMIN — LIDOCAINE HYDROCHLORIDE 0.5 ML: 10 INJECTION, SOLUTION EPIDURAL; INFILTRATION; INTRACAUDAL; PERINEURAL at 06:00

## 2022-06-09 NOTE — ANESTHESIA PROCEDURE NOTES
Airway  Urgency: elective    Date/Time: 6/9/2022 7:40 AM  Airway not difficult    General Information and Staff    Patient location during procedure: OR  CRNA/CAA: Nolvia Rios CRNA    Indications and Patient Condition  Indications for airway management: airway protection    Preoxygenated: yes  MILS not maintained throughout  Mask difficulty assessment: 1 - vent by mask    Final Airway Details  Final airway type: endotracheal airway      Successful airway: ETT  Cuffed: yes   Successful intubation technique: video laryngoscopy  Endotracheal tube insertion site: oral  Blade: Harris  Blade size: 3  ETT size (mm): 7.0  Cormack-Lehane Classification: grade IIa - partial view of glottis  Placement verified by: chest auscultation and capnometry   Cuff volume (mL): 6  Measured from: lips  ETT/EBT  to lips (cm): 20  Number of attempts at approach: 1  Assessment: lips, teeth, and gum same as pre-op and atraumatic intubation    Additional Comments  Negative epigastric sounds, Breath sound equal bilaterally with symmetric chest rise and fall

## 2022-06-09 NOTE — OP NOTE
Preoperative diagnosis: 1.  Personal history of breast cancer  2.  Bilateral absent breast nipple areolar complex  3.  Breast asymmetry    Postoperative diagnosis: 1.  Personal history of breast cancer  2.  Bilateral absent breast nipple areolar complex  3.  Breast asymmetry    Surgeon: Isiah Franco MD    Assistant: Cristi BLACKMON was responsible for performing the following activities: Retraction, Suction, Irrigation, Suturing, Closing and Placing Dressing and their skilled assistance was necessary for the success of this case.    Anesthesia: General    Procedure: 1.  Bilateral revision of her reconstructed breast with placement of fat graft.   140 cc of fat was injected on the left side and 120 cc of fat was injected on the right side  2.  Bilateral nipple reconstruction with a modified star flap  3.  Complex closure bilateral reconstructed breast measuring 5 cm  4.  Dermal fat graft to left reconstructed nipple    Indication: The patient is a 56-year-old white female who presented to my office with a diagnosis of breast cancer.  She is since undergone bilateral mastectomy and bilateral implant-based breast reconstruction.  She is noted to have absent nipple areolar complex.  I recommended nipple reconstruction with a modified star flap.  She is also noted to have breast asymmetries as well as volume deficiencies in her bilateral medial, superior medial, superior, superior lateral, and lateral poles of her reconstructed breast.  I have recommended fat grafting.  All techniques potential complications and typical postoperative course were discussed with the patient.  She indicated her understanding wished to proceed.    Findings: 1.  Bilateral absent breast and nipple areolar complex  2.  Breast asymmetries and deficiencies as noted above    Description: The patient was taken from preoperative holding to the operating room after informed consent was signed on the chart and placed under general anesthesia  successfully.  Prior to induction of anesthesia, the patient received a prophylactic dose of antibiotics and had bilateral lower extremity sequential compression devices in place and operational.  She was placed supine on the operating table.  She had a pillow placed beneath her knees.  Her arms were gently abducted to 90 degrees and she had ulnar nerve padding.  Her chest wall, abdomen, and flanks were prepped and draped in the usual sterile fashion.  After properly identifying the patient and the patient's problem, bilateral lower quadrant stab incisions were made with a 15 blade.  Tumescent solution consisting of 1 L of LR and 1 ampoule of epinephrine were injected into her abdomen and flanks.  Next, a 4.0 mm accelerator cannula was used to harvest fat into a sterile Revolve system.  The fat was washed 3 times with warm lactated Ringer solution.  The liquid was removed into the vacuum.  The fat was then transferred into 20 cc syringes.  Next, bilateral medial and lateral inframammary crease stab incisions were made with a 15 blade.  Fat was then injected into the aforementioned areas utilizing a 2 mm Crouch cannula.  140 cc of fat was injected on the left and 120 cc of fat was injected on the right in various amounts in the various aforementioned areas.  Next, a modified star flap was marked on the right reconstructed breast.  The base on the right side was oriented in a cephalad direction.  It was marked at the preplanned location.  It was incised with a 15 blade and elevated with scissors.  The donor site was closed with 3-0 Monocryl suture in a deep dermal buried interrupted fashion and 5-0 nylon suture in a simple running fashion.  The flap was inset with 5-0 chromic suture in a simple interrupted fashion.  A medial dogear was marked for excision.  This measured 2-1/2 cm.  It was excised with a 10 blade and closed in a similar fashion.  My attention then turned to the left side.  A modified star flap was  marked on the left reconstructed breast.  The base on the left side was oriented in a caudad direction.  It was marked at the preplanned location.  It was incised with a 15 blade and elevated with scissors.  The donor site was closed with 3-0 Monocryl suture in a deep dermal buried interrupted fashion and 5-0 nylon suture in a simple running fashion.  The flap was inset with 5-0 chromic suture in a simple interrupted fashion.  A medial dogear was marked for excision.  This measured 2-1/2 cm.  This area was first de-epithelialized and then excised with a 10 blade.  This dermal/fat autograft was then placed prior to closure of the nipple within the center of the nipple for bulk.  The graft donor site/medial dogear was closed in a similar fashion.  All fat grafting and liposuction access sites were closed with 5-0 fast-absorbing plain gut suture in a simple interrupted fashion.  2 x 2 covered arms were applied to these areas.  The patient's chest wall was dressed with Telfa pad, 4 x 4's, and tape.  The patient's abdomen was dressed with a abdominal binder.  The case was turned over to anesthesia which point the patient was awoken from general anesthesia successfully and taken to PACU in stable condition.  Was present for the entire procedure.  All counts were correct.    Estimated blood loss: Minimal    Drains: None    Complications: None immediate

## 2022-06-09 NOTE — BRIEF OP NOTE
FAT GRAFTING, NIPPLE RECONSTRUCTION  Progress Note    Kenya Lemon  6/9/2022    Pre-op Diagnosis:   * Acquired absence of breast and absent nipple, bilateral [Z90.13]     * Personal history of breast cancer [Z85.3]     * Breast asymmetry [N64.89]       Post-Op Diagnosis Codes:     * Acquired absence of breast and absent nipple, bilateral [Z90.13]     * Personal history of breast cancer [Z85.3]     * Breast asymmetry [N64.89]    Procedure/CPT® Codes:  TX NIPPLE/AREOLA RECONSTRUCTION [26078]  TX GRAFTING OF AUTOLOGOUS FAT BY LIPO 50 CC OR LESS [80850]  TX GRAFTING OF AUTOLOGOUS FAT BY LIPO EA ADDL 50 CC [92207]  TX RECMPL WND TRUNK 2.6-7.5 CM [14452]      Procedure(s):  BILATERAL BREAST  REVISION W/ FAT GRAFTING  NIPPLE RECONSTRUCTION    Surgeon(s):  Isiah Franco MD    Anesthesia: General    Staff:   Circulator: Tyson Martines RN; Ciarra Brantley RN  Scrub Person: Mulu Washington  Nursing Assistant: Terri Avila PCT  Assistant: Teo Dobbins PA-C  Assistant: Teo Dobbins PA-C      Estimated Blood Loss: minimal    Urine Voided: * No values recorded between 6/9/2022  7:29 AM and 6/9/2022  9:12 AM *    Specimens:                None          Drains: * No LDAs found *    Findings: absent breast        Complications: none immediate    Assistant: Teo Dobbins PA-C  was responsible for performing the following activities: Retraction, Suction, Irrigation, Suturing, Closing and Placing Dressing and their skilled assistance was necessary for the success of this case.    Isiah Franco MD     Date: 6/9/2022  Time: 09:34 EDT

## 2022-06-09 NOTE — ANESTHESIA PREPROCEDURE EVALUATION
Anesthesia Evaluation     Patient summary reviewed and Nursing notes reviewed   no history of anesthetic complications:  NPO Solid Status: > 8 hours  NPO Liquid Status: > 8 hours           Airway   Mallampati: II  TM distance: >3 FB  Neck ROM: full  No difficulty expected  Dental      Pulmonary - normal exam   (+) sleep apnea,   Cardiovascular - normal exam    (+) hypertension,       Neuro/Psych  (+) numbness, psychiatric history,    GI/Hepatic/Renal/Endo    (+)   diabetes mellitus,     Musculoskeletal     Abdominal    Substance History      OB/GYN          Other      history of cancer                    Anesthesia Plan    ASA 3     general     intravenous induction     Anesthetic plan, risks, benefits, and alternatives have been provided, discussed and informed consent has been obtained with: patient.    Plan discussed with CRNA.        CODE STATUS:

## 2022-06-09 NOTE — ANESTHESIA POSTPROCEDURE EVALUATION
Patient: [place] Lemon    Procedure Summary     Date: 06/09/22 Room / Location:  [facility]    Anesthesia Start: 0729 Anesthesia Stop:     Procedures:       BILATERAL BREAST  REVISION W/ FAT GRAFTING (Bilateral )      NIPPLE RECONSTRUCTION (Bilateral Breast) Diagnosis:       Acquired absence of breast and absent nipple, bilateral      Personal history of breast cancer      Breast asymmetry    Surgeons: Isiah Franco MD Provider: Billy Ortiz MD    Anesthesia Type: general ASA Status: 3          Anesthesia Type: general    Vitals  Vitals Value Taken Time   /88 06/09/22 1007   Temp 97.2 °F (36.2 °C) 06/09/22 0945   Pulse 76 06/09/22 1010   Resp 20 06/09/22 1000   SpO2 100 % 06/09/22 1010   Vitals shown include unvalidated device data.        Post Anesthesia Care and Evaluation    Patient location during evaluation: PACU  Patient participation: complete - patient participated  Level of consciousness: awake and alert  Pain management: adequate    Airway patency: patent  Anesthetic complications: No anesthetic complications  PONV Status: none  Cardiovascular status: hemodynamically stable and acceptable  Respiratory status: nonlabored ventilation, acceptable and nasal cannula  Hydration status: acceptable

## 2022-06-09 NOTE — ANESTHESIA PROCEDURE NOTES
Peripheral Block      Patient reassessed immediately prior to procedure    Patient location during procedure: OR  Start time: 6/9/2022 7:35 AM  Stop time: 6/9/2022 7:41 AM  Reason for block: at surgeon's request and post-op pain management  Performed by  Anesthesiologist: Billy Ortiz MD  Preanesthetic Checklist  Completed: patient identified, IV checked, site marked, risks and benefits discussed, surgical consent, monitors and equipment checked, pre-op evaluation and timeout performed  Prep:  Pt Position: supine  Sterile barriers:cap, gloves, sterile barriers and mask  Prep: ChloraPrep  Patient monitoring: blood pressure monitoring, continuous pulse oximetry and EKG  Procedure    Sedation: yes  Performed under: general  Guidance:ultrasound guided  Images:still images obtained, printed/placed on chart    Laterality:Bilateral  Block Type:TAP  Injection Technique:single-shot  Needle Type:short-bevel and echogenic  Needle Gauge:20 G  Resistance on Injection: none    Medications Used: bupivacaine PF (MARCAINE) 0.25 % injection, 60 mL  dexamethasone sodium phosphate injection, 2 mg  Med administered at 6/9/2022 7:45 AM      Medications  Comment:Block Injection:  LA dose divided between Right and Left block        Post Assessment  Injection Assessment: negative aspiration for heme, incremental injection and no paresthesia on injection  Patient Tolerance:comfortable throughout block  Complications:no  Additional Notes      Under Ultrasound guidance, a BBraun 4inch 360 degree needle was advanced with Normal Saline hydro dissection of tissue.  The Internal Oblique and Transversus Abdominus muscles where visualized.  At or before the aponeurosis of Internal Oblique, local anesthetic spread was visualized in the Transversus Abdominus Plane. Injection was made incrementally with aspiration every 5 mls.  There was no  intravascular injection,  injection pressure was normal, there was no neural injection, and the  procedure was completed without difficulty.  Thank You.

## 2022-09-28 DIAGNOSIS — Z17.1 MALIGNANT NEOPLASM OF UPPER OUTER QUADRANT OF BREAST IN FEMALE, ESTROGEN RECEPTOR NEGATIVE, UNSPECIFIED LATERALITY: ICD-10-CM

## 2022-09-28 DIAGNOSIS — G62.0 PERIPHERAL NEUROPATHY DUE TO CHEMOTHERAPY: ICD-10-CM

## 2022-09-28 DIAGNOSIS — C50.419 MALIGNANT NEOPLASM OF UPPER OUTER QUADRANT OF BREAST IN FEMALE, ESTROGEN RECEPTOR NEGATIVE, UNSPECIFIED LATERALITY: ICD-10-CM

## 2022-09-28 DIAGNOSIS — T45.1X5A PERIPHERAL NEUROPATHY DUE TO CHEMOTHERAPY: ICD-10-CM

## 2022-09-28 RX ORDER — GABAPENTIN 100 MG/1
100 CAPSULE ORAL
Qty: 30 CAPSULE | Refills: 5 | Status: SHIPPED | OUTPATIENT
Start: 2022-09-28

## 2022-11-28 ENCOUNTER — OFFICE VISIT (OUTPATIENT)
Dept: ONCOLOGY | Facility: CLINIC | Age: 57
End: 2022-11-28

## 2022-11-28 VITALS
TEMPERATURE: 97.2 F | DIASTOLIC BLOOD PRESSURE: 73 MMHG | HEIGHT: 62 IN | SYSTOLIC BLOOD PRESSURE: 137 MMHG | BODY MASS INDEX: 32.76 KG/M2 | RESPIRATION RATE: 18 BRPM | WEIGHT: 178 LBS | HEART RATE: 82 BPM | OXYGEN SATURATION: 98 %

## 2022-11-28 DIAGNOSIS — Z85.3 HISTORY OF BREAST CANCER: Primary | ICD-10-CM

## 2022-11-28 DIAGNOSIS — G62.0 CHEMOTHERAPY-INDUCED PERIPHERAL NEUROPATHY: ICD-10-CM

## 2022-11-28 DIAGNOSIS — T45.1X5A CHEMOTHERAPY-INDUCED PERIPHERAL NEUROPATHY: ICD-10-CM

## 2022-11-28 PROCEDURE — 99213 OFFICE O/P EST LOW 20 MIN: CPT | Performed by: NURSE PRACTITIONER

## 2022-11-28 NOTE — PROGRESS NOTES
CHIEF COMPLAINT: 1.  Peripheral neuropathy    2.  History of breast cancer    Problem List:  Oncology/Hematology History Overview Note   1.  Stage IIB T2 (2.5cm per Dr. Perez) N0 invasive ductal triple negative breast cancer.  2/16/2020 right upper outer quadrant needle core biopsy showed invasive ductal carcinoma 4.4 mm moderately differentiated negative for estrogen, progesterone receptor both 0% and HER-2/vashti 0+.  Dr. Santo recommended lumpectomy and sentinel node biopsy.  3/12/2020 saw Dr. Gary Perez.perimenopausal breast cancer she felt a 2.5 cm right upper outer quadrant mass almost at the edge of the axilla somewhat fixed.  No skin changes.  She recommended neoadjuvant chemotherapy followed by lumpectomy versus mastectomy then radiation if patient decides on breast conservation or if nodes are found on breast MRI.  She will get formal genetic counseling to help guide the issues of possible prophylactic mastectomy.  With triple negative disease the standard recommendation would be neoadjuvant Adriamycin Cytoxan dose dense x4 followed by Taxol weekly x12 followed by surgery.  If there is residual disease then would consider Xeloda per the create X trial.  We also have  looking at observation versus Keytruda for 1 year in patients with triple negative disease who do not have a complete pathological response to neoadjuvant chemotherapy.  In the absence of symptoms, NCCN guidelines does not recommend routine metastatic surveillance imaging.  Will need ejection fraction before Adriamycin.  -3/24/2020 through 5/4/2020 Adriamycin Cytoxan x4.  Opted out of neoadjuvant Taxol  -6/16/2020 left breast simple mastectomy benign  Right breast simple mastectomy showed residual invasive ductal carcinoma 9 mm with limited DCIS high-grade and negative margins.  0 out of 1 sentinel nodes positive.  Grade 7 out of 9.  Pathologic T1 BN 0.  Postoperative course complicated by localized infections.   -7/6/2020 [facility]  medical oncology follow-up visit: Given residual disease after 4 courses of AC I would want her to complete the Taxol as planned.  There is also new data presented at [facility] that suggests adjuvant Xeloda following adjuvant chemotherapy is reasonable and triple negative disease.  This is not precisely what she is doing as she has now done part of this neoadjuvant point but I think it makes sense to apply in this setting given residual disease after AC x4 but I would not forego the Taxol but will plan on adding Xeloda.  Reinforced the need for genetic testing salivary kit that has been sent to her but she has not completed.    -8/25/2020 Genetic testing was negative for pathogenic mutations in BRCA1/2 and 32 additional genes on the [employer] panel.       -Taxol dose reduced by 20% with cycle #5 due to peripheral neuropathy    -9/11/2020 [facility] clinic visit: Peripheral neuropathy continues, patient also in need of dental work, decision made to hold Taxol for 3 weeks to see if neuropathy improves and to allow time for oral surgery.    -9/4/2020 6th and final cycle of of Taxol as patient had terrible neuropathy despite dose reduction  -10/2/2020 [facility]ology follow-up visit: Neuropathy is not improved.  Does not like gabapentin.  Will stop Taxol permanently and press on with Xeloda after she has tooth extraction on October 14.  We will have her back to my nurse practitioner October 26 for cycle 1 of 6 of Xeloda.    -11/3/2020 [facility] clinic visit: Had tooth extraction with no complications.  Has Xeloda tablets in hand, will begin first cycle today.  Neuropathy is stable.  Gabapentin has been decreased 200 mg at night.    -11/24/2020 [facility] clinic visit:   Tolerated first cycle of Xeloda fairly well.  Did have diarrhea controlled with Imodium.  Has fatigue.  Otherwise no side effects.  CBC and CMP are unremarkable.  We will continue with cycle #2 of a planned 6 cycles  today.    -1/5/2021 Completed 4 of a planned 6 cycles of Xeloda.  Opted out of any further Xeloda.    2.  Hypertension  3.  Hyperlipidemia  4.  Diabetes  5.  Using IUD and taking estradiol for hormone replacement therapy preoperatively  6.  History of D and C as well as bilateral tubal ligation  7.  History of cholecystectomy  8.  Vasomotor symptoms  9.  Family history of paternal aunt with breast cancer and perhaps a sister with ovarian cancer but she is not sure.  She has nulliparous.  Genetic spit test done results pending.    10.  Anxiety and depression     Malignant neoplasm of upper-outer quadrant of right breast in female, estrogen receptor negative (HCC)   3/12/2020 Initial Diagnosis    Malignant neoplasm of upper outer quadrant of breast in female, estrogen receptor negative (CMS/HCC)     3/12/2020 Cancer Staged    Staging form: Breast, AJCC 8th Edition  - Clinical: Stage IIB (cT2, cN0, cM0, G2, ER-, TN-, HER2-) - Signed by Johann Juarez MD on 3/12/2020     3/24/2020 - 5/4/2020 Chemotherapy    OP BREAST AC DD DOXOrubicin / Cyclophosphamide       5/6/2020 -  Chemotherapy    OP SUPPORTIVE HYDRATION + ANTIEMETICS     6/16/2020 Surgery    Surgery       -6/16/2020 left breast simple mastectomy benign  Right breast simple mastectomy showed residual invasive ductal carcinoma 9 mm with limited DCIS high-grade and negative margins.  0 out of 1 sentinel nodes positive.  Grade 7 out of 9.  Pathologic T1 BN 0     7/6/2020 Cancer Staged    Staging form: Breast, AJCC 8th Edition  - Pathologic: Stage IB (pT1b, pN0, cM0, G2, ER-, TN-, HER2-) - Signed by Johann Juarez MD on 7/6/2020 7/31/2020 - 9/4/2020 Chemotherapy    Taxol       11/24/2020 - 1/5/2021 Chemotherapy    OP BREAST Capecitabine  Completed 4 of 6 planned cycles     Malignant neoplasm of upper-outer quadrant of right breast in female, estrogen receptor negative (HCC)   3/12/2020 Initial Diagnosis    Malignant neoplasm of upper-outer quadrant of right breast  in female, estrogen receptor negative (CMS/HCC)     3/24/2020 - 5/18/2020 Chemotherapy    OP BREAST AC DD DOXOrubicin / Cyclophosphamide     7/31/2020 - 9/10/2020 Chemotherapy    OP BREAST PACLitaxel (weekly X 12)     8/25/2020 Genetic Testing    Genetic testing was negative for pathogenic mutations in BRCA1/2 and 32 additional genes on the [employer] panel.        8/28/2020 Adverse Reaction    Taxol dose reduced by 20% due to peripheral neuropathy with cycle #5.         HISTORY OF PRESENT ILLNESS:  The patient is a 56 y.o. female, here for follow up on management of History of triple negative right breast cancer.  [place] has been doing well since we saw her last with no new concerns.  She had revision of her breast reconstruction in June and is pleased with the cosmetic outcome.  She has no new or concerning findings on chest exam, no new or concerning bony aches or pains.  She is on low-dose gabapentin 100 mg daily and states her neuropathy in her feet is stable.  She is trying to walk more and be more active.  Recently retired.    Past Medical History:   Diagnosis Date   • Cancer (HCC) 02/2020    Right breast cancer per mammogram; chemotherapy   • Diabetes (HCC)     type 2 dm, check bloos suagr twice a week   • History of low potassium 03/2020    prescribed potassium supplements    • History of transfusion 1989    after delivery of son (1 unit only)    • HTN (hypertension)    • IUD (intrauterine device) in place    • Low magnesium level 03/2020    requiring magnesium supplements    • Narcolepsy    • Neuropathy    • Sleep apnea     no cpap machine currently; d/t weight loss   • Wears partial dentures     lower     Past Surgical History:   Procedure Laterality Date   • BREAST BIOPSY Right 02/2020   • BREAST RECONSTRUCTION, BREAST TISSUE EXPANDER INSERTION Bilateral 06/10/2021    Procedure: BREAST TISSUE EXPANDER AND ALLODERM PLACEMENT BILATERAL;  Surgeon: Isiah Franco MD;  Location: [facility];  Service:  "Plastics;  Laterality: Bilateral;   • CHOLECYSTECTOMY     • COLONOSCOPY     • FAT GRAFTING Bilateral 6/9/2022    Procedure: BREAST  REVISION WITH FAT GRAFTING BILATERAL;  Surgeon: Isiah Franco MD;  Location:  [facility];  Service: Plastics;  Laterality: Bilateral;   • LAPAROSCOPIC TUBAL LIGATION     • LASIK Right     7-   • MASTECTOMY W/ SENTINEL NODE BIOPSY Bilateral 06/16/2020    Procedure: SKIN SPARING MASTECTOMIES BILATERAL AND RIGHT SENTINEL NODE BIOPSY;  Surgeon: Gary Perez MD;  Location:  [facility];  Service: General;  Laterality: Bilateral;   • NIPPLE RECONSTRUCTION Bilateral 6/9/2022    Procedure: NIPPLE RECONSTRUCTION BILATERAL;  Surgeon: Isiah Franco MD;  Location:  [facility];  Service: Plastics;  Laterality: Bilateral;   • VENOUS ACCESS DEVICE (PORT) INSERTION  02/2020   • VENOUS ACCESS DEVICE (PORT) REMOVAL Bilateral 06/10/2021    Procedure: PORT REMOVAL;  Surgeon: Isiah Franco MD;  Location:  [facility];  Service: Plastics;  Laterality: Bilateral;   • WISDOM TOOTH EXTRACTION      TWO REMOVED       No Known Allergies    Family History and Social History reviewed and changed as necessary    REVIEW OF SYSTEM:   Stable peripheral neuropathy in her feet    PHYSICAL EXAM:  Well-developed, well-nourished female in no distress  Chest exam is benign, skin tissue over bilateral reconstructed breast is soft, no abnormal masses, nodules, rashes or lesions.  No cervical, supraclavicular or axillary nodes palpable on exam    Vitals:    11/28/22 1308   BP: 137/73   Pulse: 82   Resp: 18   Temp: 97.2 °F (36.2 °C)   SpO2: 98%   Weight: 80.7 kg (178 lb)   Height: 157.5 cm (62\")     Vitals:    11/28/22 1308   PainSc: 0-No pain          ECOG score: 0           Vitals reviewed.    ECOG: (0) Fully Active - Able to Carry On All Pre-disease Performance Without Restriction          ASSESSMENT & PLAN:    1.  History of triple negative right breast cancer  2.  Peripheral neuropathy of the feet secondary to " chemotherapy    Discussion: [place] continues to do well, no evidence of recurrent breast cancer on clinical exam and no new worrisome symptoms.  Her peripheral neuropathy is stable, she continues on gabapentin 100 mg daily.  She did not need refill today.  She recently retired.  Still has not made an appointment with her gynecologist, states that now that she is retired she is going to make this her priority.  She has had no abnormal vaginal bleeding.    Return to clinic in 6 months for follow-up    This was a level 3, limited MDM visit with 2 stable chronic illnesses and prescription drug management.    Camelia Carranza, APRN    11/28/2022

## 2023-05-08 ENCOUNTER — HOSPITAL ENCOUNTER (OUTPATIENT)
Dept: ULTRASOUND IMAGING | Facility: HOSPITAL | Age: 58
Discharge: HOME | End: 2023-05-08
Payer: COMMERCIAL

## 2023-05-08 DIAGNOSIS — R09.89 WEAK PULSE: ICD-10-CM

## 2023-05-08 PROCEDURE — 93925 LOWER EXTREMITY STUDY: CPT

## 2023-05-26 ENCOUNTER — OFFICE VISIT (OUTPATIENT)
Dept: ONCOLOGY | Facility: CLINIC | Age: 58
End: 2023-05-26
Payer: COMMERCIAL

## 2023-05-26 VITALS
SYSTOLIC BLOOD PRESSURE: 149 MMHG | OXYGEN SATURATION: 98 % | DIASTOLIC BLOOD PRESSURE: 87 MMHG | RESPIRATION RATE: 16 BRPM | WEIGHT: 176 LBS | HEIGHT: 62 IN | BODY MASS INDEX: 32.39 KG/M2 | TEMPERATURE: 97.5 F | HEART RATE: 74 BPM

## 2023-05-26 DIAGNOSIS — Z85.3 HISTORY OF BREAST CANCER: Primary | ICD-10-CM

## 2023-05-26 RX ORDER — TRETINOIN 1 MG/G
CREAM TOPICAL
COMMUNITY
Start: 2023-02-28

## 2023-05-26 RX ORDER — LINAGLIPTIN 5 MG/1
TABLET, FILM COATED ORAL
COMMUNITY
Start: 2023-05-24

## 2023-05-26 NOTE — PROGRESS NOTES
CHIEF COMPLAINT: History of breast cancer    Problem List:  Oncology/Hematology History Overview Note   1.  Right breast cancer, stage IIB T2 (2.5cm per Dr. Perez) N0 invasive ductal triple negative breast cancer.  2/16/2020 right upper outer quadrant needle core biopsy showed invasive ductal carcinoma 4.4 mm moderately differentiated negative for estrogen, progesterone receptor both 0% and HER-2/vashti 0+.  Dr. Santo recommended lumpectomy and sentinel node biopsy.  3/12/2020 saw Dr. Gary Perez.perimenopausal breast cancer she felt a 2.5 cm right upper outer quadrant mass almost at the edge of the axilla somewhat fixed.  No skin changes.  She recommended neoadjuvant chemotherapy followed by lumpectomy versus mastectomy then radiation if patient decides on breast conservation or if nodes are found on breast MRI.  She will get formal genetic counseling to help guide the issues of possible prophylactic mastectomy.  With triple negative disease the standard recommendation would be neoadjuvant Adriamycin Cytoxan dose dense x4 followed by Taxol weekly x12 followed by surgery.  If there is residual disease then would consider Xeloda per the create X trial.  We also have  looking at observation versus Keytruda for 1 year in patients with triple negative disease who do not have a complete pathological response to neoadjuvant chemotherapy.  In the absence of symptoms, NCCN guidelines does not recommend routine metastatic surveillance imaging.  Will need ejection fraction before Adriamycin.  -3/24/2020 through 5/4/2020 Adriamycin Cytoxan x4.  Opted out of neoadjuvant Taxol  -6/16/2020 left breast simple mastectomy benign  Right breast simple mastectomy showed residual invasive ductal carcinoma 9 mm with limited DCIS high-grade and negative margins.  0 out of 1 sentinel nodes positive.  Grade 7 out of 9.  Pathologic T1 BN 0.  Postoperative course complicated by localized infections.   -7/6/2020 [facility] oncology  follow-up visit: Given residual disease after 4 courses of AC I would want her to complete the Taxol as planned.  There is also new data presented at [facility] that suggests adjuvant Xeloda following adjuvant chemotherapy is reasonable and triple negative disease.  This is not precisely what she is doing as she has now done part of this neoadjuvant point but I think it makes sense to apply in this setting given residual disease after AC x4 but I would not forego the Taxol but will plan on adding Xeloda.  Reinforced the need for genetic testing salivary kit that has been sent to her but she has not completed.    -8/25/2020 Genetic testing was negative for pathogenic mutations in BRCA1/2 and 32 additional genes on the [employer] panel.       -Taxol dose reduced by 20% with cycle #5 due to peripheral neuropathy    -9/11/2020 [facility]ology clinic visit: Peripheral neuropathy continues, patient also in need of dental work, decision made to hold Taxol for 3 weeks to see if neuropathy improves and to allow time for oral surgery.    -9/4/2020 6th and final cycle of of Taxol as patient had terrible neuropathy despite dose reduction  -10/2/2020 [facility] oncology follow-up visit: Neuropathy is not improved.  Does not like gabapentin.  Will stop Taxol permanently and press on with Xeloda after she has tooth extraction on October 14.  We will have her back to my nurse practitioner October 26 for cycle 1 of 6 of Xeloda.    -11/3/2020 [facility] oncology clinic visit: Had tooth extraction with no complications.  Has Xeloda tablets in hand, will begin first cycle today.  Neuropathy is stable.  Gabapentin has been decreased 200 mg at night.    -11/24/2020 [facility]ology clinic visit:   Tolerated first cycle of Xeloda fairly well.  Did have diarrhea controlled with Imodium.  Has fatigue.  Otherwise no side effects.  CBC and CMP are unremarkable.  We will continue with cycle #2 of a planned 6 cycles today.    -1/5/2021  Completed 4 of a planned 6 cycles of Xeloda.  Opted out of any further Xeloda.    2.  Hypertension  3.  Hyperlipidemia  4.  Diabetes  5.  Using IUD and taking estradiol for hormone replacement therapy preoperatively  6.  History of D and C as well as bilateral tubal ligation  7.  History of cholecystectomy  8.  Vasomotor symptoms  9.  Family history of paternal aunt with breast cancer and perhaps a sister with ovarian cancer but she is not sure.  She has nulliparous.  Genetic spit test done results pending.    10.  Anxiety and depression     Malignant neoplasm of upper-outer quadrant of right breast in female, estrogen receptor negative   3/12/2020 Initial Diagnosis    Malignant neoplasm of upper outer quadrant of breast in female, estrogen receptor negative (CMS/HCC)     3/12/2020 Cancer Staged    Staging form: Breast, AJCC 8th Edition  - Clinical: Stage IIB (cT2, cN0, cM0, G2, ER-, GA-, HER2-) - Signed by Johann Juarez MD on 3/12/2020     3/24/2020 - 5/4/2020 Chemotherapy    OP BREAST AC DD DOXOrubicin / Cyclophosphamide       5/6/2020 -  Chemotherapy    OP SUPPORTIVE HYDRATION + ANTIEMETICS     6/16/2020 Surgery    Surgery       -6/16/2020 left breast simple mastectomy benign  Right breast simple mastectomy showed residual invasive ductal carcinoma 9 mm with limited DCIS high-grade and negative margins.  0 out of 1 sentinel nodes positive.  Grade 7 out of 9.  Pathologic T1 BN 0     7/6/2020 Cancer Staged    Staging form: Breast, AJCC 8th Edition  - Pathologic: Stage IB (pT1b, pN0, cM0, G2, ER-, GA-, HER2-) - Signed by Johann Juarez MD on 7/6/2020 7/31/2020 - 9/4/2020 Chemotherapy    Taxol       11/24/2020 - 1/5/2021 Chemotherapy    OP BREAST Capecitabine  Completed 4 of 6 planned cycles     Malignant neoplasm of upper-outer quadrant of right breast in female, estrogen receptor negative   3/12/2020 Initial Diagnosis    Malignant neoplasm of upper-outer quadrant of right breast in female, estrogen receptor  negative (CMS/HCC)     3/24/2020 - 5/18/2020 Chemotherapy    OP BREAST AC DD DOXOrubicin / Cyclophosphamide     7/31/2020 - 9/10/2020 Chemotherapy    OP BREAST PACLitaxel (weekly X 12)     8/25/2020 Genetic Testing    Genetic testing was negative for pathogenic mutations in BRCA1/2 and 32 additional genes on the [employer] panel.        8/28/2020 Adverse Reaction    Taxol dose reduced by 20% due to peripheral neuropathy with cycle #5.         HISTORY OF PRESENT ILLNESS:  The patient is a 57 y.o. female, here for follow up on management of history of triple negative right breast cancer.  Sandra has been doing well overall since we saw her last with no new concerns.  Stable peripheral neuropathy in her feet for which she takes gabapentin 100 mg at night.  No new or concerning findings on breast self-exam, no new or concerning bony aches or pains.  She reports that she had a very fleeting discomfort at 1 time in her right axillary/breast region however that resolved spontaneously and has not returned.    Past Medical History:   Diagnosis Date   • Cancer 02/2020    Right breast cancer per mammogram; chemotherapy   • Diabetes     type 2 dm, check bloos suagr twice a week   • History of low potassium 03/2020    prescribed potassium supplements    • History of transfusion 1989    after delivery of son (1 unit only)    • HTN (hypertension)    • IUD (intrauterine device) in place    • Low magnesium level 03/2020    requiring magnesium supplements    • Narcolepsy    • Neuropathy    • Sleep apnea     no cpap machine currently; d/t weight loss   • Wears partial dentures     lower     Past Surgical History:   Procedure Laterality Date   • BREAST BIOPSY Right 02/2020   • BREAST RECONSTRUCTION, BREAST TISSUE EXPANDER INSERTION Bilateral 06/10/2021    Procedure: BREAST TISSUE EXPANDER AND ALLODERM PLACEMENT BILATERAL;  Surgeon: Isiah Franco MD;  [facility]  Service: Plastics;  Laterality: Bilateral;   •  "CHOLECYSTECTOMY     • COLONOSCOPY     • FAT GRAFTING Bilateral 6/9/2022    Procedure: BREAST  REVISION WITH FAT GRAFTING BILATERAL;  Surgeon: Isiah Franco MD;  Location:  [facility];  Service: Plastics;  Laterality: Bilateral;   • LAPAROSCOPIC TUBAL LIGATION     • LASIK Right     7-   • MASTECTOMY W/ SENTINEL NODE BIOPSY Bilateral 06/16/2020    Procedure: SKIN SPARING MASTECTOMIES BILATERAL AND RIGHT SENTINEL NODE BIOPSY;  Surgeon: Gary Perez MD;  Location:  [facility];  Service: General;  Laterality: Bilateral;   • NIPPLE RECONSTRUCTION Bilateral 6/9/2022    Procedure: NIPPLE RECONSTRUCTION BILATERAL;  Surgeon: Isiah Franco MD;  Location:  [facility];  Service: Plastics;  Laterality: Bilateral;   • VENOUS ACCESS DEVICE (PORT) INSERTION  02/2020   • VENOUS ACCESS DEVICE (PORT) REMOVAL Bilateral 06/10/2021    Procedure: PORT REMOVAL;  Surgeon: Isiah Franco MD;  Location:  [facility];  Service: Plastics;  Laterality: Bilateral;   • WISDOM TOOTH EXTRACTION      TWO REMOVED       No Known Allergies    Family History and Social History reviewed and changed as necessary    REVIEW OF SYSTEM:   Negative for new somatic concerns    PHYSICAL EXAM:  Well-developed, well-nourished female in no distress  No cervical, supraclavicular or axillary nodes palpable on exam  Skin tissue over bilateral reconstructed breast is soft, no abnormal masses, nodules, rashes or lesions.    Vitals:    05/26/23 1319   BP: 149/87   Pulse: 74   Resp: 16   Temp: 97.5 °F (36.4 °C)   SpO2: 98%   Weight: 79.8 kg (176 lb)   Height: 157.5 cm (62\")     Vitals:    05/26/23 1319   PainSc: 0-No pain          ECOG score: 0           Vitals reviewed.    ECOG: (0) Fully Active - Able to Carry On All Pre-disease Performance Without Restriction    Lab Results   Component Value Date    HGB 12.9 06/07/2022    HCT 39.6 06/07/2022    MCV 92.3 06/07/2022     06/07/2022    WBC 8.82 06/07/2022    NEUTROABS 4.2 12/19/2021    LYMPHSABS 1.9 " 12/19/2021    MONOSABS 0.5 12/19/2021    EOSABS 0.2 12/19/2021    BASOSABS 0.1 12/19/2021       Lab Results   Component Value Date    GLUCOSE 110 (H) 06/26/2021    BUN 17 06/26/2021    CREATININE 0.64 06/26/2021     06/26/2021    K 3.7 06/07/2022     06/26/2021    CO2 28.0 06/26/2021    CALCIUM 8.7 06/26/2021    PROTEINTOT 7.1 06/25/2021    ALBUMIN 4.10 06/25/2021    BILITOT <0.2 06/25/2021    ALKPHOS 85 06/25/2021    AST 19 06/25/2021    ALT 18 06/25/2021             ASSESSMENT & PLAN:    1.  History of triple negative right breast cancer  2.  Peripheral neuropathy of the feet secondary to chemotherapy    Discussion: Sandra continues to do well, she has no evidence of recurrent breast cancer on clinical exam and no new worrisome symptoms.  She has had bilateral mastectomies with reconstruction therefore does not need mammogram.  Her cancer was triple negative therefore she is not on any adjuvant AI therapy.  She is now 3 years out from diagnosis and doing well.  Her peripheral neuropathy in her feet secondary to chemotherapy is stable, she takes gabapentin 100 mg at night and this helps.  She did not need refill today.    Return to clinic in 1 year for follow-up    I spent 20 minutes caring for [place] on this date of service. This time includes time spent by me in the following activities: preparing for the visit, performing a medically appropriate examination and/or evaluation and documenting information in the medical record.     Camelia Carranza, APRN    05/26/2023

## 2023-08-17 DIAGNOSIS — T45.1X5A PERIPHERAL NEUROPATHY DUE TO CHEMOTHERAPY: ICD-10-CM

## 2023-08-17 DIAGNOSIS — G62.0 PERIPHERAL NEUROPATHY DUE TO CHEMOTHERAPY: ICD-10-CM

## 2023-08-17 DIAGNOSIS — C50.419 MALIGNANT NEOPLASM OF UPPER OUTER QUADRANT OF BREAST IN FEMALE, ESTROGEN RECEPTOR NEGATIVE, UNSPECIFIED LATERALITY: ICD-10-CM

## 2023-08-17 DIAGNOSIS — Z17.1 MALIGNANT NEOPLASM OF UPPER OUTER QUADRANT OF BREAST IN FEMALE, ESTROGEN RECEPTOR NEGATIVE, UNSPECIFIED LATERALITY: ICD-10-CM

## 2023-08-17 RX ORDER — GABAPENTIN 100 MG/1
100 CAPSULE ORAL
Qty: 30 CAPSULE | Refills: 3 | Status: SHIPPED | OUTPATIENT
Start: 2023-08-17

## 2024-05-28 ENCOUNTER — OFFICE VISIT (OUTPATIENT)
Dept: ONCOLOGY | Facility: CLINIC | Age: 59
End: 2024-05-28
Payer: COMMERCIAL

## 2024-05-28 VITALS
OXYGEN SATURATION: 98 % | HEIGHT: 62 IN | SYSTOLIC BLOOD PRESSURE: 147 MMHG | WEIGHT: 175 LBS | HEART RATE: 79 BPM | DIASTOLIC BLOOD PRESSURE: 88 MMHG | TEMPERATURE: 97.3 F | RESPIRATION RATE: 16 BRPM | BODY MASS INDEX: 32.2 KG/M2

## 2024-05-28 DIAGNOSIS — Z85.3 HISTORY OF BREAST CANCER: Primary | ICD-10-CM

## 2024-05-28 PROBLEM — C50.411 MALIGNANT NEOPLASM OF UPPER-OUTER QUADRANT OF RIGHT BREAST IN FEMALE, ESTROGEN RECEPTOR NEGATIVE: Status: RESOLVED | Noted: 2020-03-12 | Resolved: 2024-05-28

## 2024-05-28 PROBLEM — Z17.1 MALIGNANT NEOPLASM OF UPPER-OUTER QUADRANT OF RIGHT BREAST IN FEMALE, ESTROGEN RECEPTOR NEGATIVE: Status: RESOLVED | Noted: 2020-03-12 | Resolved: 2024-05-28

## 2024-05-28 PROCEDURE — 99213 OFFICE O/P EST LOW 20 MIN: CPT | Performed by: NURSE PRACTITIONER

## 2024-05-28 NOTE — PROGRESS NOTES
CHIEF COMPLAINT: Occasional fleeting pain in the right breast    Problem List:  Oncology/Hematology History Overview Note   1.  Right breast cancer, stage IIB T2 (2.5cm per Dr. Perez) N0 invasive ductal triple negative breast cancer.  2/16/2020 right upper outer quadrant needle core biopsy showed invasive ductal carcinoma 4.4 mm moderately differentiated negative for estrogen, progesterone receptor both 0% and HER-2/vashti 0+.  Dr. Santo recommended lumpectomy and sentinel node biopsy.  3/12/2020 saw Dr. Gary Perez.perimenopausal breast cancer she felt a 2.5 cm right upper outer quadrant mass almost at the edge of the axilla somewhat fixed.  No skin changes.  She recommended neoadjuvant chemotherapy followed by lumpectomy versus mastectomy then radiation if patient decides on breast conservation or if nodes are found on breast MRI.  She will get formal genetic counseling to help guide the issues of possible prophylactic mastectomy.  With triple negative disease the standard recommendation would be neoadjuvant Adriamycin Cytoxan dose dense x4 followed by Taxol weekly x12 followed by surgery.  If there is residual disease then would consider Xeloda per the create X trial.  We also have  looking at observation versus Keytruda for 1 year in patients with triple negative disease who do not have a complete pathological response to neoadjuvant chemotherapy.  In the absence of symptoms, NCCN guidelines does not recommend routine metastatic surveillance imaging.  Will need ejection fraction before Adriamycin.  -3/24/2020 through 5/4/2020 Adriamycin Cytoxan x4.  Opted out of neoadjuvant Taxol  -6/16/2020 left breast simple mastectomy benign  Right breast simple mastectomy showed residual invasive ductal carcinoma 9 mm with limited DCIS high-grade and negative margins.  0 out of 1 sentinel nodes positive.  Grade 7 out of 9.  Pathologic T1 BN 0.  Postoperative course complicated by localized infections.   -7/6/2020  [facility]: Given residual disease after 4 courses of AC I would want her to complete the Taxol as planned.  There is also new data presented at [facility] that suggests adjuvant Xeloda following adjuvant chemotherapy is reasonable and triple negative disease.  This is not precisely what she is doing as she has now done part of this neoadjuvant point but I think it makes sense to apply in this setting given residual disease after AC x4 but I would not forego the Taxol but will plan on adding Xeloda.  Reinforced the need for genetic testing salivary kit that has been sent to her but she has not completed.    -8/25/2020 Genetic testing was negative for pathogenic mutations in BRCA1/2 and 32 additional genes on the [employer] panel.       -Taxol dose reduced by 20% with cycle #5 due to peripheral neuropathy    -9/11/2020 [facility] visit: Peripheral neuropathy continues, patient also in need of dental work, decision made to hold Taxol for 3 weeks to see if neuropathy improves and to allow time for oral surgery.    -9/4/2020 6th and final cycle of of Taxol as patient had terrible neuropathy despite dose reduction  -10/2/2020 [facility] follow-up visit: Neuropathy is not improved.  Does not like gabapentin.  Will stop Taxol permanently and press on with Xeloda after she has tooth extraction on October 14.  We will have her back to my nurse practitioner October 26 for cycle 1 of 6 of Xeloda.    -11/3/2020 [facility] visit: Had tooth extraction with no complications.  Has Xeloda tablets in hand, will begin first cycle today.  Neuropathy is stable.  Gabapentin has been decreased 200 mg at night.    -11/24/2020 [facility] visit:   Tolerated first cycle of Xeloda fairly well.  Did have diarrhea controlled with Imodium.  Has fatigue.  Otherwise no side effects.  CBC and CMP are unremarkable.  We will continue with cycle #2 of a planned 6 cycles  today.    -1/5/2021 Completed 4 of a planned 6 cycles of Xeloda.  Opted out of any further Xeloda.    2.  Hypertension  3.  Hyperlipidemia  4.  Diabetes  5.  Using IUD and taking estradiol for hormone replacement therapy preoperatively  6.  History of D and C as well as bilateral tubal ligation  7.  History of cholecystectomy  8.  Vasomotor symptoms  9.  Family history of paternal aunt with breast cancer and perhaps a sister with ovarian cancer but she is not sure.  She has nulliparous.  Genetic spit test done results pending.    10.  Anxiety and depression     Malignant neoplasm of upper-outer quadrant of right breast in female, estrogen receptor negative   3/12/2020 Initial Diagnosis    Malignant neoplasm of upper outer quadrant of breast in female, estrogen receptor negative (CMS/HCC)     3/12/2020 Cancer Staged    Staging form: Breast, AJCC 8th Edition  - Clinical: Stage IIB (cT2, cN0, cM0, G2, ER-, [place]-, HER2-) - Signed by Johann Juarez MD on 3/12/2020     3/24/2020 - 5/4/2020 Chemotherapy    OP BREAST AC DD DOXOrubicin / Cyclophosphamide       5/6/2020 -  Chemotherapy    OP SUPPORTIVE HYDRATION + ANTIEMETICS     6/16/2020 Surgery    Surgery       -6/16/2020 left breast simple mastectomy benign  Right breast simple mastectomy showed residual invasive ductal carcinoma 9 mm with limited DCIS high-grade and negative margins.  0 out of 1 sentinel nodes positive.  Grade 7 out of 9.  Pathologic T1 BN 0     7/6/2020 Cancer Staged    Staging form: Breast, AJCC 8th Edition  - Pathologic: Stage IB (pT1b, pN0, cM0, G2, ER-, OH-, HER2-) - Signed by Johann Juarez MD on 7/6/2020 7/31/2020 - 9/4/2020 Chemotherapy    Taxol       11/24/2020 - 1/5/2021 Chemotherapy    OP BREAST Capecitabine  Completed 4 of 6 planned cycles     Malignant neoplasm of upper-outer quadrant of right breast in female, estrogen receptor negative   3/12/2020 Initial Diagnosis    Malignant neoplasm of upper-outer quadrant of right breast in female,  estrogen receptor negative (CMS/HCC)     3/24/2020 - 5/18/2020 Chemotherapy    OP BREAST AC DD DOXOrubicin / Cyclophosphamide     7/31/2020 - 9/10/2020 Chemotherapy    OP BREAST PACLitaxel (weekly X 12)     8/25/2020 Genetic Testing    Genetic testing was negative for pathogenic mutations in BRCA1/2 and 32 additional genes on the [employer] panel.        8/28/2020 Adverse Reaction    Taxol dose reduced by 20% due to peripheral neuropathy with cycle #5.         HISTORY OF PRESENT ILLNESS:  The patient is a 58 y.o. female, here for follow up on management of history of triple negative right breast cancer as outlined above in the oncology history.  Molly reports overall she has been doing well since we saw her last with no new concerns.  She reports that she had does have occasional fleeting sharp pain in the right breast that comes and goes quickly.  She has had this occur off and on over the years.  No lymphadenopathy that she is aware of.  No new or concerning bony aches or pains.  She remains active.  She went hiking and camping this weekend, no cough or unusual shortness of breath.  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    Past Medical History:   Diagnosis Date    Cancer 02/2020    Right breast cancer per mammogram; chemotherapy    Diabetes     type 2 dm, check bloos suagr twice a week    History of low potassium 03/2020    prescribed potassium supplements     History of transfusion 1989    after delivery of son (1 unit only)     HTN (hypertension)     IUD (intrauterine device) in place     Low magnesium level 03/2020    requiring magnesium supplements     Narcolepsy     Neuropathy     Sleep apnea     no cpap machine currently; d/t weight loss    Wears partial dentures     lower     Past Surgical History:   Procedure Laterality Date    BREAST BIOPSY Right 02/2020    BREAST RECONSTRUCTION, BREAST TISSUE EXPANDER INSERTION Bilateral 06/10/2021    Procedure: BREAST TISSUE  "EXPANDER AND ALLODERM PLACEMENT BILATERAL;  Surgeon: Isiah Franco MD;  Location:  [facility];  Service: Plastics;  Laterality: Bilateral;    CHOLECYSTECTOMY      COLONOSCOPY      FAT GRAFTING Bilateral 6/9/2022    Procedure: BREAST  REVISION WITH FAT GRAFTING BILATERAL;  Surgeon: Isiah Franco MD;  Location:  [facility];  Service: Plastics;  Laterality: Bilateral;    LAPAROSCOPIC TUBAL LIGATION      LASIK Right     7-    MASTECTOMY W/ SENTINEL NODE BIOPSY Bilateral 06/16/2020    Procedure: SKIN SPARING MASTECTOMIES BILATERAL AND RIGHT SENTINEL NODE BIOPSY;  Surgeon: Gary Perez MD;  Location:  [facility];  Service: General;  Laterality: Bilateral;    NIPPLE RECONSTRUCTION Bilateral 6/9/2022    Procedure: NIPPLE RECONSTRUCTION BILATERAL;  Surgeon: Isiah Franco MD;  Location:  [facility];  Service: Plastics;  Laterality: Bilateral;    VENOUS ACCESS DEVICE (PORT) INSERTION  02/2020    VENOUS ACCESS DEVICE (PORT) REMOVAL Bilateral 06/10/2021    Procedure: PORT REMOVAL;  Surgeon: Isiah Franco MD;  Location:  [facility];  Service: Plastics;  Laterality: Bilateral;    WISDOM TOOTH EXTRACTION      TWO REMOVED       No Known Allergies    Family History and Social History reviewed and changed as necessary    REVIEW OF SYSTEM:   Occasional fleeting sharp pain in the right breast    PHYSICAL EXAM:  Well-developed, well-nourished appearing female in no distress  Respirations regular and unlabored  No cervical, supraclavicular or axillary nodes palpable on exam  Breast exam benign, skin tissue over bilateral reconstructed breast is soft, no abnormal masses, nodules, rashes or lesions.    Vitals:    05/28/24 1314   BP: 147/88   Pulse: 79   Resp: 16   Temp: 97.3 °F (36.3 °C)   TempSrc: Infrared   SpO2: 98%   Weight: 79.4 kg (175 lb)   Height: 157.5 cm (62\")     Vitals:    05/28/24 1314   PainSc: 0-No pain          ECOG score: 0           Vitals reviewed.    ECOG: (0) Fully Active - Able to Carry On All " Pre-disease Performance Without Restriction    Lab Results   Component Value Date    HGB 12.9 06/07/2022    HCT 39.6 06/07/2022    MCV 92.3 06/07/2022     06/07/2022    WBC 8.82 06/07/2022    NEUTROABS 4.2 12/19/2021    LYMPHSABS 1.9 12/19/2021    MONOSABS 0.5 12/19/2021    EOSABS 0.2 12/19/2021    BASOSABS 0.1 12/19/2021       Lab Results   Component Value Date    GLUCOSE 110 (H) 06/26/2021    BUN 17 06/26/2021    CREATININE 0.64 06/26/2021     06/26/2021    K 3.7 06/07/2022     06/26/2021    CO2 28.0 06/26/2021    CALCIUM 8.7 06/26/2021    PROTEINTOT 7.1 06/25/2021    ALBUMIN 4.10 06/25/2021    BILITOT <0.2 06/25/2021    ALKPHOS 85 06/25/2021    AST 19 06/25/2021    ALT 18 06/25/2021             ASSESSMENT & PLAN:    1.  History of triple negative right breast cancer status post treatment as outlined above: Molly is doing well, she has no evidence of recurrent breast cancer on clinical exam and no new worrisome symptoms.  We discussed her phantom pains that she gets in the right breast at times, these are not new and not worsening in intensity over time.  She understands let me know if it becomes persistent or she has any other associated symptoms but this just sounds like postmastectomy phantom pains.  She had no abnormalities on exam.  She is now 4 years out from diagnosis.  We will see her back in 1 year for follow-up.    I spent 20 minutes caring for [place] on this date of service. This time includes time spent by me in the following activities: preparing for the visit, performing a medically appropriate examination and/or evaluation, counseling and educating the patient/family/caregiver, and documenting information in the medical record.     Camelia Carranza, APRN    05/28/2024

## 2025-03-24 ENCOUNTER — TRANSCRIBE ORDERS (OUTPATIENT)
Facility: HOSPITAL | Age: 60
End: 2025-03-24

## 2025-03-24 DIAGNOSIS — E11.42 DIABETIC POLYNEUROPATHY ASSOCIATED WITH TYPE 2 DIABETES MELLITUS: Primary | ICD-10-CM

## 2025-04-01 ENCOUNTER — HOSPITAL ENCOUNTER (OUTPATIENT)
Dept: ULTRASOUND IMAGING | Facility: HOSPITAL | Age: 60
Discharge: HOME | End: 2025-04-01
Payer: COMMERCIAL

## 2025-04-01 DIAGNOSIS — E11.42 DIABETIC POLYNEUROPATHY ASSOCIATED WITH TYPE 2 DIABETES MELLITUS: ICD-10-CM

## 2025-04-01 PROCEDURE — 93924 LWR XTR VASC STDY BILAT: CPT

## 2025-06-02 ENCOUNTER — OFFICE VISIT (OUTPATIENT)
Dept: ONCOLOGY | Facility: CLINIC | Age: 60
End: 2025-06-02
Payer: COMMERCIAL

## 2025-06-02 VITALS
SYSTOLIC BLOOD PRESSURE: 167 MMHG | BODY MASS INDEX: 32.18 KG/M2 | OXYGEN SATURATION: 97 % | WEIGHT: 174.9 LBS | DIASTOLIC BLOOD PRESSURE: 81 MMHG | HEIGHT: 62 IN | HEART RATE: 106 BPM | TEMPERATURE: 97.3 F | RESPIRATION RATE: 16 BRPM

## 2025-06-02 DIAGNOSIS — Z85.3 HISTORY OF BREAST CANCER: Primary | ICD-10-CM

## 2025-06-02 PROCEDURE — 99213 OFFICE O/P EST LOW 20 MIN: CPT | Performed by: NURSE PRACTITIONER

## 2025-06-02 RX ORDER — DAPAGLIFLOZIN 10 MG/1
1 TABLET, FILM COATED ORAL EVERY MORNING
COMMUNITY
Start: 2025-04-16

## 2025-06-02 RX ORDER — ORAL SEMAGLUTIDE 3 MG/1
TABLET ORAL
COMMUNITY
Start: 2025-05-28

## 2025-06-02 NOTE — PROGRESS NOTES
CHIEF COMPLAINT: History of triple negative breast cancer    Problem List:  Oncology/Hematology History Overview Note   1.  Right breast cancer, stage IIB T2 (2.5cm per Dr. Perez) N0 invasive ductal triple negative breast cancer.  2/16/2020 right upper outer quadrant needle core biopsy showed invasive ductal carcinoma 4.4 mm moderately differentiated negative for estrogen, progesterone receptor both 0% and HER-2/vashti 0+.  Dr. Santo recommended lumpectomy and sentinel node biopsy.  3/12/2020 saw Dr. Gary Perez.perimenopausal breast cancer she felt a 2.5 cm right upper outer quadrant mass almost at the edge of the axilla somewhat fixed.  No skin changes.  She recommended neoadjuvant chemotherapy followed by lumpectomy versus mastectomy then radiation if patient decides on breast conservation or if nodes are found on breast MRI.  She will get formal genetic counseling to help guide the issues of possible prophylactic mastectomy.  With triple negative disease the standard recommendation would be neoadjuvant Adriamycin Cytoxan dose dense x4 followed by Taxol weekly x12 followed by surgery.  If there is residual disease then would consider Xeloda per the create X trial.  We also have  looking at observation versus Keytruda for 1 year in patients with triple negative disease who do not have a complete pathological response to neoadjuvant chemotherapy.  In the absence of symptoms, NCCN guidelines does not recommend routine metastatic surveillance imaging.  Will need ejection fraction before Adriamycin.  -3/24/2020 through 5/4/2020 Adriamycin Cytoxan x4.  Opted out of neoadjuvant Taxol  -6/16/2020 left breast simple mastectomy benign  Right breast simple mastectomy showed residual invasive ductal carcinoma 9 mm with limited DCIS high-grade and negative margins.  0 out of 1 sentinel nodes positive.  Grade 7 out of 9.  Pathologic T1 BN 0.  Postoperative course complicated by localized infections.   -7/6/2020  [facility]: Given residual disease after 4 courses of AC I would want her to complete the Taxol as planned.  There is also new data presented at [facility] that suggests adjuvant Xeloda following adjuvant chemotherapy is reasonable and triple negative disease.  This is not precisely what she is doing as she has now done part of this neoadjuvant point but I think it makes sense to apply in this setting given residual disease after AC x4 but I would not forego the Taxol but will plan on adding Xeloda.  Reinforced the need for genetic testing salivary kit that has been sent to her but she has not completed.    -8/25/2020 Genetic testing was negative for pathogenic mutations in BRCA1/2 and 32 additional genes on the [employer] panel.       -Taxol dose reduced by 20% with cycle #5 due to peripheral neuropathy    -9/11/2020 [facility] visit: Peripheral neuropathy continues, patient also in need of dental work, decision made to hold Taxol for 3 weeks to see if neuropathy improves and to allow time for oral surgery.    -9/4/2020 6th and final cycle of of Taxol as patient had terrible neuropathy despite dose reduction  -10/2/2020 [facility] follow-up visit: Neuropathy is not improved.  Does not like gabapentin.  Will stop Taxol permanently and press on with Xeloda after she has tooth extraction on October 14.  We will have her back to my nurse practitioner October 26 for cycle 1 of 6 of Xeloda.    -11/3/2020 [facility] visit: Had tooth extraction with no complications.  Has Xeloda tablets in hand, will begin first cycle today.  Neuropathy is stable.  Gabapentin has been decreased 200 mg at night.    -11/24/2020 [facility] visit:   Tolerated first cycle of Xeloda fairly well.  Did have diarrhea controlled with Imodium.  Has fatigue.  Otherwise no side effects.  CBC and CMP are unremarkable.  We will continue with cycle #2 of a planned 6 cycles  today.    -1/5/2021 Completed 4 of a planned 6 cycles of Xeloda.  Opted out of any further Xeloda.    2.  Hypertension  3.  Hyperlipidemia  4.  Diabetes  5.  Using IUD and taking estradiol for hormone replacement therapy preoperatively  6.  History of D and C as well as bilateral tubal ligation  7.  History of cholecystectomy  8.  Vasomotor symptoms  9.  Family history of paternal aunt with breast cancer and perhaps a sister with ovarian cancer but she is not sure.  She has nulliparous.  Genetic spit test done results pending.    10.  Anxiety and depression     Malignant neoplasm of upper-outer quadrant of right breast in female, estrogen receptor negative (Resolved)   3/12/2020 Initial Diagnosis    Malignant neoplasm of upper outer quadrant of breast in female, estrogen receptor negative (CMS/HCC)     3/12/2020 Cancer Staged    Staging form: Breast, AJCC 8th Edition  - Clinical: Stage IIB (cT2, cN0, cM0, G2, ER-, [place]-, HER2-) - Signed by Johann Juarez MD on 3/12/2020     3/24/2020 - 5/4/2020 Chemotherapy    OP BREAST AC DD DOXOrubicin / Cyclophosphamide       5/6/2020 - 5/8/2020 Chemotherapy    OP SUPPORTIVE HYDRATION + ANTIEMETICS     6/16/2020 Surgery    Surgery       -6/16/2020 left breast simple mastectomy benign  Right breast simple mastectomy showed residual invasive ductal carcinoma 9 mm with limited DCIS high-grade and negative margins.  0 out of 1 sentinel nodes positive.  Grade 7 out of 9.  Pathologic T1 BN 0     7/6/2020 Cancer Staged    Staging form: Breast, AJCC 8th Edition  - Pathologic: Stage IB (pT1b, pN0, cM0, G2, ER-, WA-, HER2-) - Signed by Johann Juarez MD on 7/6/2020 7/31/2020 - 9/4/2020 Chemotherapy    Taxol       11/24/2020 - 1/5/2021 Chemotherapy    OP BREAST Capecitabine  Completed 4 of 6 planned cycles     Malignant neoplasm of upper-outer quadrant of right breast in female, estrogen receptor negative (Resolved)   3/12/2020 Initial Diagnosis    Malignant neoplasm of upper-outer quadrant  of right breast in female, estrogen receptor negative (CMS/HCC)     3/24/2020 - 5/18/2020 Chemotherapy    OP BREAST AC DD DOXOrubicin / Cyclophosphamide     7/31/2020 - 9/10/2020 Chemotherapy    OP BREAST PACLitaxel (weekly X 12)     8/25/2020 Genetic Testing    Genetic testing was negative for pathogenic mutations in BRCA1/2 and 32 additional genes on the [employer] panel.        8/28/2020 Adverse Reaction    Taxol dose reduced by 20% due to peripheral neuropathy with cycle #5.         HISTORY OF PRESENT ILLNESS:  The patient is a 59 y.o. female, here for follow up on management of history of triple negative right breast cancer.  Molly has been doing well since we saw her last with no new concerns.  She states that she still gets a little anxious at times worried if her cancer will return.  She has no new or concerning bony aches or pains, no new or concerning findings on breast or chest exam.    Past Medical History:   Diagnosis Date    Cancer 02/2020    Right breast cancer per mammogram; chemotherapy    Diabetes     type 2 dm, check bloos suagr twice a week    History of low potassium 03/2020    prescribed potassium supplements     History of transfusion 1989    after delivery of son (1 unit only)     HTN (hypertension)     IUD (intrauterine device) in place     Low magnesium level 03/2020    requiring magnesium supplements     Narcolepsy     Neuropathy     Sleep apnea     no cpap machine currently; d/t weight loss    Wears partial dentures     lower     Past Surgical History:   Procedure Laterality Date    BREAST BIOPSY Right 02/2020    BREAST RECONSTRUCTION, BREAST TISSUE EXPANDER INSERTION Bilateral 06/10/2021    Procedure: BREAST TISSUE EXPANDER AND ALLODERM PLACEMENT BILATERAL;  Surgeon: Isiah Fracno MD;  Location: [facility];  Service: Plastics;  Laterality: Bilateral;    CHOLECYSTECTOMY      COLONOSCOPY      FAT GRAFTING Bilateral 6/9/2022    Procedure: BREAST  REVISION WITH FAT GRAFTING BILATERAL;   "Surgeon: Isiah Franco MD;  Location:  [facility];  Service: Plastics;  Laterality: Bilateral;    LAPAROSCOPIC TUBAL LIGATION      LASIK Right     7-    MASTECTOMY W/ SENTINEL NODE BIOPSY Bilateral 06/16/2020    Procedure: SKIN SPARING MASTECTOMIES BILATERAL AND RIGHT SENTINEL NODE BIOPSY;  Surgeon: Gary Perez MD;  Location:  [facility];  Service: General;  Laterality: Bilateral;    NIPPLE RECONSTRUCTION Bilateral 6/9/2022    Procedure: NIPPLE RECONSTRUCTION BILATERAL;  Surgeon: Isiah Franco MD;  Location:  [facility];  Service: Plastics;  Laterality: Bilateral;    VENOUS ACCESS DEVICE (PORT) INSERTION  02/2020    VENOUS ACCESS DEVICE (PORT) REMOVAL Bilateral 06/10/2021    Procedure: PORT REMOVAL;  Surgeon: Isiah Franco MD;  Location:  [facility];  Service: Plastics;  Laterality: Bilateral;    WISDOM TOOTH EXTRACTION      TWO REMOVED       No Known Allergies    Family History and Social History reviewed and changed as necessary    REVIEW OF SYSTEM:   No new somatic concerns    PHYSICAL EXAM:  Well-developed, well-nourished healthy appearing female in no distress  No cervical, supraclavicular or axillary nodes palpable on exam  Skin tissue over bilateral reconstructed breast is soft, no abnormal masses, nodules, rashes or lesions    Vitals:    06/02/25 1409   BP: 167/81   Pulse: 106   Resp: 16   Temp: 97.3 °F (36.3 °C)   TempSrc: Temporal   SpO2: 97%   Weight: 79.3 kg (174 lb 14.4 oz)   Height: 157.5 cm (62\")     Vitals:    06/02/25 1409   PainSc: 0-No pain          ECOG score: 0           Vitals reviewed.    ECOG: (0) Fully Active - Able to Carry On All Pre-disease Performance Without Restriction    Lab Results   Component Value Date    HGB 12.9 06/07/2022    HCT 39.6 06/07/2022    MCV 92.3 06/07/2022     06/07/2022    WBC 8.82 06/07/2022    NEUTROABS 4.2 12/19/2021    LYMPHSABS 1.9 12/19/2021    MONOSABS 0.5 12/19/2021    EOSABS 0.2 12/19/2021    BASOSABS 0.1 12/19/2021       Lab Results "   Component Value Date    GLUCOSE 110 (H) 06/26/2021    BUN 17 06/26/2021    CREATININE 0.64 06/26/2021     06/26/2021    K 3.7 06/07/2022     06/26/2021    CO2 28.0 06/26/2021    CALCIUM 8.7 06/26/2021    PROTEINTOT 7.1 06/25/2021    ALBUMIN 4.10 06/25/2021    BILITOT <0.2 06/25/2021    ALKPHOS 85 06/25/2021    AST 19 06/25/2021    ALT 18 06/25/2021             ASSESSMENT & PLAN:    1.  History of triple negative right breast cancer: Molly is doing well, she has no evidence of recurrent breast cancer on clinical exam and no new worrisome symptoms.  She is now 5 years out from diagnosis.  She has had bilateral mastectomies with reconstruction therefore no need for mammography.  She does still get anxious at times worrying about recurrence of cancer.  We discussed the importance of monitoring for new and unusual symptoms and I will be glad to work that up but otherwise currently she is feeling well.  She would like to continue to follow with us annually in survivorship mode.    I spent 21 minutes caring for [place] on this date of service. This time includes time spent by me in the following activities: preparing for the visit, performing a medically appropriate examination and/or evaluation, counseling and educating the patient/family/caregiver, and documenting information in the medical record.     Camelia Carranza, APRN    06/02/2025

## (undated) DEVICE — SYR CATH/TIP 50ML 2OZ STRL 1P/U

## (undated) DEVICE — SUT MNCRYL PLS ANTIB UD 4/0 PS2 18IN

## (undated) DEVICE — ANTIBACTERIAL UNDYED BRAIDED (POLYGLACTIN 910), SYNTHETIC ABSORBABLE SUTURE: Brand: COATED VICRYL

## (undated) DEVICE — GLV SURG SENSICARE PI MIC PF SZ6.5 LF STRL

## (undated) DEVICE — COVER,LIGHT HANDLE,FLX,1/PK: Brand: MEDLINE INDUSTRIES, INC.

## (undated) DEVICE — ADHS LIQ MASTISOL 2/3ML

## (undated) DEVICE — TRAP FLD MINIVAC MEGADYNE 100ML

## (undated) DEVICE — APPL CHLORAPREP TINTED 26ML TEAL

## (undated) DEVICE — UNDERGLV SURG BIOGEL INDICATOR LF PF 7.5

## (undated) DEVICE — ADHS SKIN PREMIERPRO EXOFIN TOPICAL HI/VISC .5ML

## (undated) DEVICE — PROXIMATE RH ROTATING HEAD SKIN STAPLERS (35 WIDE) CONTAINS 35 STAINLESS STEEL STAPLES: Brand: PROXIMATE

## (undated) DEVICE — ELECTRD BLD EZ CLN MOD XLNG 2.75IN

## (undated) DEVICE — SYR LUERLOK 50ML

## (undated) DEVICE — DISH PETRI 3.5IN MD STRL LF

## (undated) DEVICE — BNDR ABD PREMIUM/UNIV 10IN 27TO48IN

## (undated) DEVICE — SUT ETHLN 2/0 PS 18IN 585H

## (undated) DEVICE — SYR LL 3CC

## (undated) DEVICE — BOWL UTIL STRL 32OZ

## (undated) DEVICE — DRSNG SURESITE WNDW 2.38X2.75

## (undated) DEVICE — GLV SURG SENSICARE W/ALOE PF LF 7 STRL

## (undated) DEVICE — TBG SXN LIPECTOMY 108IN

## (undated) DEVICE — SUT SILK 2/0 TIES 18IN A185H

## (undated) DEVICE — SAFESECURE,SECUREMENT,FOLEY CATH,STERILE: Brand: MEDLINE

## (undated) DEVICE — SUT GUT PLN FAST ABS 5/0 PC1 18IN 1915G

## (undated) DEVICE — SUT ETHLN 3/0 PC5 18IN 1893G

## (undated) DEVICE — BLANKT WARM LOWR/BDY 100X120CM

## (undated) DEVICE — DRSNG TELFA PAD NONADH STR 1S 3X8IN

## (undated) DEVICE — BANDAGE,GAUZE,BULKEE II,4.5"X4.1YD,STRL: Brand: MEDLINE

## (undated) DEVICE — JACKSON-PRATT 100CC BULB RESERVOIR: Brand: CARDINAL HEALTH

## (undated) DEVICE — SKIN AFFIX SURG ADHESIVE 72/CS 0.55ML: Brand: MEDLINE

## (undated) DEVICE — PREVENA DUO PEEL & PLACE SYSTEM KIT- 13 CM: Brand: PREVENA DUO™ PEEL & PLACE™

## (undated) DEVICE — PATIENT RETURN ELECTRODE, SINGLE-USE, CONTACT QUALITY MONITORING, ADULT, WITH 9FT CORD, FOR PATIENTS WEIGING OVER 33LBS. (15KG): Brand: MEGADYNE

## (undated) DEVICE — SUPER SPONGES,MEDIUM: Brand: KERLIX

## (undated) DEVICE — PK CHST BRST 10

## (undated) DEVICE — JP PERF DRN SIL FLT 10MM FULL: Brand: CARDINAL HEALTH

## (undated) DEVICE — SUT SILK 3/0 TIES 18IN A184H

## (undated) DEVICE — TP PLSTC CLR TRNSPORE 1IN SURG

## (undated) DEVICE — LINER CANSTR SXN HERCULES

## (undated) DEVICE — TBG PENCL TELESCP MEGADYNE SMOKE EVAC 10FT

## (undated) DEVICE — SUT MNCRYL PLS ANTIB UD 3/0 PS2 27IN

## (undated) DEVICE — BRA COMPR SURG STL958 QUEEN

## (undated) DEVICE — Device

## (undated) DEVICE — LEX GENERAL BREAST: Brand: MEDLINE INDUSTRIES, INC.

## (undated) DEVICE — 3M™ STERI-STRIP™ REINFORCED ADHESIVE SKIN CLOSURES, R1547, 1/2 IN X 4 IN (12 MM X 100 MM), 6 STRIPS/ENVELOPE: Brand: 3M™ STERI-STRIP™

## (undated) DEVICE — SYS FAT GRAFTING REVOLVE SGL

## (undated) DEVICE — PENCL E/S ULTRAVAC TELESCP NOSE HOLSTR 10FT

## (undated) DEVICE — SUT ETHLN 5/0 P3 18IN 698G

## (undated) DEVICE — SPNG GZ WOVN 4X4IN 12PLY 10/BX STRL

## (undated) DEVICE — ABDOMINAL BINDER: Brand: DEROYAL

## (undated) DEVICE — DRSNG WND BORDR/ADHS NONADHR/GZ LF 2X2IN STRL

## (undated) DEVICE — DRSNG TELFA ILND ADH 4X6IN

## (undated) DEVICE — SUT GUT CHRM 5/0 P3 18IN 687G

## (undated) DEVICE — INTENDED FOR TISSUE SEPARATION, AND OTHER PROCEDURES THAT REQUIRE A SHARP SURGICAL BLADE TO PUNCTURE OR CUT.: Brand: BARD-PARKER ® SAFETYLOCK CARBON RIB-BACK BLADES

## (undated) DEVICE — BLANKT WARM UNDER/BDY FUL/ACC A/ 90X206CM

## (undated) DEVICE — BLAKE SILICONE DRAIN, 15 FR ROUND, HUBLESS WITH 3/16" TROCAR: Brand: BLAKE

## (undated) DEVICE — IRRIGATOR BULB ASEPTO 60CC STRL

## (undated) DEVICE — DRSNG PAD ABD 8X10IN STRL

## (undated) DEVICE — SYR LUERLOK 20CC BX/50

## (undated) DEVICE — PCH DRN BRST RECONSTRUCT BRA LXF

## (undated) DEVICE — INTENDED FOR TISSUE SEPARATION, AND OTHER PROCEDURES THAT REQUIRE A SHARP SURGICAL BLADE TO PUNCTURE OR CUT.: Brand: BARD-PARKER ® STAINLESS STEEL BLADES

## (undated) DEVICE — STERILE PVP: Brand: MEDLINE INDUSTRIES, INC.

## (undated) DEVICE — INTRAOPERATIVE COVER KIT, 10 PACK: Brand: SITE-RITE

## (undated) DEVICE — MARKR SKIN W/RULR

## (undated) DEVICE — SUT SILK 2/0 SH 30IN K833H

## (undated) DEVICE — ELECTRD BLD EXT EDGE 1P COAT 4IN STRL